# Patient Record
Sex: FEMALE | Race: BLACK OR AFRICAN AMERICAN | Employment: FULL TIME | URBAN - METROPOLITAN AREA
[De-identification: names, ages, dates, MRNs, and addresses within clinical notes are randomized per-mention and may not be internally consistent; named-entity substitution may affect disease eponyms.]

---

## 2017-01-09 ENCOUNTER — TELEPHONE (OUTPATIENT)
Dept: INTERNAL MEDICINE CLINIC | Facility: CLINIC | Age: 47
End: 2017-01-09

## 2017-01-09 DIAGNOSIS — E11.9 CONTROLLED TYPE 2 DIABETES MELLITUS WITHOUT COMPLICATION, WITHOUT LONG-TERM CURRENT USE OF INSULIN (HCC): Primary | ICD-10-CM

## 2017-01-09 NOTE — TELEPHONE ENCOUNTER
Call patient. I've gone ahead and ordered Diabetes-related labs to start out this calendar year on the right note. Get labs done ASAP. Loida Kaur. Go Murphy MD  Diplomate, American Board of Internal Medicine  [facility]  [address] [place] [address],[address], [address]

## 2017-01-18 RX ORDER — OMEPRAZOLE 20 MG/1
CAPSULE, DELAYED RELEASE ORAL
Qty: 90 CAPSULE | Refills: 0 | Status: SHIPPED | OUTPATIENT
Start: 2017-01-18 | End: 2017-02-20

## 2017-02-08 ENCOUNTER — TELEPHONE (OUTPATIENT)
Dept: INTERNAL MEDICINE CLINIC | Facility: CLINIC | Age: 47
End: 2017-02-08

## 2017-02-13 ENCOUNTER — APPOINTMENT (OUTPATIENT)
Dept: LAB | Age: 47
End: 2017-02-13
Attending: INTERNAL MEDICINE
Payer: COMMERCIAL

## 2017-02-13 DIAGNOSIS — E11.9 CONTROLLED TYPE 2 DIABETES MELLITUS WITHOUT COMPLICATION, WITHOUT LONG-TERM CURRENT USE OF INSULIN (HCC): ICD-10-CM

## 2017-02-13 LAB
ALBUMIN SERPL-MCNC: 3.3 G/DL (ref 3.5–4.8)
ALP LIVER SERPL-CCNC: 100 U/L (ref 39–100)
ALT SERPL-CCNC: 17 U/L (ref 14–54)
AST SERPL-CCNC: 12 U/L (ref 15–41)
BILIRUB SERPL-MCNC: 0.3 MG/DL (ref 0.1–2)
BUN BLD-MCNC: 14 MG/DL (ref 8–20)
CALCIUM BLD-MCNC: 8.7 MG/DL (ref 8.3–10.3)
CHLORIDE: 101 MMOL/L (ref 101–111)
CHOLEST SMN-MCNC: 157 MG/DL (ref ?–200)
CO2: 29 MMOL/L (ref 22–32)
CREAT BLD-MCNC: 1.1 MG/DL (ref 0.55–1.02)
CREAT UR-SCNC: 328 MG/DL
EST. AVERAGE GLUCOSE BLD GHB EST-MCNC: 123 MG/DL (ref 68–126)
GLUCOSE BLD-MCNC: 103 MG/DL (ref 70–99)
HBA1C MFR BLD HPLC: 5.9 % (ref ?–5.7)
HDLC SERPL-MCNC: 74 MG/DL (ref 45–?)
HDLC SERPL: 2.12 {RATIO} (ref ?–4.44)
LDLC SERPL CALC-MCNC: 67 MG/DL (ref ?–130)
M PROTEIN MFR SERPL ELPH: 7.1 G/DL (ref 6.1–8.3)
MICROALBUMIN UR-MCNC: 1.55 MG/DL
MICROALBUMIN/CREAT 24H UR-RTO: 4.7 UG/MG (ref ?–30)
NONHDLC SERPL-MCNC: 83 MG/DL (ref ?–130)
POTASSIUM SERPL-SCNC: 3.3 MMOL/L (ref 3.6–5.1)
SODIUM SERPL-SCNC: 137 MMOL/L (ref 136–144)
TRIGLYCERIDES: 79 MG/DL (ref ?–150)
VLDL: 16 MG/DL (ref 5–40)

## 2017-02-20 ENCOUNTER — OFFICE VISIT (OUTPATIENT)
Dept: INTERNAL MEDICINE CLINIC | Facility: CLINIC | Age: 47
End: 2017-02-20

## 2017-02-20 VITALS
SYSTOLIC BLOOD PRESSURE: 118 MMHG | BODY MASS INDEX: 46 KG/M2 | DIASTOLIC BLOOD PRESSURE: 74 MMHG | TEMPERATURE: 99 F | OXYGEN SATURATION: 98 % | WEIGHT: 255.5 LBS | RESPIRATION RATE: 12 BRPM | HEART RATE: 91 BPM

## 2017-02-20 DIAGNOSIS — C81.04 NODULAR LYMPHOCYTE PREDOMINANT HODGKIN LYMPHOMA OF LYMPH NODES OF AXILLA (HCC): ICD-10-CM

## 2017-02-20 DIAGNOSIS — I10 ESSENTIAL HYPERTENSION, BENIGN: ICD-10-CM

## 2017-02-20 DIAGNOSIS — E66.01 MORBID OBESITY WITH BMI OF 45.0-49.9, ADULT (HCC): ICD-10-CM

## 2017-02-20 DIAGNOSIS — G47.33 OSA ON CPAP: ICD-10-CM

## 2017-02-20 DIAGNOSIS — Z99.89 OSA ON CPAP: ICD-10-CM

## 2017-02-20 DIAGNOSIS — E11.9 CONTROLLED TYPE 2 DIABETES MELLITUS WITHOUT COMPLICATION, WITHOUT LONG-TERM CURRENT USE OF INSULIN (HCC): Primary | ICD-10-CM

## 2017-02-20 DIAGNOSIS — K21.00 REFLUX ESOPHAGITIS: ICD-10-CM

## 2017-02-20 DIAGNOSIS — Z12.31 ENCOUNTER FOR SCREENING MAMMOGRAM FOR BREAST CANCER: ICD-10-CM

## 2017-02-20 DIAGNOSIS — N92.6 ABNORMAL MENSES: ICD-10-CM

## 2017-02-20 DIAGNOSIS — J30.9 CHRONIC ALLERGIC RHINITIS: ICD-10-CM

## 2017-02-20 PROCEDURE — 99214 OFFICE O/P EST MOD 30 MIN: CPT | Performed by: INTERNAL MEDICINE

## 2017-02-20 RX ORDER — HYDROCHLOROTHIAZIDE 25 MG/1
25 TABLET ORAL
Qty: 90 TABLET | Refills: 1 | Status: SHIPPED | OUTPATIENT
Start: 2017-02-20 | End: 2017-06-13

## 2017-02-20 RX ORDER — OMEPRAZOLE 20 MG/1
CAPSULE, DELAYED RELEASE ORAL
Qty: 90 CAPSULE | Refills: 1 | Status: SHIPPED | OUTPATIENT
Start: 2017-02-20 | End: 2017-07-26

## 2017-02-20 RX ORDER — RANITIDINE 300 MG/1
300 TABLET ORAL NIGHTLY
Qty: 90 TABLET | Refills: 1 | Status: SHIPPED | OUTPATIENT
Start: 2017-02-20 | End: 2017-08-20

## 2017-02-20 NOTE — PROGRESS NOTES
Patient presents with:   Other: 6-month f/u chronic medical conditions and disease burden status      HPI: The pt presents today for 6-month f/u chronic medical conditions and disease burden status eval.  Specifically, her conditions include controlled DM2, Capsule Delayed Release, TAKE 1 CAPSULE BY MOUTH EVERY MORNING, Disp: 90 capsule, Rfl: 1  •  MetFORMIN HCl 500 MG Oral Tab, TAKE 1 TABLET(500 MG) BY MOUTH TWICE DAILY WITH MEALS, Disp: 180 tablet, Rfl: 1  •  RaNITidine HCl (ZANTAC) 300 MG Oral Tab, Take 1 pulses B       Labs:  Component      Latest Ref Rng 2/13/2017   Glucose      70-99 mg/dL 103 (H)   BUN      8-20 mg/dL 14   CREATININE      0.55-1.02 mg/dL 1.10 (H)   GFR      >=60 68   CALCIUM      8.3-10.3 mg/dL 8.7   ALKALINE PHOSPHATASE       U/L an active and healthy lifestyle, one that encompasses strong foundations of a mineral-dense and macronutritent-balanced diet, regular and dynamic exercise, maintenance of ideal body weight, and reduction in daily stressors while making themselves more resi

## 2017-03-02 ENCOUNTER — HOSPITAL ENCOUNTER (OUTPATIENT)
Dept: MAMMOGRAPHY | Age: 47
Discharge: HOME | End: 2017-03-02
Attending: INTERNAL MEDICINE
Payer: COMMERCIAL

## 2017-03-02 DIAGNOSIS — Z12.31 ENCOUNTER FOR SCREENING MAMMOGRAM FOR BREAST CANCER: ICD-10-CM

## 2017-03-02 PROCEDURE — 77063 BREAST TOMOSYNTHESIS BI: CPT

## 2017-03-02 PROCEDURE — 77067 SCR MAMMO BI INCL CAD: CPT

## 2017-03-11 ENCOUNTER — HOSPITAL ENCOUNTER (OUTPATIENT)
Age: 47
Discharge: HOME | End: 2017-03-11
Attending: FAMILY MEDICINE
Payer: COMMERCIAL

## 2017-03-11 VITALS
RESPIRATION RATE: 16 BRPM | SYSTOLIC BLOOD PRESSURE: 153 MMHG | DIASTOLIC BLOOD PRESSURE: 77 MMHG | BODY MASS INDEX: 45 KG/M2 | WEIGHT: 249 LBS | OXYGEN SATURATION: 99 % | HEART RATE: 70 BPM | TEMPERATURE: 99 F

## 2017-03-11 DIAGNOSIS — J30.9 ALLERGIC RHINITIS, UNSPECIFIED ALLERGIC RHINITIS TRIGGER, UNSPECIFIED RHINITIS SEASONALITY: Primary | ICD-10-CM

## 2017-03-11 DIAGNOSIS — J01.00 ACUTE MAXILLARY SINUSITIS, RECURRENCE NOT SPECIFIED: ICD-10-CM

## 2017-03-11 DIAGNOSIS — H10.33 ACUTE CONJUNCTIVITIS OF BOTH EYES, UNSPECIFIED ACUTE CONJUNCTIVITIS TYPE: ICD-10-CM

## 2017-03-11 DIAGNOSIS — H65.93 MIDDLE EAR EFFUSION, BILATERAL: ICD-10-CM

## 2017-03-11 PROCEDURE — 99204 OFFICE O/P NEW MOD 45 MIN: CPT

## 2017-03-11 PROCEDURE — 99213 OFFICE O/P EST LOW 20 MIN: CPT

## 2017-03-11 RX ORDER — AMOXICILLIN AND CLAVULANATE POTASSIUM 875; 125 MG/1; MG/1
1 TABLET, FILM COATED ORAL 2 TIMES DAILY
Qty: 20 TABLET | Refills: 0 | Status: SHIPPED | OUTPATIENT
Start: 2017-03-11 | End: 2017-03-21

## 2017-03-11 RX ORDER — CIPROFLOXACIN HYDROCHLORIDE 3.5 MG/ML
2 SOLUTION/ DROPS TOPICAL EVERY 8 HOURS
Qty: 1 BOTTLE | Refills: 0 | Status: SHIPPED | OUTPATIENT
Start: 2017-03-11 | End: 2017-03-18

## 2017-03-11 NOTE — ED PROVIDER NOTES
Patient Seen in: [facility]    History   Patient presents with:  Cough/URI    Stated Complaint: cold symptoms x2 weeks     HPI  78-year-old female coming in with complaints of cold and congestion, has been sick for the last 2 we nightly. hydrochlorothiazide 25 MG Oral Tab,  Take 1 tablet (25 mg total) by mouth once daily. Multiple Vitamins-Iron (ONCE DAILY/IRON) Oral Tab,  Take 1 tablet by mouth daily.    Azelastine HCl 0.05 % Ophthalmic Solution,  Place 1 drop into both eyes d Wt 112.946 kg  SpO2 99%  LMP 02/02/2017 (Approximate)    Physical Exam  GEN: Not in any acute distress, making good conversation, answering appropriately   SKIN: No pallor, no erythema, no cyanosis, warm and dry  Eyes: wnl, Eron conjunctiva, pupils equall phenylephrine if you have heart problems or blood pressure issues like hypertension. It will raise your blood pressure. · Pneumonia is a risk with a cold.  If your symptoms worsen, you become short-of-breath, develop fever, worse cough etc, you must follo

## 2017-03-16 ENCOUNTER — OFFICE VISIT (OUTPATIENT)
Dept: PHYSICAL THERAPY | Age: 47
End: 2017-03-16
Attending: ORTHOPAEDIC SURGERY
Payer: COMMERCIAL

## 2017-03-16 DIAGNOSIS — M25.561 RIGHT KNEE PAIN, UNSPECIFIED CHRONICITY: Primary | ICD-10-CM

## 2017-03-16 DIAGNOSIS — M17.0 PRIMARY OSTEOARTHRITIS OF BOTH KNEES: ICD-10-CM

## 2017-03-16 PROCEDURE — 97140 MANUAL THERAPY 1/> REGIONS: CPT

## 2017-03-16 PROCEDURE — 97110 THERAPEUTIC EXERCISES: CPT

## 2017-03-16 PROCEDURE — 97162 PT EVAL MOD COMPLEX 30 MIN: CPT

## 2017-03-16 NOTE — PROGRESS NOTES
KNEE EVALUATION:   Referring Physician: Dr. Jemal Villatoro  Diagnosis: R knee pain, B OA     Date of Service: 3/16/2017     PATIENT SUMMARY   Andrew Phelps is a 50year old female who presents to therapy today with complaints of B knee pain which insidiously began a medial joint line in R. Stiffness in distal ITB and hamstring.            A/PROM Strength    R L R L   Hip WNL WNL       Flexion   4 4     Extension   3+ 3+     Abduction   4- 4-   Knee         Flexion 105*/110* 118/122 4+ 4+     Extension 0 0 4 4   Foot / questions, please contact me at Dept: [phone]    Sincerely,  Electronically signed by therapist: Cameron Montano PT

## 2017-03-23 ENCOUNTER — OFFICE VISIT (OUTPATIENT)
Dept: PHYSICAL THERAPY | Age: 47
End: 2017-03-23
Attending: ORTHOPAEDIC SURGERY
Payer: COMMERCIAL

## 2017-03-23 PROCEDURE — 97140 MANUAL THERAPY 1/> REGIONS: CPT

## 2017-03-23 PROCEDURE — 97110 THERAPEUTIC EXERCISES: CPT

## 2017-03-23 NOTE — PROGRESS NOTES
Dx: R knee pain, B knee OA         Authorized # of Visits:  10         Next MD visit: none scheduled  Fall Risk: standard         Precautions: n/a             Subjective: Pt reports she wasn't too sore after the eval.  Objective: (see flow sheet below)

## 2017-03-28 ENCOUNTER — APPOINTMENT (OUTPATIENT)
Dept: PHYSICAL THERAPY | Age: 47
End: 2017-03-28
Attending: ORTHOPAEDIC SURGERY
Payer: COMMERCIAL

## 2017-04-04 ENCOUNTER — OFFICE VISIT (OUTPATIENT)
Dept: PHYSICAL THERAPY | Age: 47
End: 2017-04-04
Attending: ORTHOPAEDIC SURGERY
Payer: COMMERCIAL

## 2017-04-04 PROCEDURE — 97140 MANUAL THERAPY 1/> REGIONS: CPT

## 2017-04-04 PROCEDURE — 97110 THERAPEUTIC EXERCISES: CPT

## 2017-04-04 NOTE — PROGRESS NOTES
Dx: R knee pain, B knee OA         Authorized # of Visits:  10         Next MD visit: none scheduled  Fall Risk: standard         Precautions: n/a             Subjective: Pt reports she is having more good days, but she still notes R knee pain after increa min, Gr II-III 4 min, Gr II-III 4 min, Gr II-III

## 2017-04-06 ENCOUNTER — OFFICE VISIT (OUTPATIENT)
Dept: PHYSICAL THERAPY | Age: 47
End: 2017-04-06
Attending: ORTHOPAEDIC SURGERY
Payer: COMMERCIAL

## 2017-04-06 PROCEDURE — 97140 MANUAL THERAPY 1/> REGIONS: CPT

## 2017-04-06 PROCEDURE — 97110 THERAPEUTIC EXERCISES: CPT

## 2017-04-06 NOTE — PROGRESS NOTES
Dx: R knee pain, B knee OA         Authorized # of Visits:  10         Next MD visit: none scheduled  Fall Risk: standard         Precautions: n/a             Subjective:  Pt reports her knees are feeling okay. They are a little sore today.   Objective: (s inch  R: x20, 4 inch   Backward walkout, low incline    X10, 30#   Step over, R/L    X10, 4 inch   Note: exercises with (*) are part of the patient's HEP and with (-) are not completed that visit    Manual:   Date:   Tx#:  1 3/23/2017  Tx#: 2  4/4/2017  Tx#

## 2017-04-11 ENCOUNTER — OFFICE VISIT (OUTPATIENT)
Dept: PHYSICAL THERAPY | Age: 47
End: 2017-04-11
Attending: ORTHOPAEDIC SURGERY
Payer: COMMERCIAL

## 2017-04-11 PROCEDURE — 97110 THERAPEUTIC EXERCISES: CPT

## 2017-04-11 PROCEDURE — 97140 MANUAL THERAPY 1/> REGIONS: CPT

## 2017-04-11 NOTE — PROGRESS NOTES
Dx: R knee pain, B knee OA         Authorized # of Visits:  10         Next MD visit: none scheduled  Fall Risk: standard         Precautions: n/a             Subjective: Pt reports she has been doing more stairs lately and feels stiffness in the knees.   O x20, 4 inch -   Backward walkout, low incline    X10, 30# X10, 35#   Step over, R/L    X10, 4 inch X20, 4 inch   Stair tap, SLS balance, R/L     X20, 4 inch   Note: exercises with (*) are part of the patient's HEP and with (-) are not completed that visit

## 2017-04-13 ENCOUNTER — APPOINTMENT (OUTPATIENT)
Dept: PHYSICAL THERAPY | Age: 47
End: 2017-04-13
Attending: ORTHOPAEDIC SURGERY
Payer: COMMERCIAL

## 2017-04-18 ENCOUNTER — OFFICE VISIT (OUTPATIENT)
Dept: PHYSICAL THERAPY | Age: 47
End: 2017-04-18
Attending: ORTHOPAEDIC SURGERY
Payer: COMMERCIAL

## 2017-04-18 PROCEDURE — 97140 MANUAL THERAPY 1/> REGIONS: CPT

## 2017-04-18 PROCEDURE — 97110 THERAPEUTIC EXERCISES: CPT

## 2017-04-18 NOTE — PROGRESS NOTES
Dx: R knee pain, B knee OA         Authorized # of Visits:  10         Next MD visit: none scheduled  Fall Risk: standard         Precautions: n/a             Subjective: Pt reports her knees have been feeling okay.   Just on/off symptoms, but it is not con X20, red *X20, red -   Side stepping   *X3, 10 ft, red X3, 10 ft, red X3, 10 ft, red -   Lat step up, R/L   L: X20, 4 inch  R: x10, 4 inch  L: X20, 4 inch  R: x20, 4 inch - Dip: 2x10, 4 inch   Backward walkout, low incline    X10, 30# X10, 35# X10, 35#   S

## 2017-04-25 ENCOUNTER — OFFICE VISIT (OUTPATIENT)
Dept: PHYSICAL THERAPY | Age: 47
End: 2017-04-25
Attending: ORTHOPAEDIC SURGERY
Payer: COMMERCIAL

## 2017-04-25 PROCEDURE — 97110 THERAPEUTIC EXERCISES: CPT

## 2017-04-25 PROCEDURE — 97140 MANUAL THERAPY 1/> REGIONS: CPT

## 2017-04-25 NOTE — PROGRESS NOTES
Discharge Summary  Initial Functional Outcome Score 44/100  Final Functional Outcome Score 64/100  Number of Visits Attended 7 in Physical Therapy  Pt reports pain is 2/10 at most.  She feels she has returned to her PLOF 100%.   Assessment: Pt presents with PT    Charges: 1 Man, 2 TherEx       Total Timed Treatment: 35 min  Total Treatment Time: 35 min (decreased treatment time as pt was 15 min late)  Exercises:   Date: 3/16/2017  Tx#:   1 3/23/2017  Tx#: 2  4/4/2017  Tx#: 3 4/6/2017  Tx#: 4 4/11/2017  Tx#: 5 tendon 10 min, patellar tendon 8 min   Knee traction R/L 3 min 3 min - - - - -   Patellar mob- R/L med glide with lat lift 2 min, Gr II-III 4 min, Gr II-III 4 min, Gr II-III 2 min, Gr II-III 5 min, Gr II-III 5 min, Gr II-III 2 min, Gr III

## 2017-04-26 ENCOUNTER — OFFICE VISIT (OUTPATIENT)
Dept: INTERNAL MEDICINE CLINIC | Facility: CLINIC | Age: 47
End: 2017-04-26

## 2017-04-26 VITALS
HEIGHT: 62 IN | HEART RATE: 81 BPM | RESPIRATION RATE: 20 BRPM | OXYGEN SATURATION: 99 % | BODY MASS INDEX: 47.11 KG/M2 | SYSTOLIC BLOOD PRESSURE: 132 MMHG | WEIGHT: 256 LBS | DIASTOLIC BLOOD PRESSURE: 84 MMHG | TEMPERATURE: 98 F

## 2017-04-26 DIAGNOSIS — J32.9 RECURRENT SINUS INFECTIONS: Primary | ICD-10-CM

## 2017-04-26 PROCEDURE — 99213 OFFICE O/P EST LOW 20 MIN: CPT | Performed by: INTERNAL MEDICINE

## 2017-04-26 RX ORDER — AMOXICILLIN AND CLAVULANATE POTASSIUM 875; 125 MG/1; MG/1
1 TABLET, FILM COATED ORAL 2 TIMES DAILY
Qty: 20 TABLET | Refills: 0 | Status: SHIPPED | OUTPATIENT
Start: 2017-04-26 | End: 2017-05-06

## 2017-04-26 NOTE — PROGRESS NOTES
Katrinka Bernheim is a 50year old female. HPI:   Patient presents with:  Sinus Problem: gums/teeth pain, headaches, coughing, poor hearing both ears, coughing, 2 1/2 weeks   Patient presents with acute respiratory symptoms.   Pain in her teeth, headaches, Rfl: 1  •  hydrochlorothiazide 25 MG Oral Tab, Take 1 tablet (25 mg total) by mouth once daily. , Disp: 90 tablet, Rfl: 1  •  Multiple Vitamins-Iron (ONCE DAILY/IRON) Oral Tab, Take 1 tablet by mouth daily. , Disp: , Rfl:   •  Azelastine HCl 0.05 % Ophthalmi kg/m2  SpO2 99%  LMP 04/24/2017  Breastfeeding? No  GENERAL: Alert and oriented, well developed, well nourished,in no apparent distress  HEENT: atraumatic, PERRLA, EOMI, normal lid and conjunctiva.   Bilateral sinus tenderness  LUNGS: clear to auscultation

## 2017-04-26 NOTE — PATIENT INSTRUCTIONS
- Take antibiotic (Augmentin) twice daily for the next 10 days  - Use Flonase twice daily (AM and PM)  - Consider switching to a different over the counter daily allergy medications. Options include Claritin (loratadine) or Xyzal (levocetirizine).     - If

## 2017-05-05 ENCOUNTER — LAB ENCOUNTER (OUTPATIENT)
Dept: LAB | Age: 47
End: 2017-05-05
Attending: OBSTETRICS & GYNECOLOGY
Payer: COMMERCIAL

## 2017-05-05 ENCOUNTER — OFFICE VISIT (OUTPATIENT)
Dept: OBGYN CLINIC | Facility: CLINIC | Age: 47
End: 2017-05-05

## 2017-05-05 VITALS
DIASTOLIC BLOOD PRESSURE: 70 MMHG | WEIGHT: 252 LBS | HEIGHT: 62 IN | BODY MASS INDEX: 46.38 KG/M2 | HEART RATE: 79 BPM | SYSTOLIC BLOOD PRESSURE: 136 MMHG

## 2017-05-05 DIAGNOSIS — N92.6 IRREGULAR MENSES: Primary | ICD-10-CM

## 2017-05-05 DIAGNOSIS — N92.6 IRREGULAR MENSES: ICD-10-CM

## 2017-05-05 DIAGNOSIS — R10.2 PELVIC PAIN: ICD-10-CM

## 2017-05-05 PROCEDURE — 36415 COLL VENOUS BLD VENIPUNCTURE: CPT | Performed by: OBSTETRICS & GYNECOLOGY

## 2017-05-05 PROCEDURE — 84443 ASSAY THYROID STIM HORMONE: CPT | Performed by: OBSTETRICS & GYNECOLOGY

## 2017-05-05 PROCEDURE — 85025 COMPLETE CBC W/AUTO DIFF WBC: CPT | Performed by: OBSTETRICS & GYNECOLOGY

## 2017-05-05 PROCEDURE — 99203 OFFICE O/P NEW LOW 30 MIN: CPT | Performed by: OBSTETRICS & GYNECOLOGY

## 2017-05-05 NOTE — PROGRESS NOTES
GYN H&P     2017  5:29 PM    CC: Patient presents with: Other: irregular cycles 14 to 90 days / abdominal pain / pain in groin      HPI: patient is a 50year old  here for Patient presents with:   Other: irregular cycles 14 to 90 days / Itzel Kat left                         TONSILLECTOMY  2000    Comment w/ adenoidectomy    OTHER SURGICAL HISTORY  2009    Comment uterine myomectomy for uterine fibroids    ROGELIO BIOPSY STEREOTACTIC NODULE 2 SITE BILAT  2009    Comment NON HODGKINS LYMPHOMA/RADIATION visit.   Family History   Problem Relation Age of Onset   • Anemia Mother    • Diabetes Mother    • Mari Aschoff Mother    • Hypertension Father    • Cancer Father      lung   • Heart Attack Father      transient Ischemic Attack   • Cancer Maternal well supported, urethra wnl, no lesions or fissures                     Vagina: normal pink mucosa, no lesions, normal clear discharge.                       Uterus:  mobile, non tender, Difficult to assess size due to body habitus                     Sally [place]

## 2017-05-16 ENCOUNTER — OFFICE VISIT (OUTPATIENT)
Dept: INTERNAL MEDICINE CLINIC | Facility: CLINIC | Age: 47
End: 2017-05-16

## 2017-05-16 VITALS
BODY MASS INDEX: 46.74 KG/M2 | WEIGHT: 254 LBS | HEIGHT: 62 IN | RESPIRATION RATE: 16 BRPM | HEART RATE: 86 BPM | DIASTOLIC BLOOD PRESSURE: 72 MMHG | SYSTOLIC BLOOD PRESSURE: 122 MMHG

## 2017-05-16 DIAGNOSIS — E11.9 CONTROLLED TYPE 2 DIABETES MELLITUS WITHOUT COMPLICATION, WITHOUT LONG-TERM CURRENT USE OF INSULIN (HCC): ICD-10-CM

## 2017-05-16 DIAGNOSIS — E66.01 OBESITY, CLASS III, BMI 40-49.9 (MORBID OBESITY) (HCC): ICD-10-CM

## 2017-05-16 DIAGNOSIS — R94.31 ABNORMAL EKG: ICD-10-CM

## 2017-05-16 DIAGNOSIS — Z99.89 OSA ON CPAP: ICD-10-CM

## 2017-05-16 DIAGNOSIS — G47.33 OSA ON CPAP: ICD-10-CM

## 2017-05-16 DIAGNOSIS — Z51.81 ENCOUNTER FOR THERAPEUTIC DRUG MONITORING: Primary | ICD-10-CM

## 2017-05-16 PROBLEM — E66.813 OBESITY, CLASS III, BMI 40-49.9 (MORBID OBESITY): Status: ACTIVE | Noted: 2017-05-16

## 2017-05-16 PROCEDURE — 93000 ELECTROCARDIOGRAM COMPLETE: CPT | Performed by: INTERNAL MEDICINE

## 2017-05-16 PROCEDURE — 99214 OFFICE O/P EST MOD 30 MIN: CPT | Performed by: INTERNAL MEDICINE

## 2017-05-16 NOTE — PROGRESS NOTES
CC: Patient presents with:  Weight Problem       HPI:   1. Obesity, wt gain over the past 15 years, severity moderate in nature, with associated back pain, have tried wt watchers with some help. 2. ANAHY, doing well on cpap machine.    3. DM, doing well Reflux esophagitis     [facility]'s [facility] ([facility]     Eczema     Essential hypertension, benign     Diabetes mellitus type 2, controlled, without complications (HCC)     Chronic allergic rhinitis     Allergic rhinitis due to pollen     Allergic rhinitis due t mellitus without complication, without long-term current use of insulin (HCC)  Abnormal EKG     PLAN:  1. Obesity, Class III, BMI 40-49.9 (morbid obesity), consulted on nutrition and working out. Will send to dietician.  Increased stress, consulted on medit

## 2017-05-23 ENCOUNTER — TELEPHONE (OUTPATIENT)
Dept: OBGYN CLINIC | Facility: CLINIC | Age: 47
End: 2017-05-23

## 2017-05-23 DIAGNOSIS — R10.2 PELVIC PAIN: Primary | ICD-10-CM

## 2017-05-23 DIAGNOSIS — N92.6 IRREGULAR MENSES: ICD-10-CM

## 2017-05-25 ENCOUNTER — HOSPITAL ENCOUNTER (OUTPATIENT)
Dept: ULTRASOUND IMAGING | Facility: HOSPITAL | Age: 47
Discharge: HOME | End: 2017-05-25
Attending: OBSTETRICS & GYNECOLOGY
Payer: COMMERCIAL

## 2017-05-25 ENCOUNTER — HOSPITAL ENCOUNTER (OUTPATIENT)
Dept: CV DIAGNOSTICS | Facility: HOSPITAL | Age: 47
Discharge: HOME | End: 2017-05-25
Attending: INTERNAL MEDICINE
Payer: COMMERCIAL

## 2017-05-25 ENCOUNTER — APPOINTMENT (OUTPATIENT)
Dept: LAB | Facility: HOSPITAL | Age: 47
End: 2017-05-25
Attending: INTERNAL MEDICINE
Payer: COMMERCIAL

## 2017-05-25 DIAGNOSIS — G47.33 OSA ON CPAP: ICD-10-CM

## 2017-05-25 DIAGNOSIS — E66.01 OBESITY, CLASS III, BMI 40-49.9 (MORBID OBESITY) (HCC): ICD-10-CM

## 2017-05-25 DIAGNOSIS — R10.2 PELVIC PAIN: ICD-10-CM

## 2017-05-25 DIAGNOSIS — N92.6 IRREGULAR MENSES: ICD-10-CM

## 2017-05-25 DIAGNOSIS — E11.9 CONTROLLED TYPE 2 DIABETES MELLITUS WITHOUT COMPLICATION, WITHOUT LONG-TERM CURRENT USE OF INSULIN (HCC): ICD-10-CM

## 2017-05-25 DIAGNOSIS — Z99.89 OSA ON CPAP: ICD-10-CM

## 2017-05-25 DIAGNOSIS — Z51.81 ENCOUNTER FOR THERAPEUTIC DRUG MONITORING: ICD-10-CM

## 2017-05-25 DIAGNOSIS — R94.31 ABNORMAL EKG: ICD-10-CM

## 2017-05-25 PROCEDURE — 93017 CV STRESS TEST TRACING ONLY: CPT | Performed by: INTERNAL MEDICINE

## 2017-05-25 PROCEDURE — 76856 US EXAM PELVIC COMPLETE: CPT | Performed by: OBSTETRICS & GYNECOLOGY

## 2017-05-25 PROCEDURE — 93018 CV STRESS TEST I&R ONLY: CPT | Performed by: INTERNAL MEDICINE

## 2017-05-25 PROCEDURE — 82397 CHEMILUMINESCENT ASSAY: CPT

## 2017-05-25 PROCEDURE — 76830 TRANSVAGINAL US NON-OB: CPT | Performed by: OBSTETRICS & GYNECOLOGY

## 2017-05-25 PROCEDURE — 82607 VITAMIN B-12: CPT

## 2017-05-25 PROCEDURE — 36415 COLL VENOUS BLD VENIPUNCTURE: CPT

## 2017-05-25 PROCEDURE — 82306 VITAMIN D 25 HYDROXY: CPT

## 2017-05-25 PROCEDURE — 93350 STRESS TTE ONLY: CPT | Performed by: INTERNAL MEDICINE

## 2017-05-25 PROCEDURE — 80048 BASIC METABOLIC PNL TOTAL CA: CPT

## 2017-05-26 ENCOUNTER — TELEPHONE (OUTPATIENT)
Dept: OBGYN CLINIC | Facility: CLINIC | Age: 47
End: 2017-05-26

## 2017-05-26 NOTE — PROGRESS NOTES
Quick Note:    Patient notified. Patient verbalized understanding. Pt has no symptoms at this time.     ______

## 2017-06-07 ENCOUNTER — OFFICE VISIT (OUTPATIENT)
Dept: INTERNAL MEDICINE CLINIC | Facility: CLINIC | Age: 47
End: 2017-06-07

## 2017-06-07 VITALS
WEIGHT: 252 LBS | OXYGEN SATURATION: 98 % | RESPIRATION RATE: 12 BRPM | SYSTOLIC BLOOD PRESSURE: 128 MMHG | HEIGHT: 62 IN | HEART RATE: 84 BPM | TEMPERATURE: 98 F | DIASTOLIC BLOOD PRESSURE: 78 MMHG | BODY MASS INDEX: 46.38 KG/M2

## 2017-06-07 DIAGNOSIS — E66.01 OBESITY, CLASS III, BMI 40-49.9 (MORBID OBESITY) (HCC): ICD-10-CM

## 2017-06-07 DIAGNOSIS — R42 VERTIGO: Primary | ICD-10-CM

## 2017-06-07 DIAGNOSIS — I10 ESSENTIAL HYPERTENSION, BENIGN: ICD-10-CM

## 2017-06-07 DIAGNOSIS — J30.9 CHRONIC ALLERGIC RHINITIS: ICD-10-CM

## 2017-06-07 PROCEDURE — 99214 OFFICE O/P EST MOD 30 MIN: CPT | Performed by: INTERNAL MEDICINE

## 2017-06-07 RX ORDER — MECLIZINE HYDROCHLORIDE 25 MG/1
25 TABLET ORAL 3 TIMES DAILY PRN
Qty: 15 TABLET | Refills: 0 | Status: SHIPPED | OUTPATIENT
Start: 2017-06-07 | End: 2018-07-25 | Stop reason: ALTCHOICE

## 2017-06-07 NOTE — PROGRESS NOTES
[facility] is a 50year old female. HPI:   Patient presents with:  Vertigo  Patient presents with vertiginous symptoms. Symptoms started this morning. Room spinning around her, mild nausea. Has happened before.   Symptoms triggered by bending ove daily., Disp: 90 tablet, Rfl: 1  •  Multiple Vitamins-Iron (ONCE DAILY/IRON) Oral Tab, Take 1 tablet by mouth daily. , Disp: , Rfl:   •  Azelastine HCl 0.05 % Ophthalmic Solution, Place 1 drop into both eyes daily. , Disp: 1 Bottle, Rfl: 0  •  JOLIVETTE 0.35 Resp 12  Ht 62\"  Wt 252 lb  BMI 46.08 kg/m2  SpO2 98%  LMP 06/02/2017 (Exact Date)  Breastfeeding?  No  GENERAL: Alert and oriented, well developed, well nourished,in no apparent distress  HEENT: atraumatic, PERRLA, EOMI, normal lid and conjunctiva  LUNGS the plan. The patient is asked to return to clinic in 3-6 months with Dr. Nely Veliz MD for follow up on chronic issues, or earlier if acute issues arise.     Jerome Guzman MD

## 2017-06-07 NOTE — PATIENT INSTRUCTIONS
- Take meclizine as needed (1 tab every 8 hours)  - Follow up with [facility]  - Consider ENT follow up for allergy symptoms. It was a pleasure seeing you in the clinic today.   Thank you for choosing the [facility]

## 2017-06-08 ENCOUNTER — OFFICE VISIT (OUTPATIENT)
Dept: INTERNAL MEDICINE CLINIC | Facility: CLINIC | Age: 47
End: 2017-06-08

## 2017-06-08 ENCOUNTER — OFFICE VISIT (OUTPATIENT)
Dept: PHYSICAL THERAPY | Age: 47
End: 2017-06-08
Attending: INTERNAL MEDICINE
Payer: COMMERCIAL

## 2017-06-08 VITALS — BODY MASS INDEX: 47 KG/M2 | WEIGHT: 255 LBS

## 2017-06-08 DIAGNOSIS — E66.01 OBESITY, CLASS III, BMI 40-49.9 (MORBID OBESITY) (HCC): ICD-10-CM

## 2017-06-08 PROCEDURE — 97802 MEDICAL NUTRITION INDIV IN: CPT | Performed by: DIETITIAN, REGISTERED

## 2017-06-08 PROCEDURE — 97161 PT EVAL LOW COMPLEX 20 MIN: CPT

## 2017-06-08 NOTE — PROGRESS NOTES
VESTIBULAR EVALUATION:   Referring Physician: Dr. Hardeep Bliss  Diagnosis: Vertigo     Date of Service: 6/8/2017     PATIENT Shaquille Tavarez is a 50year old y/o female who presents to therapy today with complaints of dizziness.  She has had this befor symptoms do not return pt has no further need for skilled intervention and will be discharged to [facility], however, if symptoms return in next 1-2 weeks will continue reassessment and treatment as necessary and appropriate.    Jose Roberto Garg would benefit from skilled Ph over a 90 day period. Treatment will include: Neuromuscular Re-education;  Therapeutic Exercises; Gait Training; Manual Therapy; modalities as indicated    Education or treatment limitation: None  Rehab Potential: good    Patient/Family/Caregiver was advise

## 2017-06-08 NOTE — PROGRESS NOTES
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  Dis Current Diet: General, has multiple food dislikes. Concerned about trying unfamiliar foods. Odors and appearance of foods related to food likes/dislkes. Food/Beverage Intake: Written journal  Breakfast: Skips most days.   Sleeps in during the summe decreased. Currently is trying to walk more and do yard work. Discussed importance of following a structured eating pattern. Encouraged a preliminary goal of eating breakfast daily. Sample meals were provided.     Goals:   · Eat breakfast daily  · Incre

## 2017-06-12 NOTE — PROGRESS NOTES
Quick Note:    Elevated leptin level  Low potassium, increase potassium rich foods repeat BMP in 2 weeks  Low Vit D, begin 50,000 U twice weekly for 6 months, and then OTC 1,000 U daily  F/U as directed  ______

## 2017-06-13 ENCOUNTER — OFFICE VISIT (OUTPATIENT)
Dept: PHYSICAL THERAPY | Age: 47
End: 2017-06-13
Attending: INTERNAL MEDICINE
Payer: COMMERCIAL

## 2017-06-13 ENCOUNTER — OFFICE VISIT (OUTPATIENT)
Dept: INTERNAL MEDICINE CLINIC | Facility: CLINIC | Age: 47
End: 2017-06-13

## 2017-06-13 VITALS
BODY MASS INDEX: 46.56 KG/M2 | WEIGHT: 253 LBS | HEART RATE: 80 BPM | RESPIRATION RATE: 16 BRPM | HEIGHT: 62 IN | SYSTOLIC BLOOD PRESSURE: 122 MMHG | DIASTOLIC BLOOD PRESSURE: 80 MMHG

## 2017-06-13 DIAGNOSIS — E11.9 CONTROLLED TYPE 2 DIABETES MELLITUS WITHOUT COMPLICATION, WITHOUT LONG-TERM CURRENT USE OF INSULIN (HCC): ICD-10-CM

## 2017-06-13 DIAGNOSIS — R42 VERTIGO: Primary | ICD-10-CM

## 2017-06-13 DIAGNOSIS — E87.6 HYPOKALEMIA: ICD-10-CM

## 2017-06-13 DIAGNOSIS — Z51.81 ENCOUNTER FOR THERAPEUTIC DRUG MONITORING: Primary | ICD-10-CM

## 2017-06-13 DIAGNOSIS — R79.89 LOW VITAMIN D LEVEL: ICD-10-CM

## 2017-06-13 DIAGNOSIS — E66.01 OBESITY, CLASS III, BMI 40-49.9 (MORBID OBESITY) (HCC): ICD-10-CM

## 2017-06-13 DIAGNOSIS — I10 ESSENTIAL HYPERTENSION, BENIGN: ICD-10-CM

## 2017-06-13 PROCEDURE — 99214 OFFICE O/P EST MOD 30 MIN: CPT | Performed by: INTERNAL MEDICINE

## 2017-06-13 PROCEDURE — 97112 NEUROMUSCULAR REEDUCATION: CPT

## 2017-06-13 RX ORDER — ERGOCALCIFEROL 1.25 MG/1
50000 CAPSULE ORAL
Qty: 8 CAPSULE | Refills: 5 | Status: SHIPPED | OUTPATIENT
Start: 2017-06-15 | End: 2017-12-04

## 2017-06-13 NOTE — PROGRESS NOTES
Dx: Marquis Wilde         Authorized # of Visits:  ANNALEEO- latisha         Next MD visit: TBA  Fall Risk: standard       Precautions: none             Subjective: Pt reports dizziness 1x last week and then 1x last night.  She notes rolling over and that helps as

## 2017-06-13 NOTE — PROGRESS NOTES
CC: Patient presents with:  Weight Check: down 1 lb       HPI:   1. Obesity, Class III, BMI 40-49.9 (morbid obesity), watching diet. Saw dietician.    2. Controlled type 2 diabetes mellitus without complication, without long-term current use of insuli Cancer ([facility] 2010     Predominant Hodgkin's Lymphoma (in remission)   • Diabetes ([facility]    • Sleep apnea    • High blood pressure    • Fibroids    • Abnormal uterine bleeding    • Amenorrhea    • Anemia    • Anxiety    • Depression    • Dysmenorrhea    • Se ([address]  Hypokalemia  Essential hypertension, benign     PLAN:  1. Obesity, Class III, BMI 40-49.9 (morbid obesity), pt with 1 Ib with 1 month of watching diet. Saw dietician. Stress echo is ok. Will consider phentermine next visit. Labs show high leptin.

## 2017-06-15 ENCOUNTER — APPOINTMENT (OUTPATIENT)
Dept: PHYSICAL THERAPY | Age: 47
End: 2017-06-15
Attending: INTERNAL MEDICINE
Payer: COMMERCIAL

## 2017-06-19 ENCOUNTER — APPOINTMENT (OUTPATIENT)
Dept: PHYSICAL THERAPY | Age: 47
End: 2017-06-19
Attending: INTERNAL MEDICINE
Payer: COMMERCIAL

## 2017-06-21 ENCOUNTER — APPOINTMENT (OUTPATIENT)
Dept: PHYSICAL THERAPY | Age: 47
End: 2017-06-21
Attending: INTERNAL MEDICINE
Payer: COMMERCIAL

## 2017-06-26 ENCOUNTER — APPOINTMENT (OUTPATIENT)
Dept: PHYSICAL THERAPY | Age: 47
End: 2017-06-26
Attending: INTERNAL MEDICINE
Payer: COMMERCIAL

## 2017-06-28 ENCOUNTER — APPOINTMENT (OUTPATIENT)
Dept: PHYSICAL THERAPY | Age: 47
End: 2017-06-28
Attending: INTERNAL MEDICINE
Payer: COMMERCIAL

## 2017-07-07 ENCOUNTER — OFFICE VISIT (OUTPATIENT)
Dept: INTERNAL MEDICINE CLINIC | Facility: CLINIC | Age: 47
End: 2017-07-07

## 2017-07-07 VITALS — WEIGHT: 253 LBS | BODY MASS INDEX: 46 KG/M2

## 2017-07-07 DIAGNOSIS — E66.01 OBESITY, CLASS III, BMI 40-49.9 (MORBID OBESITY) (HCC): ICD-10-CM

## 2017-07-07 PROBLEM — J30.89 ALLERGIC RHINITIS DUE TO HOUSE DUST MITE: Status: ACTIVE | Noted: 2017-07-07

## 2017-07-07 PROBLEM — J30.81 ALLERGIC RHINITIS DUE TO ANIMAL (CAT) (DOG) HAIR AND DANDER: Status: ACTIVE | Noted: 2017-07-07

## 2017-07-07 PROBLEM — J30.89 ALLERGIC RHINITIS DUE TO MOLD: Status: ACTIVE | Noted: 2017-07-07

## 2017-07-07 PROCEDURE — 97803 MED NUTRITION INDIV SUBSEQ: CPT | Performed by: DIETITIAN, REGISTERED

## 2017-07-07 NOTE — PROGRESS NOTES
FOLLOW UP NUTRITION CONSULTATION    Nutrition Assessment    Number of consultations with dietitian: 2    Height:  Ht Readings from Last 1 Encounters:  06/13/17 : 62\"      Weight:   Wt Readings from Last 2 Encounters:  07/07/17 : 253 lb  06/13/17 : 253

## 2017-07-10 ENCOUNTER — APPOINTMENT (OUTPATIENT)
Dept: LAB | Age: 47
End: 2017-07-10
Attending: INTERNAL MEDICINE
Payer: COMMERCIAL

## 2017-07-10 DIAGNOSIS — Z51.81 ENCOUNTER FOR THERAPEUTIC DRUG MONITORING: ICD-10-CM

## 2017-07-10 DIAGNOSIS — E66.01 OBESITY, CLASS III, BMI 40-49.9 (MORBID OBESITY) (HCC): ICD-10-CM

## 2017-07-10 DIAGNOSIS — I10 ESSENTIAL HYPERTENSION, BENIGN: ICD-10-CM

## 2017-07-10 DIAGNOSIS — R79.89 LOW VITAMIN D LEVEL: ICD-10-CM

## 2017-07-10 DIAGNOSIS — E87.6 HYPOKALEMIA: ICD-10-CM

## 2017-07-10 DIAGNOSIS — E11.9 CONTROLLED TYPE 2 DIABETES MELLITUS WITHOUT COMPLICATION, WITHOUT LONG-TERM CURRENT USE OF INSULIN (HCC): ICD-10-CM

## 2017-07-10 DIAGNOSIS — D50.0 IRON DEFICIENCY ANEMIA DUE TO CHRONIC BLOOD LOSS: ICD-10-CM

## 2017-07-10 LAB
BUN BLD-MCNC: 12 MG/DL (ref 8–20)
CALCIUM BLD-MCNC: 9.1 MG/DL (ref 8.3–10.3)
CHLORIDE: 106 MMOL/L (ref 101–111)
CO2: 25 MMOL/L (ref 22–32)
CREAT BLD-MCNC: 1.39 MG/DL (ref 0.55–1.02)
GLUCOSE BLD-MCNC: 120 MG/DL (ref 70–99)
POTASSIUM SERPL-SCNC: 3.9 MMOL/L (ref 3.6–5.1)
SODIUM SERPL-SCNC: 140 MMOL/L (ref 136–144)

## 2017-07-10 PROCEDURE — 80048 BASIC METABOLIC PNL TOTAL CA: CPT | Performed by: INTERNAL MEDICINE

## 2017-07-10 PROCEDURE — 36415 COLL VENOUS BLD VENIPUNCTURE: CPT | Performed by: INTERNAL MEDICINE

## 2017-07-14 ENCOUNTER — OFFICE VISIT (OUTPATIENT)
Dept: INTERNAL MEDICINE CLINIC | Facility: CLINIC | Age: 47
End: 2017-07-14

## 2017-07-14 VITALS
HEIGHT: 62 IN | WEIGHT: 253 LBS | SYSTOLIC BLOOD PRESSURE: 130 MMHG | HEART RATE: 78 BPM | DIASTOLIC BLOOD PRESSURE: 80 MMHG | BODY MASS INDEX: 46.56 KG/M2 | RESPIRATION RATE: 16 BRPM

## 2017-07-14 DIAGNOSIS — R79.89 LOW VITAMIN D LEVEL: ICD-10-CM

## 2017-07-14 DIAGNOSIS — E87.6 HYPOKALEMIA: ICD-10-CM

## 2017-07-14 DIAGNOSIS — E66.01 OBESITY, CLASS III, BMI 40-49.9 (MORBID OBESITY) (HCC): ICD-10-CM

## 2017-07-14 DIAGNOSIS — I10 ESSENTIAL HYPERTENSION, BENIGN: ICD-10-CM

## 2017-07-14 DIAGNOSIS — Z51.81 ENCOUNTER FOR THERAPEUTIC DRUG MONITORING: Primary | ICD-10-CM

## 2017-07-14 DIAGNOSIS — E11.9 CONTROLLED TYPE 2 DIABETES MELLITUS WITHOUT COMPLICATION, WITHOUT LONG-TERM CURRENT USE OF INSULIN (HCC): ICD-10-CM

## 2017-07-14 PROCEDURE — 99214 OFFICE O/P EST MOD 30 MIN: CPT | Performed by: INTERNAL MEDICINE

## 2017-07-14 RX ORDER — PHENTERMINE HYDROCHLORIDE 37.5 MG/1
37.5 TABLET ORAL
Qty: 30 TABLET | Refills: 0 | Status: SHIPPED | OUTPATIENT
Start: 2017-07-14 | End: 2017-08-15

## 2017-07-14 NOTE — PROGRESS NOTES
CC: Patient presents with:  Weight Check: same       HPI:   1. Obesity, Class III, BMI 40-49.9 (morbid obesity), pt watching diet, trying to work out more.     2. Controlled type 2 diabetes mellitus without complication, without long-term current use sprays in each nostril daily as needed Disp: 1 Bottle Rfl: 0   Cetirizine HCl (ZYRTEC ALLERGY) 10 MG Oral Tab Take 1 tablet by mouth daily.  Disp:  Rfl:       Past Medical History:   Diagnosis Date   • Abnormal uterine bleeding    • Allergic rhinitis    • A therapeutic drug monitoring  (primary encounter diagnosis)  Obesity, Class III, BMI 40-49.9 (morbid obesity) (HCC)  Low vitamin D level  Essential hypertension, benign  Hypokalemia  Controlled type 2 diabetes mellitus without complication, without long-ter

## 2017-07-26 DIAGNOSIS — K21.00 REFLUX ESOPHAGITIS: ICD-10-CM

## 2017-07-26 RX ORDER — OMEPRAZOLE 20 MG/1
CAPSULE, DELAYED RELEASE ORAL
Qty: 90 CAPSULE | Refills: 0 | Status: SHIPPED | OUTPATIENT
Start: 2017-07-26 | End: 2017-10-19

## 2017-07-29 DIAGNOSIS — I10 ESSENTIAL HYPERTENSION, BENIGN: ICD-10-CM

## 2017-08-04 RX ORDER — HYDROCHLOROTHIAZIDE 25 MG/1
TABLET ORAL
Qty: 90 TABLET | Refills: 0 | Status: SHIPPED | OUTPATIENT
Start: 2017-08-04 | End: 2017-08-15

## 2017-08-14 ENCOUNTER — TELEPHONE (OUTPATIENT)
Dept: INTERNAL MEDICINE CLINIC | Facility: CLINIC | Age: 47
End: 2017-08-14

## 2017-08-15 ENCOUNTER — OFFICE VISIT (OUTPATIENT)
Dept: INTERNAL MEDICINE CLINIC | Facility: CLINIC | Age: 47
End: 2017-08-15

## 2017-08-15 ENCOUNTER — LAB ENCOUNTER (OUTPATIENT)
Dept: LAB | Age: 47
End: 2017-08-15
Attending: INTERNAL MEDICINE
Payer: COMMERCIAL

## 2017-08-15 VITALS
TEMPERATURE: 98 F | HEART RATE: 84 BPM | OXYGEN SATURATION: 97 % | HEIGHT: 62 IN | RESPIRATION RATE: 20 BRPM | WEIGHT: 245 LBS | DIASTOLIC BLOOD PRESSURE: 70 MMHG | SYSTOLIC BLOOD PRESSURE: 124 MMHG | BODY MASS INDEX: 45.08 KG/M2

## 2017-08-15 VITALS
HEIGHT: 63 IN | SYSTOLIC BLOOD PRESSURE: 122 MMHG | RESPIRATION RATE: 16 BRPM | BODY MASS INDEX: 43.41 KG/M2 | HEART RATE: 80 BPM | DIASTOLIC BLOOD PRESSURE: 78 MMHG | WEIGHT: 245 LBS

## 2017-08-15 DIAGNOSIS — E87.6 HYPOKALEMIA: ICD-10-CM

## 2017-08-15 DIAGNOSIS — E66.01 OBESITY, CLASS III, BMI 40-49.9 (MORBID OBESITY) (HCC): ICD-10-CM

## 2017-08-15 DIAGNOSIS — E11.9 CONTROLLED TYPE 2 DIABETES MELLITUS WITHOUT COMPLICATION, WITHOUT LONG-TERM CURRENT USE OF INSULIN (HCC): ICD-10-CM

## 2017-08-15 DIAGNOSIS — R79.89 LOW VITAMIN D LEVEL: ICD-10-CM

## 2017-08-15 DIAGNOSIS — I10 ESSENTIAL HYPERTENSION, BENIGN: ICD-10-CM

## 2017-08-15 DIAGNOSIS — C81.04 NODULAR LYMPHOCYTE PREDOMINANT HODGKIN LYMPHOMA OF LYMPH NODES OF AXILLA (HCC): ICD-10-CM

## 2017-08-15 DIAGNOSIS — T50.905A MEDICATION SIDE EFFECT, INITIAL ENCOUNTER: ICD-10-CM

## 2017-08-15 DIAGNOSIS — R19.7 DIARRHEA, UNSPECIFIED TYPE: ICD-10-CM

## 2017-08-15 DIAGNOSIS — Z51.81 ENCOUNTER FOR THERAPEUTIC DRUG MONITORING: Primary | ICD-10-CM

## 2017-08-15 DIAGNOSIS — Z00.00 ROUTINE GENERAL MEDICAL EXAMINATION AT A HEALTH CARE FACILITY: Primary | ICD-10-CM

## 2017-08-15 LAB
BUN BLD-MCNC: 10 MG/DL (ref 8–20)
CALCIUM BLD-MCNC: 9 MG/DL (ref 8.3–10.3)
CHLORIDE: 109 MMOL/L (ref 101–111)
CO2: 25 MMOL/L (ref 22–32)
CREAT BLD-MCNC: 0.98 MG/DL (ref 0.55–1.02)
GLUCOSE BLD-MCNC: 79 MG/DL (ref 70–99)
POTASSIUM SERPL-SCNC: 4.1 MMOL/L (ref 3.6–5.1)
SODIUM SERPL-SCNC: 141 MMOL/L (ref 136–144)

## 2017-08-15 PROCEDURE — 99214 OFFICE O/P EST MOD 30 MIN: CPT | Performed by: INTERNAL MEDICINE

## 2017-08-15 PROCEDURE — 99396 PREV VISIT EST AGE 40-64: CPT | Performed by: INTERNAL MEDICINE

## 2017-08-15 PROCEDURE — 80048 BASIC METABOLIC PNL TOTAL CA: CPT | Performed by: INTERNAL MEDICINE

## 2017-08-15 PROCEDURE — 36415 COLL VENOUS BLD VENIPUNCTURE: CPT | Performed by: INTERNAL MEDICINE

## 2017-08-15 RX ORDER — PHENTERMINE HYDROCHLORIDE 37.5 MG/1
37.5 TABLET ORAL
Qty: 30 TABLET | Refills: 0 | Status: SHIPPED | OUTPATIENT
Start: 2017-08-15 | End: 2017-09-12

## 2017-08-15 NOTE — PROGRESS NOTES
CC: Patient presents with:  Weight Check: down 8 lb       HPI:   1. Obesity, Class III, BMI 40-49.9 (morbid obesity), pt doing well on phentermine, no chest pain.    2. Controlled type 2 diabetes mellitus without complication, without long-term current needed Disp: 1 Bottle Rfl: 0   Cetirizine HCl (ZYRTEC ALLERGY) 10 MG Oral Tab Take 1 tablet by mouth daily.  Disp:  Rfl:       Past Medical History:   Diagnosis Date   • Abnormal uterine bleeding    • Allergic rhinitis    • Amenorrhea    • Anemia    • Anxie diagnosis)  Obesity, Class III, BMI 40-49.9 (morbid obesity) ([address]  Controlled type 2 diabetes mellitus without complication, without long-term current use of insulin (HCC)  Essential hypertension, benign  Hypokalemia     PLAN:  1.  Obesity, Class III, BMI

## 2017-08-15 NOTE — PROGRESS NOTES
Patient presents with:  Physical: not fasting       HPI: The patient presents today for WWE minus the pap and minus the CBE. She's due for updated wellness labs. Feels well.   She just [facility] and she's due to and bilateral  2000: TONSILLECTOMY      Comment: w/ adenoidectomy  2010: UPPER GI ENDOSCOPY,EXAM    Mucinex                 Li    Comment:TBCR  Sudmary Jefferson    Comment:Tabs;        Current Outpatient Prescriptions:   •  Phentermine Smoker                                                              Smokeless tobacco: Never Used                      Alcohol use:  No              Family History   Problem Relation Age of Onset   • Anemia Mother    • Diabetes Mother    • Other Minor Grady encounter  Diarrhea, unspecified type  Essential hypertension, benign  Obesity, class iii, bmi 40-49.9 (morbid obesity) (hcc)  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 Female CPX - Stable health.   Check wellness

## 2017-08-15 NOTE — PATIENT INSTRUCTIONS
Lizette,  1. Get labs done in the fasting state. 2. Stop the Metformin as this may be causing the loose stools. 3. Continue all other medications as scheduled. 4. Keep losing weight.   5. If all goes well with your labs, then you and I will re-connect in 6

## 2017-08-17 ENCOUNTER — OFFICE VISIT (OUTPATIENT)
Dept: INTERNAL MEDICINE CLINIC | Facility: CLINIC | Age: 47
End: 2017-08-17

## 2017-08-17 ENCOUNTER — OFFICE VISIT (OUTPATIENT)
Dept: HEMATOLOGY/ONCOLOGY | Facility: HOSPITAL | Age: 47
End: 2017-08-17
Attending: SPECIALIST
Payer: COMMERCIAL

## 2017-08-17 VITALS
WEIGHT: 245 LBS | TEMPERATURE: 98 F | BODY MASS INDEX: 45.08 KG/M2 | HEIGHT: 62 IN | DIASTOLIC BLOOD PRESSURE: 69 MMHG | SYSTOLIC BLOOD PRESSURE: 105 MMHG | RESPIRATION RATE: 16 BRPM | HEART RATE: 101 BPM | OXYGEN SATURATION: 98 %

## 2017-08-17 DIAGNOSIS — E66.01 OBESITY, CLASS III, BMI 40-49.9 (MORBID OBESITY) (HCC): ICD-10-CM

## 2017-08-17 DIAGNOSIS — C81.04 NODULAR LYMPHOCYTE PREDOMINANT HODGKIN LYMPHOMA OF LYMPH NODES OF AXILLA (HCC): ICD-10-CM

## 2017-08-17 DIAGNOSIS — Z00.00 ROUTINE GENERAL MEDICAL EXAMINATION AT A HEALTH CARE FACILITY: ICD-10-CM

## 2017-08-17 LAB
25-HYDROXYVITAMIN D (TOTAL): 98 NG/ML (ref 30–100)
ALBUMIN SERPL-MCNC: 3.5 G/DL (ref 3.5–4.8)
ALP LIVER SERPL-CCNC: 82 U/L (ref 39–100)
ALT SERPL-CCNC: 17 U/L (ref 14–54)
AST SERPL-CCNC: 12 U/L (ref 15–41)
BILIRUB DIRECT SERPL-MCNC: 0.2 MG/DL (ref 0.1–0.5)
BILIRUB SERPL-MCNC: 0.5 MG/DL (ref 0.1–2)
BILIRUB UR QL STRIP.AUTO: NEGATIVE
CHOLEST SMN-MCNC: 145 MG/DL (ref ?–200)
CLARITY UR REFRACT.AUTO: CLEAR
COLOR UR AUTO: YELLOW
EST. AVERAGE GLUCOSE BLD GHB EST-MCNC: 120 MG/DL (ref 68–126)
GLUCOSE UR STRIP.AUTO-MCNC: >=500 MG/DL
HBA1C MFR BLD HPLC: 5.8 % (ref ?–5.7)
HDLC SERPL-MCNC: 63 MG/DL (ref 45–?)
HDLC SERPL: 2.3 {RATIO} (ref ?–4.44)
KETONES UR STRIP.AUTO-MCNC: NEGATIVE MG/DL
LDLC SERPL CALC-MCNC: 68 MG/DL (ref ?–130)
LDLC SERPL-MCNC: 14 MG/DL (ref 5–40)
LEUKOCYTE ESTERASE UR QL STRIP.AUTO: NEGATIVE
M PROTEIN MFR SERPL ELPH: 7 G/DL (ref 6.1–8.3)
NITRITE UR QL STRIP.AUTO: NEGATIVE
NONHDLC SERPL-MCNC: 82 MG/DL (ref ?–130)
PH UR STRIP.AUTO: 5 [PH] (ref 4.5–8)
PROT UR STRIP.AUTO-MCNC: NEGATIVE MG/DL
SP GR UR STRIP.AUTO: 1.02 (ref 1–1.03)
TRIGLYCERIDES: 68 MG/DL (ref ?–150)
UROBILINOGEN UR STRIP.AUTO-MCNC: <2 MG/DL

## 2017-08-17 PROCEDURE — 99213 OFFICE O/P EST LOW 20 MIN: CPT | Performed by: SPECIALIST

## 2017-08-17 PROCEDURE — 97803 MED NUTRITION INDIV SUBSEQ: CPT | Performed by: DIETITIAN, REGISTERED

## 2017-08-17 NOTE — PROGRESS NOTES
[facility] Progress Note      Patient Name: Lanie Schwarz   YOB: 1970  Medical Record Number: [id number]  Attending Physician: Brant Hammans Aisha Shad, M.D.      Date of Visit: 8/17/2017      Chief Complaint  Hodgkin's lymphoma, nodular lym Phentermine HCl 37.5 MG Oral Tab Take 1 tablet (37.5 mg total) by mouth every morning before breakfast. Disp: 30 tablet Rfl: 0   OMEPRAZOLE 20 MG Oral Capsule Delayed Release TAKE 1 CAPSULE EVERY MORNING Disp: 90 capsule Rfl: 0   Empagliflozin ([facility] Location: Left arm, Patient Position: Sitting, Cuff Size: large)   Pulse 101   Temp 98 °F (36.7 °C)   Resp 16   Ht 1.575 m (5' 2\")   Wt 111.1 kg (245 lb)   LMP 08/01/2017   SpO2 98%   BMI 44.81 kg/m²     Physical Examination   Constitutional Well develope

## 2017-08-17 NOTE — PROGRESS NOTES
Patient is here today for follow up with Luis Felipe Merida for hodgkins lymphoma. Patient denies pain. Medication list and medical history were reviewed and updated.     Education Record    Learner:  Patient    Disease / Diagnosis: Hodgkins Lymphoma    Barriers /

## 2017-08-20 VITALS — BODY MASS INDEX: 45 KG/M2 | WEIGHT: 245 LBS

## 2017-08-20 DIAGNOSIS — K21.00 REFLUX ESOPHAGITIS: ICD-10-CM

## 2017-08-20 NOTE — PROGRESS NOTES
FOLLOW UP NUTRITION CONSULTATION    Nutrition Assessment    Number of consultations with dietitian: 3    Height:  Ht Readings from Last 1 Encounters:  08/17/17 : 62\"      Weight:   Wt Readings from Last 2 Encounters:  08/17/17 : 245 lb  08/17/17 : 245

## 2017-08-21 PROBLEM — J30.81 ALLERGIC RHINITIS DUE TO ANIMAL HAIR AND DANDER: Status: ACTIVE | Noted: 2017-07-07

## 2017-08-23 RX ORDER — RANITIDINE 300 MG/1
TABLET ORAL
Qty: 90 TABLET | Refills: 0 | Status: SHIPPED | OUTPATIENT
Start: 2017-08-23 | End: 2017-11-21

## 2017-09-05 ENCOUNTER — OFFICE VISIT (OUTPATIENT)
Dept: FAMILY MEDICINE CLINIC | Facility: CLINIC | Age: 47
End: 2017-09-05

## 2017-09-05 VITALS
DIASTOLIC BLOOD PRESSURE: 84 MMHG | SYSTOLIC BLOOD PRESSURE: 138 MMHG | BODY MASS INDEX: 43.24 KG/M2 | OXYGEN SATURATION: 98 % | TEMPERATURE: 98 F | HEIGHT: 62 IN | HEART RATE: 104 BPM | RESPIRATION RATE: 16 BRPM | WEIGHT: 235 LBS

## 2017-09-05 DIAGNOSIS — R05.9 COUGH: ICD-10-CM

## 2017-09-05 DIAGNOSIS — J01.10 ACUTE FRONTAL SINUSITIS, RECURRENCE NOT SPECIFIED: Primary | ICD-10-CM

## 2017-09-05 PROCEDURE — 99213 OFFICE O/P EST LOW 20 MIN: CPT | Performed by: NURSE PRACTITIONER

## 2017-09-05 RX ORDER — AMOXICILLIN AND CLAVULANATE POTASSIUM 875; 125 MG/1; MG/1
1 TABLET, FILM COATED ORAL 2 TIMES DAILY
Qty: 20 TABLET | Refills: 0 | Status: SHIPPED | OUTPATIENT
Start: 2017-09-05 | End: 2017-09-15

## 2017-09-05 NOTE — PROGRESS NOTES
CHIEF COMPLAINT:   Patient presents with:  Nasal Congestion      HPI:   Acosta Mccloud is a 50year old female who presents for sinus congestion for 1 week. Symptoms have been worsening since onset.  Sinus congestion/pain is described as a pressure and is l Fluticasone Propionate (FLONASE) 50 MCG/ACT Nasal Suspension 2 sprays in each nostril daily as needed Disp: 1 Bottle Rfl: 0   Cetirizine HCl (ZYRTEC ALLERGY) 10 MG Oral Tab Take 1 tablet by mouth daily.  Disp:  Rfl:    Meclizine HCl 25 MG Oral Tab Take 1 ta • Breast Cancer Maternal Aunt 48     IN HER 50'S  NOT SURE OF EXACT AGE   • Cancer Self 35     Predominant lymphocyte hodgkins lymphoma      Smoking status: Never Smoker                                                              Smokeless tobacco: Never PLAN: Meds and instructions as below. Comfort care instructions as listed in Patient Instructions. To f/u if no improvement in 3-5 days or sooner if sx worsen.     Meds & Refills for this Visit:    Signed Prescriptions Disp Refills    Amoxicillin-Pot Clav · Over-the-counter decongestants may be used unless a similar medicine was prescribed. Nasal sprays work the fastest. Use one that contains phenylephrine or oxymetazoline. First blow the nose gently. Then use the spray.  Do not use these medicines more ofte © 9225-9127 The [address] [place] [address], [address]. All rights reserved. This information is not intended as a substitute for professional medical care. Always follow your healthcare professional's instructions.             The

## 2017-09-12 ENCOUNTER — OFFICE VISIT (OUTPATIENT)
Dept: INTERNAL MEDICINE CLINIC | Facility: CLINIC | Age: 47
End: 2017-09-12

## 2017-09-12 VITALS
DIASTOLIC BLOOD PRESSURE: 68 MMHG | HEART RATE: 78 BPM | WEIGHT: 237 LBS | SYSTOLIC BLOOD PRESSURE: 122 MMHG | RESPIRATION RATE: 16 BRPM | BODY MASS INDEX: 41.99 KG/M2 | HEIGHT: 63 IN

## 2017-09-12 DIAGNOSIS — Z51.81 ENCOUNTER FOR THERAPEUTIC DRUG MONITORING: Primary | ICD-10-CM

## 2017-09-12 DIAGNOSIS — E66.01 OBESITY, CLASS III, BMI 40-49.9 (MORBID OBESITY) (HCC): ICD-10-CM

## 2017-09-12 DIAGNOSIS — I10 ESSENTIAL HYPERTENSION, BENIGN: ICD-10-CM

## 2017-09-12 DIAGNOSIS — E11.9 CONTROLLED TYPE 2 DIABETES MELLITUS WITHOUT COMPLICATION, WITHOUT LONG-TERM CURRENT USE OF INSULIN (HCC): ICD-10-CM

## 2017-09-12 PROCEDURE — 99214 OFFICE O/P EST MOD 30 MIN: CPT | Performed by: INTERNAL MEDICINE

## 2017-09-12 RX ORDER — PHENTERMINE HYDROCHLORIDE 37.5 MG/1
37.5 TABLET ORAL
Qty: 30 TABLET | Refills: 0 | Status: SHIPPED | OUTPATIENT
Start: 2017-09-12 | End: 2017-11-02

## 2017-09-12 RX ORDER — METFORMIN HYDROCHLORIDE 750 MG/1
750 TABLET, EXTENDED RELEASE ORAL DAILY
Qty: 90 TABLET | Refills: 3 | Status: SHIPPED | OUTPATIENT
Start: 2017-09-12 | End: 2018-08-27

## 2017-09-12 NOTE — PROGRESS NOTES
CC: Patient presents with:  Weight Check: down 8 lb       HPI:   1. Obesity, Class III, BMI 40-49.9 (morbid obesity), pt doing well on phentermine, no chest pain.    2. Controlled type 2 diabetes mellitus without complication, without long-term current Disp: 1 Box Rfl: 5   Fluticasone Propionate (FLONASE) 50 MCG/ACT Nasal Suspension 2 sprays in each nostril daily as needed Disp: 1 Bottle Rfl: 0   Cetirizine HCl (ZYRTEC ALLERGY) 10 MG Oral Tab Take 1 tablet by mouth daily.  Disp:  Rfl:       Past Medical H Take 1 tablet (750 mg total) by mouth daily.           None     ASSESSMENT:   Encounter for therapeutic drug monitoring  (primary encounter diagnosis)  Obesity, Class III, BMI 40-49.9 (morbid obesity) ([address]  Controlled type 2 diabetes mellitus without compl

## 2017-09-19 ENCOUNTER — NURSE ONLY (OUTPATIENT)
Dept: INTERNAL MEDICINE CLINIC | Facility: CLINIC | Age: 47
End: 2017-09-19

## 2017-09-19 DIAGNOSIS — Z23 NEED FOR VACCINATION: ICD-10-CM

## 2017-09-19 PROCEDURE — 90686 IIV4 VACC NO PRSV 0.5 ML IM: CPT | Performed by: INTERNAL MEDICINE

## 2017-09-19 PROCEDURE — 90471 IMMUNIZATION ADMIN: CPT | Performed by: INTERNAL MEDICINE

## 2017-10-03 ENCOUNTER — HOSPITAL ENCOUNTER (OUTPATIENT)
Dept: CT IMAGING | Age: 47
Discharge: HOME | End: 2017-10-03
Attending: OTOLARYNGOLOGY
Payer: COMMERCIAL

## 2017-10-03 DIAGNOSIS — J32.0 CHRONIC MAXILLARY SINUSITIS: ICD-10-CM

## 2017-10-03 PROCEDURE — 70486 CT MAXILLOFACIAL W/O DYE: CPT | Performed by: OTOLARYNGOLOGY

## 2017-10-04 NOTE — PROGRESS NOTES
Shen Bauer. Your CT sinus looks overall clear with no evidence of infection or significant inflammation.  Your septum that divides the nose into 2 sides is crooked and is likely contributing to trouble breathing through the nose, but not to the sneezing or ea

## 2017-10-11 NOTE — PROGRESS NOTES
Patient made a follow up appointment for 10/20/17, results and recommendations were viewed on [facility].

## 2017-10-17 ENCOUNTER — TELEPHONE (OUTPATIENT)
Dept: INTERNAL MEDICINE CLINIC | Facility: CLINIC | Age: 47
End: 2017-10-17

## 2017-10-17 NOTE — TELEPHONE ENCOUNTER
Patient unable to come in for appt before 4pm, no availability for patient in nov or oct. Would like to speak to supervisor.

## 2017-10-19 DIAGNOSIS — K21.00 REFLUX ESOPHAGITIS: ICD-10-CM

## 2017-10-20 PROBLEM — H69.93 DYSFUNCTION OF BOTH EUSTACHIAN TUBES: Status: ACTIVE | Noted: 2017-10-20

## 2017-10-20 PROBLEM — H69.83 DYSFUNCTION OF BOTH EUSTACHIAN TUBES: Status: ACTIVE | Noted: 2017-10-20

## 2017-10-20 RX ORDER — OMEPRAZOLE 20 MG/1
CAPSULE, DELAYED RELEASE ORAL
Qty: 90 CAPSULE | Refills: 0 | Status: SHIPPED | OUTPATIENT
Start: 2017-10-20 | End: 2018-01-18

## 2017-11-02 ENCOUNTER — PATIENT MESSAGE (OUTPATIENT)
Dept: INTERNAL MEDICINE CLINIC | Facility: CLINIC | Age: 47
End: 2017-11-02

## 2017-11-02 ENCOUNTER — OFFICE VISIT (OUTPATIENT)
Dept: INTERNAL MEDICINE CLINIC | Facility: CLINIC | Age: 47
End: 2017-11-02

## 2017-11-02 VITALS
RESPIRATION RATE: 16 BRPM | DIASTOLIC BLOOD PRESSURE: 80 MMHG | HEIGHT: 63 IN | WEIGHT: 236 LBS | HEART RATE: 80 BPM | BODY MASS INDEX: 41.82 KG/M2 | SYSTOLIC BLOOD PRESSURE: 128 MMHG

## 2017-11-02 DIAGNOSIS — E66.01 OBESITY, CLASS III, BMI 40-49.9 (MORBID OBESITY) (HCC): ICD-10-CM

## 2017-11-02 DIAGNOSIS — Z51.81 ENCOUNTER FOR THERAPEUTIC DRUG MONITORING: ICD-10-CM

## 2017-11-02 DIAGNOSIS — E11.9 CONTROLLED TYPE 2 DIABETES MELLITUS WITHOUT COMPLICATION, WITHOUT LONG-TERM CURRENT USE OF INSULIN (HCC): Primary | ICD-10-CM

## 2017-11-02 PROCEDURE — 99213 OFFICE O/P EST LOW 20 MIN: CPT | Performed by: NURSE PRACTITIONER

## 2017-11-02 RX ORDER — PHENTERMINE HYDROCHLORIDE 37.5 MG/1
37.5 TABLET ORAL
Qty: 30 TABLET | Refills: 0 | Status: SHIPPED | OUTPATIENT
Start: 2017-11-02 | End: 2017-12-07

## 2017-11-02 RX ORDER — TOPIRAMATE 25 MG/1
25 TABLET ORAL 2 TIMES DAILY
Qty: 60 TABLET | Refills: 1 | Status: SHIPPED | OUTPATIENT
Start: 2017-11-02 | End: 2017-12-07 | Stop reason: ALTCHOICE

## 2017-11-02 RX ORDER — PHENTERMINE HYDROCHLORIDE 37.5 MG/1
37.5 TABLET ORAL
Qty: 30 TABLET | Refills: 0 | Status: CANCELLED | OUTPATIENT
Start: 2017-11-02

## 2017-11-02 NOTE — PATIENT INSTRUCTIONS
Weight Management: Overcoming Your Barriers    You may have many reasons why you’re not ready to lose weight. You may not feel you have the time or the skills. You may be afraid of losing weight and gaining it back again. Well, you can lose weight.  And y Do you have a health problem? If so, don’t use it as an excuse for not losing weight. Ask your healthcare provider or dietitian about methods to lose weight that are safe for you.  For example, even if you have severe arthritis, it may be easier for you to

## 2017-11-02 NOTE — PROGRESS NOTES
CC: Patient presents with:  Weight Check: lost 1 pound       HPI:   Patient doing well on phentermine, jardiance, topiramate, and metformin.        Current Outpatient Prescriptions:  Phentermine HCl 37.5 MG Oral Tab Take 1 tablet (37.5 mg total) by mo ALLERGY) 10 MG Oral Tab Take 1 tablet by mouth daily.  Disp:  Rfl:       Past Medical History:   Diagnosis Date   • Abnormal uterine bleeding    • Allergic rhinitis    • Amenorrhea    • Anemia    • Anxiety    • [facility]    Predominant Hodgkin's Lym topiramate 25 MG Oral Tab 60 tablet 1      Sig: Take 1 tablet (25 mg total) by mouth 2 (two) times daily.           None     ASSESSMENT:   Controlled type 2 diabetes mellitus without complication, without long-term current use of insulin (HCC)  (primar

## 2017-11-03 NOTE — TELEPHONE ENCOUNTER
From: Iris Madrigal  To: VIKAS Smith  Sent: 11/2/2017 5:59 PM CDT  Subject: Visit Follow-up Question    Blanca Jackson,   I forgot to ask about the probiotic Dr. hSayy Gudino wanted me to take. I am out of it, do I need to get more?      [employer]

## 2017-11-08 NOTE — TELEPHONE ENCOUNTER
Requesting: VSL 3  LOV: 11/2/17 LUCI Burdick  Will cont phentermine and will add topamax and discussed side effects and MOA. . Labs show high leptin. Encouraged to increase exercise frequency.   RTC: 4 weeks  Last Relevant Labs:   Filled: I do not see that we ev

## 2017-11-12 NOTE — TELEPHONE ENCOUNTER
Only for 1 month for now, pt needs to have her pap smear results sent to us or needs to return for a pap smear.

## 2017-11-13 RX ORDER — NORETHINDRONE 0.35 MG/1
TABLET ORAL
Qty: 28 TABLET | Refills: 0 | Status: SHIPPED | OUTPATIENT
Start: 2017-11-13 | End: 2017-12-04

## 2017-11-13 RX ORDER — LACTOBACIL 2/BIFIDO 1/S.THERMO 450B CELL
PACKET (EA) ORAL
Qty: 30 CAPSULE | Refills: 3 | Status: SHIPPED | OUTPATIENT
Start: 2017-11-13

## 2017-11-21 DIAGNOSIS — K21.00 REFLUX ESOPHAGITIS: ICD-10-CM

## 2017-11-22 RX ORDER — RANITIDINE 300 MG/1
TABLET ORAL
Qty: 90 TABLET | Refills: 0 | Status: SHIPPED | OUTPATIENT
Start: 2017-11-22 | End: 2018-02-18

## 2017-12-04 ENCOUNTER — OFFICE VISIT (OUTPATIENT)
Dept: OBGYN CLINIC | Facility: CLINIC | Age: 47
End: 2017-12-04

## 2017-12-04 VITALS
HEART RATE: 99 BPM | DIASTOLIC BLOOD PRESSURE: 76 MMHG | SYSTOLIC BLOOD PRESSURE: 134 MMHG | WEIGHT: 230 LBS | HEIGHT: 62.25 IN | BODY MASS INDEX: 41.79 KG/M2

## 2017-12-04 DIAGNOSIS — Z12.39 BREAST CANCER SCREENING: ICD-10-CM

## 2017-12-04 DIAGNOSIS — Z12.4 CERVICAL CANCER SCREENING: ICD-10-CM

## 2017-12-04 DIAGNOSIS — Z01.419 WELL WOMAN EXAM WITH ROUTINE GYNECOLOGICAL EXAM: Primary | ICD-10-CM

## 2017-12-04 PROCEDURE — 88175 CYTOPATH C/V AUTO FLUID REDO: CPT | Performed by: NURSE PRACTITIONER

## 2017-12-04 PROCEDURE — 99396 PREV VISIT EST AGE 40-64: CPT | Performed by: NURSE PRACTITIONER

## 2017-12-04 PROCEDURE — 87624 HPV HI-RISK TYP POOLED RSLT: CPT | Performed by: NURSE PRACTITIONER

## 2017-12-04 RX ORDER — MONTELUKAST SODIUM 10 MG/1
TABLET ORAL
COMMUNITY
Start: 2017-11-22 | End: 2018-01-31

## 2017-12-04 RX ORDER — ACETAMINOPHEN AND CODEINE PHOSPHATE 120; 12 MG/5ML; MG/5ML
0.35 SOLUTION ORAL
Qty: 84 TABLET | Refills: 4 | Status: SHIPPED | OUTPATIENT
Start: 2017-12-04 | End: 2018-12-10

## 2017-12-04 RX ORDER — VALACYCLOVIR HYDROCHLORIDE 500 MG/1
500 TABLET, FILM COATED ORAL AS NEEDED
COMMUNITY
Start: 2017-09-17 | End: 2019-07-08

## 2017-12-05 NOTE — PROGRESS NOTES
Here for Routine Annual Exam  No concerns or questions. Denies any pain or concern with fibroids  Menses irregular, not monthly but no concern. Contraception- Jonathan.   Does note a change in discharge since medications were changed, no itching or burnin

## 2017-12-07 ENCOUNTER — OFFICE VISIT (OUTPATIENT)
Dept: INTERNAL MEDICINE CLINIC | Facility: CLINIC | Age: 47
End: 2017-12-07

## 2017-12-07 VITALS
HEIGHT: 63 IN | HEART RATE: 80 BPM | BODY MASS INDEX: 41.11 KG/M2 | DIASTOLIC BLOOD PRESSURE: 70 MMHG | WEIGHT: 232 LBS | RESPIRATION RATE: 16 BRPM | SYSTOLIC BLOOD PRESSURE: 136 MMHG

## 2017-12-07 DIAGNOSIS — E55.9 VITAMIN D DEFICIENCY: Primary | ICD-10-CM

## 2017-12-07 DIAGNOSIS — E66.01 OBESITY, CLASS III, BMI 40-49.9 (MORBID OBESITY) (HCC): ICD-10-CM

## 2017-12-07 DIAGNOSIS — Z51.81 ENCOUNTER FOR THERAPEUTIC DRUG MONITORING: ICD-10-CM

## 2017-12-07 PROCEDURE — 99213 OFFICE O/P EST LOW 20 MIN: CPT | Performed by: NURSE PRACTITIONER

## 2017-12-07 RX ORDER — PHENTERMINE HYDROCHLORIDE 37.5 MG/1
37.5 TABLET ORAL
Qty: 30 TABLET | Refills: 0 | Status: SHIPPED | OUTPATIENT
Start: 2017-12-07 | End: 2018-01-04

## 2017-12-07 RX ORDER — ZONISAMIDE 100 MG/1
100 CAPSULE ORAL DAILY
Qty: 30 CAPSULE | Refills: 1 | Status: SHIPPED | OUTPATIENT
Start: 2017-12-07 | End: 2018-01-31

## 2017-12-07 RX ORDER — TOPIRAMATE 25 MG/1
25 TABLET ORAL 2 TIMES DAILY
Qty: 60 TABLET | Refills: 1 | Status: CANCELLED | OUTPATIENT
Start: 2017-12-07 | End: 2018-02-05

## 2017-12-07 RX ORDER — MULTIVIT-MIN/IRON/FOLIC ACID/K 18-600-40
2000 CAPSULE ORAL DAILY
COMMUNITY

## 2017-12-07 NOTE — PATIENT INSTRUCTIONS
1. Put in lab to check Vitamin d  2. Start taking Vitamin D3 2000 units with a meal daily  3. Take half of a phentermine if you are feeling jittery again.    Diabetes: Learning About Serving and Portion Sizes     A good rule of thumb: Devote half your plate When you’re planning for a snack or a meal, keep servings in mind. If you don’t have measuring cups or a scale handy, there are ways to [facility] serving sizes, such as comparing your food to the size of your hand (see pictures above).   Managing portion si

## 2017-12-07 NOTE — PROGRESS NOTES
CC: Patient presents with:  Weight Check: lost 4 pounds       HPI:   Patient doing well on phentermine, jardiance, topiramate, and metformin. She has been having problems sleeping and feels jittery when trying to sleep.  Only thing different is additi Box Rfl: 5   Fluticasone Propionate (FLONASE) 50 MCG/ACT Nasal Suspension 2 sprays in each nostril daily as needed Disp: 1 Bottle Rfl: 0   Cetirizine HCl (ZYRTEC ALLERGY) 10 MG Oral Tab Take 1 tablet by mouth daily.  Disp:  Rfl:       Past Medical History: breakfast.      zonisamide 100 MG Oral Cap 30 capsule 1      Sig: Take 1 capsule (100 mg total) by mouth daily.           None     ASSESSMENT:   Vitamin D deficiency  (primary encounter diagnosis)  Encounter for therapeutic drug monitoring  Obesity, Class I

## 2017-12-13 ENCOUNTER — TELEPHONE (OUTPATIENT)
Dept: INTERNAL MEDICINE CLINIC | Facility: CLINIC | Age: 47
End: 2017-12-13

## 2017-12-15 ENCOUNTER — HOSPITAL ENCOUNTER (OUTPATIENT)
Age: 47
Discharge: HOME | End: 2017-12-15
Attending: EMERGENCY MEDICINE
Payer: COMMERCIAL

## 2017-12-15 VITALS
TEMPERATURE: 99 F | SYSTOLIC BLOOD PRESSURE: 154 MMHG | HEART RATE: 77 BPM | OXYGEN SATURATION: 100 % | DIASTOLIC BLOOD PRESSURE: 65 MMHG | RESPIRATION RATE: 18 BRPM

## 2017-12-15 DIAGNOSIS — J01.00 ACUTE NON-RECURRENT MAXILLARY SINUSITIS: Primary | ICD-10-CM

## 2017-12-15 PROCEDURE — 99214 OFFICE O/P EST MOD 30 MIN: CPT

## 2017-12-15 PROCEDURE — 99213 OFFICE O/P EST LOW 20 MIN: CPT

## 2017-12-15 RX ORDER — AMOXICILLIN AND CLAVULANATE POTASSIUM 875; 125 MG/1; MG/1
1 TABLET, FILM COATED ORAL 2 TIMES DAILY
Qty: 20 TABLET | Refills: 0 | Status: SHIPPED | OUTPATIENT
Start: 2017-12-15 | End: 2018-02-19

## 2017-12-15 NOTE — ED INITIAL ASSESSMENT (HPI)
Head and nose congestion for the last two weeks. States wake up with headaches. Blowing dark yellow to greenish nasal discharge.

## 2017-12-15 NOTE — ED PROVIDER NOTES
Patient presents with:  Nasal Congestion    HPI:     Albino Springer is a 50year old female who presents with chief complaint of nasal congestion, facial pressure. Hx of allergies and chronic sinusitis with possible sinus surgery this upcoming summer.   St needed for re-evaluation, sooner if symptoms worsen      All results reviewed and discussed with patient. See AVS for detailed discharge instructions.

## 2017-12-22 ENCOUNTER — OFFICE VISIT (OUTPATIENT)
Dept: INTERNAL MEDICINE CLINIC | Facility: CLINIC | Age: 47
End: 2017-12-22

## 2017-12-22 VITALS — BODY MASS INDEX: 41 KG/M2 | WEIGHT: 232 LBS

## 2017-12-22 DIAGNOSIS — E66.01 OBESITY, CLASS III, BMI 40-49.9 (MORBID OBESITY) (HCC): ICD-10-CM

## 2017-12-22 PROCEDURE — 97803 MED NUTRITION INDIV SUBSEQ: CPT | Performed by: DIETITIAN, REGISTERED

## 2017-12-22 NOTE — PROGRESS NOTES
FOLLOW UP NUTRITION CONSULTATION    Nutrition Assessment    Number of consultations with dietitian: 4    Height:  Ht Readings from Last 1 Encounters:  12/07/17 : 63\"      Weight:   Wt Readings from Last 2 Encounters:  12/22/17 : 232 lb  12/07/17 : 232

## 2018-01-03 ENCOUNTER — LAB ENCOUNTER (OUTPATIENT)
Dept: LAB | Age: 48
End: 2018-01-03
Attending: NURSE PRACTITIONER
Payer: COMMERCIAL

## 2018-01-03 DIAGNOSIS — E55.9 VITAMIN D DEFICIENCY: ICD-10-CM

## 2018-01-03 LAB — 25-HYDROXYVITAMIN D (TOTAL): 51.7 NG/ML (ref 30–100)

## 2018-01-03 PROCEDURE — 82306 VITAMIN D 25 HYDROXY: CPT | Performed by: NURSE PRACTITIONER

## 2018-01-03 PROCEDURE — 36415 COLL VENOUS BLD VENIPUNCTURE: CPT | Performed by: NURSE PRACTITIONER

## 2018-01-04 ENCOUNTER — OFFICE VISIT (OUTPATIENT)
Dept: INTERNAL MEDICINE CLINIC | Facility: CLINIC | Age: 48
End: 2018-01-04

## 2018-01-04 VITALS
HEART RATE: 80 BPM | SYSTOLIC BLOOD PRESSURE: 130 MMHG | BODY MASS INDEX: 51.91 KG/M2 | RESPIRATION RATE: 16 BRPM | DIASTOLIC BLOOD PRESSURE: 82 MMHG | HEIGHT: 63 IN | WEIGHT: 293 LBS

## 2018-01-04 DIAGNOSIS — Z51.81 ENCOUNTER FOR THERAPEUTIC DRUG MONITORING: ICD-10-CM

## 2018-01-04 DIAGNOSIS — E66.01 OBESITY, CLASS III, BMI 40-49.9 (MORBID OBESITY) (HCC): ICD-10-CM

## 2018-01-04 PROCEDURE — 99213 OFFICE O/P EST LOW 20 MIN: CPT | Performed by: NURSE PRACTITIONER

## 2018-01-04 RX ORDER — PHENTERMINE HYDROCHLORIDE 37.5 MG/1
37.5 TABLET ORAL
Qty: 30 TABLET | Refills: 0 | Status: SHIPPED | OUTPATIENT
Start: 2018-01-04 | End: 2018-01-31 | Stop reason: DRUGHIGH

## 2018-01-04 NOTE — PROGRESS NOTES
CC: Patient presents with:  Weight Check: lost 5 pounds       HPI:   Patient doing well on phentermine, jardiance,zonisamide and metformin. Has been sleeping better with switch to zonisamide from topamax. She has been exercising some.        Current nostril daily as needed Disp: 1 Bottle Rfl: 0   Cetirizine HCl (ZYRTEC ALLERGY) 10 MG Oral Tab Take 1 tablet by mouth daily.  Disp:  Rfl:       Past Medical History:   Diagnosis Date   • Abnormal uterine bleeding    • Allergic rhinitis    • Amenorrhea    • therapeutic drug monitoring  Obesity, Class III, BMI 40-49.9 (morbid obesity) [facility]     PLAN:  1. Obesity, Class III, BMI 40-49.9 (morbid obesity), pt with 9 Ibs wt loss on 3 month of phentermine and zonisamide (5 lbs this last month).  Pt with total wt los

## 2018-01-04 NOTE — PATIENT INSTRUCTIONS
Weight Management: Fact and Fiction    Knowing the truth about losing weight can help you separate what works from what doesn’t. Don’t be taken in by expensive weight-loss fads like pills, herbs, and special foods that promise unbelievable results.  There Fact: All foods, even fat-free ones, have calories. Eat too many calories and you’ll gain weight. It’s OK to treat yourself to a fat-free cookie or two. Just don’t eat the whole box!  A dietitian will help you figure this out, and will likely recommend that

## 2018-01-18 DIAGNOSIS — K21.00 REFLUX ESOPHAGITIS: ICD-10-CM

## 2018-01-18 RX ORDER — OMEPRAZOLE 20 MG/1
CAPSULE, DELAYED RELEASE ORAL
Qty: 90 CAPSULE | Refills: 3 | Status: SHIPPED | OUTPATIENT
Start: 2018-01-18 | End: 2018-12-31

## 2018-01-18 NOTE — TELEPHONE ENCOUNTER
Omeprazole 20 mg 1 cap daily filled 10-20-17 90 with 0 refills     LOV 8-15-17      RTC 6 months.     Next apt 2-19-18     Labs 8-7-17

## 2018-01-31 ENCOUNTER — OFFICE VISIT (OUTPATIENT)
Dept: INTERNAL MEDICINE CLINIC | Facility: CLINIC | Age: 48
End: 2018-01-31

## 2018-01-31 VITALS
RESPIRATION RATE: 16 BRPM | HEART RATE: 62 BPM | DIASTOLIC BLOOD PRESSURE: 74 MMHG | HEIGHT: 63 IN | WEIGHT: 225 LBS | SYSTOLIC BLOOD PRESSURE: 120 MMHG | BODY MASS INDEX: 39.87 KG/M2

## 2018-01-31 DIAGNOSIS — E66.01 OBESITY, CLASS III, BMI 40-49.9 (MORBID OBESITY) (HCC): ICD-10-CM

## 2018-01-31 DIAGNOSIS — Z51.81 ENCOUNTER FOR THERAPEUTIC DRUG MONITORING: Primary | ICD-10-CM

## 2018-01-31 PROCEDURE — 99213 OFFICE O/P EST LOW 20 MIN: CPT | Performed by: NURSE PRACTITIONER

## 2018-01-31 RX ORDER — ZONISAMIDE 100 MG/1
100 CAPSULE ORAL DAILY
Qty: 90 CAPSULE | Refills: 1 | Status: SHIPPED | OUTPATIENT
Start: 2018-01-31 | End: 2018-07-23

## 2018-01-31 RX ORDER — PHENTERMINE HYDROCHLORIDE 15 MG/1
15 CAPSULE ORAL EVERY MORNING
Qty: 30 CAPSULE | Refills: 2 | Status: SHIPPED | OUTPATIENT
Start: 2018-01-31 | End: 2018-04-30

## 2018-01-31 NOTE — PROGRESS NOTES
CC: Patient presents with:  Weight Check: Down 2lb       HPI:   Patient doing well on phentermine, jardiance,zonisamide and metformin. Has been sleeping better with switch to zonisamide from topamax. She has been exercising some.        Current Outpa ALLERGY) 10 MG Oral Tab Take 1 tablet by mouth daily.  Disp:  Rfl:       Past Medical History:   Diagnosis Date   • Abnormal uterine bleeding    • Allergic rhinitis    • Amenorrhea    • Anemia    • Anxiety    • [facility] ([facility]    Predominant Hodgkin's Lym for therapeutic drug monitoring  (primary encounter diagnosis)  Obesity, Class III, BMI 40-49.9 (morbid obesity) ([facility]     PLAN:  1. Obesity, Class III, BMI 40-49.9 (morbid obesity), pt with 11 Ibs wt loss on 4 month of phentermine and zonisamide.  Total wt

## 2018-01-31 NOTE — PATIENT INSTRUCTIONS
Weigh weekly and if increase of 5 lbs total make earlier appointment. Weight Management: Take It Off and Keep It Off    It’s easy to be motivated when you first start.  The key is to stay motivated all along the way and to have realistic and achievable · Make a list of the things that others like about you and that you like about yourself. Add something new from time to time. Keep this list to look at when you need a lift. Resources  · [employer]www. fitness. go

## 2018-02-18 DIAGNOSIS — K21.00 REFLUX ESOPHAGITIS: ICD-10-CM

## 2018-02-19 ENCOUNTER — APPOINTMENT (OUTPATIENT)
Dept: LAB | Age: 48
End: 2018-02-19
Attending: INTERNAL MEDICINE
Payer: COMMERCIAL

## 2018-02-19 ENCOUNTER — OFFICE VISIT (OUTPATIENT)
Dept: INTERNAL MEDICINE CLINIC | Facility: CLINIC | Age: 48
End: 2018-02-19

## 2018-02-19 VITALS
WEIGHT: 228.5 LBS | TEMPERATURE: 98 F | HEART RATE: 96 BPM | SYSTOLIC BLOOD PRESSURE: 122 MMHG | HEIGHT: 63 IN | BODY MASS INDEX: 40.49 KG/M2 | OXYGEN SATURATION: 98 % | DIASTOLIC BLOOD PRESSURE: 80 MMHG | RESPIRATION RATE: 18 BRPM

## 2018-02-19 DIAGNOSIS — G47.33 OSA ON CPAP: ICD-10-CM

## 2018-02-19 DIAGNOSIS — J01.90 ACUTE SINUSITIS, RECURRENCE NOT SPECIFIED, UNSPECIFIED LOCATION: ICD-10-CM

## 2018-02-19 DIAGNOSIS — J30.9 CHRONIC ALLERGIC RHINITIS: ICD-10-CM

## 2018-02-19 DIAGNOSIS — C81.04 NODULAR LYMPHOCYTE PREDOMINANT HODGKIN LYMPHOMA OF LYMPH NODES OF AXILLA (HCC): ICD-10-CM

## 2018-02-19 DIAGNOSIS — E66.01 OBESITY, CLASS III, BMI 40-49.9 (MORBID OBESITY) (HCC): ICD-10-CM

## 2018-02-19 DIAGNOSIS — E11.9 CONTROLLED TYPE 2 DIABETES MELLITUS WITHOUT COMPLICATION, WITHOUT LONG-TERM CURRENT USE OF INSULIN (HCC): ICD-10-CM

## 2018-02-19 DIAGNOSIS — I10 ESSENTIAL HYPERTENSION, BENIGN: ICD-10-CM

## 2018-02-19 DIAGNOSIS — L60.0 INGROWN TOENAIL: ICD-10-CM

## 2018-02-19 DIAGNOSIS — E11.9 CONTROLLED TYPE 2 DIABETES MELLITUS WITHOUT COMPLICATION, WITHOUT LONG-TERM CURRENT USE OF INSULIN (HCC): Primary | ICD-10-CM

## 2018-02-19 DIAGNOSIS — Z99.89 OSA ON CPAP: ICD-10-CM

## 2018-02-19 DIAGNOSIS — K21.00 REFLUX ESOPHAGITIS: ICD-10-CM

## 2018-02-19 PROBLEM — H69.93 DYSFUNCTION OF BOTH EUSTACHIAN TUBES: Status: RESOLVED | Noted: 2017-10-20 | Resolved: 2018-02-19

## 2018-02-19 PROBLEM — Z51.81 ENCOUNTER FOR THERAPEUTIC DRUG MONITORING: Status: RESOLVED | Noted: 2017-05-16 | Resolved: 2018-02-19

## 2018-02-19 PROBLEM — J30.89 ALLERGIC RHINITIS DUE TO MOLD: Status: RESOLVED | Noted: 2017-07-07 | Resolved: 2018-02-19

## 2018-02-19 PROBLEM — H69.83 DYSFUNCTION OF BOTH EUSTACHIAN TUBES: Status: RESOLVED | Noted: 2017-10-20 | Resolved: 2018-02-19

## 2018-02-19 PROBLEM — R79.89 LOW VITAMIN D LEVEL: Status: RESOLVED | Noted: 2017-06-13 | Resolved: 2018-02-19

## 2018-02-19 PROBLEM — J30.81 ALLERGIC RHINITIS DUE TO ANIMAL HAIR AND DANDER: Status: RESOLVED | Noted: 2017-07-07 | Resolved: 2018-02-19

## 2018-02-19 PROBLEM — J30.89 ALLERGIC RHINITIS DUE TO HOUSE DUST MITE: Status: RESOLVED | Noted: 2017-07-07 | Resolved: 2018-02-19

## 2018-02-19 LAB
ALBUMIN SERPL-MCNC: 3.3 G/DL (ref 3.5–4.8)
ALP LIVER SERPL-CCNC: 98 U/L (ref 39–100)
ALT SERPL-CCNC: 13 U/L (ref 14–54)
AST SERPL-CCNC: 11 U/L (ref 15–41)
BILIRUB SERPL-MCNC: 0.4 MG/DL (ref 0.1–2)
BUN BLD-MCNC: 11 MG/DL (ref 8–20)
CALCIUM BLD-MCNC: 8.9 MG/DL (ref 8.3–10.3)
CHLORIDE: 110 MMOL/L (ref 101–111)
CHOLEST SMN-MCNC: 146 MG/DL (ref ?–200)
CO2: 23 MMOL/L (ref 22–32)
CREAT BLD-MCNC: 1.04 MG/DL (ref 0.55–1.02)
CREAT UR-SCNC: 162 MG/DL
EST. AVERAGE GLUCOSE BLD GHB EST-MCNC: 128 MG/DL (ref 68–126)
GLUCOSE BLD-MCNC: 81 MG/DL (ref 70–99)
HBA1C MFR BLD HPLC: 6.1 % (ref ?–5.7)
HDLC SERPL-MCNC: 68 MG/DL (ref 45–?)
HDLC SERPL: 2.15 {RATIO} (ref ?–4.44)
LDLC SERPL CALC-MCNC: 65 MG/DL (ref ?–130)
M PROTEIN MFR SERPL ELPH: 7.1 G/DL (ref 6.1–8.3)
MICROALBUMIN UR-MCNC: <0.5 MG/DL
NONHDLC SERPL-MCNC: 78 MG/DL (ref ?–130)
POTASSIUM SERPL-SCNC: 4.3 MMOL/L (ref 3.6–5.1)
SODIUM SERPL-SCNC: 140 MMOL/L (ref 136–144)
TRIGL SERPL-MCNC: 64 MG/DL (ref ?–150)
VLDLC SERPL CALC-MCNC: 13 MG/DL (ref 5–40)

## 2018-02-19 PROCEDURE — 80061 LIPID PANEL: CPT | Performed by: INTERNAL MEDICINE

## 2018-02-19 PROCEDURE — 80053 COMPREHEN METABOLIC PANEL: CPT | Performed by: INTERNAL MEDICINE

## 2018-02-19 PROCEDURE — 36415 COLL VENOUS BLD VENIPUNCTURE: CPT | Performed by: INTERNAL MEDICINE

## 2018-02-19 PROCEDURE — 99214 OFFICE O/P EST MOD 30 MIN: CPT | Performed by: INTERNAL MEDICINE

## 2018-02-19 PROCEDURE — 82043 UR ALBUMIN QUANTITATIVE: CPT | Performed by: INTERNAL MEDICINE

## 2018-02-19 PROCEDURE — 82570 ASSAY OF URINE CREATININE: CPT | Performed by: INTERNAL MEDICINE

## 2018-02-19 PROCEDURE — 83036 HEMOGLOBIN GLYCOSYLATED A1C: CPT | Performed by: INTERNAL MEDICINE

## 2018-02-19 RX ORDER — AMOXICILLIN AND CLAVULANATE POTASSIUM 875; 125 MG/1; MG/1
1 TABLET, FILM COATED ORAL 2 TIMES DAILY
Qty: 20 TABLET | Refills: 0 | Status: SHIPPED | OUTPATIENT
Start: 2018-02-19 | End: 2018-03-01

## 2018-02-19 RX ORDER — RANITIDINE 300 MG/1
TABLET ORAL
Qty: 90 TABLET | Refills: 0 | Status: SHIPPED | OUTPATIENT
Start: 2018-02-19 | End: 2018-05-20

## 2018-02-19 NOTE — TELEPHONE ENCOUNTER
Ranitidine 300 mg 1 tab nightly filled 11-22-17 90 with 0 refills     LOV2-19-18  GERD - Stable on prescription medication. No new issues    RTC 6 months for f/u.

## 2018-02-19 NOTE — PROGRESS NOTES
Patient presents with: Follow - Up: 6-month f/u chronic medical conditions      HPI: The pt presents today for 6-month f/u chronic medical conditions as follows:    1. Controlled DM2 w/o insulin and w/o complication - Doing well.   Peak A1c was 6.6% on 7/2 capsule, Rfl: 2  •  zonisamide 100 MG Oral Cap, Take 1 capsule (100 mg total) by mouth daily. , Disp: 90 capsule, Rfl: 1  •  OMEPRAZOLE 20 MG Oral Capsule Delayed Release, TAKE 1 CAPSULE EVERY MORNING, Disp: 90 capsule, Rfl: 3  •  Cholecalciferol (VITAMIN D (Oral)   Resp 18   Ht 63\"   Wt 228 lb 8 oz   SpO2 98%   BMI 40.48 kg/m²   Wt Readings from Last 6 Encounters:  02/19/18 : 228 lb 8 oz  01/31/18 : 225 lb  01/04/18 : (!) 327 lb  12/22/17 : 232 lb  12/07/17 : 232 lb  12/04/17 : 230 lb  Gen - A&Ox3, NAD, mor above. Patient was also afforded the time and opportunity to ask questions, which were then answered to the best of my ability. Liyah Pickard. Russell Adrian MD  Diplomate, American Board of Internal Medicine  [address] [address] [address]  [address], [address], [place] [address]

## 2018-03-05 ENCOUNTER — HOSPITAL ENCOUNTER (OUTPATIENT)
Dept: MAMMOGRAPHY | Age: 48
Discharge: HOME | End: 2018-03-05
Attending: NURSE PRACTITIONER
Payer: COMMERCIAL

## 2018-03-05 DIAGNOSIS — Z12.39 BREAST CANCER SCREENING: ICD-10-CM

## 2018-03-05 PROCEDURE — 77067 SCR MAMMO BI INCL CAD: CPT | Performed by: NURSE PRACTITIONER

## 2018-03-06 DIAGNOSIS — R92.1 BREAST CALCIFICATIONS: Primary | ICD-10-CM

## 2018-03-06 DIAGNOSIS — N64.4 BREAST PAIN: ICD-10-CM

## 2018-03-07 NOTE — PROGRESS NOTES
Patient returned call. She is aware that she needs additional views. Let her know that orders have been placed. She has central scheduling's number.

## 2018-03-16 ENCOUNTER — MED REC SCAN ONLY (OUTPATIENT)
Dept: INTERNAL MEDICINE CLINIC | Facility: CLINIC | Age: 48
End: 2018-03-16

## 2018-03-19 ENCOUNTER — HOSPITAL ENCOUNTER (OUTPATIENT)
Dept: MAMMOGRAPHY | Facility: HOSPITAL | Age: 48
Discharge: HOME | End: 2018-03-19
Attending: NURSE PRACTITIONER
Payer: COMMERCIAL

## 2018-03-19 DIAGNOSIS — N64.4 BREAST PAIN: ICD-10-CM

## 2018-03-19 DIAGNOSIS — R92.1 BREAST CALCIFICATIONS: ICD-10-CM

## 2018-03-19 PROCEDURE — 77062 BREAST TOMOSYNTHESIS BI: CPT | Performed by: NURSE PRACTITIONER

## 2018-03-19 PROCEDURE — 76642 ULTRASOUND BREAST LIMITED: CPT | Performed by: NURSE PRACTITIONER

## 2018-03-19 PROCEDURE — 77066 DX MAMMO INCL CAD BI: CPT | Performed by: NURSE PRACTITIONER

## 2018-03-19 NOTE — IMAGING NOTE
Asssisted Dr. Urban Zapata with recommendation for a right stereotactic 3 site breast biopsy for calcifications. Emotional and educational support provided. Written information provided to Albino Springer.  Our breast center schedulers will call pt within [phone] [id number]

## 2018-04-10 ENCOUNTER — OFFICE VISIT (OUTPATIENT)
Dept: SURGERY | Facility: CLINIC | Age: 48
End: 2018-04-10

## 2018-04-10 VITALS
DIASTOLIC BLOOD PRESSURE: 84 MMHG | HEART RATE: 91 BPM | RESPIRATION RATE: 20 BRPM | BODY MASS INDEX: 41 KG/M2 | WEIGHT: 229.38 LBS | SYSTOLIC BLOOD PRESSURE: 134 MMHG | TEMPERATURE: 99 F

## 2018-04-10 DIAGNOSIS — F41.9 ANXIETY: ICD-10-CM

## 2018-04-10 DIAGNOSIS — R92.1 BREAST CALCIFICATION, RIGHT: Primary | ICD-10-CM

## 2018-04-10 PROCEDURE — 99244 OFF/OP CNSLTJ NEW/EST MOD 40: CPT | Performed by: SURGERY

## 2018-04-10 RX ORDER — DIAZEPAM 5 MG/1
5 TABLET ORAL 3 TIMES DAILY PRN
Qty: 10 TABLET | Refills: 0 | Status: SHIPPED | OUTPATIENT
Start: 2018-04-10 | End: 2018-04-30 | Stop reason: ALTCHOICE

## 2018-04-10 NOTE — PROGRESS NOTES
Previous biopsy on the left, for lymphoma. Recommended biopsies 3 sites on the right. Scheduled for Thursday.

## 2018-04-11 NOTE — PROGRESS NOTES
Breast Surgery New Patient Consultation    This is the first visit for this 46year old woman, referred by Gene Dee, who presents for evaluation of abnormal imaging.     History of Present Illness:   Ms. Irma Avila is a 46year old woman who prese SURGERY UNLISTED      Comment: hand incision tendon sheath of a finger  2009: ROGELIO BIOPSY STEREOTACTIC NODULE 2 SITE BILAT      Comment: NON HODGKINS LYMPHOMA/RADIATION THERAPY 8/10  2008: OTHER SURGICAL HISTORY      Comment: neuroplasty w/ transposition of Bottle Rfl: 6   Multiple Vitamins-Iron (ONCE DAILY/IRON) Oral Tab Take 1 tablet by mouth daily.  Disp:  Rfl:    Fluticasone Propionate (FLONASE) 50 MCG/ACT Nasal Suspension 2 sprays in each nostril daily as needed Disp: 1 Bottle Rfl: 0   Cetirizine HCl (ZYR is no history of chest pain, chest pressure/discomfort, palpitations, irregular heartbeat, fainting or near-fainting, difficulty breathing when lying flat, SOB/Coughing at night, swelling of the legs or chest pain while walking.     Breasts:  See history of 6.4 oz)   BMI 40.64 kg/m²     Physical Exam:  The patient is an alert, oriented, well-nourished and  well-developed woman who appears her stated age. Her speech patterns and movements are normal. Her affect is appropriate.     HEENT: The head is normocephal a discussion with the Patient regarding her breast exam. On exam today I found no clinical evidence of malignancy bilaterally. I personally reviewed her recent imaging we discussed this at length.   The significance of calcifications found on screening cande

## 2018-04-12 ENCOUNTER — HOSPITAL ENCOUNTER (OUTPATIENT)
Dept: MAMMOGRAPHY | Facility: HOSPITAL | Age: 48
Discharge: HOME | End: 2018-04-12
Attending: NURSE PRACTITIONER
Payer: COMMERCIAL

## 2018-04-12 DIAGNOSIS — R92.1 BREAST CALCIFICATIONS: ICD-10-CM

## 2018-04-12 PROCEDURE — 19081 BX BREAST 1ST LESION STRTCTC: CPT | Performed by: NURSE PRACTITIONER

## 2018-04-12 PROCEDURE — 88305 TISSUE EXAM BY PATHOLOGIST: CPT | Performed by: NURSE PRACTITIONER

## 2018-04-12 PROCEDURE — 19082 BX BREAST ADD LESION STRTCTC: CPT | Performed by: NURSE PRACTITIONER

## 2018-04-17 ENCOUNTER — TELEPHONE (OUTPATIENT)
Dept: MAMMOGRAPHY | Facility: HOSPITAL | Age: 48
End: 2018-04-17

## 2018-04-17 NOTE — TELEPHONE ENCOUNTER
Telephoned Maddie Hsu and name,  verified with pt. Notified Maddie Hsu of benign right breast 3 site stereotactic biopsy result of radial scar, fibroadenoma. Pt has seen Dr. Sue Cortez prior to bx, will f/u with her.   Maddie Hsu reports biopsy

## 2018-04-25 ENCOUNTER — TELEPHONE (OUTPATIENT)
Dept: MAMMOGRAPHY | Facility: HOSPITAL | Age: 48
End: 2018-04-25

## 2018-04-30 ENCOUNTER — OFFICE VISIT (OUTPATIENT)
Dept: INTERNAL MEDICINE CLINIC | Facility: CLINIC | Age: 48
End: 2018-04-30

## 2018-04-30 VITALS
WEIGHT: 229.25 LBS | HEART RATE: 78 BPM | SYSTOLIC BLOOD PRESSURE: 120 MMHG | HEIGHT: 63 IN | BODY MASS INDEX: 40.62 KG/M2 | DIASTOLIC BLOOD PRESSURE: 70 MMHG | OXYGEN SATURATION: 99 % | RESPIRATION RATE: 16 BRPM

## 2018-04-30 DIAGNOSIS — E66.01 OBESITY, CLASS III, BMI 40-49.9 (MORBID OBESITY) (HCC): ICD-10-CM

## 2018-04-30 DIAGNOSIS — Z51.81 ENCOUNTER FOR THERAPEUTIC DRUG MONITORING: ICD-10-CM

## 2018-04-30 DIAGNOSIS — E11.9 CONTROLLED TYPE 2 DIABETES MELLITUS WITHOUT COMPLICATION, WITHOUT LONG-TERM CURRENT USE OF INSULIN (HCC): ICD-10-CM

## 2018-04-30 PROCEDURE — 99214 OFFICE O/P EST MOD 30 MIN: CPT | Performed by: NURSE PRACTITIONER

## 2018-04-30 RX ORDER — PHENTERMINE HYDROCHLORIDE 15 MG/1
15 CAPSULE ORAL EVERY MORNING
Qty: 30 CAPSULE | Refills: 1 | Status: SHIPPED | OUTPATIENT
Start: 2018-04-30 | End: 2018-06-27

## 2018-04-30 NOTE — PROGRESS NOTES
HISTORY OF PRESENT ILLNESS  Patient presents with:  Weight Check: 3 month follow up started menses today, has been snacking more, Phentermine  Up 4lbs    Loni Cortes is a 46year old female here for follow up in medical weight loss program.  Previous Ki hours and integrity: 7-8 Hours per night    MEDICAL HISTORY  PMH reviewed:   Cardiac disorders: htn  ANAHY- on CPAP   Diabetes: DM type II  +Hodgkin's disease   Thyroid disease: negative  Renal disease: negative   Kidney stones: negative   Liver disease: neg HGB 12.9 05/05/2017   HCT 39.4 05/05/2017   MCV 87.9 05/05/2017   MCH 28.8 05/05/2017   MCHC 32.7 05/05/2017   RDW 13.9 05/05/2017   .0 05/05/2017   MPV 11.0 02/01/2013       Lab Results  Component Value Date   GLU 81 02/19/2018   BUN 11 02/19/201 times daily as needed for Rhinitis. Disp: 1 Bottle Rfl: 6   Meclizine HCl 25 MG Oral Tab Take 1 tablet (25 mg total) by mouth 3 (three) times daily as needed for Dizziness.  Disp: 15 tablet Rfl: 0   EPINEPHrine (EPIPEN 2-CHLOE) 0.3 MG/0.3ML Injection Solution decreasing fast foods and sweet tea and starting to exercising.     Medication:  · Medication use and side effects reviewed with patient.    · Will continue low dose phentermine 15mg, zonisamide, metformin 750mg, jardiance   · Discussed possibly starting in crackers    Premier protein shakes   Siggi yogurt    [place] balanced breaks (cheese and nuts)- without chocolate   5.  Reduce carbohydrates which includes sweets as well as rice, pasta, potatoes, bread, corn and instead choose whole grain options or more

## 2018-04-30 NOTE — PATIENT INSTRUCTIONS
Plan:  Continue with medications: phentermine, metformin, jardance and zonasamide   Consider starting on victoza (injectable medication to help w/ weight loss and prediabetes)   Follow up with me in 1-2 month  Schedule follow up appointments: Leelee Calvo (

## 2018-05-01 ENCOUNTER — HOSPITAL ENCOUNTER (OUTPATIENT)
Dept: GENERAL RADIOLOGY | Age: 48
Discharge: HOME | End: 2018-05-01
Attending: PHYSICIAN ASSISTANT
Payer: COMMERCIAL

## 2018-05-01 ENCOUNTER — OFFICE VISIT (OUTPATIENT)
Dept: INTERNAL MEDICINE CLINIC | Facility: CLINIC | Age: 48
End: 2018-05-01

## 2018-05-01 ENCOUNTER — OFFICE VISIT (OUTPATIENT)
Dept: SURGERY | Facility: CLINIC | Age: 48
End: 2018-05-01

## 2018-05-01 VITALS
HEART RATE: 98 BPM | BODY MASS INDEX: 40.57 KG/M2 | OXYGEN SATURATION: 100 % | HEIGHT: 62.99 IN | DIASTOLIC BLOOD PRESSURE: 68 MMHG | SYSTOLIC BLOOD PRESSURE: 122 MMHG | WEIGHT: 229 LBS | TEMPERATURE: 99 F | RESPIRATION RATE: 20 BRPM

## 2018-05-01 VITALS
DIASTOLIC BLOOD PRESSURE: 68 MMHG | TEMPERATURE: 99 F | BODY MASS INDEX: 40.57 KG/M2 | SYSTOLIC BLOOD PRESSURE: 122 MMHG | HEART RATE: 98 BPM | WEIGHT: 229 LBS | HEIGHT: 63 IN

## 2018-05-01 DIAGNOSIS — M79.642 LEFT HAND PAIN: Primary | ICD-10-CM

## 2018-05-01 DIAGNOSIS — M79.645 FINGER PAIN, LEFT: ICD-10-CM

## 2018-05-01 DIAGNOSIS — R92.8 ABNORMAL MAMMOGRAM OF BOTH BREASTS: ICD-10-CM

## 2018-05-01 DIAGNOSIS — N64.89 RADIAL SCAR OF BREAST: Primary | ICD-10-CM

## 2018-05-01 DIAGNOSIS — M79.642 LEFT HAND PAIN: ICD-10-CM

## 2018-05-01 PROCEDURE — 73140 X-RAY EXAM OF FINGER(S): CPT | Performed by: PHYSICIAN ASSISTANT

## 2018-05-01 PROCEDURE — 73130 X-RAY EXAM OF HAND: CPT | Performed by: PHYSICIAN ASSISTANT

## 2018-05-01 PROCEDURE — 99214 OFFICE O/P EST MOD 30 MIN: CPT | Performed by: SURGERY

## 2018-05-01 PROCEDURE — 99213 OFFICE O/P EST LOW 20 MIN: CPT | Performed by: PHYSICIAN ASSISTANT

## 2018-05-01 NOTE — PATIENT INSTRUCTIONS
Hand & Finger Pain:  - x-ray today  - ice (10-15 minutes at a time)  - may take tylenol (acetaminophen) 1,000 mg every 8 hours as needed (do not exceed 3,000 mg daily)  - may take ibuprofen 600 mg every 8 hours WITH food as needed  - start occupational the

## 2018-05-01 NOTE — PROGRESS NOTES
Jt Wesley is a 46year old female. HPI:   Patient presents for L 3rd finger pain x 3 months. Notes that in mid February she was pulling a plastic construction cone from under her car when she felt a pop and had sudden onset of pain.   Had some ini Smokeless tobacco: Never Used                      Alcohol use:  No                  REVIEW OF SYSTEMS:   GENERAL HEALTH: denies fever, chills  SKIN: denies any unusual skin lesions or rashes  NECK: denies neck pain  NEURO: denies numbness

## 2018-05-03 DIAGNOSIS — Z51.81 ENCOUNTER FOR THERAPEUTIC DRUG MONITORING: ICD-10-CM

## 2018-05-03 DIAGNOSIS — E66.01 OBESITY, CLASS III, BMI 40-49.9 (MORBID OBESITY) (HCC): ICD-10-CM

## 2018-05-03 RX ORDER — PHENTERMINE HYDROCHLORIDE 15 MG/1
CAPSULE ORAL
Qty: 30 CAPSULE | Refills: 0 | OUTPATIENT
Start: 2018-05-03

## 2018-05-03 NOTE — TELEPHONE ENCOUNTER
Requesting Phentermine HCl 15 MG Oral Cap  LOV: 04/30/2018  RTC: 3 months  Filled: 04/30/2018 #30 with 1 refills    Future Appointments  Date Time Provider Karina Veronique   5/22/2018 4:00 PM Christy Carmichael MD [facility]   6/27/2018 8:20 AM VIKAS Sam [facility] [address]   8/20/2018 8:00 AM Jena Damon MD [facility]   12/10/2018 6:00 PM VIKAS Rushing [facility] [place]

## 2018-05-04 NOTE — PROGRESS NOTES
Breast Surgery Surveillance    History of Present Illness:   Ms. Alondra Mixon is a 46year old woman who presents with a personal history of a left breast needle biopsy many years ago which was benign with new calcifications in the right breast seen on r 8/10  2008: OTHER SURGICAL HISTORY      Comment: neuroplasty w/ transposition of ulnar nerve at               elbow left                       2009: OTHER SURGICAL HISTORY      Comment: uterine myomectomy for uterine fibroids  2018: OTHER SURGICAL HISTORY Solution Auto-injector Take as directed for anaphylaxis, then call 911. Disp: 1 each Rfl: 1   Multiple Vitamins-Iron (ONCE DAILY/IRON) Oral Tab Take 1 tablet by mouth daily.  Disp:  Rfl:    ACCU-CHEK FASTCLIX LANCETS Does not apply Misc TEST X1 DAILY Disp: wheezing, difficulty in breathing with exertion, emphysema, chronic bronchitis, shortness of breath or abnormal sound when breathing. Cardiovascular:   There is no history of chest pain, chest pressure/discomfort, palpitations, irregular heartbeat, morgan or anaphylaxis.     /68 (BP Location: Left arm, Patient Position: Sitting, Cuff Size: adult)   Pulse 98   Temp 98.7 °F (37.1 °C) (Oral)   Resp 20   Ht 1.6 m (5' 2.99\")   Wt 103.9 kg (229 lb)   LMP 04/30/2018 (Exact Date)   SpO2 100%   BMI 40.58 kg/m² Valdo Fuller is a 46year old woman presents with a personal history of Hodgkin's disease status post left axillary surgery and radiation with new right breast imaging detected calcifications ×3 clusters s/p benign biopsy with incidental radial scar.

## 2018-05-20 DIAGNOSIS — K21.00 REFLUX ESOPHAGITIS: ICD-10-CM

## 2018-05-21 RX ORDER — RANITIDINE 300 MG/1
TABLET ORAL
Qty: 90 TABLET | Refills: 0 | Status: SHIPPED | OUTPATIENT
Start: 2018-05-21 | End: 2018-08-18

## 2018-06-04 ENCOUNTER — HOSPITAL ENCOUNTER (OUTPATIENT)
Dept: OCCUPATIONAL MEDICINE | Facility: HOSPITAL | Age: 48
Setting detail: THERAPIES SERIES
Discharge: HOME | End: 2018-06-04
Attending: PHYSICIAN ASSISTANT
Payer: COMMERCIAL

## 2018-06-04 DIAGNOSIS — M79.645 FINGER PAIN, LEFT: ICD-10-CM

## 2018-06-04 DIAGNOSIS — M79.642 LEFT HAND PAIN: ICD-10-CM

## 2018-06-04 PROCEDURE — 97140 MANUAL THERAPY 1/> REGIONS: CPT

## 2018-06-04 PROCEDURE — 97165 OT EVAL LOW COMPLEX 30 MIN: CPT

## 2018-06-07 ENCOUNTER — HOSPITAL ENCOUNTER (OUTPATIENT)
Dept: OCCUPATIONAL MEDICINE | Facility: HOSPITAL | Age: 48
Setting detail: THERAPIES SERIES
Discharge: HOME | End: 2018-06-07
Attending: PHYSICIAN ASSISTANT
Payer: COMMERCIAL

## 2018-06-07 PROCEDURE — 97110 THERAPEUTIC EXERCISES: CPT

## 2018-06-07 PROCEDURE — 97140 MANUAL THERAPY 1/> REGIONS: CPT

## 2018-06-07 NOTE — PROGRESS NOTES
Dx: L Hand pain   (M79.642)  L Finger Pain ( M79.645  )          Authorized # of Visits:  2/12       Next MD visit: none scheduled unless needs to  Fall Risk: standard         Precautions: n/a             Subjective:   Pt reports:   Woke up in terrible pain

## 2018-06-11 ENCOUNTER — HOSPITAL ENCOUNTER (OUTPATIENT)
Dept: OCCUPATIONAL MEDICINE | Facility: HOSPITAL | Age: 48
Setting detail: THERAPIES SERIES
Discharge: HOME | End: 2018-06-11
Attending: PHYSICIAN ASSISTANT
Payer: COMMERCIAL

## 2018-06-11 PROCEDURE — 97110 THERAPEUTIC EXERCISES: CPT

## 2018-06-11 PROCEDURE — 97140 MANUAL THERAPY 1/> REGIONS: CPT

## 2018-06-11 PROCEDURE — 97035 APP MDLTY 1+ULTRASOUND EA 15: CPT

## 2018-06-11 PROCEDURE — 97760 ORTHOTIC MGMT&TRAING 1ST ENC: CPT

## 2018-06-11 NOTE — PROGRESS NOTES
Dx: L Hand pain   (M79.642)  L Finger Pain ( M79.645  )          Authorized # of Visits:  3/12       Next MD visit: none scheduled unless needs to  Fall Risk: standard         Precautions: n/a             Subjective:   Pt reports:   Bad pain from last week Services:manual therapy, U/S    Charges: 1x MT, 1x TE , 1x orthotic management 1x u/s     Total Timed Treatment:55 min  Total Treatment Time: 55 min

## 2018-06-14 ENCOUNTER — HOSPITAL ENCOUNTER (OUTPATIENT)
Dept: OCCUPATIONAL MEDICINE | Facility: HOSPITAL | Age: 48
Setting detail: THERAPIES SERIES
Discharge: HOME | End: 2018-06-14
Attending: PHYSICIAN ASSISTANT
Payer: COMMERCIAL

## 2018-06-14 PROCEDURE — 97110 THERAPEUTIC EXERCISES: CPT

## 2018-06-14 PROCEDURE — 97140 MANUAL THERAPY 1/> REGIONS: CPT

## 2018-06-14 NOTE — PROGRESS NOTES
Dx: L Hand pain   (M79.642)  L Finger Pain ( M79.645  )          Authorized # of Visits:  4/12       Next MD visit: none scheduled unless needs to  Fall Risk: standard         Precautions: n/a             Subjective:   Pt reports:  * she cut her grass prio walking       Finger lifts L Finger lifts L Finger lifting       yel putty  and pinch. Table lift offs L fingers Table lift off L fingers for MCP ext. L MCP table liftoff for vola Fabricated L LF ring orthosis to ease stress on triggering finger.

## 2018-06-18 ENCOUNTER — HOSPITAL ENCOUNTER (OUTPATIENT)
Dept: OCCUPATIONAL MEDICINE | Facility: HOSPITAL | Age: 48
Setting detail: THERAPIES SERIES
Discharge: HOME | End: 2018-06-18
Attending: PHYSICIAN ASSISTANT
Payer: COMMERCIAL

## 2018-06-18 PROCEDURE — 97140 MANUAL THERAPY 1/> REGIONS: CPT

## 2018-06-18 PROCEDURE — 97110 THERAPEUTIC EXERCISES: CPT

## 2018-06-18 NOTE — PROGRESS NOTES
Dx: L Hand pain   (M79.642)  L Finger Pain ( M79.645  )          Authorized # of Visits:  4/12       Next MD visit: none scheduled unless needs to  Fall Risk: standard         Precautions: n/a             Subjective:   Pt reports:  * she she drove several all fingers L  AROM L fingers flx/ext AB-D AROM L fingers flx/ext AB-D      Finger walking  L Finger walking L Finger walking Finger walking      Finger lifts L Finger lifts L Finger lifting Finger lifting      yel putty  and pinch.  Table lift offs L f

## 2018-06-21 ENCOUNTER — HOSPITAL ENCOUNTER (OUTPATIENT)
Dept: OCCUPATIONAL MEDICINE | Facility: HOSPITAL | Age: 48
Setting detail: THERAPIES SERIES
Discharge: HOME | End: 2018-06-21
Attending: PHYSICIAN ASSISTANT
Payer: COMMERCIAL

## 2018-06-21 PROCEDURE — 97140 MANUAL THERAPY 1/> REGIONS: CPT

## 2018-06-21 PROCEDURE — 97110 THERAPEUTIC EXERCISES: CPT

## 2018-06-21 NOTE — PROGRESS NOTES
Dx: L Hand pain   (M79.642)  L Finger Pain ( M79.645  )          Authorized # of Visits:  6/12       Next MD visit: none scheduled unless needs to  Fall Risk: standard         Precautions: n/a             Subjective:   Pt reports:  * she is noticing pain i areas as above Manual tissue massage same areas as above Manual tissue massage same areas as above     Gentle jnt mobs L fingers MCPs AROM all fingers L  AROM L fingers flx/ext AB-D AROM L fingers flx/ext AB-D AROM L fingers flx/ext AB-D     Finger walking

## 2018-06-25 ENCOUNTER — HOSPITAL ENCOUNTER (OUTPATIENT)
Dept: OCCUPATIONAL MEDICINE | Facility: HOSPITAL | Age: 48
Setting detail: THERAPIES SERIES
Discharge: HOME | End: 2018-06-25
Attending: PHYSICIAN ASSISTANT
Payer: COMMERCIAL

## 2018-06-25 PROCEDURE — 97140 MANUAL THERAPY 1/> REGIONS: CPT

## 2018-06-25 PROCEDURE — 97110 THERAPEUTIC EXERCISES: CPT

## 2018-06-25 NOTE — PROGRESS NOTES
Dx: L Hand pain   (M79.642)  L Finger Pain ( M79.645  )          Authorized # of Visits:  7/12       Next MD visit: none scheduled unless needs to  Fall Risk: standard         Precautions: n/a             Subjective:   Pt reports:  * she is noticing  [employer] #  A-1pulley , above and below A-1 pulley area IASTM  To volar L LF  HG #  A-1pulley , above and below A-1 pulley area    AROM all directions fingers IASTM to volar L LF MCP A-1pulley   Manual tissue massage same areas as above Manual tissue massage same a

## 2018-06-26 NOTE — PROGRESS NOTES
HISTORY OF PRESENT ILLNESS  Patient presents with:  Weight Check: 1 pound weight gain    Dayna Mason is a 46year old female. here for follow up with medical weight loss program for the treatment of overweight, obesity, or morbid obesity.  Patient has gai none  Exercise/Activity: walking about 7,000 steps per day   Stress level: 7/10  Sleep hours and integrity: 7-8 Hours per night    MEDICAL HISTORY  PMH reviewed:   Cardiac disorders: htn  ANAHY- on CPAP   Diabetes: DM type II  +Hodgkin's disease   Thyroid di 05/05/2017   HCT 39.4 05/05/2017   MCV 87.9 05/05/2017   MCH 28.8 05/05/2017   MCHC 32.7 05/05/2017   RDW 13.9 05/05/2017   .0 05/05/2017   MPV 11.0 02/01/2013       Lab Results  Component Value Date   GLU 81 02/19/2018   BUN 11 02/19/2018   JIM MetFORMIN HCl  MG Oral Tablet 24 Hr Take 1 tablet (750 mg total) by mouth daily. Disp: 90 tablet Rfl: 3   Azelastine HCl 0.1 % Nasal Solution 2 sprays by Nasal route 2 (two) times daily as needed for Rhinitis.  Disp: 1 Bottle Rfl: 6   Meclizine HCl Prediabetes (last a1c was 6.1% on 2/2018)    PLAN   Discussed a lot about diet, decreasing fast foods and sweet tea and starting to exercising. Cut out cereal  No skipping meals     Medication:  · Medication use and side effects reviewed with patient.    · meal) ie. greek yogurt, cottage cheese, string cheese, hard boiled eggs  4.  Healthy snacks: peanut butter and apples, hummus and carrots, yogurt and berries, nuts (1/4 cup), tuna and crackers    Protein Shakes: Premier protein or Core Power   Protein Bars:

## 2018-06-27 ENCOUNTER — OFFICE VISIT (OUTPATIENT)
Dept: INTERNAL MEDICINE CLINIC | Facility: CLINIC | Age: 48
End: 2018-06-27

## 2018-06-27 VITALS
RESPIRATION RATE: 16 BRPM | HEIGHT: 63 IN | HEART RATE: 76 BPM | DIASTOLIC BLOOD PRESSURE: 82 MMHG | BODY MASS INDEX: 40.75 KG/M2 | WEIGHT: 230 LBS | SYSTOLIC BLOOD PRESSURE: 136 MMHG

## 2018-06-27 DIAGNOSIS — E11.9 CONTROLLED TYPE 2 DIABETES MELLITUS WITHOUT COMPLICATION, WITHOUT LONG-TERM CURRENT USE OF INSULIN (HCC): ICD-10-CM

## 2018-06-27 DIAGNOSIS — I10 ESSENTIAL HYPERTENSION, BENIGN: Primary | ICD-10-CM

## 2018-06-27 DIAGNOSIS — Z99.89 OSA ON CPAP: ICD-10-CM

## 2018-06-27 DIAGNOSIS — Z51.81 ENCOUNTER FOR THERAPEUTIC DRUG MONITORING: ICD-10-CM

## 2018-06-27 DIAGNOSIS — C81.04 NODULAR LYMPHOCYTE PREDOMINANT HODGKIN LYMPHOMA OF LYMPH NODES OF AXILLA (HCC): ICD-10-CM

## 2018-06-27 DIAGNOSIS — E66.01 OBESITY, CLASS III, BMI 40-49.9 (MORBID OBESITY) (HCC): ICD-10-CM

## 2018-06-27 DIAGNOSIS — G47.33 OSA ON CPAP: ICD-10-CM

## 2018-06-27 PROCEDURE — 99213 OFFICE O/P EST LOW 20 MIN: CPT | Performed by: NURSE PRACTITIONER

## 2018-06-27 RX ORDER — PHENTERMINE HYDROCHLORIDE 15 MG/1
15 CAPSULE ORAL EVERY MORNING
Qty: 30 CAPSULE | Refills: 0 | Status: SHIPPED | OUTPATIENT
Start: 2018-06-27 | End: 2018-07-23

## 2018-06-27 NOTE — PATIENT INSTRUCTIONS
Plan:  Continue with medications: phentermine 15mg, jardance, metformin (think about adding injectable victoza, saxanda or ozempic (weekly injection)   Also, talk with therapist about possibly adding in floxetine (to help w/ mood)   Add in exercise  Follow

## 2018-06-28 ENCOUNTER — HOSPITAL ENCOUNTER (OUTPATIENT)
Dept: OCCUPATIONAL MEDICINE | Facility: HOSPITAL | Age: 48
Setting detail: THERAPIES SERIES
Discharge: HOME | End: 2018-06-28
Attending: PHYSICIAN ASSISTANT
Payer: COMMERCIAL

## 2018-06-28 PROCEDURE — 97140 MANUAL THERAPY 1/> REGIONS: CPT

## 2018-06-28 PROCEDURE — 97110 THERAPEUTIC EXERCISES: CPT

## 2018-06-28 NOTE — PROGRESS NOTES
Dx: L Hand pain   (M79.642)  L Finger Pain ( M79.645  )          Authorized # of Visits:  8/12       Next MD visit: none scheduled unless needs to  Fall Risk: standard         Precautions: n/a            Discharge Summary    Pt has attended 8, cancelled 0, zip lock baggies  8 visits MET. 5-  fabricate L LF Ring Orthosis for L LF to decrease \"triggering\" symptoms    3 visits.  MET  Pt will be independent and compliant with comprehensive HEP to maintain progress achieved in OT (12visits) MET     FOTO SCORE: fingers flx/ext AB-D   Finger lifts L Finger lifts L Finger lifting Finger lifting Finger lifting Table lift off L fingers for MCP ext Table lift off L fingers for MCP ext   yel putty  and pinch.  Table lift offs L fingers Table lift off L fingers for M

## 2018-07-03 DIAGNOSIS — E66.01 OBESITY, CLASS III, BMI 40-49.9 (MORBID OBESITY) (HCC): ICD-10-CM

## 2018-07-03 DIAGNOSIS — Z51.81 ENCOUNTER FOR THERAPEUTIC DRUG MONITORING: ICD-10-CM

## 2018-07-05 ENCOUNTER — APPOINTMENT (OUTPATIENT)
Dept: OCCUPATIONAL MEDICINE | Facility: HOSPITAL | Age: 48
End: 2018-07-05
Attending: PHYSICIAN ASSISTANT

## 2018-07-05 RX ORDER — PHENTERMINE HYDROCHLORIDE 15 MG/1
CAPSULE ORAL
Qty: 30 CAPSULE | Refills: 0 | OUTPATIENT
Start: 2018-07-05

## 2018-07-23 ENCOUNTER — OFFICE VISIT (OUTPATIENT)
Dept: INTERNAL MEDICINE CLINIC | Facility: CLINIC | Age: 48
End: 2018-07-23
Payer: COMMERCIAL

## 2018-07-23 VITALS
BODY MASS INDEX: 40.75 KG/M2 | WEIGHT: 230 LBS | HEART RATE: 80 BPM | HEIGHT: 63 IN | SYSTOLIC BLOOD PRESSURE: 130 MMHG | DIASTOLIC BLOOD PRESSURE: 76 MMHG | RESPIRATION RATE: 16 BRPM

## 2018-07-23 DIAGNOSIS — G47.33 OSA ON CPAP: ICD-10-CM

## 2018-07-23 DIAGNOSIS — E66.01 OBESITY, CLASS III, BMI 40-49.9 (MORBID OBESITY) (HCC): Primary | ICD-10-CM

## 2018-07-23 DIAGNOSIS — Z99.89 OSA ON CPAP: ICD-10-CM

## 2018-07-23 DIAGNOSIS — Z51.81 ENCOUNTER FOR THERAPEUTIC DRUG MONITORING: ICD-10-CM

## 2018-07-23 DIAGNOSIS — E11.9 CONTROLLED TYPE 2 DIABETES MELLITUS WITHOUT COMPLICATION, WITHOUT LONG-TERM CURRENT USE OF INSULIN (HCC): ICD-10-CM

## 2018-07-23 DIAGNOSIS — I10 ESSENTIAL HYPERTENSION, BENIGN: ICD-10-CM

## 2018-07-23 PROCEDURE — 99213 OFFICE O/P EST LOW 20 MIN: CPT | Performed by: NURSE PRACTITIONER

## 2018-07-23 RX ORDER — ZONISAMIDE 100 MG/1
100 CAPSULE ORAL DAILY
Qty: 90 CAPSULE | Refills: 0 | Status: SHIPPED | OUTPATIENT
Start: 2018-07-23 | End: 2018-08-27

## 2018-07-23 RX ORDER — PHENTERMINE HYDROCHLORIDE 15 MG/1
15 CAPSULE ORAL EVERY MORNING
Qty: 30 CAPSULE | Refills: 0 | Status: SHIPPED | OUTPATIENT
Start: 2018-07-23 | End: 2018-08-27

## 2018-07-23 NOTE — PROGRESS NOTES
HISTORY OF PRESENT ILLNESS  Patient presents with:  Weight Check: no weight change    ARIELLEkenneth Hsu is a 46year old female here for follow up with medical weight loss program for the treatment of overweight, obesity, or morbid obesity.  Patient has lost - use: didn't discuss   Supplements: none  Exercise/Activity: walking about 7,000 steps per day   Stress level: 7/10  Sleep hours and integrity: 7-8 Hours per night    MEDICAL HISTORY  PMH reviewed:   Cardiac disorders: htn  ANAHY- on CPAP   Diabetes: DM type RBC 4.48 05/05/2017   HGB 12.9 05/05/2017   HCT 39.4 05/05/2017   MCV 87.9 05/05/2017   MCH 28.8 05/05/2017   MCHC 32.7 05/05/2017   RDW 13.9 05/05/2017   .0 05/05/2017   MPV 11.0 02/01/2013       Lab Results  Component Value Date   GLU 81 02/19/2 by mouth once daily. Disp: 84 tablet Rfl: 4   Probiotic Product (VSL#3) Oral Cap Take 1 capsule by mouth daily. Disp: 30 capsule Rfl: 3   MetFORMIN HCl  MG Oral Tablet 24 Hr Take 1 tablet (750 mg total) by mouth daily.  Disp: 90 tablet Rfl: 3   Azelas attention to nutrition, exercise and behavior/stress management for success.  Discussed first goal of weight loss 5% in 3 months and 10% in 6 months  Abnormal labs: elevated leptin,  Prediabetes (last a1c was 6.1% on 2/2018)    PLAN   Discussed a lot about

## 2018-07-23 NOTE — PATIENT INSTRUCTIONS
Plan:  Continue with medications: phentermine 15mg, jardance, metformin  Looking into injectable--> victoza, saxenda (daily) medication or ozempic (once weekly)   Vitamin b12 1,000 to 2,000 iu daily (over the counter after high dose  Follow up with me in 1

## 2018-07-24 ENCOUNTER — OFFICE VISIT (OUTPATIENT)
Dept: INTERNAL MEDICINE CLINIC | Facility: CLINIC | Age: 48
End: 2018-07-24
Payer: COMMERCIAL

## 2018-07-24 DIAGNOSIS — E66.01 OBESITY, CLASS III, BMI 40-49.9 (MORBID OBESITY) (HCC): Primary | ICD-10-CM

## 2018-07-24 DIAGNOSIS — E11.9 TYPE 2 DIABETES MELLITUS WITHOUT COMPLICATION, WITHOUT LONG-TERM CURRENT USE OF INSULIN (HCC): ICD-10-CM

## 2018-07-24 PROCEDURE — 97803 MED NUTRITION INDIV SUBSEQ: CPT | Performed by: DIETITIAN, REGISTERED

## 2018-07-25 ENCOUNTER — HOSPITAL ENCOUNTER (OUTPATIENT)
Dept: GENERAL RADIOLOGY | Age: 48
Discharge: HOME | End: 2018-07-25
Attending: INTERNAL MEDICINE
Payer: COMMERCIAL

## 2018-07-25 ENCOUNTER — OFFICE VISIT (OUTPATIENT)
Dept: INTERNAL MEDICINE CLINIC | Facility: CLINIC | Age: 48
End: 2018-07-25
Payer: COMMERCIAL

## 2018-07-25 VITALS
WEIGHT: 230 LBS | HEART RATE: 91 BPM | RESPIRATION RATE: 18 BRPM | OXYGEN SATURATION: 98 % | SYSTOLIC BLOOD PRESSURE: 124 MMHG | BODY MASS INDEX: 40.75 KG/M2 | TEMPERATURE: 99 F | HEIGHT: 63 IN | DIASTOLIC BLOOD PRESSURE: 70 MMHG

## 2018-07-25 DIAGNOSIS — M79.642 LEFT HAND PAIN: Primary | ICD-10-CM

## 2018-07-25 DIAGNOSIS — M79.642 LEFT HAND PAIN: ICD-10-CM

## 2018-07-25 PROCEDURE — 73130 X-RAY EXAM OF HAND: CPT | Performed by: INTERNAL MEDICINE

## 2018-07-25 PROCEDURE — 99214 OFFICE O/P EST MOD 30 MIN: CPT | Performed by: INTERNAL MEDICINE

## 2018-07-25 NOTE — PROGRESS NOTES
Rubio Yuan is a 46year old female. HPI:   Patient presents with:  Hand Pain: Left hand pain. Smashed hand while doing yardwork. Icing and taking ibuprofen. Pt feels swelling has not gone down.     Patient presents with an acute musculoskeletal compla as needed. , Disp: , Rfl:   •  Norethindrone (JOLIVETTE) 0.35 MG Oral Tab, Take 1 tablet (0.35 mg total) by mouth once daily. , Disp: 84 tablet, Rfl: 4  •  Probiotic Product (VSL#3) Oral Cap, Take 1 capsule by mouth daily. , Disp: 30 capsule, Rfl: 3  •  Met grandfather, and paternal aunt; Cancer (age of onset: 37) in her self; Diabetes in her mother; Heart Attack in her father; Hypertension in her father; Other in her mother. Social:  reports that she has never smoked.  She has never used smokeless tobacco. S as Consulting Physician ([facility])  Norma Feldman RD (Dietitian)  VIKAS Lemon (Nurse Practitioner Women's Health)  VIKAS Felix (Nurse Practitioner Family)  The patient indicates understanding of these issues and agrees

## 2018-07-25 NOTE — PROGRESS NOTES
FOLLOW UP NUTRITION CONSULTATION    Nutrition Assessment    Number of consultations with dietitian: 5    Height:  Ht Readings from Last 1 Encounters:  07/25/18 : 63\"      Weight:   Wt Readings from Last 2 Encounters:  07/25/18 : 230 lb  07/23/18 : 230

## 2018-07-25 NOTE — PATIENT INSTRUCTIONS
- Get x-ray done today. - Start diclofenac. Take every 8 hours as needed with food. - Ok to take Tylenol in between doses. - Based on results and your recovery process, we may have you see Dr. Oliva Ards. [phone].     It was a pleasure seeing you in

## 2018-07-27 ENCOUNTER — PATIENT MESSAGE (OUTPATIENT)
Dept: INTERNAL MEDICINE CLINIC | Facility: CLINIC | Age: 48
End: 2018-07-27

## 2018-07-30 NOTE — TELEPHONE ENCOUNTER
Please see patient message. Per LOV 7/23/18:  Medication:  · Medication use and side effects reviewed with patient.    · Will continue low dose phentermine 15mg, zonisamide, metformin 750mg, jardiance   · Discussed again starting injectable medication (v

## 2018-07-30 NOTE — TELEPHONE ENCOUNTER
From: Maddie Hsu  To:  VIKAS Glaser  Sent: 7/27/2018 4:13 PM CDT  Subject: Prescription Question    Bettie May,    I was told by [facility] that I needed to try Trulicity before they would approve the Victoza, but the Ozempic is ap

## 2018-07-30 NOTE — TELEPHONE ENCOUNTER
I would say trulicity or ozempic injectables would be ok to use. .. Do you want to discuss with Dr. Jaja Vidal first, or would you like to me order?

## 2018-08-16 RX ORDER — EMPAGLIFLOZIN 10 MG/1
TABLET, FILM COATED ORAL
Qty: 90 TABLET | Refills: 3 | OUTPATIENT
Start: 2018-08-16

## 2018-08-18 DIAGNOSIS — K21.00 REFLUX ESOPHAGITIS: ICD-10-CM

## 2018-08-20 ENCOUNTER — APPOINTMENT (OUTPATIENT)
Dept: HEMATOLOGY/ONCOLOGY | Facility: HOSPITAL | Age: 48
End: 2018-08-20
Attending: SPECIALIST
Payer: COMMERCIAL

## 2018-08-20 RX ORDER — RANITIDINE 300 MG/1
TABLET ORAL
Qty: 90 TABLET | Refills: 0 | Status: SHIPPED | OUTPATIENT
Start: 2018-08-20 | End: 2018-11-17

## 2018-08-20 NOTE — TELEPHONE ENCOUNTER
Ranitidine 300 mg 1 tab nightly filled 5-21-18 90 with 0 refills     LOV 7-25-18      return to clinic in 1-2 months     Next apt 9-7-18     Labs 2-19-18

## 2018-08-21 DIAGNOSIS — E11.9 CONTROLLED TYPE 2 DIABETES MELLITUS WITHOUT COMPLICATION, WITHOUT LONG-TERM CURRENT USE OF INSULIN (HCC): ICD-10-CM

## 2018-08-21 DIAGNOSIS — Z51.81 ENCOUNTER FOR THERAPEUTIC DRUG MONITORING: Primary | ICD-10-CM

## 2018-08-21 DIAGNOSIS — E66.01 OBESITY, CLASS III, BMI 40-49.9 (MORBID OBESITY) (HCC): ICD-10-CM

## 2018-08-21 NOTE — TELEPHONE ENCOUNTER
Pharmacy called to request a refill         Name of Medication:Empagliflozin (JARDIANCE) 10 MG Oral Tab       Dose:     How is medication prescribed:    Specific name of pharmacy and location: [address] [place] [address]

## 2018-08-22 NOTE — TELEPHONE ENCOUNTER
Requesting:     Pending Prescriptions Disp Refills    Empagliflozin (JARDIANCE) 10 MG Oral Tab 90 tablet 1     Sig: Take 10 mg by mouth daily.        LOV: 7/23/18  RTC: 4 weeks  Filled: 4/30/18  with 2 refills at [place] [address], pt is requesting mailorder    Fut

## 2018-08-26 NOTE — PROGRESS NOTES
HISTORY OF PRESENT ILLNESS  Patient presents with:  Weight Check: lost 3 pounds    Albino Springer is a 46year old female here for follow up with medical weight loss program for the treatment of overweight, obesity, or morbid obesity.  Patient has lost -# week  Juice: none  Coffee/Tea: sweat tea (thermus) daily      Sweet/ Salty tooth: salty  Portion: medium/large  Eats 3 meals per day: no (skips a lot either breakfast or lunch)   Number of restaurant or fast food meals/week: 4-5  meals/week     Social hx a adenopathy, no thyromegaly  LUNGS: CTA in all fields, breathing non labored  CARDIO: RRR without murmur, normal S1 and S2 without clicks or gallops, no pedal edema.    GI: rotund, no masses, HSM or tenderness  MUSCULOSKELETAL: grossly intact  NEURO: [place] daily. Disp: 45 tablet Rfl: 1   OMEPRAZOLE 20 MG Oral Capsule Delayed Release TAKE 1 CAPSULE EVERY MORNING Disp: 90 capsule Rfl: 3   Cholecalciferol (VITAMIN D) 2000 units Oral Cap Take 2,000 Units by mouth daily.    Disp:  Rfl:    ValACYclovir HCl 500 MG O long-term current use of insulin (HCC)  Plan: MetFORMIN HCl  MG Oral Tablet 24 Hr,         OZEMPIC 0.25 or 0.5 MG/DOSE Subcutaneous         Solution Pen-injector, Insulin Pen Needle (PEN         NEEDLES) 32G X 4 MM Does not apply Misc    (C81.04) Nod drink your calories (no soda, juice, high calorie coffee drinks, limit alcohol)--> drinking sweet tea everyday  - Do not eat late at night  · FITTE: ACSM recommendations (150-300 minutes/ week in active weight loss)   · Discussed the role of sleep and stre weeks (around 9/24/2018) for weight management. Patient verbalizes understanding.     VIKAS Batista

## 2018-08-27 ENCOUNTER — OFFICE VISIT (OUTPATIENT)
Dept: INTERNAL MEDICINE CLINIC | Facility: CLINIC | Age: 48
End: 2018-08-27

## 2018-08-27 VITALS
SYSTOLIC BLOOD PRESSURE: 110 MMHG | HEART RATE: 80 BPM | WEIGHT: 227 LBS | BODY MASS INDEX: 40.22 KG/M2 | HEIGHT: 63 IN | RESPIRATION RATE: 16 BRPM | DIASTOLIC BLOOD PRESSURE: 70 MMHG

## 2018-08-27 DIAGNOSIS — I10 ESSENTIAL HYPERTENSION, BENIGN: ICD-10-CM

## 2018-08-27 DIAGNOSIS — G47.33 OSA ON CPAP: ICD-10-CM

## 2018-08-27 DIAGNOSIS — Z51.81 ENCOUNTER FOR THERAPEUTIC DRUG MONITORING: Primary | ICD-10-CM

## 2018-08-27 DIAGNOSIS — E11.9 CONTROLLED TYPE 2 DIABETES MELLITUS WITHOUT COMPLICATION, WITHOUT LONG-TERM CURRENT USE OF INSULIN (HCC): ICD-10-CM

## 2018-08-27 DIAGNOSIS — C81.04 NODULAR LYMPHOCYTE PREDOMINANT HODGKIN LYMPHOMA OF LYMPH NODES OF AXILLA (HCC): ICD-10-CM

## 2018-08-27 DIAGNOSIS — E66.01 OBESITY, CLASS III, BMI 40-49.9 (MORBID OBESITY) (HCC): ICD-10-CM

## 2018-08-27 DIAGNOSIS — Z99.89 OSA ON CPAP: ICD-10-CM

## 2018-08-27 PROCEDURE — 99214 OFFICE O/P EST MOD 30 MIN: CPT | Performed by: NURSE PRACTITIONER

## 2018-08-27 RX ORDER — PHENTERMINE HYDROCHLORIDE 15 MG/1
15 CAPSULE ORAL EVERY MORNING
Qty: 30 CAPSULE | Refills: 0 | Status: CANCELLED | OUTPATIENT
Start: 2018-08-27 | End: 2018-11-25

## 2018-08-27 RX ORDER — SEMAGLUTIDE 1.34 MG/ML
0.25 INJECTION, SOLUTION SUBCUTANEOUS WEEKLY
Qty: 4 PEN | Refills: 1 | Status: SHIPPED | OUTPATIENT
Start: 2018-08-27 | End: 2018-10-22

## 2018-08-27 RX ORDER — PEN NEEDLE, DIABETIC 30 GX3/16"
1 NEEDLE, DISPOSABLE MISCELLANEOUS DAILY
Qty: 100 EACH | Refills: 1 | Status: SHIPPED | OUTPATIENT
Start: 2018-08-27 | End: 2020-12-21

## 2018-08-27 RX ORDER — METFORMIN HYDROCHLORIDE 750 MG/1
750 TABLET, EXTENDED RELEASE ORAL DAILY
Qty: 90 TABLET | Refills: 3 | Status: SHIPPED | OUTPATIENT
Start: 2018-08-27 | End: 2019-01-23

## 2018-08-27 NOTE — PROGRESS NOTES
Teaching done for ozempic. Explained that this is a weekly multidose pen. Instructed on how to dial to appropriate dose and titrate. How to use and dispose of needles. How to store medication.  Patient seemed confident, informed, and verbalized understandin

## 2018-09-07 ENCOUNTER — OFFICE VISIT (OUTPATIENT)
Dept: INTERNAL MEDICINE CLINIC | Facility: CLINIC | Age: 48
End: 2018-09-07
Payer: COMMERCIAL

## 2018-09-07 ENCOUNTER — OFFICE VISIT (OUTPATIENT)
Dept: HEMATOLOGY/ONCOLOGY | Facility: HOSPITAL | Age: 48
End: 2018-09-07
Attending: SPECIALIST
Payer: COMMERCIAL

## 2018-09-07 VITALS
RESPIRATION RATE: 18 BRPM | OXYGEN SATURATION: 100 % | DIASTOLIC BLOOD PRESSURE: 72 MMHG | WEIGHT: 227.5 LBS | TEMPERATURE: 98 F | BODY MASS INDEX: 40 KG/M2 | HEART RATE: 71 BPM | SYSTOLIC BLOOD PRESSURE: 133 MMHG

## 2018-09-07 VITALS
DIASTOLIC BLOOD PRESSURE: 70 MMHG | HEIGHT: 63 IN | TEMPERATURE: 99 F | HEART RATE: 76 BPM | RESPIRATION RATE: 16 BRPM | WEIGHT: 228.25 LBS | SYSTOLIC BLOOD PRESSURE: 120 MMHG | BODY MASS INDEX: 40.44 KG/M2

## 2018-09-07 DIAGNOSIS — C81.04 NODULAR LYMPHOCYTE PREDOMINANT HODGKIN LYMPHOMA OF LYMPH NODES OF AXILLA (HCC): Primary | ICD-10-CM

## 2018-09-07 DIAGNOSIS — E11.9 CONTROLLED TYPE 2 DIABETES MELLITUS WITHOUT COMPLICATION, WITHOUT LONG-TERM CURRENT USE OF INSULIN (HCC): Primary | ICD-10-CM

## 2018-09-07 DIAGNOSIS — G47.33 OSA ON CPAP: ICD-10-CM

## 2018-09-07 DIAGNOSIS — C81.04 NODULAR LYMPHOCYTE PREDOMINANT HODGKIN LYMPHOMA OF LYMPH NODES OF AXILLA (HCC): ICD-10-CM

## 2018-09-07 DIAGNOSIS — K21.00 REFLUX ESOPHAGITIS: ICD-10-CM

## 2018-09-07 DIAGNOSIS — E66.01 OBESITY, CLASS III, BMI 40-49.9 (MORBID OBESITY) (HCC): ICD-10-CM

## 2018-09-07 DIAGNOSIS — I10 ESSENTIAL HYPERTENSION, BENIGN: ICD-10-CM

## 2018-09-07 DIAGNOSIS — Z88.9 MULTIPLE ALLERGIES: ICD-10-CM

## 2018-09-07 DIAGNOSIS — Z99.89 OSA ON CPAP: ICD-10-CM

## 2018-09-07 PROCEDURE — 99214 OFFICE O/P EST MOD 30 MIN: CPT | Performed by: INTERNAL MEDICINE

## 2018-09-07 PROCEDURE — 99213 OFFICE O/P EST LOW 20 MIN: CPT | Performed by: SPECIALIST

## 2018-09-07 RX ORDER — LANCETS
EACH MISCELLANEOUS
Qty: 1 BOX | Refills: 1 | Status: SHIPPED | OUTPATIENT
Start: 2018-09-07

## 2018-09-07 RX ORDER — BLOOD SUGAR DIAGNOSTIC
STRIP MISCELLANEOUS
Qty: 100 STRIP | Refills: 1 | Status: SHIPPED | OUTPATIENT
Start: 2018-09-07 | End: 2019-12-13

## 2018-09-07 NOTE — PROGRESS NOTES
Patient is here today for follow up with Jossy Alejandra for Hodgkins . Patient denies pain. Stated feeling good. Medication list and medical history were reviewed and updated.      Education Record    Learner:  Patient    Disease / Diagnosis: Hodgkins follow u

## 2018-09-07 NOTE — PROGRESS NOTES
Patient presents with: Follow - Up: 6 month follow up for  chronic medical issues      HPI: Josselin Tavares presents today for 6-month f/u chronic medical conditions as follows:    1. Controlled DM2 w/o complication - Stable on prescription medication.   Due for lab MG/DOSE Subcutaneous Solution Pen-injector, Inject 0.25 mg into the skin once a week., Disp: 4 pen, Rfl: 1  •  Insulin Pen Needle (PEN NEEDLES) 32G X 4 MM Does not apply Misc, Inject 1 each into the skin daily. , Disp: 100 each, Rfl: 1  •  Empagliflozin (Betty Plover Encounters:  09/07/18 : 228 lb 4 oz  09/07/18 : 227 lb 8 oz  08/27/18 : 227 lb  07/25/18 : 230 lb  07/23/18 : 230 lb  06/27/18 : 230 lb  Gen - A&Ox3, NAD, morbidly obese  HEENT - PERRL, EOMI, OP is clear  Lungs - CTAB  CV - RRR, nl s1, s2  Abd - soft, NABS Vitro Strip 100 strip 1      Sig: Once a day E11.9           Imaging & Consults:  None

## 2018-09-07 NOTE — PATIENT INSTRUCTIONS
1. Menopausal options include EVENING PRIMROSE OIL AND BLACK COHOSH. These are supplements available OTC, BUT no consensus on dose in milligrams or frequency of capsules per day.     Kind regards,  Dr. Guerrero He

## 2018-09-08 NOTE — PROGRESS NOTES
[facility] Progress Note      Patient Name: Abdi Carty   YOB: 1970  Medical Record Number: [id number]  Attending Physician: Theo Huang M.D.      Date of Visit: 9/7/2018      Chief Complaint  Hodgkin's lymphoma, nodular lymp MetFORMIN HCl  MG Oral Tablet 24 Hr Take 1 tablet (750 mg total) by mouth daily. Disp: 90 tablet Rfl: 3   OZEMPIC 0.25 or 0.5 MG/DOSE Subcutaneous Solution Pen-injector Inject 0.25 mg into the skin once a week.  Disp: 4 pen Rfl: 1   Insulin Pen Ne Constitutional No fevers, chills, night sweats, or weight loss. Hematologic/Lymphatic No tender or enlarged lymph nodes; no easy bruising or bleeding. Breasts No new breast masses, nipple discharge, breast skin changes.   Respiratory No dyspnea, cough, Patient is more than 5 years out from definitive therapy. Continue active surveillance. Recommend CBC be obtained - patient would like to have the lab drawn with whatever labs her primary care physician orders today.      Planned Follow Up  Patient will ret

## 2018-09-15 ENCOUNTER — LAB ENCOUNTER (OUTPATIENT)
Dept: LAB | Facility: HOSPITAL | Age: 48
End: 2018-09-15
Attending: INTERNAL MEDICINE
Payer: COMMERCIAL

## 2018-09-15 DIAGNOSIS — C81.04 NODULAR LYMPHOCYTE PREDOMINANT HODGKIN LYMPHOMA OF LYMPH NODES OF AXILLA (HCC): ICD-10-CM

## 2018-09-15 DIAGNOSIS — E11.9 CONTROLLED TYPE 2 DIABETES MELLITUS WITHOUT COMPLICATION, WITHOUT LONG-TERM CURRENT USE OF INSULIN (HCC): ICD-10-CM

## 2018-09-15 LAB
ALBUMIN SERPL-MCNC: 3.5 G/DL (ref 3.5–4.8)
ALBUMIN/GLOB SERPL: 0.9 {RATIO} (ref 1–2)
ALP LIVER SERPL-CCNC: 111 U/L (ref 39–100)
ALT SERPL-CCNC: 18 U/L (ref 14–54)
ANION GAP SERPL CALC-SCNC: 8 MMOL/L (ref 0–18)
AST SERPL-CCNC: 14 U/L (ref 15–41)
BASOPHILS # BLD AUTO: 0.02 X10(3) UL (ref 0–0.1)
BASOPHILS NFR BLD AUTO: 0.3 %
BILIRUB SERPL-MCNC: 0.4 MG/DL (ref 0.1–2)
BUN BLD-MCNC: 14 MG/DL (ref 8–20)
BUN/CREAT SERPL: 15.4 (ref 10–20)
CALCIUM BLD-MCNC: 9 MG/DL (ref 8.3–10.3)
CHLORIDE SERPL-SCNC: 108 MMOL/L (ref 101–111)
CHOLEST SMN-MCNC: 145 MG/DL (ref ?–200)
CO2 SERPL-SCNC: 26 MMOL/L (ref 22–32)
CREAT BLD-MCNC: 0.91 MG/DL (ref 0.55–1.02)
EOSINOPHIL # BLD AUTO: 0.16 X10(3) UL (ref 0–0.3)
EOSINOPHIL NFR BLD AUTO: 2.3 %
ERYTHROCYTE [DISTWIDTH] IN BLOOD BY AUTOMATED COUNT: 17.8 % (ref 11.5–16)
EST. AVERAGE GLUCOSE BLD GHB EST-MCNC: 120 MG/DL (ref 68–126)
GLOBULIN PLAS-MCNC: 4 G/DL (ref 2.5–4)
GLUCOSE BLD-MCNC: 81 MG/DL (ref 70–99)
HBA1C MFR BLD HPLC: 5.8 % (ref ?–5.7)
HCT VFR BLD AUTO: 41.7 % (ref 34–50)
HDLC SERPL-MCNC: 72 MG/DL (ref 40–59)
HGB BLD-MCNC: 13 G/DL (ref 12–16)
IMMATURE GRANULOCYTE COUNT: 0.02 X10(3) UL (ref 0–1)
IMMATURE GRANULOCYTE RATIO %: 0.3 %
LDLC SERPL CALC-MCNC: 60 MG/DL (ref ?–100)
LYMPHOCYTES # BLD AUTO: 1.81 X10(3) UL (ref 0.9–4)
LYMPHOCYTES NFR BLD AUTO: 26.6 %
M PROTEIN MFR SERPL ELPH: 7.5 G/DL (ref 6.1–8.3)
MCH RBC QN AUTO: 26.5 PG (ref 27–33.2)
MCHC RBC AUTO-ENTMCNC: 31.2 G/DL (ref 31–37)
MCV RBC AUTO: 84.9 FL (ref 81–100)
MONOCYTES # BLD AUTO: 0.57 X10(3) UL (ref 0.1–1)
MONOCYTES NFR BLD AUTO: 8.4 %
NEUTROPHIL ABS PRELIM: 4.23 X10 (3) UL (ref 1.3–6.7)
NEUTROPHILS # BLD AUTO: 4.23 X10(3) UL (ref 1.3–6.7)
NEUTROPHILS NFR BLD AUTO: 62.1 %
NONHDLC SERPL-MCNC: 73 MG/DL (ref ?–130)
OSMOLALITY SERPL CALC.SUM OF ELEC: 294 MOSM/KG (ref 275–295)
PLATELET # BLD AUTO: 196 10(3)UL (ref 150–450)
POTASSIUM SERPL-SCNC: 4.2 MMOL/L (ref 3.6–5.1)
RBC # BLD AUTO: 4.91 X10(6)UL (ref 3.8–5.1)
RED CELL DISTRIBUTION WIDTH-SD: 54.3 FL (ref 35.1–46.3)
SODIUM SERPL-SCNC: 142 MMOL/L (ref 136–144)
TRIGL SERPL-MCNC: 65 MG/DL (ref 30–149)
VLDLC SERPL CALC-MCNC: 13 MG/DL (ref 0–30)
WBC # BLD AUTO: 6.8 X10(3) UL (ref 4–13)

## 2018-09-15 PROCEDURE — 36415 COLL VENOUS BLD VENIPUNCTURE: CPT

## 2018-09-15 PROCEDURE — 80053 COMPREHEN METABOLIC PANEL: CPT

## 2018-09-15 PROCEDURE — 80061 LIPID PANEL: CPT

## 2018-09-15 PROCEDURE — 83036 HEMOGLOBIN GLYCOSYLATED A1C: CPT

## 2018-09-15 PROCEDURE — 85025 COMPLETE CBC W/AUTO DIFF WBC: CPT

## 2018-09-20 ENCOUNTER — OFFICE VISIT (OUTPATIENT)
Dept: INTERNAL MEDICINE CLINIC | Facility: CLINIC | Age: 48
End: 2018-09-20
Payer: COMMERCIAL

## 2018-09-20 VITALS
HEIGHT: 63 IN | BODY MASS INDEX: 40.4 KG/M2 | WEIGHT: 228 LBS | RESPIRATION RATE: 16 BRPM | HEART RATE: 86 BPM | DIASTOLIC BLOOD PRESSURE: 72 MMHG | SYSTOLIC BLOOD PRESSURE: 118 MMHG

## 2018-09-20 DIAGNOSIS — G47.33 OSA ON CPAP: ICD-10-CM

## 2018-09-20 DIAGNOSIS — Z51.81 ENCOUNTER FOR THERAPEUTIC DRUG MONITORING: Primary | ICD-10-CM

## 2018-09-20 DIAGNOSIS — E11.9 CONTROLLED TYPE 2 DIABETES MELLITUS WITHOUT COMPLICATION, WITHOUT LONG-TERM CURRENT USE OF INSULIN (HCC): ICD-10-CM

## 2018-09-20 DIAGNOSIS — I10 ESSENTIAL HYPERTENSION, BENIGN: ICD-10-CM

## 2018-09-20 DIAGNOSIS — Z99.89 OSA ON CPAP: ICD-10-CM

## 2018-09-20 PROBLEM — M26.609 TMJ DYSFUNCTION: Status: ACTIVE | Noted: 2018-09-20

## 2018-09-20 PROCEDURE — 99214 OFFICE O/P EST MOD 30 MIN: CPT | Performed by: NURSE PRACTITIONER

## 2018-09-20 RX ORDER — SEMAGLUTIDE 1.34 MG/ML
0.5 INJECTION, SOLUTION SUBCUTANEOUS WEEKLY
Qty: 4 PEN | Refills: 1 | Status: SHIPPED | OUTPATIENT
Start: 2018-09-20 | End: 2018-10-22

## 2018-09-20 NOTE — PATIENT INSTRUCTIONS
PLAN:  Continue with medications: ozempic 0.5mg weekly X 4 weeks, jardance, and metformin  Go to the lab for blood work   Follow up with me in 1 month  Schedule follow up appointments:  Dietitian/nutritionist     Please try to work on the following dietary

## 2018-09-20 NOTE — PROGRESS NOTES
HISTORY OF PRESENT ILLNESS  Patient presents with:  Weight Check: up 1 lb    Valdo Fuller is a 46year old female here for follow up with medical weight loss program for the treatment of overweight, obesity, or morbid obesity.  Patient has gained -# 1  lb (skips a lot either breakfast or lunch)   Number of restaurant or fast food meals/week: 4-5  meals/week     Social hx and lifestyle reviewed:    Work:  (off during the summer)   Marital status:     Support: yes  Tobacco use: none  ETOH without clicks or gallops, no pedal edema.    GI: rotund, no masses, HSM or tenderness  MUSCULOSKELETAL: grossly intact  NEURO: Oriented times three, full ROM of bilateral UE/LE  PSYCH: pleasant, cooperative, normal mood and affect, no si/hi    Lab Results total) by mouth daily. Disp: 90 tablet Rfl: 3   OZEMPIC 0.25 or 0.5 MG/DOSE Subcutaneous Solution Pen-injector Inject 0.25 mg into the skin once a week.  Disp: 4 pen Rfl: 1   Insulin Pen Needle (PEN NEEDLES) 32G X 4 MM Does not apply Misc Inject 1 each into diabetes mellitus without complication, without long-term current use of insulin (HCC)    (I10) Essential hypertension, benign    (G47.33,  Z99.89) ANAHY on CPAP    Initial Weight Data and Goal Weight Loss:  Weight Calculations  Initial Weight: 254 lbs  Init week in active weight loss)   · Discussed the role of sleep and stress in weight management    Labs ordered today: none    Patient Instructions   PLAN:  Continue with medications: ozempic 0.5mg weekly X 4 weeks, jardance, and metformin  Go to the lab for b \"Headspace\" which helps with mindfulness, meditation, clarity, sleep, and lexus to your daily life. Return in about 4 weeks (around 10/18/2018) for weight management. Patient verbalizes understanding.     VIKAS Collado

## 2018-09-21 ENCOUNTER — HOSPITAL ENCOUNTER (OUTPATIENT)
Dept: MAMMOGRAPHY | Facility: HOSPITAL | Age: 48
Discharge: HOME | End: 2018-09-21
Attending: SURGERY
Payer: COMMERCIAL

## 2018-09-21 DIAGNOSIS — R92.8 ABNORMAL MAMMOGRAM: ICD-10-CM

## 2018-09-21 DIAGNOSIS — R92.8 ABNORMAL MAMMOGRAM: Primary | ICD-10-CM

## 2018-09-21 DIAGNOSIS — N64.89 RADIAL SCAR OF BREAST: ICD-10-CM

## 2018-09-21 DIAGNOSIS — R92.8 ABNORMAL MAMMOGRAM OF BOTH BREASTS: ICD-10-CM

## 2018-09-21 PROCEDURE — 76642 ULTRASOUND BREAST LIMITED: CPT | Performed by: SURGERY

## 2018-09-21 PROCEDURE — 77066 DX MAMMO INCL CAD BI: CPT | Performed by: SURGERY

## 2018-09-21 PROCEDURE — 77062 BREAST TOMOSYNTHESIS BI: CPT | Performed by: SURGERY

## 2018-09-21 NOTE — PROGRESS NOTES
HPI:    Patient ID: Philomena Parikh is a 46year old female. HPI    Review of Systems         Current Outpatient Medications:  OZEMPIC 0.25 or 0.5 MG/DOSE Subcutaneous Solution Pen-injector Inject 0.5 mg into the skin once a week.  Disp: 4 pen Rfl: 1   me Auto-injector Take as directed for anaphylaxis, then call 911. Disp: 1 each Rfl: 1   Multiple Vitamins-Iron (ONCE DAILY/IRON) Oral Tab Take 1 tablet by mouth daily.  Disp:  Rfl:    Fluticasone Propionate (FLONASE) 50 MCG/ACT Nasal Suspension 2 sprays in eac

## 2018-10-11 ENCOUNTER — HOSPITAL ENCOUNTER (OUTPATIENT)
Dept: MRI IMAGING | Facility: HOSPITAL | Age: 48
Discharge: HOME | End: 2018-10-11
Attending: ORTHOPAEDIC SURGERY
Payer: COMMERCIAL

## 2018-10-11 ENCOUNTER — OFFICE VISIT (OUTPATIENT)
Dept: FAMILY MEDICINE CLINIC | Facility: CLINIC | Age: 48
End: 2018-10-11
Payer: COMMERCIAL

## 2018-10-11 ENCOUNTER — TELEPHONE (OUTPATIENT)
Dept: INTERNAL MEDICINE CLINIC | Facility: CLINIC | Age: 48
End: 2018-10-11

## 2018-10-11 VITALS
SYSTOLIC BLOOD PRESSURE: 130 MMHG | HEART RATE: 77 BPM | WEIGHT: 225 LBS | HEIGHT: 62.5 IN | RESPIRATION RATE: 16 BRPM | DIASTOLIC BLOOD PRESSURE: 76 MMHG | TEMPERATURE: 98 F | OXYGEN SATURATION: 98 % | BODY MASS INDEX: 40.37 KG/M2

## 2018-10-11 DIAGNOSIS — S67.22XA CRUSHING INJURY OF LEFT HAND, INITIAL ENCOUNTER: ICD-10-CM

## 2018-10-11 DIAGNOSIS — J30.2 SEASONAL ALLERGIES: ICD-10-CM

## 2018-10-11 DIAGNOSIS — R59.0 PREAURICULAR LYMPHADENOPATHY: Primary | ICD-10-CM

## 2018-10-11 PROCEDURE — 99213 OFFICE O/P EST LOW 20 MIN: CPT | Performed by: NURSE PRACTITIONER

## 2018-10-11 PROCEDURE — 73218 MRI UPPER EXTREMITY W/O DYE: CPT | Performed by: ORTHOPAEDIC SURGERY

## 2018-10-11 RX ORDER — AZELASTINE 1 MG/ML
SPRAY, METERED NASAL
Qty: 30 ML | Refills: 5 | Status: SHIPPED | OUTPATIENT
Start: 2018-10-11 | End: 2018-10-22

## 2018-10-11 NOTE — TELEPHONE ENCOUNTER
Pt called to advise she was seen at dentist due to inflammation of lymph nodes in rt jaw. Dentist wants to send pt to dentist for TMJ . Pt wants to know if she should go to dentist or come here. Please call to advise.

## 2018-10-11 NOTE — PROGRESS NOTES
Nella Vidal is a 46year old female. HPI:   Patient presents with: Other: swollen tender lymph nodes right side of neck x 1 day  Patient presents with one day hx of right sided jaw/neck pain.  She has hx of teeth grinding and believed it might be relat OMEPRAZOLE 20 MG Oral Capsule Delayed Release TAKE 1 CAPSULE EVERY MORNING Disp: 90 capsule Rfl: 3   ValACYclovir HCl 500 MG Oral Tab Take 500 mg by mouth as needed.    Disp:  Rfl:    Norethindrone (JOLIVETTE) 0.35 MG Oral Tab Take 1 tablet (0.35 mg total) /76 (BP Location: Left arm, Patient Position: Sitting)   Pulse 77   Temp 98.1 °F (36.7 °C) (Tympanic)   Resp 16   Ht 62.5\"   Wt 225 lb   SpO2 98%   BMI 40.50 kg/m²   GENERAL: well developed, well nourished, in no apparent distress  SKIN: no rashes o Dead cells and fluid build up in the lymph nodes as they help fight infection or disease. This causes them to swell in size. Enlarged lymph nodes are often near the source of infection. This can help to find the cause of an infection.  For example, swollen · Lymph node biopsy. If lymph nodes are swollen for 3 to 4 weeks, they may be checked with a biopsy. Small samples of lymph node tissue are taken and checked in a lab for signs of cancer.  You may be referred to a specialist in blood disorders and cancer (h

## 2018-10-11 NOTE — PATIENT INSTRUCTIONS
Lymphadenopathy  Lymphadenopathy is swelling of the lymph nodes. Lymph nodes are small, bean-shaped glands around the body. What are lymph nodes? Lymph nodes are part of your immune system.  These glands are found in your neck, over your clavicle, armpi You may also have symptoms from an infection causing the swollen glands. These symptoms may include fever, sore throat, body aches, or cough. Diagnosing lymphadenopathy  Your healthcare provider will ask about your health history and symptoms.  He or she w © 0989-0893 [employer] [address]. [address] [facility], [facility] [place]. All rights reserved. This information is not intended as a substitute for professional medical care. Always follow your healthcare professional's instructions.

## 2018-10-11 NOTE — TELEPHONE ENCOUNTER
Called pt back to recommend an initial assessment w a provider here at the office. Pt stated will call right back as an appointment was offered for tomorrow w Dr Miranda Madison.

## 2018-10-12 NOTE — PROGRESS NOTES
MRI results reviewed, mild tendinitis at the extensor tendon, we can discuss further treatment options at the next appointment.

## 2018-10-22 ENCOUNTER — OFFICE VISIT (OUTPATIENT)
Dept: INTERNAL MEDICINE CLINIC | Facility: CLINIC | Age: 48
End: 2018-10-22
Payer: COMMERCIAL

## 2018-10-22 VITALS
HEIGHT: 63 IN | WEIGHT: 233 LBS | HEART RATE: 82 BPM | BODY MASS INDEX: 41.29 KG/M2 | DIASTOLIC BLOOD PRESSURE: 80 MMHG | SYSTOLIC BLOOD PRESSURE: 134 MMHG | RESPIRATION RATE: 16 BRPM

## 2018-10-22 DIAGNOSIS — I10 ESSENTIAL HYPERTENSION, BENIGN: ICD-10-CM

## 2018-10-22 DIAGNOSIS — G47.33 OSA ON CPAP: ICD-10-CM

## 2018-10-22 DIAGNOSIS — E11.9 CONTROLLED TYPE 2 DIABETES MELLITUS WITHOUT COMPLICATION, WITHOUT LONG-TERM CURRENT USE OF INSULIN (HCC): ICD-10-CM

## 2018-10-22 DIAGNOSIS — Z99.89 OSA ON CPAP: ICD-10-CM

## 2018-10-22 DIAGNOSIS — Z51.81 ENCOUNTER FOR THERAPEUTIC DRUG MONITORING: Primary | ICD-10-CM

## 2018-10-22 PROCEDURE — 99214 OFFICE O/P EST MOD 30 MIN: CPT | Performed by: NURSE PRACTITIONER

## 2018-10-22 RX ORDER — SEMAGLUTIDE 1.34 MG/ML
1 INJECTION, SOLUTION SUBCUTANEOUS WEEKLY
Qty: 2 PEN | Refills: 1 | Status: SHIPPED | OUTPATIENT
Start: 2018-10-22 | End: 2019-02-07

## 2018-10-22 NOTE — PATIENT INSTRUCTIONS
PLAN:  Continue with medications: ozempic 1mg, jardance and metformin (by PCP)  Follow up with me in 1 month  Schedule follow up appointments:  Dietitian/nutritionist      Please try to work on the following dietary changes:  1.  Drink lots of water and cut daily life.

## 2018-10-22 NOTE — PROGRESS NOTES
HISTORY OF PRESENT ILLNESS  Patient presents with:  Weight Check: 5 pound weight gain    Loni Cortes is a 46year old female here for follow up with medical weight loss program for the treatment of overweight, obesity, or morbid obesity.  Patient has gai fast food meals/week: 4-5  meals/week     Social hx and lifestyle reviewed:    Work:  (off during the summer)   Marital status:     Support: yes  Tobacco use: none  ETOH use: didn't discuss   Supplements: none  Exercise/Activity: walki masses, HSM or tenderness  MUSCULOSKELETAL: grossly intact  NEURO: Oriented times three, full ROM of bilateral UE/LE  PSYCH: pleasant, cooperative, normal mood and affect, no si/hi    Lab Results   Component Value Date    WBC 6.8 09/15/2018    RBC 4.91 09/ HCl  MG Oral Tablet 24 Hr Take 1 tablet (750 mg total) by mouth daily. Disp: 90 tablet Rfl: 3   Insulin Pen Needle (PEN NEEDLES) 32G X 4 MM Does not apply Misc Inject 1 each into the skin daily.  Disp: 100 each Rfl: 1   Empagliflozin (JARDIANCE) 10 MG complication, without long-term current use of insulin (HCC)  Plan: OZEMPIC 1 MG/DOSE Subcutaneous Solution         Pen-injector    Initial Weight Data and Goal Weight Loss:  Weight Calculations  Initial Weight: 254 lbs  Initial Weight Date: 05/16/17  Sang Holt active weight loss)   · Discussed the role of sleep and stress in weight management    Labs ordered today: none    Patient Instructions   PLAN:  Continue with medications: ozempic 1mg, jardance and metformin (by PCP)  Follow up with me in 1 month  Schedule your day and that you can do at home! 3. \"Calm\" or \"Headspace\" which helps with mindfulness, meditation, clarity, sleep, and lexus to your daily life. Return in about 4 weeks (around 11/19/2018) for weight management.     Patient verbalizes unders

## 2018-11-05 RX ORDER — METFORMIN HYDROCHLORIDE 750 MG/1
TABLET, EXTENDED RELEASE ORAL
Qty: 90 TABLET | Refills: 3 | OUTPATIENT
Start: 2018-11-05

## 2018-11-12 ENCOUNTER — TELEPHONE (OUTPATIENT)
Dept: INTERNAL MEDICINE CLINIC | Facility: CLINIC | Age: 48
End: 2018-11-12

## 2018-11-12 NOTE — TELEPHONE ENCOUNTER
Patient called stating that she needs a letter to be written to \"[facility]\" explaining that she broke her CPAP machine and is in need of a new one  Ph# [phone]  # [phone]

## 2018-11-17 DIAGNOSIS — K21.00 REFLUX ESOPHAGITIS: ICD-10-CM

## 2018-11-19 ENCOUNTER — OFFICE VISIT (OUTPATIENT)
Dept: INTERNAL MEDICINE CLINIC | Facility: CLINIC | Age: 48
End: 2018-11-19
Payer: COMMERCIAL

## 2018-11-19 VITALS
SYSTOLIC BLOOD PRESSURE: 120 MMHG | WEIGHT: 223 LBS | DIASTOLIC BLOOD PRESSURE: 70 MMHG | HEIGHT: 63 IN | BODY MASS INDEX: 39.51 KG/M2 | RESPIRATION RATE: 16 BRPM | HEART RATE: 76 BPM

## 2018-11-19 DIAGNOSIS — R79.89 LOW VITAMIN D LEVEL: ICD-10-CM

## 2018-11-19 DIAGNOSIS — E11.9 CONTROLLED TYPE 2 DIABETES MELLITUS WITHOUT COMPLICATION, WITHOUT LONG-TERM CURRENT USE OF INSULIN (HCC): ICD-10-CM

## 2018-11-19 DIAGNOSIS — G47.33 OSA ON CPAP: ICD-10-CM

## 2018-11-19 DIAGNOSIS — Z99.89 OSA ON CPAP: ICD-10-CM

## 2018-11-19 DIAGNOSIS — Z51.81 ENCOUNTER FOR THERAPEUTIC DRUG MONITORING: Primary | ICD-10-CM

## 2018-11-19 DIAGNOSIS — I10 ESSENTIAL HYPERTENSION, BENIGN: ICD-10-CM

## 2018-11-19 PROCEDURE — 99213 OFFICE O/P EST LOW 20 MIN: CPT | Performed by: NURSE PRACTITIONER

## 2018-11-19 RX ORDER — RANITIDINE 300 MG/1
TABLET ORAL
Qty: 90 TABLET | Refills: 0 | Status: SHIPPED | OUTPATIENT
Start: 2018-11-19 | End: 2019-01-21

## 2018-11-19 NOTE — PATIENT INSTRUCTIONS
PLAN:  Continue with medications: ozempic 1mg  Follow up with me in 1 month  Schedule follow up appointments:  Dietitian/nutritionist      Please try to work on the following dietary changes:  1.  Drink lots of water and cut down on soda/juice consumption i

## 2018-11-19 NOTE — PROGRESS NOTES
HISTORY OF PRESENT ILLNESS  Patient presents with:  Weight Check: lost 10 pounds    Roe Toro is a 46year old female here for follow up with medical weight loss program for the treatment of overweight, obesity, or morbid obesity.  Patient has lost -#1 (skips a lot either breakfast or lunch)   Number of restaurant or fast food meals/week: 4-5  meals/week     Social hx and lifestyle reviewed:    Work:  (off during the summer)   Marital status:     Support: yes  Tobacco use: none  ETOH UE/LE  PSYCH: pleasant, cooperative, normal mood and affect, no si/hi    Lab Results   Component Value Date    WBC 6.8 09/15/2018    RBC 4.91 09/15/2018    HGB 13.0 09/15/2018    HCT 41.7 09/15/2018    MCV 84.9 09/15/2018    MCH 26.5 (L) 09/15/2018    MCHC ([place]rist[place]) In Vitro Strip Once a day E11.9 Disp: 100 strip Rfl: 1   MetFORMIN HCl  MG Oral Tablet 24 Hr Take 1 tablet (750 mg total) by mouth daily.  Disp: 90 tablet Rfl: 3   Insulin Pen Needle (PEN NEEDLES) 32G X 4 MM Does not apply Misc I Weight Loss:  Weight Calculations  Initial Weight: 254 lbs  Initial Weight Date: 05/16/17  Today's Weight: 223 lbs  5% Goal: 12.7  10% Goal: 25.4  Total Weight Loss: 31 lbs     Initial consult: 254 lbs on 5/27/2017, lost 10 Lb: 31 lbs total     Reviewed management    Labs ordered today: none    Patient Instructions   PLAN:  Continue with medications: ozempic 1mg  Follow up with me in 1 month  Schedule follow up appointments:  Dietitian/nutritionist      Please try to work on the following dietary changes: clarity, sleep, and lexus to your daily life. No Follow-up on file. Patient verbalizes understanding.     VIKAS Burns

## 2018-12-01 ENCOUNTER — PATIENT MESSAGE (OUTPATIENT)
Dept: INTERNAL MEDICINE CLINIC | Facility: CLINIC | Age: 48
End: 2018-12-01

## 2018-12-03 NOTE — TELEPHONE ENCOUNTER
Spoke with pt who stated she is in need of a replacement Cpap machine through [address] [place] [address]. Pt states her last Cpap machine and supplies were ordered by a previous physician. Last sleep study 8 years ago. Pt will try to obtain a copy.   Will Dr Madeline Rodgers

## 2018-12-03 NOTE — TELEPHONE ENCOUNTER
From: Qi Marinelli  To: Emelia Sparks MD  Sent: 12/1/2018 10:47 AM CST  Subject: Non-Urgent Medical Question    Rosanne Wheatmann have a question about my CPAP machine.  [facility] needs a letter that has my specifications to set up my CPA

## 2018-12-03 NOTE — PROGRESS NOTES
Sure, please write on a prescription pad. I think we'll just need her specific settings and then I can sign it. We'd then send it back to [facility] via fax. Yuli Molina.  Yasmeen Tripp MD  Diplomate, American Board of Internal Medicine  [address] [address]

## 2018-12-04 ENCOUNTER — TELEPHONE (OUTPATIENT)
Dept: INTERNAL MEDICINE CLINIC | Facility: CLINIC | Age: 48
End: 2018-12-04

## 2018-12-04 NOTE — TELEPHONE ENCOUNTER
Roselia Martin from [facility] would like a call back needs clarification on order for CPAP

## 2018-12-07 NOTE — PROGRESS NOTES
CPAP order from [address] [place] [address] completed by Dr Laura Apodaca and faxed back to [phone]. Copy in [place] 1.

## 2018-12-10 ENCOUNTER — OFFICE VISIT (OUTPATIENT)
Dept: OBGYN CLINIC | Facility: CLINIC | Age: 48
End: 2018-12-10
Payer: COMMERCIAL

## 2018-12-10 VITALS
SYSTOLIC BLOOD PRESSURE: 122 MMHG | DIASTOLIC BLOOD PRESSURE: 72 MMHG | BODY MASS INDEX: 39.79 KG/M2 | HEIGHT: 62.25 IN | HEART RATE: 92 BPM | WEIGHT: 219 LBS

## 2018-12-10 DIAGNOSIS — N92.6 IRREGULAR MENSES: ICD-10-CM

## 2018-12-10 DIAGNOSIS — Z01.419 WELL WOMAN EXAM WITH ROUTINE GYNECOLOGICAL EXAM: Primary | ICD-10-CM

## 2018-12-10 DIAGNOSIS — Z12.39 BREAST CANCER SCREENING: ICD-10-CM

## 2018-12-10 PROCEDURE — 99396 PREV VISIT EST AGE 40-64: CPT | Performed by: NURSE PRACTITIONER

## 2018-12-10 RX ORDER — ACETAMINOPHEN AND CODEINE PHOSPHATE 120; 12 MG/5ML; MG/5ML
0.35 SOLUTION ORAL
Qty: 84 TABLET | Refills: 4 | Status: SHIPPED | OUTPATIENT
Start: 2018-12-10 | End: 2019-01-30 | Stop reason: SDUPTHER

## 2018-12-10 RX ORDER — VALACYCLOVIR HYDROCHLORIDE 500 MG/1
500 TABLET, FILM COATED ORAL 2 TIMES DAILY
Qty: 30 TABLET | Refills: 0 | Status: SHIPPED | OUTPATIENT
Start: 2018-12-10 | End: 2019-07-06

## 2018-12-10 NOTE — PROGRESS NOTES
Here for Routine Annual Exam  No concerns or questions. Menses are irregular on OCP. She has skipped a few months in between. She experiences hot flashes the longer she goes between menses. Once she has a menses her symptoms improve.   Contraception- n

## 2018-12-14 NOTE — TELEPHONE ENCOUNTER
Ludwig Brownlee called from [facility] to request face to face notes for CPAP she needs more documentation please fax [phone]

## 2018-12-17 ENCOUNTER — OFFICE VISIT (OUTPATIENT)
Dept: INTERNAL MEDICINE CLINIC | Facility: CLINIC | Age: 48
End: 2018-12-17
Payer: COMMERCIAL

## 2018-12-17 VITALS
RESPIRATION RATE: 16 BRPM | HEIGHT: 63 IN | DIASTOLIC BLOOD PRESSURE: 74 MMHG | SYSTOLIC BLOOD PRESSURE: 126 MMHG | HEART RATE: 80 BPM | BODY MASS INDEX: 38.98 KG/M2 | WEIGHT: 220 LBS

## 2018-12-17 DIAGNOSIS — Z51.81 ENCOUNTER FOR THERAPEUTIC DRUG MONITORING: Primary | ICD-10-CM

## 2018-12-17 DIAGNOSIS — R79.89 LOW VITAMIN D LEVEL: ICD-10-CM

## 2018-12-17 DIAGNOSIS — E66.01 OBESITY, CLASS III, BMI 40-49.9 (MORBID OBESITY) (HCC): ICD-10-CM

## 2018-12-17 DIAGNOSIS — E11.9 CONTROLLED TYPE 2 DIABETES MELLITUS WITHOUT COMPLICATION, WITHOUT LONG-TERM CURRENT USE OF INSULIN (HCC): ICD-10-CM

## 2018-12-17 DIAGNOSIS — I10 ESSENTIAL HYPERTENSION, BENIGN: ICD-10-CM

## 2018-12-17 PROCEDURE — 99213 OFFICE O/P EST LOW 20 MIN: CPT | Performed by: NURSE PRACTITIONER

## 2018-12-17 RX ORDER — SEMAGLUTIDE 1.34 MG/ML
1 INJECTION, SOLUTION SUBCUTANEOUS WEEKLY
Qty: 2 PEN | Refills: 1 | Status: CANCELLED | OUTPATIENT
Start: 2018-12-17

## 2018-12-17 NOTE — PROGRESS NOTES
HISTORY OF PRESENT ILLNESS  Patient presents with:  Weight Check: down 3 lbs in 1 month(11/19/18)    Romero Payne is a 46year old female here for follow up with medical weight loss program for the treatment of overweight, obesity, or morbid obesity.  [employer] sweat tea (thermus) daily      Sweet/ Salty tooth: salty  Portion: medium/large  Eats 3 meals per day: no (skips a lot either breakfast or lunch)   Number of restaurant or fast food meals/week: 4-5  meals/week     Social hx and lifestyle reviewed:    Work: mood and affect, no si/hi    Lab Results   Component Value Date    WBC 6.8 09/15/2018    RBC 4.91 09/15/2018    HGB 13.0 09/15/2018    HCT 41.7 09/15/2018    MCV 84.9 09/15/2018    MCH 26.5 (L) 09/15/2018    MCHC 31.2 09/15/2018    RDW 17.8 (H) 09/15/2018 Does not apply Misc TEST X1 DAILY Disp: 1 Box Rfl: 1   Glucose Blood (ACCU-CHEK SMARTVIEW) In Vitro Strip Once a day E11.9 Disp: 100 strip Rfl: 1   MetFORMIN HCl  MG Oral Tablet 24 Hr Take 1 tablet (750 mg total) by mouth daily.  Disp: 90 tablet Rfl: Loss:  Weight Calculations  Initial Weight: 254 lbs  Initial Weight Date: 05/16/17  Today's Weight: 220 lbs  5% Goal: 12.7  10% Goal: 25.4  Total Weight Loss: 34 lbs     Initial consult: 254 lbs on 5/27/2017, lost 3 Lb: 34 lbs total     Reviewed    [employer] night  · FITTE: ACSM recommendations (150-300 minutes/ week in active weight loss)   · Discussed the role of sleep and stress in weight management    Labs ordered today: none    Patient Instructions   Keep up the great work!!  #34 lbs of weight loss! !    minute workout\" to help with exercise/activity which takes 7 minutes of your day and that you can do at home! 3. \"Calm\" or \"Headspace\" which helps with mindfulness, meditation, clarity, sleep, and lexus to your daily life.        Return in about 4 week

## 2018-12-17 NOTE — PATIENT INSTRUCTIONS
Keep up the great work!!  #34 lbs of weight loss! !     PLAN:  Continue with medications: Follow up with me in 1 month  Schedule follow up appointments:  Dietitian/nutritionist      Please try to work on the following dietary changes:  1.  Drink lots of wa to your daily life.

## 2018-12-31 DIAGNOSIS — K21.00 REFLUX ESOPHAGITIS: ICD-10-CM

## 2019-01-01 RX ORDER — OMEPRAZOLE 20 MG/1
CAPSULE, DELAYED RELEASE ORAL
Qty: 90 CAPSULE | Refills: 3 | Status: SHIPPED | OUTPATIENT
Start: 2019-01-01 | End: 2019-12-13

## 2019-01-01 NOTE — TELEPHONE ENCOUNTER
Requesting OMEPRAZOLE 20 MG Oral Capsule Delayed Release  LOV: 9/7/18  RTC: 6 months  Filled: 1/18/18 #90 with 3 refills    Future Appointments   Date Time Provider Karina Piper   1/11/2019  9:00 PM SCHEDULE BY DATE [address]   1/28/2019

## 2019-01-03 ENCOUNTER — PATIENT MESSAGE (OUTPATIENT)
Dept: INTERNAL MEDICINE CLINIC | Facility: CLINIC | Age: 49
End: 2019-01-03

## 2019-01-03 DIAGNOSIS — K21.00 REFLUX ESOPHAGITIS: ICD-10-CM

## 2019-01-03 RX ORDER — OMEPRAZOLE 20 MG/1
CAPSULE, DELAYED RELEASE ORAL
Qty: 90 CAPSULE | Refills: 3 | OUTPATIENT
Start: 2019-01-03

## 2019-01-03 RX ORDER — OMEPRAZOLE 20 MG/1
CAPSULE, DELAYED RELEASE ORAL
Qty: 90 CAPSULE | Refills: 0 | OUTPATIENT
Start: 2019-01-03

## 2019-01-03 NOTE — TELEPHONE ENCOUNTER
From: Roe Toro  To: Milad Caba MD  Sent: 1/3/2019 3:41 PM CST  Subject: Prescription Question    Dr. Bee Palmoo,      My insurance has changed to a different mail order service. I am currently out of omeprazole.  May I please have a refill sent to the [address] [place] [address] Dr MORTON

## 2019-01-11 ENCOUNTER — OFFICE VISIT (OUTPATIENT)
Dept: SLEEP CENTER | Facility: HOSPITAL | Age: 49
End: 2019-01-11
Attending: INTERNAL MEDICINE
Payer: COMMERCIAL

## 2019-01-11 DIAGNOSIS — G47.33 OSA (OBSTRUCTIVE SLEEP APNEA): ICD-10-CM

## 2019-01-11 DIAGNOSIS — R06.83 SNORING: ICD-10-CM

## 2019-01-11 DIAGNOSIS — G47.10 HYPERSOMNIA: ICD-10-CM

## 2019-01-11 PROCEDURE — 95810 POLYSOM 6/> YRS 4/> PARAM: CPT

## 2019-01-15 NOTE — PROCEDURES
[address] [address]       Accredited by the [facility] (AASM)    PATIENT'S NAME:        Niko Geronimo  ATTENDING PHYSICIAN:   Suzie Davenport M.D. REFERRING PHYSICIAN:   Suzie Davenport M.D.   PATIENT follows:  Stage 1, 2%; stage 2, 88%; stage 3, less than 1%; stage REM 10%. RESPIRATORY MEASURES:  The patient had a total of 43 obstructive apneic or hypopneic episodes yielding an apnea-hypopnea index of 7.6. Supine AHI was 23, lateral AHI was 6.   O

## 2019-01-21 ENCOUNTER — TELEPHONE (OUTPATIENT)
Dept: INTERNAL MEDICINE CLINIC | Facility: CLINIC | Age: 49
End: 2019-01-21

## 2019-01-21 DIAGNOSIS — K21.00 REFLUX ESOPHAGITIS: ICD-10-CM

## 2019-01-21 NOTE — TELEPHONE ENCOUNTER
Requesting Jardiance  LOV: 12/17/18  RTC: 4 weeks  Last Relevant Labs: 9/15/18  Filled: 8/22/18 #90 with 1 refills    Future Appointments   Date Time Provider Karina Piper   1/28/2019  7:40 AM Amber Patel APRN [facility]     Patient h

## 2019-01-22 DIAGNOSIS — E66.01 OBESITY, CLASS III, BMI 40-49.9 (MORBID OBESITY) (HCC): ICD-10-CM

## 2019-01-22 DIAGNOSIS — Z51.81 ENCOUNTER FOR THERAPEUTIC DRUG MONITORING: ICD-10-CM

## 2019-01-22 DIAGNOSIS — E11.9 CONTROLLED TYPE 2 DIABETES MELLITUS WITHOUT COMPLICATION, WITHOUT LONG-TERM CURRENT USE OF INSULIN (HCC): ICD-10-CM

## 2019-01-22 RX ORDER — RANITIDINE 300 MG/1
TABLET ORAL
Qty: 90 TABLET | Refills: 0 | Status: SHIPPED | OUTPATIENT
Start: 2019-01-22 | End: 2019-09-13

## 2019-01-22 NOTE — TELEPHONE ENCOUNTER
Ranitidine 300 mg 1 tab nightly  11-19-18 90 with 0 refill    LOV 9-7-18     RTC 6 months.     Next apt 2-7-19     Labs 9-15-18

## 2019-01-22 NOTE — TELEPHONE ENCOUNTER
Pt has been out of MetFORMIN HCl  for 3 weeks. [place] ([phone]) told pt when have not responded to request they sent on 1/15/19. Please send refill asap.

## 2019-01-23 RX ORDER — METFORMIN HYDROCHLORIDE 750 MG/1
750 TABLET, EXTENDED RELEASE ORAL DAILY
Qty: 90 TABLET | Refills: 3 | Status: SHIPPED | OUTPATIENT
Start: 2019-01-23 | End: 2019-11-11

## 2019-01-23 NOTE — TELEPHONE ENCOUNTER
Metformin er 750 mg 1 tab daily filled 8-27-18 90 with 3 refills     Follow up 9-7-18     . RTC 6 months.       Next apt 2-7-19     Labs 9-15-18     HgbA1C <5.7 % 5.8 Abnormally high

## 2019-01-30 RX ORDER — NORETHINDRONE 0.35 MG/1
TABLET ORAL
Qty: 84 TABLET | Refills: 4 | Status: SHIPPED | OUTPATIENT
Start: 2019-01-30 | End: 2019-02-07 | Stop reason: ALTCHOICE

## 2019-01-30 NOTE — TELEPHONE ENCOUNTER
Refill Sent to [facility] for 1 year on 12/10/18. [facility]   [facility] and canceled at [facility]

## 2019-02-01 ENCOUNTER — OFFICE VISIT (OUTPATIENT)
Dept: SLEEP CENTER | Facility: HOSPITAL | Age: 49
End: 2019-02-01
Attending: INTERNAL MEDICINE
Payer: COMMERCIAL

## 2019-02-01 DIAGNOSIS — G47.33 OSA (OBSTRUCTIVE SLEEP APNEA): ICD-10-CM

## 2019-02-01 PROCEDURE — 95811 POLYSOM 6/>YRS CPAP 4/> PARM: CPT

## 2019-02-05 NOTE — PROCEDURES
[address] [address]       Accredited by the [facility] of [facility] Medicine (AASM)    PATIENT'S NAME:        Favian Rausch  ATTENDING PHYSICIAN:   Og Easton M.D. REFERRING PHYSICIAN:   Og Easton M.D.   PATIENT STAGING:  Stage 1, 2%; stage 2, 85%; stage 3, 0%; stage REM, 14%. RESPIRATORY MEASURES:  The patient was initiated on CPAP at 7 cm of water pressure and titrated up to a final pressure of 10 cm of water.   On this setting, the patient was able to achieve

## 2019-02-07 ENCOUNTER — OFFICE VISIT (OUTPATIENT)
Dept: INTERNAL MEDICINE CLINIC | Facility: CLINIC | Age: 49
End: 2019-02-07
Payer: COMMERCIAL

## 2019-02-07 ENCOUNTER — APPOINTMENT (OUTPATIENT)
Dept: LAB | Age: 49
End: 2019-02-07
Attending: INTERNAL MEDICINE
Payer: COMMERCIAL

## 2019-02-07 VITALS
RESPIRATION RATE: 16 BRPM | HEART RATE: 78 BPM | HEIGHT: 63 IN | DIASTOLIC BLOOD PRESSURE: 66 MMHG | BODY MASS INDEX: 39.69 KG/M2 | SYSTOLIC BLOOD PRESSURE: 112 MMHG | WEIGHT: 224 LBS

## 2019-02-07 VITALS
HEIGHT: 62.25 IN | WEIGHT: 222.5 LBS | DIASTOLIC BLOOD PRESSURE: 76 MMHG | TEMPERATURE: 99 F | SYSTOLIC BLOOD PRESSURE: 112 MMHG | BODY MASS INDEX: 40.43 KG/M2 | HEART RATE: 72 BPM | RESPIRATION RATE: 16 BRPM

## 2019-02-07 DIAGNOSIS — E11.9 CONTROLLED TYPE 2 DIABETES MELLITUS WITHOUT COMPLICATION, WITHOUT LONG-TERM CURRENT USE OF INSULIN (HCC): ICD-10-CM

## 2019-02-07 DIAGNOSIS — Z51.81 ENCOUNTER FOR THERAPEUTIC DRUG MONITORING: Primary | ICD-10-CM

## 2019-02-07 DIAGNOSIS — E11.9 CONTROLLED TYPE 2 DIABETES MELLITUS WITHOUT COMPLICATION, WITHOUT LONG-TERM CURRENT USE OF INSULIN (HCC): Primary | ICD-10-CM

## 2019-02-07 DIAGNOSIS — C81.04 NODULAR LYMPHOCYTE PREDOMINANT HODGKIN LYMPHOMA OF LYMPH NODES OF AXILLA (HCC): ICD-10-CM

## 2019-02-07 DIAGNOSIS — E66.01 OBESITY, CLASS III, BMI 40-49.9 (MORBID OBESITY) (HCC): ICD-10-CM

## 2019-02-07 DIAGNOSIS — I10 ESSENTIAL HYPERTENSION, BENIGN: ICD-10-CM

## 2019-02-07 DIAGNOSIS — R79.89 LOW VITAMIN D LEVEL: ICD-10-CM

## 2019-02-07 DIAGNOSIS — K21.00 REFLUX ESOPHAGITIS: ICD-10-CM

## 2019-02-07 DIAGNOSIS — E66.01 CLASS 3 SEVERE OBESITY DUE TO EXCESS CALORIES WITH SERIOUS COMORBIDITY AND BODY MASS INDEX (BMI) OF 40.0 TO 44.9 IN ADULT (HCC): ICD-10-CM

## 2019-02-07 DIAGNOSIS — Z23 FLU VACCINE NEED: ICD-10-CM

## 2019-02-07 PROBLEM — E66.813 CLASS 3 SEVERE OBESITY DUE TO EXCESS CALORIES WITH SERIOUS COMORBIDITY AND BODY MASS INDEX (BMI) OF 40.0 TO 44.9 IN ADULT: Status: ACTIVE | Noted: 2017-05-16

## 2019-02-07 PROBLEM — E66.813 CLASS 3 SEVERE OBESITY DUE TO EXCESS CALORIES WITH SERIOUS COMORBIDITY AND BODY MASS INDEX (BMI) OF 40.0 TO 44.9 IN ADULT (HCC): Status: ACTIVE | Noted: 2017-05-16

## 2019-02-07 LAB
ALBUMIN SERPL-MCNC: 3.6 G/DL (ref 3.1–4.5)
ALBUMIN/GLOB SERPL: 1 {RATIO} (ref 1–2)
ALP LIVER SERPL-CCNC: 94 U/L (ref 41–108)
ALT SERPL-CCNC: 17 U/L (ref 14–54)
ANION GAP SERPL CALC-SCNC: 6 MMOL/L (ref 0–18)
AST SERPL-CCNC: 13 U/L (ref 15–41)
BILIRUB SERPL-MCNC: 0.6 MG/DL (ref 0.1–2)
BUN BLD-MCNC: 11 MG/DL (ref 8–20)
BUN/CREAT SERPL: 11.7 (ref 10–20)
CALCIUM BLD-MCNC: 8.9 MG/DL (ref 8.3–10.3)
CHLORIDE SERPL-SCNC: 107 MMOL/L (ref 101–111)
CHOLEST SMN-MCNC: 160 MG/DL (ref ?–200)
CO2 SERPL-SCNC: 28 MMOL/L (ref 22–32)
CREAT BLD-MCNC: 0.94 MG/DL (ref 0.55–1.02)
CREAT UR-SCNC: 161 MG/DL
EST. AVERAGE GLUCOSE BLD GHB EST-MCNC: 114 MG/DL (ref 68–126)
GLOBULIN PLAS-MCNC: 3.7 G/DL (ref 2.8–4.4)
GLUCOSE BLD-MCNC: 72 MG/DL (ref 70–99)
HBA1C MFR BLD HPLC: 5.6 % (ref ?–5.7)
HDLC SERPL-MCNC: 75 MG/DL (ref 40–59)
LDLC SERPL CALC-MCNC: 73 MG/DL (ref ?–100)
M PROTEIN MFR SERPL ELPH: 7.3 G/DL (ref 6.4–8.2)
MICROALBUMIN UR-MCNC: 0.68 MG/DL
MICROALBUMIN/CREAT 24H UR-RTO: 4.2 UG/MG (ref ?–30)
NONHDLC SERPL-MCNC: 85 MG/DL (ref ?–130)
OSMOLALITY SERPL CALC.SUM OF ELEC: 290 MOSM/KG (ref 275–295)
POTASSIUM SERPL-SCNC: 4 MMOL/L (ref 3.6–5.1)
SODIUM SERPL-SCNC: 141 MMOL/L (ref 136–144)
TRIGL SERPL-MCNC: 58 MG/DL (ref 30–149)
VLDLC SERPL CALC-MCNC: 12 MG/DL (ref 0–30)

## 2019-02-07 PROCEDURE — 90686 IIV4 VACC NO PRSV 0.5 ML IM: CPT | Performed by: INTERNAL MEDICINE

## 2019-02-07 PROCEDURE — 80061 LIPID PANEL: CPT | Performed by: INTERNAL MEDICINE

## 2019-02-07 PROCEDURE — 82570 ASSAY OF URINE CREATININE: CPT | Performed by: INTERNAL MEDICINE

## 2019-02-07 PROCEDURE — 90471 IMMUNIZATION ADMIN: CPT | Performed by: INTERNAL MEDICINE

## 2019-02-07 PROCEDURE — 99214 OFFICE O/P EST MOD 30 MIN: CPT | Performed by: NURSE PRACTITIONER

## 2019-02-07 PROCEDURE — 80053 COMPREHEN METABOLIC PANEL: CPT | Performed by: INTERNAL MEDICINE

## 2019-02-07 PROCEDURE — 99214 OFFICE O/P EST MOD 30 MIN: CPT | Performed by: INTERNAL MEDICINE

## 2019-02-07 PROCEDURE — 82043 UR ALBUMIN QUANTITATIVE: CPT | Performed by: INTERNAL MEDICINE

## 2019-02-07 PROCEDURE — 83036 HEMOGLOBIN GLYCOSYLATED A1C: CPT | Performed by: INTERNAL MEDICINE

## 2019-02-07 PROCEDURE — 36415 COLL VENOUS BLD VENIPUNCTURE: CPT | Performed by: INTERNAL MEDICINE

## 2019-02-07 RX ORDER — SEMAGLUTIDE 1.34 MG/ML
1 INJECTION, SOLUTION SUBCUTANEOUS WEEKLY
Qty: 2 PEN | Refills: 1 | Status: SHIPPED | OUTPATIENT
Start: 2019-02-07 | End: 2019-04-15

## 2019-02-07 NOTE — PATIENT INSTRUCTIONS
PLAN:  Continue with medications: ozempic 1mg, jardance  Follow up with me in 1 month  Schedule follow up appointments:  Dietitian/nutritionist      Please try to work on the following dietary changes:  1.  Drink lots of water and cut down on soda/juice con

## 2019-02-07 NOTE — PROGRESS NOTES
Patient presents with: Follow - Up: 6-month f/u chronic medical conditions      HPI: Rosalba [place] presents today for 6-month f/u chronic medical conditions as follows:    1. Controlled DM2 w/o complication - Stable on prescription medication. No new issues.    2 Disp: 1 Box, Rfl: 1  •  Glucose Blood (ACCU-CHEK SMARTVIEW) In Vitro Strip, Once a day E11.9, Disp: 100 strip, Rfl: 1  •  Insulin Pen Needle (PEN NEEDLES) 32G X 4 MM Does not apply Misc, Inject 1 each into the skin daily. , Disp: 100 each, Rfl: 1  •  Empagl intact, no focal deficits; 2+ DTRs  Psych - nl mood/affect      A/P:  Controlled type 2 diabetes mellitus without complication, without long-term current use of insulin (hcc)  (primary encounter diagnosis)  Essential hypertension, benign  Class 3 severe ob

## 2019-02-07 NOTE — PROGRESS NOTES
HISTORY OF PRESENT ILLNESS  Patient presents with:  Weight Check: up 4 lbs    Nicole Orozco is a 44year old female here for follow up with medical weight loss program for the treatment of overweight, obesity, or morbid obesity.  Patient has gained -#4 lbs Work:  (off during the summer)   Marital status:  ?  Lives with sister (no kids)     Support: yes  Tobacco use: none  ETOH use: didn't discuss   Supplements: none  Exercise/Activity: walking about 7,000 steps per day   Stress level: 7 ANIONGAP 8 09/15/2018    GFR 78 08/15/2017    GFRNAA 74 09/15/2018    GFRAA 85 09/15/2018    CA 9.0 09/15/2018    OSMOCALC 294 09/15/2018    ALKPHO 111 (H) 09/15/2018    AST 14 (L) 09/15/2018    ALT 18 09/15/2018    BILT 0.4 09/15/2018    TP 7.5 09/15/2018 Take 1 capsule by mouth daily. Disp: 30 capsule Rfl: 3   Azelastine HCl 0.1 % Nasal Solution 2 sprays by Nasal route 2 (two) times daily as needed for Rhinitis.  Disp: 1 Bottle Rfl: 6   EPINEPHrine (EPIPEN 2-CHLOE) 0.3 MG/0.3ML Injection Solution Auto-injecto with meal prepping  No skipping meals  Long discussion done on meal prepping, offered possibly getting some meal delivery programs (don't want to do yet)--> crockpot meals suggested   Sleep hygiene discussed- can try OTC melontin  Mindfulness and eating    tuna and crackers                 Protein Shakes: Premier protein or Core Power                Protein Bars: Rx Bars, Oatmega, Power Crunch                 Sargento balanced breaks (cheese and nuts)- without chocolate     5. Reduce carbohydrates which incl

## 2019-02-09 ENCOUNTER — HOSPITAL ENCOUNTER (OUTPATIENT)
Age: 49
Discharge: HOME | End: 2019-02-09
Attending: FAMILY MEDICINE
Payer: OTHER MISCELLANEOUS

## 2019-02-09 ENCOUNTER — APPOINTMENT (OUTPATIENT)
Dept: GENERAL RADIOLOGY | Age: 49
End: 2019-02-09
Attending: FAMILY MEDICINE
Payer: OTHER MISCELLANEOUS

## 2019-02-09 VITALS
RESPIRATION RATE: 16 BRPM | SYSTOLIC BLOOD PRESSURE: 138 MMHG | DIASTOLIC BLOOD PRESSURE: 72 MMHG | HEART RATE: 80 BPM | HEIGHT: 62.5 IN | TEMPERATURE: 99 F | BODY MASS INDEX: 39.29 KG/M2 | OXYGEN SATURATION: 100 % | WEIGHT: 219 LBS

## 2019-02-09 DIAGNOSIS — S40.022A CONTUSION OF MULTIPLE SITES OF LEFT SHOULDER AND UPPER ARM, INITIAL ENCOUNTER: ICD-10-CM

## 2019-02-09 DIAGNOSIS — S50.02XA CONTUSION OF LEFT ELBOW, INITIAL ENCOUNTER: Primary | ICD-10-CM

## 2019-02-09 DIAGNOSIS — S40.012A CONTUSION OF MULTIPLE SITES OF LEFT SHOULDER AND UPPER ARM, INITIAL ENCOUNTER: ICD-10-CM

## 2019-02-09 PROCEDURE — 99213 OFFICE O/P EST LOW 20 MIN: CPT

## 2019-02-09 PROCEDURE — 73030 X-RAY EXAM OF SHOULDER: CPT | Performed by: FAMILY MEDICINE

## 2019-02-09 PROCEDURE — 73080 X-RAY EXAM OF ELBOW: CPT | Performed by: FAMILY MEDICINE

## 2019-02-09 RX ORDER — IBUPROFEN 600 MG/1
600 TABLET ORAL EVERY 8 HOURS PRN
Qty: 21 TABLET | Refills: 0 | Status: SHIPPED | OUTPATIENT
Start: 2019-02-09 | End: 2019-02-16

## 2019-02-09 NOTE — ED INITIAL ASSESSMENT (HPI)
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 Patient states that she fell onto her left side. Denies hitting her head or any LOC.  Patient complains of left upper back pain, le

## 2019-02-09 NOTE — ED PROVIDER NOTES
Patient Seen in: [facility]    History   Patient presents with:  Fall (musculoskeletal, neurologic)  Upper Extremity Injury (musculoskeletal)  Back Pain (musculoskeletal)    Stated Complaint: WC/ Left arm/shoulder/collar injury/ 04/2018    3 SITE, RADIAL SCAR   • ROGELIO BIOPSY STEREOTACTIC NODULE 2 SITE BILAT  2009    NON HODGKINS LYMPHOMA/RADIATION THERAPY 8/10   • OTHER SURGICAL HISTORY  2008    neuroplasty w/ transposition of ulnar nerve at elbow left                        • OTHE Cardiovascular: Normal rate, regular rhythm, normal heart sounds and intact distal pulses. Pulmonary/Chest: Effort normal and breath sounds normal. No respiratory distress. Abdominal: Soft. Bowel sounds are normal. There is no tenderness.    Luba Oliver joint effusion. Joint spaces are well maintained. Coronoid osteophyte is noted. IMPRESSION: Mild osteoarthritic changes the left elbow.     Dictated by: Dwight Campbell MD on 2/09/2019 at 13:54     Approved by: MD Amy Hunter Medication List    START taking these medications    ibuprofen 600 MG Oral Tab  Take 1 tablet (600 mg total) by mouth every 8 (eight) hours as needed for Pain or Fever.   Qty: 21 tablet Refills: 0

## 2019-02-12 ENCOUNTER — OFFICE VISIT (OUTPATIENT)
Dept: OCCUPATIONAL MEDICINE | Age: 49
End: 2019-02-12
Attending: FAMILY MEDICINE
Payer: OTHER MISCELLANEOUS

## 2019-02-19 ENCOUNTER — OFFICE VISIT (OUTPATIENT)
Dept: OCCUPATIONAL MEDICINE | Age: 49
End: 2019-02-19
Attending: FAMILY MEDICINE
Payer: OTHER MISCELLANEOUS

## 2019-03-11 ENCOUNTER — OFFICE VISIT (OUTPATIENT)
Dept: INTERNAL MEDICINE CLINIC | Facility: CLINIC | Age: 49
End: 2019-03-11
Payer: COMMERCIAL

## 2019-03-11 VITALS
DIASTOLIC BLOOD PRESSURE: 74 MMHG | HEART RATE: 84 BPM | SYSTOLIC BLOOD PRESSURE: 120 MMHG | RESPIRATION RATE: 16 BRPM | HEIGHT: 63 IN | WEIGHT: 225 LBS | BODY MASS INDEX: 39.87 KG/M2

## 2019-03-11 DIAGNOSIS — E66.01 OBESITY, CLASS III, BMI 40-49.9 (MORBID OBESITY) (HCC): ICD-10-CM

## 2019-03-11 DIAGNOSIS — R79.89 LOW VITAMIN D LEVEL: ICD-10-CM

## 2019-03-11 DIAGNOSIS — Z51.81 ENCOUNTER FOR THERAPEUTIC DRUG MONITORING: Primary | ICD-10-CM

## 2019-03-11 DIAGNOSIS — I10 ESSENTIAL HYPERTENSION, BENIGN: ICD-10-CM

## 2019-03-11 DIAGNOSIS — E11.9 CONTROLLED TYPE 2 DIABETES MELLITUS WITHOUT COMPLICATION, WITHOUT LONG-TERM CURRENT USE OF INSULIN (HCC): ICD-10-CM

## 2019-03-11 PROCEDURE — 99213 OFFICE O/P EST LOW 20 MIN: CPT | Performed by: NURSE PRACTITIONER

## 2019-03-11 NOTE — PATIENT INSTRUCTIONS
PLAN:  Continue with medications: ozempic 1mg, could think about add back stimulant medication (but would need ekg first)   Check out interm fasting- Dr. Lomas Do  Follow up with me in 1 month  Schedule follow up appointments:  [address] \"Headspace\" which helps with mindfulness, meditation, clarity, sleep, and lexus to your daily life.

## 2019-03-11 NOTE — PROGRESS NOTES
HISTORY OF PRESENT ILLNESS  Patient presents with:  Weight Check: 1 pound weight gain    Nicole Orozco is a 44year old female here for follow up with medical weight loss program for the treatment of overweight, obesity, or morbid obesity.  Patient has gai meals/week: 4-5  meals/week     Social hx and lifestyle reviewed:    Work:  (off during the summer)   Marital status:  ?  Lives with sister (no kids)     Support: yes  Tobacco use: none  ETOH use: didn't discuss   Supplements: none  Exe 11.7 02/07/2019    CREATSERUM 0.94 02/07/2019    ANIONGAP 6 02/07/2019    GFR 78 08/15/2017    GFRNAA 70 02/07/2019    GFRAA 81 02/07/2019    CA 8.9 02/07/2019    OSMOCALC 290 02/07/2019    ALKPHO 94 02/07/2019    AST 13 (L) 02/07/2019    ALT 17 02/07/2019 by mouth daily. Disp:  Rfl:    ValACYclovir HCl 500 MG Oral Tab Take 500 mg by mouth as needed. Disp:  Rfl:    Probiotic Product (VSL#3) Oral Cap Take 1 capsule by mouth daily.  Disp: 30 capsule Rfl: 3   Azelastine HCl 0.1 % Nasal Solution 2 sprays by N breakfast, and frozen meal for lunch to help with meal prepping  No skipping meals  Long discussion done on meal prepping, offered possibly getting some meal delivery programs (don't want to do yet)--> crockpot meals suggested   Sleep hygiene discussed- ca changes:  1. Drink lots of water and cut down on soda/juice consumption if soda/juice drinker  2. Eat breakfast daily (within 1 hour)  3. Focus on protein: (15-30 grams with each meal) ie. greek yogurt, cottage cheese, string cheese, hard boiled eggs  4.  H

## 2019-03-13 ENCOUNTER — TELEPHONE (OUTPATIENT)
Dept: INTERNAL MEDICINE CLINIC | Facility: CLINIC | Age: 49
End: 2019-03-13

## 2019-03-14 NOTE — TELEPHONE ENCOUNTER
Can you please call patient and tell her that I spoke with Dr. Hiral Jones and she is good to stop jardance, just needs to watch bp.

## 2019-04-15 ENCOUNTER — OFFICE VISIT (OUTPATIENT)
Dept: INTERNAL MEDICINE CLINIC | Facility: CLINIC | Age: 49
End: 2019-04-15
Payer: COMMERCIAL

## 2019-04-15 VITALS
WEIGHT: 224 LBS | HEART RATE: 84 BPM | SYSTOLIC BLOOD PRESSURE: 110 MMHG | RESPIRATION RATE: 16 BRPM | DIASTOLIC BLOOD PRESSURE: 66 MMHG | BODY MASS INDEX: 39.69 KG/M2 | HEIGHT: 63 IN

## 2019-04-15 DIAGNOSIS — Z71.3 DIETARY COUNSELING: ICD-10-CM

## 2019-04-15 DIAGNOSIS — I10 ESSENTIAL HYPERTENSION, BENIGN: ICD-10-CM

## 2019-04-15 DIAGNOSIS — E66.01 OBESITY, CLASS III, BMI 40-49.9 (MORBID OBESITY) (HCC): ICD-10-CM

## 2019-04-15 DIAGNOSIS — R79.89 LOW VITAMIN D LEVEL: ICD-10-CM

## 2019-04-15 DIAGNOSIS — E11.9 CONTROLLED TYPE 2 DIABETES MELLITUS WITHOUT COMPLICATION, WITHOUT LONG-TERM CURRENT USE OF INSULIN (HCC): ICD-10-CM

## 2019-04-15 DIAGNOSIS — Z51.81 ENCOUNTER FOR THERAPEUTIC DRUG MONITORING: Primary | ICD-10-CM

## 2019-04-15 PROCEDURE — 99401 PREV MED CNSL INDIV APPRX 15: CPT | Performed by: NURSE PRACTITIONER

## 2019-04-15 PROCEDURE — 99213 OFFICE O/P EST LOW 20 MIN: CPT | Performed by: NURSE PRACTITIONER

## 2019-04-15 RX ORDER — SEMAGLUTIDE 1.34 MG/ML
1 INJECTION, SOLUTION SUBCUTANEOUS WEEKLY
Qty: 6 PEN | Refills: 0 | Status: SHIPPED | OUTPATIENT
Start: 2019-04-15 | End: 2019-07-17

## 2019-04-15 NOTE — PROGRESS NOTES
HISTORY OF PRESENT ILLNESS  Patient presents with:  Weight Check: lost 1 pound    Jt Wesley is a 44year old female here for follow up with medical weight loss program for the treatment of overweight, obesity, or morbid obesity.  Patient has lost -#1 l conditions:negative  Migraines/seizures: negative  Glaucoma: negative   Depression/anxiety: negative  Constipation: negative  DVT: negative  Family or personal history of Pancreatic issues / Medullary Thyroid Cancer: negative  History of bariatric surgery: CHOLEST 160 02/07/2019    TRIG 58 02/07/2019    HDL 75 (H) 02/07/2019    LDL 73 02/07/2019    VLDL 12 02/07/2019    TCHDLRATIO 2.15 02/19/2018    NONHDLC 85 02/07/2019    CHOLHDLRATIO 2.6 01/30/2016     Lab Results   Component Value Date    TSH 0.723 05/05 ALLERGY) 10 MG Oral Tab Take 1 tablet by mouth daily. Disp:  Rfl:      No current facility-administered medications on file prior to visit.      ASSESSMENT/PLAN  (Z51.81) Encounter for therapeutic drug monitoring  (primary encounter diagnosis)  Plan: Lolita Linares some meds)   · Could consider re-trying stimulant medication in the future    Contraindications: topamax (insomnia), stop phentermine 15mg and zonisamide (wasn't working anymore    Discussed:  · Counseled on comprehensive weight loss plan including attenti sleep  7. Try to decrease stress in life     Please download apps:  1.  \"My Fitness Pal\" (other option is Lose it)) to help you to monitor daily dietary intake and you will be able to see if you are eating the right amount of calories, protein, carbs

## 2019-04-15 NOTE — PROGRESS NOTES
Preventative Counseling for Diet and Exercise     /66   Pulse 84   Resp 16   Ht 63\"   Wt 224 lb   BMI 39.68 kg/m²   Body mass index is 39.68 kg/m².     Wt Readings from Last 6 Encounters:  04/15/19 : 224 lb  04/15/19 : 220 lb  03/11/19 : 225 lb  02/0

## 2019-05-24 ENCOUNTER — OFFICE VISIT (OUTPATIENT)
Dept: OBGYN CLINIC | Facility: CLINIC | Age: 49
End: 2019-05-24
Payer: COMMERCIAL

## 2019-05-24 ENCOUNTER — TELEPHONE (OUTPATIENT)
Dept: OBGYN CLINIC | Facility: CLINIC | Age: 49
End: 2019-05-24

## 2019-05-24 VITALS
HEIGHT: 63 IN | SYSTOLIC BLOOD PRESSURE: 140 MMHG | DIASTOLIC BLOOD PRESSURE: 78 MMHG | BODY MASS INDEX: 40.93 KG/M2 | WEIGHT: 231 LBS | HEART RATE: 80 BPM

## 2019-05-24 DIAGNOSIS — N76.0 VAGINITIS AND VULVOVAGINITIS: Primary | ICD-10-CM

## 2019-05-24 PROCEDURE — 87480 CANDIDA DNA DIR PROBE: CPT | Performed by: NURSE PRACTITIONER

## 2019-05-24 PROCEDURE — 99213 OFFICE O/P EST LOW 20 MIN: CPT | Performed by: NURSE PRACTITIONER

## 2019-05-24 PROCEDURE — 87510 GARDNER VAG DNA DIR PROBE: CPT | Performed by: NURSE PRACTITIONER

## 2019-05-24 PROCEDURE — 87660 TRICHOMONAS VAGIN DIR PROBE: CPT | Performed by: NURSE PRACTITIONER

## 2019-05-24 RX ORDER — FLUCONAZOLE 150 MG/1
TABLET ORAL
Qty: 3 TABLET | Refills: 0 | Status: SHIPPED | OUTPATIENT
Start: 2019-05-24 | End: 2019-06-11 | Stop reason: ALTCHOICE

## 2019-05-24 NOTE — TELEPHONE ENCOUNTER
Pt calling and has a possible yeast infection    Has been treating for a couple of days and no relief    Please call

## 2019-05-24 NOTE — TELEPHONE ENCOUNTER
Pt last seen 12/10/18 for annual physical.  Pt reports HSV outbreak earlier this week; treated with Valtrex BID x 2 days; symptoms resolved. Pt reports vaginal itching and irritation x 3 days; treated with OTC Monistat with minimal relief of symptoms.   Pt

## 2019-05-24 NOTE — PROGRESS NOTES
S:  1 week ago patient started having increased yellow discharge. The following day she had an HSV outbreak. She used her valtrex BID for 2 days and her outbreak resolved. She then started to have increased vaginal itching.  She has used Monistat for 3 days

## 2019-05-28 RX ORDER — METRONIDAZOLE 500 MG/1
500 TABLET ORAL 2 TIMES DAILY WITH MEALS
Qty: 14 TABLET | Refills: 0 | Status: SHIPPED | OUTPATIENT
Start: 2019-05-28 | End: 2019-06-04

## 2019-05-29 ENCOUNTER — TELEPHONE (OUTPATIENT)
Dept: INTERNAL MEDICINE CLINIC | Facility: CLINIC | Age: 49
End: 2019-05-29

## 2019-05-29 DIAGNOSIS — Z23 NEED FOR TETANUS, DIPHTHERIA, AND ACELLULAR PERTUSSIS (TDAP) VACCINE: Primary | ICD-10-CM

## 2019-05-29 NOTE — TELEPHONE ENCOUNTER
Pt cut her leg when something flew out of the weed tara. Pt reports last tetanus vaccine 2006. Requesting order for booster. No c/o redness, swelling, drainage or any signs of infection.

## 2019-05-30 NOTE — TELEPHONE ENCOUNTER
Order signed. Adela Robb. Severo Obey, MD  Diplomate, American Board of Internal Medicine  [facility]  [address], [address], [address]  T: [id number]; F: [place] [address]

## 2019-06-03 NOTE — TELEPHONE ENCOUNTER
appt sched    Future Appointments   Date Time Provider Karina Piper   6/7/2019  9:00 AM [facility] 08 NURSE EM[facility] 8 [facility] [place]

## 2019-06-07 ENCOUNTER — NURSE ONLY (OUTPATIENT)
Dept: INTERNAL MEDICINE CLINIC | Facility: CLINIC | Age: 49
End: 2019-06-07
Payer: COMMERCIAL

## 2019-06-07 PROCEDURE — 90715 TDAP VACCINE 7 YRS/> IM: CPT | Performed by: INTERNAL MEDICINE

## 2019-06-07 PROCEDURE — 90471 IMMUNIZATION ADMIN: CPT | Performed by: INTERNAL MEDICINE

## 2019-06-11 ENCOUNTER — OFFICE VISIT (OUTPATIENT)
Dept: INTERNAL MEDICINE CLINIC | Facility: CLINIC | Age: 49
End: 2019-06-11
Payer: COMMERCIAL

## 2019-06-11 VITALS
HEART RATE: 74 BPM | SYSTOLIC BLOOD PRESSURE: 132 MMHG | RESPIRATION RATE: 16 BRPM | DIASTOLIC BLOOD PRESSURE: 66 MMHG | TEMPERATURE: 98 F | OXYGEN SATURATION: 99 % | HEIGHT: 63 IN | BODY MASS INDEX: 41.11 KG/M2 | WEIGHT: 232 LBS

## 2019-06-11 DIAGNOSIS — Z51.81 ENCOUNTER FOR THERAPEUTIC DRUG MONITORING: Primary | ICD-10-CM

## 2019-06-11 DIAGNOSIS — E66.01 OBESITY, CLASS III, BMI 40-49.9 (MORBID OBESITY) (HCC): ICD-10-CM

## 2019-06-11 DIAGNOSIS — R79.89 LOW VITAMIN D LEVEL: ICD-10-CM

## 2019-06-11 DIAGNOSIS — E11.9 CONTROLLED TYPE 2 DIABETES MELLITUS WITHOUT COMPLICATION, WITHOUT LONG-TERM CURRENT USE OF INSULIN (HCC): ICD-10-CM

## 2019-06-11 DIAGNOSIS — I10 ESSENTIAL HYPERTENSION, BENIGN: ICD-10-CM

## 2019-06-11 PROCEDURE — 99214 OFFICE O/P EST MOD 30 MIN: CPT | Performed by: NURSE PRACTITIONER

## 2019-06-11 NOTE — PROGRESS NOTES
HISTORY OF PRESENT ILLNESS  Patient presents with:  Weight Check: gained Taty Christie is a 44year old female here for follow up with medical weight loss program for the treatment of overweight, obesity, or morbid obesity.  Patient has gained -#8 negative  Joint-related conditions:negative  Migraines/seizures: negative  Glaucoma: negative   Depression/anxiety: negative  Constipation: negative  DVT: negative  Family or personal history of Pancreatic issues / Medullary Thyroid Cancer: negative  Histo 02/07/2019    CO2 28.0 02/07/2019     Lab Results   Component Value Date     02/07/2019    A1C 5.6 02/07/2019     Lab Results   Component Value Date    CHOLEST 160 02/07/2019    TRIG 58 02/07/2019    HDL 75 (H) 02/07/2019    LDL 73 02/07/2019    VLD for Rhinitis. Disp: 1 Bottle Rfl: 11   ValACYclovir HCl 500 MG Oral Tab Take 500 mg by mouth as needed.    Disp:  Rfl:    Fluticasone Propionate (FLONASE) 50 MCG/ACT Nasal Suspension 2 sprays in each nostril daily as needed Disp: 1 Bottle Rfl: 0     No curr consider re-trying stimulant medication in the future    Contraindications: topamax (insomnia), stop phentermine 15mg and zonisamide (wasn't working anymore), stop jardance (wanted to get off)    Discussed:  · Counseled on comprehensive weight loss plan in

## 2019-07-08 RX ORDER — VALACYCLOVIR HYDROCHLORIDE 500 MG/1
TABLET, FILM COATED ORAL
Qty: 30 TABLET | Refills: 0 | Status: SHIPPED | OUTPATIENT
Start: 2019-07-08

## 2019-07-08 NOTE — TELEPHONE ENCOUNTER
Last OV: 5/24/19 with [facility] for gyn problem; last annual 12/2018  Last refill date: none  Follow-up: 12/2019 for annual  Next appt.: 12/16/19    Refill sent.

## 2019-07-17 DIAGNOSIS — E66.01 OBESITY, CLASS III, BMI 40-49.9 (MORBID OBESITY) (HCC): ICD-10-CM

## 2019-07-17 DIAGNOSIS — E11.9 CONTROLLED TYPE 2 DIABETES MELLITUS WITHOUT COMPLICATION, WITHOUT LONG-TERM CURRENT USE OF INSULIN (HCC): ICD-10-CM

## 2019-07-17 DIAGNOSIS — Z51.81 ENCOUNTER FOR THERAPEUTIC DRUG MONITORING: ICD-10-CM

## 2019-07-18 RX ORDER — SEMAGLUTIDE 1.34 MG/ML
1 INJECTION, SOLUTION SUBCUTANEOUS WEEKLY
Qty: 2 PEN | Refills: 0 | Status: SHIPPED | OUTPATIENT
Start: 2019-07-18 | End: 2019-08-17

## 2019-07-18 NOTE — TELEPHONE ENCOUNTER
Requesting  Requested Prescriptions     Pending Prescriptions Disp Refills   • OZEMPIC 1 MG/DOSE Subcutaneous Solution Pen-injector [Pharmacy Med Name: OZEMPIC 1MG PER DOSE-2MG/1.5ML] 2 pen 0     Sig: INJECT 1 MG INTO THE SKIN ONCE A WEEK     LOV: 6/11/19

## 2019-07-30 ENCOUNTER — OFFICE VISIT (OUTPATIENT)
Dept: INTERNAL MEDICINE CLINIC | Facility: CLINIC | Age: 49
End: 2019-07-30
Payer: COMMERCIAL

## 2019-07-30 VITALS
SYSTOLIC BLOOD PRESSURE: 120 MMHG | DIASTOLIC BLOOD PRESSURE: 80 MMHG | HEIGHT: 63 IN | RESPIRATION RATE: 16 BRPM | WEIGHT: 235 LBS | HEART RATE: 76 BPM | BODY MASS INDEX: 41.64 KG/M2

## 2019-07-30 DIAGNOSIS — Z51.81 ENCOUNTER FOR THERAPEUTIC DRUG MONITORING: Primary | ICD-10-CM

## 2019-07-30 DIAGNOSIS — E66.01 OBESITY, CLASS III, BMI 40-49.9 (MORBID OBESITY) (HCC): ICD-10-CM

## 2019-07-30 DIAGNOSIS — I10 ESSENTIAL HYPERTENSION, BENIGN: ICD-10-CM

## 2019-07-30 DIAGNOSIS — R79.89 LOW VITAMIN D LEVEL: ICD-10-CM

## 2019-07-30 DIAGNOSIS — E11.9 CONTROLLED TYPE 2 DIABETES MELLITUS WITHOUT COMPLICATION, WITHOUT LONG-TERM CURRENT USE OF INSULIN (HCC): ICD-10-CM

## 2019-07-30 PROCEDURE — 99214 OFFICE O/P EST MOD 30 MIN: CPT | Performed by: NURSE PRACTITIONER

## 2019-07-30 RX ORDER — SEMAGLUTIDE 1.34 MG/ML
1 INJECTION, SOLUTION SUBCUTANEOUS WEEKLY
Qty: 2 PEN | Refills: 0 | Status: CANCELLED | OUTPATIENT
Start: 2019-07-30

## 2019-07-30 NOTE — PROGRESS NOTES
HISTORY OF PRESENT ILLNESS  Patient presents with:  Weight Check: 3 pound weight gain    Harjit Calhoun is a 44year old female here for follow up with medical weight loss program for the treatment of overweight, obesity, or morbid obesity.  Patient has gai Kidney stones: negative   Liver disease: negative  Joint-related conditions:negative  Migraines/seizures: negative  Glaucoma: negative   Depression/anxiety: negative  Constipation: negative  DVT: negative  Family or personal history of Pancreatic issues 02/07/2019    A1C 5.6 02/07/2019     Lab Results   Component Value Date    CHOLEST 160 02/07/2019    TRIG 58 02/07/2019    HDL 75 (H) 02/07/2019    LDL 73 02/07/2019    VLDL 12 02/07/2019    TCHDLRATIO 2.15 02/19/2018    NONHDLC 85 02/07/2019    CHOLHDLRAT (FLONASE) 50 MCG/ACT Nasal Suspension 2 sprays in each nostril daily as needed Disp: 1 Bottle Rfl: 0   Cetirizine HCl (ZYRTEC ALLERGY) 10 MG Oral Tab Take 1 tablet by mouth daily.  Disp:  Rfl:      No current facility-administered medications on file prior Pt wants to start decreasing the amount of meds that she takes  · Will continue with ozempic 1mg   · Could consider re-trying stimulant medication in the future    Contraindications: topamax (insomnia), stop phentermine 15mg and zonisamide (wasn't working

## 2019-07-31 PROBLEM — M77.11 RIGHT LATERAL EPICONDYLITIS: Status: ACTIVE | Noted: 2019-07-31

## 2019-08-17 DIAGNOSIS — E11.9 CONTROLLED TYPE 2 DIABETES MELLITUS WITHOUT COMPLICATION, WITHOUT LONG-TERM CURRENT USE OF INSULIN (HCC): ICD-10-CM

## 2019-08-17 DIAGNOSIS — Z51.81 ENCOUNTER FOR THERAPEUTIC DRUG MONITORING: ICD-10-CM

## 2019-08-17 DIAGNOSIS — E66.01 OBESITY, CLASS III, BMI 40-49.9 (MORBID OBESITY) (HCC): ICD-10-CM

## 2019-08-19 RX ORDER — SEMAGLUTIDE 1.34 MG/ML
INJECTION, SOLUTION SUBCUTANEOUS
Qty: 2 PEN | Refills: 1 | Status: SHIPPED | OUTPATIENT
Start: 2019-08-19 | End: 2019-10-10

## 2019-08-19 NOTE — TELEPHONE ENCOUNTER
Requesting ozempic  LOV: 7/30/19  RTC: 8 weeks  Last Relevant Labs: 2/7/19  Filled: 7/18/19 #2 pens with 0 refills    10/7/2019  3:40 PM DOREEN Gonzalez [facility] [address]   12/16/2019  4:30 PM VIKAS Craig [facility] N [facility] [place]

## 2019-09-10 ENCOUNTER — OFFICE VISIT (OUTPATIENT)
Dept: FAMILY MEDICINE CLINIC | Facility: CLINIC | Age: 49
End: 2019-09-10
Payer: COMMERCIAL

## 2019-09-10 VITALS
OXYGEN SATURATION: 100 % | BODY MASS INDEX: 42 KG/M2 | TEMPERATURE: 98 F | WEIGHT: 235 LBS | DIASTOLIC BLOOD PRESSURE: 76 MMHG | SYSTOLIC BLOOD PRESSURE: 140 MMHG | HEART RATE: 82 BPM

## 2019-09-10 DIAGNOSIS — R19.7 DIARRHEA, UNSPECIFIED TYPE: Primary | ICD-10-CM

## 2019-09-10 DIAGNOSIS — J01.40 ACUTE NON-RECURRENT PANSINUSITIS: ICD-10-CM

## 2019-09-10 DIAGNOSIS — H65.92 OTITIS MEDIA WITH EFFUSION, LEFT: ICD-10-CM

## 2019-09-10 PROCEDURE — 99213 OFFICE O/P EST LOW 20 MIN: CPT | Performed by: NURSE PRACTITIONER

## 2019-09-10 RX ORDER — AZELASTINE HYDROCHLORIDE 0.5 MG/ML
1 SOLUTION/ DROPS OPHTHALMIC DAILY
Qty: 1 BOTTLE | Refills: 0 | Status: SHIPPED | OUTPATIENT
Start: 2019-09-10 | End: 2019-10-10

## 2019-09-10 RX ORDER — AMOXICILLIN AND CLAVULANATE POTASSIUM 875; 125 MG/1; MG/1
1 TABLET, FILM COATED ORAL 2 TIMES DAILY
Qty: 20 TABLET | Refills: 0 | Status: SHIPPED | OUTPATIENT
Start: 2019-09-10 | End: 2019-09-20

## 2019-09-10 NOTE — PROGRESS NOTES
CHIEF COMPLAINT:   Patient presents with:  Sinus Problem: for greater than 2 weeks  Diarrhea: on and off x1 week      HPI:   Neelima Mane is a 44year old female who presents for sinus congestion for over 2  weeks.  Symptoms have been worsening since on Insulin Pen Needle (PEN NEEDLES) 32G X 4 MM Does not apply Misc Inject 1 each into the skin daily. Disp: 100 each Rfl: 1   Cholecalciferol (VITAMIN D) 2000 units Oral Cap Take 2,000 Units by mouth daily.    Disp:  Rfl:    Multiple Vitamins-Iron (ONCE DAILY/ • OTHER SURGICAL HISTORY  2009    uterine myomectomy for uterine fibroids   • OTHER SURGICAL HISTORY  2018    R breast bx (x3)   • REVISE MEDIAN N/CARPAL TUNNEL SURG      laterality and bilateral   • TONSILLECTOMY  2000    w/ adenoidectomy   • UPPER GI END THROAT: oral mucosa pink, moist. No visible dental caries. Posterior pharynx is not erythematous. no exudates. NECK: supple, non-tender  LUNGS: clear to auscultation bilaterally, no wheezes or rhonchi. Breathing is non labored.   CARDIO: RRR without murmur Drinking extra fluids helps thin your mucus. This lets it drain from your sinuses more easily. Have a glass of water every hour or two. A humidifier helps in much the same way. Fluids can also offset the drying effects of certain medicines.  If you use a hu

## 2019-09-10 NOTE — PATIENT INSTRUCTIONS
-Take antibiotics with food and plenty of water. Eat yogurt or take probiotic daily.   Joseph Pal is a good otc probiotic.  -follow up if new or worsening symptoms, or if no improvement in 2-3 days  -stay well hydrated and rest  -bland foods such as bananas, r 10/1/2016  © 2577-8345 [employer] [address] [address]. [address] [facility] [place]. All rights reserved. This information is not intended as a substitute for professional medical care. Always follow your healthcare professional's instructions.

## 2019-09-13 ENCOUNTER — OFFICE VISIT (OUTPATIENT)
Dept: INTERNAL MEDICINE CLINIC | Facility: CLINIC | Age: 49
End: 2019-09-13
Payer: COMMERCIAL

## 2019-09-13 ENCOUNTER — OFFICE VISIT (OUTPATIENT)
Dept: HEMATOLOGY/ONCOLOGY | Facility: HOSPITAL | Age: 49
End: 2019-09-13
Attending: SPECIALIST
Payer: COMMERCIAL

## 2019-09-13 VITALS
HEART RATE: 64 BPM | SYSTOLIC BLOOD PRESSURE: 130 MMHG | DIASTOLIC BLOOD PRESSURE: 78 MMHG | TEMPERATURE: 99 F | HEIGHT: 63 IN | WEIGHT: 241 LBS | BODY MASS INDEX: 42.7 KG/M2 | RESPIRATION RATE: 16 BRPM

## 2019-09-13 VITALS
WEIGHT: 240.5 LBS | DIASTOLIC BLOOD PRESSURE: 80 MMHG | TEMPERATURE: 99 F | SYSTOLIC BLOOD PRESSURE: 141 MMHG | OXYGEN SATURATION: 98 % | RESPIRATION RATE: 18 BRPM | BODY MASS INDEX: 43 KG/M2

## 2019-09-13 DIAGNOSIS — J30.9 CHRONIC ALLERGIC RHINITIS: ICD-10-CM

## 2019-09-13 DIAGNOSIS — K21.00 REFLUX ESOPHAGITIS: ICD-10-CM

## 2019-09-13 DIAGNOSIS — C81.04 NODULAR LYMPHOCYTE PREDOMINANT HODGKIN LYMPHOMA OF LYMPH NODES OF AXILLA (HCC): ICD-10-CM

## 2019-09-13 DIAGNOSIS — E66.01 CLASS 3 SEVERE OBESITY DUE TO EXCESS CALORIES WITH SERIOUS COMORBIDITY AND BODY MASS INDEX (BMI) OF 40.0 TO 44.9 IN ADULT (HCC): ICD-10-CM

## 2019-09-13 DIAGNOSIS — E11.9 CONTROLLED TYPE 2 DIABETES MELLITUS WITHOUT COMPLICATION, WITHOUT LONG-TERM CURRENT USE OF INSULIN (HCC): Primary | ICD-10-CM

## 2019-09-13 DIAGNOSIS — Z99.89 OSA ON CPAP: ICD-10-CM

## 2019-09-13 DIAGNOSIS — I10 ESSENTIAL HYPERTENSION, BENIGN: ICD-10-CM

## 2019-09-13 DIAGNOSIS — C81.04 NODULAR LYMPHOCYTE PREDOMINANT HODGKIN LYMPHOMA OF LYMPH NODES OF AXILLA (HCC): Primary | ICD-10-CM

## 2019-09-13 DIAGNOSIS — G47.33 OSA ON CPAP: ICD-10-CM

## 2019-09-13 PROCEDURE — 99213 OFFICE O/P EST LOW 20 MIN: CPT | Performed by: SPECIALIST

## 2019-09-13 PROCEDURE — 99214 OFFICE O/P EST MOD 30 MIN: CPT | Performed by: INTERNAL MEDICINE

## 2019-09-13 RX ORDER — GLYCOPYRROLATE 1.5 MG/1
1 TABLET ORAL 2 TIMES DAILY PRN
Qty: 60 TABLET | Refills: 2 | Status: SHIPPED | OUTPATIENT
Start: 2019-09-13 | End: 2020-02-17

## 2019-09-13 RX ORDER — RANITIDINE 300 MG/1
TABLET ORAL
Qty: 90 TABLET | Refills: 3 | Status: SHIPPED | OUTPATIENT
Start: 2019-09-13 | End: 2020-04-16 | Stop reason: RX

## 2019-09-13 NOTE — PROGRESS NOTES
[facility] Progress Note      Patient Name: Sugar Hansen   YOB: 1970  Medical Record Number: [id number]  Attending Physician: Vinny Strickland M.D.      Date of Visit: 9/13/2019      Chief Complaint  Hodgkin's lymphoma, nodular lym alcohol, and illicit drug use. Current Medications     [] Amoxicillin-Pot Clavulanate 875-125 MG Oral Tab Take 1 tablet by mouth 2 (two) times daily for 10 days.  Disp: 20 tablet Rfl: 0   Azelastine HCl 0.05 % Ophthalmic Solution Place 1 drop in mucinex and sudafed. Review of Systems   Constitutional No fevers, chills, night sweats, or weight loss. Hematologic/Lymphatic No tender or enlarged lymph nodes; no easy bruising or bleeding.   Breasts No new breast masses, nipple discharge, breast s evidence on exam of recurrent disease. Recommend continue annual follow up until 10 years post treatment (2021).  Recommend CBC and ESR be obtained - patient reports that she is seeing her primary care physician today who will also be drawing labs and she w

## 2019-09-13 NOTE — PROGRESS NOTES
Patient is here today for follow up with Gm Chow for Hodgkins Disease. Patient denies pain. Stated has sinus infection. Is currently seeing PCP and is on antibiotic. Feeling good. Medication list and medical history were reviewed and updated.      Vee Crum

## 2019-09-13 NOTE — PROGRESS NOTES
Patient presents with: Follow - Up: 6 month follwo up       HPI: Amado Costa presents today for 6-month f/u chronic medical conditions as follows:    1. Controlled DM2 - Stable on prescription medication. No new issues.    2. HTN - Stable on prescription medica EPINEPHrine (EPIPEN 2-CHLOE) 0.3 MG/0.3ML Injection Solution Auto-injector, Take as directed for anaphylaxis, then call 911., Disp: 2 each, Rfl: 1  •  Azelastine HCl 0.1 % Nasal Solution, 2 sprays by Nasal route 2 (two) times daily as needed for Rhinitis. , D lb  Gen - A&Ox3, NAD, morbidly obese  HEENT - PERRL, EOMI, OP is clear; TMs clear B  Neck - supple, no JVD, no thyromegaly  Lymph - no palp LAD  Lungs - CTAB  CV - RRR, nl s1, s2  Abd - soft, NABS, NT, ND  Ext - no c/c/e; Bilateral barefoot skin diabetic e Urine      Meds & Refills for this Visit:  Requested Prescriptions     Signed Prescriptions Disp Refills   • raNITIdine HCl 300 MG Oral Tab 90 tablet 3     Sig: TAKE 1 TABLET NIGHTLY   • GLYCATE 1.5 MG Oral Tab 60 tablet 2     Sig: Take 1 tablet by mouth 2

## 2019-09-23 ENCOUNTER — HOSPITAL ENCOUNTER (OUTPATIENT)
Dept: MAMMOGRAPHY | Age: 49
Discharge: HOME | End: 2019-09-23
Attending: NURSE PRACTITIONER
Payer: COMMERCIAL

## 2019-09-23 DIAGNOSIS — Z12.39 BREAST CANCER SCREENING: ICD-10-CM

## 2019-09-23 PROCEDURE — 77063 BREAST TOMOSYNTHESIS BI: CPT | Performed by: NURSE PRACTITIONER

## 2019-09-23 PROCEDURE — 77067 SCR MAMMO BI INCL CAD: CPT | Performed by: NURSE PRACTITIONER

## 2019-09-29 LAB
ALBUMIN/GLOBULIN RATIO: 1.6 (CALC) (ref 1–2.5)
ALBUMIN: 3.9 G/DL (ref 3.6–5.1)
ALKALINE PHOSPHATASE: 75 U/L (ref 33–130)
ALT: 17 U/L (ref 6–29)
AST: 17 U/L (ref 10–35)
BILIRUBIN, TOTAL: 0.4 MG/DL (ref 0.2–1.2)
BUN: 12 MG/DL (ref 7–25)
CALCIUM: 9.3 MG/DL (ref 8.6–10.4)
CARBON DIOXIDE: 28 MMOL/L (ref 20–32)
CHLORIDE: 105 MMOL/L (ref 98–110)
CHOL/HDLC RATIO: 2.3 (CALC)
CHOLESTEROL, TOTAL: 166 MG/DL
CREATININE, RANDOM URINE: 170 MG/DL (ref 20–275)
CREATININE: 1.04 MG/DL (ref 0.5–1.05)
EGFR IF AFRICN AM: 72 ML/MIN/1.73M2
EGFR IF NONAFRICN AM: 62 ML/MIN/1.73M2
GLOBULIN: 2.4 G/DL (CALC) (ref 1.9–3.7)
GLUCOSE: 84 MG/DL (ref 65–99)
HDL CHOLESTEROL: 73 MG/DL
HEMOGLOBIN A1C: 5.2 % OF TOTAL HGB
LDL-CHOLESTEROL: 77 MG/DL (CALC)
MICROALBUMIN/CREATININE RATIO, RANDOM URINE: 1 MCG/MG CREAT
MICROALBUMIN: 0.2 MG/DL
NON-HDL CHOLESTEROL: 93 MG/DL (CALC)
POTASSIUM: 4.4 MMOL/L (ref 3.5–5.3)
PROTEIN, TOTAL: 6.3 G/DL (ref 6.1–8.1)
SED RATE BY MODIFIED$WESTERGREN: 6 MM/H
SODIUM: 140 MMOL/L (ref 135–146)
TRIGLYCERIDES: 76 MG/DL

## 2019-10-10 ENCOUNTER — OFFICE VISIT (OUTPATIENT)
Dept: INTERNAL MEDICINE CLINIC | Facility: CLINIC | Age: 49
End: 2019-10-10
Payer: COMMERCIAL

## 2019-10-10 VITALS
DIASTOLIC BLOOD PRESSURE: 70 MMHG | BODY MASS INDEX: 42.52 KG/M2 | HEIGHT: 63 IN | HEART RATE: 70 BPM | RESPIRATION RATE: 16 BRPM | WEIGHT: 240 LBS | SYSTOLIC BLOOD PRESSURE: 128 MMHG

## 2019-10-10 DIAGNOSIS — E55.9 VITAMIN D DEFICIENCY: ICD-10-CM

## 2019-10-10 DIAGNOSIS — E11.9 CONTROLLED TYPE 2 DIABETES MELLITUS WITHOUT COMPLICATION, WITHOUT LONG-TERM CURRENT USE OF INSULIN (HCC): ICD-10-CM

## 2019-10-10 DIAGNOSIS — I10 ESSENTIAL HYPERTENSION, BENIGN: ICD-10-CM

## 2019-10-10 DIAGNOSIS — G47.33 OSA ON CPAP: ICD-10-CM

## 2019-10-10 DIAGNOSIS — Z99.89 OSA ON CPAP: ICD-10-CM

## 2019-10-10 DIAGNOSIS — Z51.81 ENCOUNTER FOR THERAPEUTIC DRUG MONITORING: Primary | ICD-10-CM

## 2019-10-10 DIAGNOSIS — E66.01 CLASS 3 SEVERE OBESITY DUE TO EXCESS CALORIES WITH SERIOUS COMORBIDITY AND BODY MASS INDEX (BMI) OF 40.0 TO 44.9 IN ADULT (HCC): ICD-10-CM

## 2019-10-10 PROCEDURE — 93000 ELECTROCARDIOGRAM COMPLETE: CPT | Performed by: NURSE PRACTITIONER

## 2019-10-10 PROCEDURE — 99214 OFFICE O/P EST MOD 30 MIN: CPT | Performed by: NURSE PRACTITIONER

## 2019-10-10 RX ORDER — SEMAGLUTIDE 1.34 MG/ML
1 INJECTION, SOLUTION SUBCUTANEOUS WEEKLY
Qty: 6 PEN | Refills: 0 | Status: SHIPPED | OUTPATIENT
Start: 2019-10-10 | End: 2020-01-17

## 2019-10-10 NOTE — PROGRESS NOTES
HISTORY OF PRESENT ILLNESS  Patient presents with:  Weight Check: up 5 lbs    Liliane Vasquez is a 44year old female here for follow up with medical weight loss program for the treatment of overweight, obesity, or morbid obesity.  Patient has gained -#5 lbs negative   Liver disease: negative  Joint-related conditions:negative  Migraines/seizures: negative  Glaucoma: negative   Depression/anxiety: negative  Constipation: negative  DVT: negative  Family or personal history of Pancreatic issues / Medullary Thyro 02/07/2019    A1C 5.2 09/28/2019     Lab Results   Component Value Date    CHOLEST 166 09/28/2019    TRIG 76 09/28/2019    HDL 73 09/28/2019    LDL 77 09/28/2019    VLDL 12 02/07/2019    TCHDLRATIO 2.15 02/19/2018    NONHDLC 93 09/28/2019    CHOLHDLRATIO 2 Disp:  Rfl:    Probiotic Product (VSL#3) Oral Cap Take 1 capsule by mouth daily. Disp: 30 capsule Rfl: 3   Multiple Vitamins-Iron (ONCE DAILY/IRON) Oral Tab Take 1 tablet by mouth daily.  Disp:  Rfl:    Fluticasone Propionate (FLONASE) 50 MCG/ACT Nasal Susp with, decreasing fast foods and sweet tea and starting to exercising (with bike riding--> with more intensity)   Cut out cereal, egg for breakfast, and frozen meal for lunch to help with meal prepping  No skipping meals, cut down on snacking  Long discussi verbalizes understanding.     VIKAS Ward

## 2019-10-12 DIAGNOSIS — E11.9 CONTROLLED TYPE 2 DIABETES MELLITUS WITHOUT COMPLICATION, WITHOUT LONG-TERM CURRENT USE OF INSULIN (HCC): ICD-10-CM

## 2019-10-12 DIAGNOSIS — Z51.81 ENCOUNTER FOR THERAPEUTIC DRUG MONITORING: ICD-10-CM

## 2019-10-12 DIAGNOSIS — E66.01 OBESITY, CLASS III, BMI 40-49.9 (MORBID OBESITY) (HCC): ICD-10-CM

## 2019-10-14 RX ORDER — SEMAGLUTIDE 1.34 MG/ML
INJECTION, SOLUTION SUBCUTANEOUS
Refills: 0 | OUTPATIENT
Start: 2019-10-14

## 2019-10-17 ENCOUNTER — TELEPHONE (OUTPATIENT)
Dept: INTERNAL MEDICINE CLINIC | Facility: CLINIC | Age: 49
End: 2019-10-17

## 2019-10-17 NOTE — TELEPHONE ENCOUNTER
Patient returned call and I was unable to  since I was rooming patients. I tried calling her back and left message I would try again to reach her later.

## 2019-10-17 NOTE — PROGRESS NOTES
Breast Surgery Surveillance Visit      History of Present Illness:   Ms. Roe Toro is a 44year old woman was referred by VIKAS Hernandez and presented in April 2018 with a personal history of a benign left breast needle biopsy many years ago.  She transmitted disease    • Sleep apnea        Past Surgical History:   Procedure Laterality Date   • BENIGN BIOPSY LEFT     • COLONOSCOPY  2010   • HAND DUPYTRENS RELEASE Left 12/4/2014    Performed by Niles Crigler, MD at [facility] MAIN OR   • HAND/FINGER SURGERY 1  Azelastine HCl 0.1 % Nasal Solution, 2 sprays by Nasal route 2 (two) times daily as needed for Rhinitis., Disp: 1 Bottle, Rfl: 11  MetFORMIN HCl  MG Oral Tablet 24 Hr, Take 1 tablet (750 mg total) by mouth daily. , Disp: 90 tablet, Rfl: 3  OMEPRAZO ancestry. Social History:    Alcohol use No         Smoking status: Never Smoker   Smokeless tobacco: Never Used   The patient is single. She has no children. She is employed full-time.     Review of Systems:  General:   The patient denies, fever, chil nodes.     Musculoskeletal:  The patient denies muscle aches/pain, +joint pain, +stiff joints, neck pain, +back pain or bone pain.     Neuropsychiatric:  There is no history of migraines or severe headaches, seizure/epilepsy, speech problems, coordination p no skin dimpling with movement of the pectoralis. There is no nipple retraction. No nipple discharge can be elicited. The parenchyma is mildly nodular. There are no dominant masses in the breast. The axillary tail is normal.    Abdomen:   The abdomen is sof prior to her next appointment. This encounter lasted a total of 25 minutes, more than 50% of which was dedicated to the discussion of management options.

## 2019-10-17 NOTE — TELEPHONE ENCOUNTER
This provider called patient to discuss stimulant medication, I discussed with my collaborating MD and we would be ok with starting phentermine (if the patient is ok with it). The past echo (was a false positive and ekg was similar to the one from 2017).

## 2019-10-19 PROBLEM — N64.89 RADIAL SCAR OF BREAST: Status: ACTIVE | Noted: 2019-10-19

## 2019-10-21 ENCOUNTER — OFFICE VISIT (OUTPATIENT)
Dept: SURGERY | Facility: CLINIC | Age: 49
End: 2019-10-21
Payer: COMMERCIAL

## 2019-10-21 VITALS
RESPIRATION RATE: 20 BRPM | DIASTOLIC BLOOD PRESSURE: 79 MMHG | SYSTOLIC BLOOD PRESSURE: 126 MMHG | HEART RATE: 78 BPM | TEMPERATURE: 98 F | WEIGHT: 239.38 LBS | BODY MASS INDEX: 42 KG/M2

## 2019-10-21 DIAGNOSIS — Z12.39 BREAST CANCER SCREENING, HIGH RISK PATIENT: ICD-10-CM

## 2019-10-21 DIAGNOSIS — Z92.3 PERSONAL HISTORY OF RADIATION EXPOSURE: ICD-10-CM

## 2019-10-21 DIAGNOSIS — Z91.89 AT HIGH RISK FOR BREAST CANCER: ICD-10-CM

## 2019-10-21 DIAGNOSIS — Z80.3 FAMILY HISTORY OF BREAST CANCER: Primary | ICD-10-CM

## 2019-10-21 DIAGNOSIS — Z85.71 HISTORY OF HODGKIN'S LYMPHOMA: ICD-10-CM

## 2019-10-21 PROCEDURE — 99214 OFFICE O/P EST MOD 30 MIN: CPT | Performed by: SURGERY

## 2019-11-11 ENCOUNTER — OFFICE VISIT (OUTPATIENT)
Dept: INTERNAL MEDICINE CLINIC | Facility: CLINIC | Age: 49
End: 2019-11-11
Payer: COMMERCIAL

## 2019-11-11 ENCOUNTER — HOSPITAL ENCOUNTER (OUTPATIENT)
Dept: MRI IMAGING | Facility: HOSPITAL | Age: 49
Discharge: HOME | End: 2019-11-11
Attending: SURGERY
Payer: COMMERCIAL

## 2019-11-11 VITALS
SYSTOLIC BLOOD PRESSURE: 132 MMHG | DIASTOLIC BLOOD PRESSURE: 70 MMHG | BODY MASS INDEX: 41.43 KG/M2 | WEIGHT: 233.81 LBS | HEART RATE: 68 BPM | HEIGHT: 63 IN

## 2019-11-11 DIAGNOSIS — E11.9 CONTROLLED TYPE 2 DIABETES MELLITUS WITHOUT COMPLICATION, WITHOUT LONG-TERM CURRENT USE OF INSULIN (HCC): ICD-10-CM

## 2019-11-11 DIAGNOSIS — E55.9 VITAMIN D DEFICIENCY: ICD-10-CM

## 2019-11-11 DIAGNOSIS — Z91.89 AT HIGH RISK FOR BREAST CANCER: ICD-10-CM

## 2019-11-11 DIAGNOSIS — Z92.3 PERSONAL HISTORY OF RADIATION EXPOSURE: ICD-10-CM

## 2019-11-11 DIAGNOSIS — Z99.89 OSA ON CPAP: ICD-10-CM

## 2019-11-11 DIAGNOSIS — Z12.39 BREAST CANCER SCREENING, HIGH RISK PATIENT: ICD-10-CM

## 2019-11-11 DIAGNOSIS — I10 ESSENTIAL HYPERTENSION, BENIGN: ICD-10-CM

## 2019-11-11 DIAGNOSIS — G47.33 OSA ON CPAP: ICD-10-CM

## 2019-11-11 DIAGNOSIS — Z80.3 FAMILY HISTORY OF BREAST CANCER: ICD-10-CM

## 2019-11-11 DIAGNOSIS — E66.01 OBESITY, CLASS III, BMI 40-49.9 (MORBID OBESITY) (HCC): ICD-10-CM

## 2019-11-11 DIAGNOSIS — Z51.81 ENCOUNTER FOR THERAPEUTIC DRUG MONITORING: Primary | ICD-10-CM

## 2019-11-11 DIAGNOSIS — Z85.71 HISTORY OF HODGKIN'S LYMPHOMA: ICD-10-CM

## 2019-11-11 PROCEDURE — A9575 INJ GADOTERATE MEGLUMI 0.1ML: HCPCS | Performed by: SURGERY

## 2019-11-11 PROCEDURE — 77049 MRI BREAST C-+ W/CAD BI: CPT | Performed by: SURGERY

## 2019-11-11 PROCEDURE — 99214 OFFICE O/P EST MOD 30 MIN: CPT | Performed by: NURSE PRACTITIONER

## 2019-11-11 NOTE — PATIENT INSTRUCTIONS
Keep up the great work! !     PLAN:  Continue with medications: ozempic 1mg  Follow up with me in 1 month  Schedule follow up appointments:  Dietitian/nutritionist      Please try to work on the following dietary changes:  1.  Drink lots of water and cut carolyn life.

## 2019-11-11 NOTE — PROGRESS NOTES
HISTORY OF PRESENT ILLNESS  Patient presents with:  Weight Check: down 81 Claudette Arroyo is a 44year old female here for follow up with medical weight loss program for the treatment of overweight, obesity, or morbid obesity.  Patient has lost -#7 lbs si negative  Joint-related conditions:negative  Migraines/seizures: negative  Glaucoma: negative   Depression/anxiety: negative  Constipation: negative  DVT: negative  Family or personal history of Pancreatic issues / Medullary Thyroid Cancer: negative  Histo 09/28/2019     Lab Results   Component Value Date    CHOLEST 166 09/28/2019    TRIG 76 09/28/2019    HDL 73 09/28/2019    LDL 77 09/28/2019    VLDL 12 02/07/2019    TCHDLRATIO 2.15 02/19/2018    NONHDLC 93 09/28/2019    CHOLHDLRATIO 2.3 09/28/2019     Lab Suspension, 2 sprays in each nostril daily as needed, Disp: 1 Bottle, Rfl: 0  Cetirizine HCl (ZYRTEC ALLERGY) 10 MG Oral Tab, Take 1 tablet by mouth daily. , Disp: , Rfl:     No current facility-administered medications on file prior to visit.        ASSESSM effects reviewed with patient.   · Will continue with ozempic 1mg   · Stopped metformin (by self)   · Wants to hold on restarting stimulant   · Could consider re-trying stimulant medication in the future    Contraindications: topamax (insomnia), stop phente includes sweets as well as rice, pasta, potatoes, bread, corn and instead choose whole grain options or more protein or vegetables (4-6 servings of vegetables per day)  6. Get a good night of sleep  7.  Try to decrease stress in life     Please download vinh

## 2019-11-12 ENCOUNTER — TELEPHONE (OUTPATIENT)
Dept: MAMMOGRAPHY | Age: 49
End: 2019-11-12

## 2019-11-12 ENCOUNTER — TELEPHONE (OUTPATIENT)
Dept: SURGERY | Facility: CLINIC | Age: 49
End: 2019-11-12

## 2019-11-12 DIAGNOSIS — R92.8 ABNORMAL MRI, BREAST: Primary | ICD-10-CM

## 2019-11-12 NOTE — TELEPHONE ENCOUNTER
LVM for patient to review the [employer] message from Dr Tosha Todd regarding her MRI, and recommendation for biopsy. I let her know someone would be contacting her to schedule, and to call me back with any questions.

## 2019-11-12 NOTE — TELEPHONE ENCOUNTER
Spoke with pt re:Right breast MRI biopsy x 1 site at the recommendation of Dr Pam Smith. Procedure reviewed and written instructions providedall questions answered. Emotional and educational support given.   On the day of the biopsy, pt instructed

## 2019-11-15 ENCOUNTER — TELEPHONE (OUTPATIENT)
Dept: SURGERY | Facility: CLINIC | Age: 49
End: 2019-11-15

## 2019-11-18 ENCOUNTER — IMMUNIZATION (OUTPATIENT)
Dept: INTERNAL MEDICINE CLINIC | Facility: CLINIC | Age: 49
End: 2019-11-18
Payer: COMMERCIAL

## 2019-11-18 DIAGNOSIS — Z23 NEED FOR VACCINATION: ICD-10-CM

## 2019-11-18 PROCEDURE — 90686 IIV4 VACC NO PRSV 0.5 ML IM: CPT | Performed by: INTERNAL MEDICINE

## 2019-11-18 PROCEDURE — 90471 IMMUNIZATION ADMIN: CPT | Performed by: INTERNAL MEDICINE

## 2019-11-22 ENCOUNTER — HOSPITAL ENCOUNTER (OUTPATIENT)
Dept: MAMMOGRAPHY | Facility: HOSPITAL | Age: 49
Discharge: HOME | End: 2019-11-22
Attending: SURGERY
Payer: COMMERCIAL

## 2019-11-22 ENCOUNTER — HOSPITAL ENCOUNTER (OUTPATIENT)
Dept: MRI IMAGING | Facility: HOSPITAL | Age: 49
Discharge: HOME | End: 2019-11-22
Attending: SURGERY
Payer: COMMERCIAL

## 2019-11-22 DIAGNOSIS — R92.8 ABNORMAL MRI, BREAST: ICD-10-CM

## 2019-11-22 PROCEDURE — A9575 INJ GADOTERATE MEGLUMI 0.1ML: HCPCS | Performed by: SURGERY

## 2019-11-22 PROCEDURE — 19085 BX BREAST 1ST LESION MR IMAG: CPT | Performed by: SURGERY

## 2019-11-22 PROCEDURE — 88344 IMHCHEM/IMCYTCHM EA MLT ANTB: CPT | Performed by: SURGERY

## 2019-11-22 PROCEDURE — 88305 TISSUE EXAM BY PATHOLOGIST: CPT | Performed by: SURGERY

## 2019-11-22 PROCEDURE — 77065 DX MAMMO INCL CAD UNI: CPT | Performed by: SURGERY

## 2019-11-22 NOTE — IMAGING NOTE
Pt arrived with sisiter. Procedure explained throughout and all questions answered. Consent and discharge paperwork signed by Rochelle Enriquez. IV placed to left antecubital by technologist without difficulty.  Pt placed prone in comfortable position on MRI t

## 2019-11-26 ENCOUNTER — TELEPHONE (OUTPATIENT)
Dept: SURGERY | Facility: CLINIC | Age: 49
End: 2019-11-26

## 2019-11-26 NOTE — TELEPHONE ENCOUNTER
Contacted the patient in regard to her recent biopsy, and the need to set up an appt to see dr Cindy Draper. appt made.

## 2019-12-13 DIAGNOSIS — K21.00 REFLUX ESOPHAGITIS: ICD-10-CM

## 2019-12-13 RX ORDER — OMEPRAZOLE 20 MG/1
CAPSULE, DELAYED RELEASE ORAL
Qty: 90 CAPSULE | Refills: 0 | Status: SHIPPED | OUTPATIENT
Start: 2019-12-13 | End: 2020-03-23

## 2019-12-13 NOTE — TELEPHONE ENCOUNTER
Failed protocol     Last refill:  1/1/2019 Omeprazole 20 mg #90 3R    9/13/2019 Dr Kim Roberts RTC 6 months  4. GERD - Sub-optimally controlled on both PPI and H-2 blocker. No new issues.    No FOV scheduled

## 2019-12-16 ENCOUNTER — OFFICE VISIT (OUTPATIENT)
Dept: OBGYN CLINIC | Facility: CLINIC | Age: 49
End: 2019-12-16
Payer: COMMERCIAL

## 2019-12-16 VITALS
BODY MASS INDEX: 41.45 KG/M2 | HEART RATE: 77 BPM | HEIGHT: 62.5 IN | DIASTOLIC BLOOD PRESSURE: 70 MMHG | SYSTOLIC BLOOD PRESSURE: 120 MMHG | WEIGHT: 231 LBS

## 2019-12-16 DIAGNOSIS — Z12.4 CERVICAL CANCER SCREENING: ICD-10-CM

## 2019-12-16 DIAGNOSIS — Z01.419 WELL WOMAN EXAM WITH ROUTINE GYNECOLOGICAL EXAM: Primary | ICD-10-CM

## 2019-12-16 PROCEDURE — 99396 PREV VISIT EST AGE 40-64: CPT | Performed by: NURSE PRACTITIONER

## 2019-12-16 PROCEDURE — 87624 HPV HI-RISK TYP POOLED RSLT: CPT | Performed by: NURSE PRACTITIONER

## 2019-12-16 NOTE — PROGRESS NOTES
Here for Routine Annual Exam  No concerns or questions. Menses are irregular, 7 in the last year. She is seeing breast specialist Dr. Shay Hull for follow up to a breast MRI. No C/O    ROS: No Cardiac, Respiratory, GI,  or Neurological symptoms.     P

## 2020-01-07 ENCOUNTER — OFFICE VISIT (OUTPATIENT)
Dept: SURGERY | Facility: CLINIC | Age: 50
End: 2020-01-07
Payer: COMMERCIAL

## 2020-01-07 VITALS
SYSTOLIC BLOOD PRESSURE: 125 MMHG | BODY MASS INDEX: 42 KG/M2 | DIASTOLIC BLOOD PRESSURE: 73 MMHG | HEART RATE: 70 BPM | OXYGEN SATURATION: 100 % | WEIGHT: 233 LBS | RESPIRATION RATE: 16 BRPM

## 2020-01-07 DIAGNOSIS — Z85.71 HISTORY OF HODGKIN'S LYMPHOMA: ICD-10-CM

## 2020-01-07 DIAGNOSIS — Z01.818 PRE-OP TESTING: ICD-10-CM

## 2020-01-07 DIAGNOSIS — D24.1 PAPILLOMA OF RIGHT BREAST: Primary | ICD-10-CM

## 2020-01-07 DIAGNOSIS — Z80.3 FAMILY HISTORY OF BREAST CANCER: ICD-10-CM

## 2020-01-07 DIAGNOSIS — Z92.3 PERSONAL HISTORY OF RADIATION EXPOSURE: ICD-10-CM

## 2020-01-07 PROCEDURE — 99215 OFFICE O/P EST HI 40 MIN: CPT | Performed by: SURGERY

## 2020-01-07 NOTE — PROGRESS NOTES
Reviewed surgical instructions with patient. She does need pre op testing for anesthesia. Pt open to seed loc. Questions addressed.

## 2020-01-07 NOTE — PROGRESS NOTES
Breast Surgery Surveillance Visit      History of Present Illness:   Ms. Ailyn Schwarz is a 44year old woman was referred by Albin Schaumann, APN and presented in April 2018 with a personal history of a benign left breast needle biopsy many years ago.  She Anxiety    • Cancer ([facility] 2010    Predominant Hodgkin's Lymphoma (in remission)   • Depression    • Diabetes ([facility]    • Diabetes mellitus ([facility]    • Dysmenorrhea    • Esophageal reflux    • Essential hypertension    • Fibroids    • Genital herpes simplex tablet, Rfl: 3  GLYCATE 1.5 MG Oral Tab, Take 1 tablet by mouth 2 (two) times daily as needed (for further acid suppression). , Disp: 60 tablet, Rfl: 2  VALACYCLOVIR  MG Oral Tab, TAKE 1 TABLET(500 MG) BY MOUTH TWICE DAILY FOR 3 DAYS, Disp: 30 tablet lymphoma   • Cancer Maternal Grandfather         brain       She is not of Ashkenazi Jehovah's witness ancestry. Social History:    Alcohol use No         Smoking status: Never Smoker   Smokeless tobacco: Never Used   The patient is single. She has no children. Hematologic/Lymphatic:  The patient denies easily bruising or bleeding or persistent swollen glands or lymph nodes. Musculoskeletal:  The patient denies muscle aches/pain, +joint pain, +stiff joints, neck pain, +back pain or bone pain.     Neuropsyc mildly nodular. There are no dominant masses in the breast. The axillary tail is normal.  Left breast:   The skin, nipple, and areola appear normal. There is no skin dimpling with movement of the pectoralis. There is no nipple retraction.  No nipple dischar MRI has been found to increase cancer detection rates in this population therefore I recommended this be alternated with her mammogram at six-month intervals for high risk surveillance.       The risks and possible complications of the surgery were discusse

## 2020-01-07 NOTE — PATIENT INSTRUCTIONS
Surgeon: Dr Bethanie Kramer  [phone]  Fax [phone]  Procedure/Surgery: right breast localized excisional biopsy    [address] [place], [address] [phone]  1.  Someone must accompany you the day of the procedure to dri

## 2020-01-08 ENCOUNTER — TELEPHONE (OUTPATIENT)
Dept: SURGERY | Facility: CLINIC | Age: 50
End: 2020-01-08

## 2020-01-08 DIAGNOSIS — E66.01 CLASS 3 SEVERE OBESITY DUE TO EXCESS CALORIES WITH SERIOUS COMORBIDITY AND BODY MASS INDEX (BMI) OF 40.0 TO 44.9 IN ADULT (HCC): ICD-10-CM

## 2020-01-08 DIAGNOSIS — E11.9 CONTROLLED TYPE 2 DIABETES MELLITUS WITHOUT COMPLICATION, WITHOUT LONG-TERM CURRENT USE OF INSULIN (HCC): ICD-10-CM

## 2020-01-08 DIAGNOSIS — Z51.81 ENCOUNTER FOR THERAPEUTIC DRUG MONITORING: ICD-10-CM

## 2020-01-09 DIAGNOSIS — D24.1 PAPILLOMA OF RIGHT BREAST: Primary | ICD-10-CM

## 2020-01-09 NOTE — TELEPHONE ENCOUNTER
Requesting ozempic  LOV: 10/10/19  RTC: 4 weeks  Last Relevant Labs: 9/28/19  Filled: 10/10/19 #6 pens with 0 refills    Future Appointments   Date Time Provider Karina Piper   2/18/2020  2:15 PM Walker Conklin MD [facility] Surg/Onc   12/21/2

## 2020-01-10 ENCOUNTER — APPOINTMENT (OUTPATIENT)
Dept: LAB | Age: 50
End: 2020-01-10
Attending: SURGERY
Payer: COMMERCIAL

## 2020-01-10 DIAGNOSIS — Z01.818 PRE-OP TESTING: ICD-10-CM

## 2020-01-10 LAB
ANION GAP SERPL CALC-SCNC: 3 MMOL/L (ref 0–18)
ATRIAL RATE: 64 BPM
BUN BLD-MCNC: 10 MG/DL (ref 7–18)
BUN/CREAT SERPL: 10.9 (ref 10–20)
CALCIUM BLD-MCNC: 8.8 MG/DL (ref 8.5–10.1)
CHLORIDE SERPL-SCNC: 107 MMOL/L (ref 98–112)
CO2 SERPL-SCNC: 30 MMOL/L (ref 21–32)
CREAT BLD-MCNC: 0.92 MG/DL (ref 0.55–1.02)
GLUCOSE BLD-MCNC: 75 MG/DL (ref 70–99)
OSMOLALITY SERPL CALC.SUM OF ELEC: 288 MOSM/KG (ref 275–295)
P AXIS: 65 DEGREES
P-R INTERVAL: 172 MS
PATIENT FASTING Y/N/NP: NO
POTASSIUM SERPL-SCNC: 3.9 MMOL/L (ref 3.5–5.1)
Q-T INTERVAL: 414 MS
QRS DURATION: 90 MS
QTC CALCULATION (BEZET): 427 MS
R AXIS: 37 DEGREES
SODIUM SERPL-SCNC: 140 MMOL/L (ref 136–145)
T AXIS: 19 DEGREES
VENTRICULAR RATE: 64 BPM

## 2020-01-10 PROCEDURE — 93010 ELECTROCARDIOGRAM REPORT: CPT | Performed by: INTERNAL MEDICINE

## 2020-01-10 PROCEDURE — 36415 COLL VENOUS BLD VENIPUNCTURE: CPT | Performed by: SURGERY

## 2020-01-10 PROCEDURE — 93005 ELECTROCARDIOGRAM TRACING: CPT

## 2020-01-10 PROCEDURE — 80048 BASIC METABOLIC PNL TOTAL CA: CPT | Performed by: SURGERY

## 2020-01-13 ENCOUNTER — PATIENT MESSAGE (OUTPATIENT)
Dept: INTERNAL MEDICINE CLINIC | Facility: CLINIC | Age: 50
End: 2020-01-13

## 2020-01-13 NOTE — TELEPHONE ENCOUNTER
From: Evert Hand  To: Anabel Cortes MD  Sent: 1/13/2020 9:07 AM CST  Subject: Non-Urgent Cherelle Arceo,     I have just reviewed my latest pre-op test concerning my heart.  Should I be concerned about the results and what do they cheri

## 2020-01-16 ENCOUNTER — TELEPHONE (OUTPATIENT)
Dept: INTERNAL MEDICINE CLINIC | Facility: CLINIC | Age: 50
End: 2020-01-16

## 2020-01-16 NOTE — TELEPHONE ENCOUNTER
Pt called checking status on rx refill pt states this is day 2 without med and has not received a call back

## 2020-01-16 NOTE — TELEPHONE ENCOUNTER
Does patient have a pre-op clearance visit scheduled with us?   I took a look at the EKG report that was ordered (not by me) and it's being finalized as abnormal from the cardiologist.  She'll need to be seen for pre-op clearance and also probably updated s

## 2020-01-17 RX ORDER — SEMAGLUTIDE 1.34 MG/ML
1 INJECTION, SOLUTION SUBCUTANEOUS WEEKLY
Qty: 9 ML | Refills: 0 | Status: SHIPPED | OUTPATIENT
Start: 2020-01-17 | End: 2020-09-11

## 2020-01-17 NOTE — TELEPHONE ENCOUNTER
Future Appointments   Date Time Provider Karina Piper   1/20/2020 12:40 PM DOREEN Charles [address]   1/20/2020  1:20 PM [facility] [address] [facility]   2/6/2020  8:00 AM [place] [id number] [facility]   2/17/2020  4:00 PM Renetta Corea

## 2020-01-20 ENCOUNTER — OFFICE VISIT (OUTPATIENT)
Dept: INTERNAL MEDICINE CLINIC | Facility: CLINIC | Age: 50
End: 2020-01-20
Payer: COMMERCIAL

## 2020-01-20 ENCOUNTER — HOSPITAL ENCOUNTER (OUTPATIENT)
Dept: MAMMOGRAPHY | Facility: HOSPITAL | Age: 50
Discharge: HOME | End: 2020-01-20
Attending: SURGERY
Payer: COMMERCIAL

## 2020-01-20 ENCOUNTER — TELEPHONE (OUTPATIENT)
Dept: MAMMOGRAPHY | Age: 50
End: 2020-01-20

## 2020-01-20 VITALS
SYSTOLIC BLOOD PRESSURE: 120 MMHG | WEIGHT: 234 LBS | DIASTOLIC BLOOD PRESSURE: 80 MMHG | HEART RATE: 68 BPM | RESPIRATION RATE: 18 BRPM | BODY MASS INDEX: 41.98 KG/M2 | HEIGHT: 62.5 IN

## 2020-01-20 DIAGNOSIS — E55.9 VITAMIN D DEFICIENCY: ICD-10-CM

## 2020-01-20 DIAGNOSIS — I10 ESSENTIAL HYPERTENSION, BENIGN: ICD-10-CM

## 2020-01-20 DIAGNOSIS — D24.1 PAPILLOMA OF RIGHT BREAST: ICD-10-CM

## 2020-01-20 DIAGNOSIS — Z99.89 OSA ON CPAP: ICD-10-CM

## 2020-01-20 DIAGNOSIS — Z51.81 ENCOUNTER FOR THERAPEUTIC DRUG MONITORING: Primary | ICD-10-CM

## 2020-01-20 DIAGNOSIS — G47.33 OSA ON CPAP: ICD-10-CM

## 2020-01-20 DIAGNOSIS — E66.01 OBESITY, CLASS III, BMI 40-49.9 (MORBID OBESITY) (HCC): ICD-10-CM

## 2020-01-20 DIAGNOSIS — E11.9 CONTROLLED TYPE 2 DIABETES MELLITUS WITHOUT COMPLICATION, WITHOUT LONG-TERM CURRENT USE OF INSULIN (HCC): ICD-10-CM

## 2020-01-20 PROCEDURE — 99213 OFFICE O/P EST LOW 20 MIN: CPT | Performed by: NURSE PRACTITIONER

## 2020-01-20 PROCEDURE — 19281 PERQ DEVICE BREAST 1ST IMAG: CPT | Performed by: SURGERY

## 2020-01-20 NOTE — TELEPHONE ENCOUNTER
Spoke with pt and explained how the marjan  procedure is preformed by the Radiologist with local. I explained that she can have this placed up to 30 days prior to surgery. All of the pt's questions were answered and emotional support provided.

## 2020-01-20 NOTE — PATIENT INSTRUCTIONS
PLAN:  Continue with medications: ozempic 1mg  Follow up with me in 2-3 month  Schedule follow up appointments:  Dietitian/nutritionist      Please try to work on the following dietary changes:  1.  Drink lots of water and cut down on soda/juice consumption

## 2020-01-20 NOTE — TELEPHONE ENCOUNTER
OK to fit her into a 15 min slot or a hospital f/u slot (preferrably). Rosa Sommers. Pablo Queen MD  Diplomate, American Board of Internal Medicine  Member, [employer] of [address] [employer]  [address], [address], [facility] [place], [place]

## 2020-01-20 NOTE — TELEPHONE ENCOUNTER
2nd attempt to reach pt-LMTCB attempting to provide pre-procedure instructions for marjan seed placement being done later today.

## 2020-01-20 NOTE — TELEPHONE ENCOUNTER
Pt having surgery on 2/6 with Dr Sameer Rivera and requesting pre-op clearance and to discuss ekg with only Dr Laura Apodaca. Only 15 min slots available other than some available HFU appointment's. Can you squeeze her in to your schedule?

## 2020-01-27 ENCOUNTER — ANESTHESIA EVENT (OUTPATIENT)
Dept: SURGERY | Facility: HOSPITAL | Age: 50
End: 2020-01-27
Payer: COMMERCIAL

## 2020-01-27 ENCOUNTER — TELEPHONE (OUTPATIENT)
Dept: INTERNAL MEDICINE CLINIC | Facility: CLINIC | Age: 50
End: 2020-01-27

## 2020-01-30 ENCOUNTER — OFFICE VISIT (OUTPATIENT)
Dept: INTERNAL MEDICINE CLINIC | Facility: CLINIC | Age: 50
End: 2020-01-30
Payer: COMMERCIAL

## 2020-01-30 VITALS
HEART RATE: 79 BPM | RESPIRATION RATE: 16 BRPM | TEMPERATURE: 98 F | HEIGHT: 62 IN | BODY MASS INDEX: 43.15 KG/M2 | SYSTOLIC BLOOD PRESSURE: 138 MMHG | OXYGEN SATURATION: 99 % | WEIGHT: 234.5 LBS | DIASTOLIC BLOOD PRESSURE: 70 MMHG

## 2020-01-30 DIAGNOSIS — R94.31 ABNORMAL EKG: Primary | ICD-10-CM

## 2020-01-30 PROCEDURE — 99214 OFFICE O/P EST MOD 30 MIN: CPT | Performed by: INTERNAL MEDICINE

## 2020-01-30 NOTE — PROGRESS NOTES
Stat stress test scheduled for 1/31/2020 @ 9am at [facility]. Pt agreed to time.

## 2020-01-30 NOTE — PROGRESS NOTES
Patient presents with:  Abnormal EKG      HPI: Renaldo Bob presents today for abnormal EKG. Testing was done on 1/7/20 as part of pre-procedural w/u. She's set to undergo surgical intervention w/ Dr. Cris Danielle on 2/6/20.   Specifically, she'll have R breast seed l tablet, Rfl: 0  •  EPINEPHrine (EPIPEN 2-CHLOE) 0.3 MG/0.3ML Injection Solution Auto-injector, Take as directed for anaphylaxis, then call 911., Disp: 2 each, Rfl: 1  •  Azelastine HCl 0.1 % Nasal Solution, 2 sprays by Nasal route 2 (two) times daily as need in Radiology ([address]) Dx:  Pre-op testing   Component  Ref Range & Units 2wk ago   Ventricular rate  BPM 64    Atrial rate  BPM 64    P-R Interval  ms 172    QRS Duration  ms 90    Q-T Interval  ms 414    QTC Calculation (Bezet)  ms 427    P Axis STRESS(CPT=93350/40359 [facility] [address])

## 2020-01-31 ENCOUNTER — HOSPITAL ENCOUNTER (OUTPATIENT)
Dept: CV DIAGNOSTICS | Facility: HOSPITAL | Age: 50
Discharge: HOME | End: 2020-01-31
Attending: INTERNAL MEDICINE
Payer: COMMERCIAL

## 2020-01-31 DIAGNOSIS — R94.31 ABNORMAL EKG: ICD-10-CM

## 2020-01-31 PROCEDURE — 93017 CV STRESS TEST TRACING ONLY: CPT | Performed by: INTERNAL MEDICINE

## 2020-01-31 PROCEDURE — 93350 STRESS TTE ONLY: CPT | Performed by: INTERNAL MEDICINE

## 2020-01-31 PROCEDURE — 93018 CV STRESS TEST I&R ONLY: CPT | Performed by: INTERNAL MEDICINE

## 2020-02-03 NOTE — PROGRESS NOTES
Stress testing result is negative. Hayden Frazier. Rui Levin MD  Diplomate, American Board of Internal Medicine  Member, American College of [address] [facility] [address] [facility] [address] [employer]  [address] [facility], [address], [facility], [address] [place] [address]  T: [phone]; F: [phone].

## 2020-02-04 ENCOUNTER — OFFICE VISIT (OUTPATIENT)
Dept: INTERNAL MEDICINE CLINIC | Facility: CLINIC | Age: 50
End: 2020-02-04
Payer: COMMERCIAL

## 2020-02-04 VITALS
TEMPERATURE: 98 F | HEIGHT: 62 IN | BODY MASS INDEX: 43.06 KG/M2 | DIASTOLIC BLOOD PRESSURE: 78 MMHG | OXYGEN SATURATION: 95 % | HEART RATE: 93 BPM | WEIGHT: 234 LBS | SYSTOLIC BLOOD PRESSURE: 132 MMHG | RESPIRATION RATE: 16 BRPM

## 2020-02-04 DIAGNOSIS — J01.90 ACUTE NON-RECURRENT SINUSITIS, UNSPECIFIED LOCATION: Primary | ICD-10-CM

## 2020-02-04 PROCEDURE — 99213 OFFICE O/P EST LOW 20 MIN: CPT | Performed by: NURSE PRACTITIONER

## 2020-02-04 RX ORDER — AMOXICILLIN AND CLAVULANATE POTASSIUM 875; 125 MG/1; MG/1
1 TABLET, FILM COATED ORAL 2 TIMES DAILY
Qty: 14 TABLET | Refills: 0 | Status: SHIPPED | OUTPATIENT
Start: 2020-02-04 | End: 2020-02-11

## 2020-02-04 NOTE — PATIENT INSTRUCTIONS
Sinusitis (Antibiotic Treatment)    The sinuses are air-filled spaces within the bones of the face. They connect to the inside of the nose. Sinusitis is an inflammation of the tissue that lines the sinuses. Sinusitis can occur during a cold.  It can also · You can use an over-the-counter decongestant, unless a similar medicine was prescribed to you. Nasal sprays work the fastest. Use one that contains phenylephrine or oxymetazoline. First blow your nose gently. Then use the spray.  Do not use these medicine · Don’t have close contact with people who have sore throats, colds, or other upper respiratory infections. · Don’t smoke, and stay away from secondhand smoke. · Stay up to date with of your vaccines.   Date Last Reviewed: 11/1/2017  © 5802-3915 [employer]

## 2020-02-04 NOTE — PROGRESS NOTES
CHIEF COMPLAINT:   Patient presents with:  Sinus Problem  Cough: lasting a few weeks, pt has surgery on on 2/6/2020      HPI:   Kamila Batres is a 44year old female who presents for upper respiratory symptoms for  2 weeks.  Patient reports sore throat, co capsule by mouth daily. 30 capsule 3   • Multiple Vitamins-Iron (ONCE DAILY/IRON) Oral Tab Take 1 tablet by mouth daily.      • Fluticasone Propionate (FLONASE) 50 MCG/ACT Nasal Suspension 2 sprays in each nostril daily as needed 1 Bottle 0   • Cetirizine H Social History    Tobacco Use      Smoking status: Never Smoker      Smokeless tobacco: Never Used    Alcohol use:  Yes      Alcohol/week: 0.0 standard drinks      Frequency: Monthly or less      Comment: occ    Drug use: No        REVIEW OF SYSTEMS:   MYRNA drink tea with honey for sore throat; I also instructed them to use a cool-mist vaporizer and prop themselves up at night to help with the coughing. The patient indicates understanding of these issues and agrees to the plan.  The patient is asked to return

## 2020-02-05 ENCOUNTER — TELEPHONE (OUTPATIENT)
Dept: SURGERY | Facility: CLINIC | Age: 50
End: 2020-02-05

## 2020-02-05 NOTE — TELEPHONE ENCOUNTER
I contacted the patient to assess her symptoms. Pt states that she is afebrile, and has never had a fever. She is taking the abx, Amoxicillin and tylenol for a headache.    I let her know it is ok to take cold medicine as long as there is no ibuprofen o

## 2020-02-06 ENCOUNTER — ANESTHESIA (OUTPATIENT)
Dept: SURGERY | Facility: HOSPITAL | Age: 50
End: 2020-02-06
Payer: COMMERCIAL

## 2020-02-06 ENCOUNTER — HOSPITAL ENCOUNTER (OUTPATIENT)
Facility: HOSPITAL | Age: 50
Setting detail: HOSPITAL OUTPATIENT SURGERY
Discharge: HOME | End: 2020-02-06
Attending: SURGERY | Admitting: SURGERY
Payer: COMMERCIAL

## 2020-02-06 ENCOUNTER — HOSPITAL ENCOUNTER (OUTPATIENT)
Dept: MAMMOGRAPHY | Facility: HOSPITAL | Age: 50
Setting detail: HOSPITAL OUTPATIENT SURGERY
Discharge: HOME | End: 2020-02-06
Attending: SURGERY | Admitting: SURGERY
Payer: COMMERCIAL

## 2020-02-06 VITALS
HEIGHT: 62 IN | OXYGEN SATURATION: 99 % | DIASTOLIC BLOOD PRESSURE: 70 MMHG | WEIGHT: 231.06 LBS | HEART RATE: 72 BPM | SYSTOLIC BLOOD PRESSURE: 123 MMHG | RESPIRATION RATE: 18 BRPM | BODY MASS INDEX: 42.52 KG/M2 | TEMPERATURE: 97 F

## 2020-02-06 DIAGNOSIS — D24.1 PAPILLOMA OF RIGHT BREAST: ICD-10-CM

## 2020-02-06 LAB
GLUCOSE BLD-MCNC: 88 MG/DL (ref 70–99)
GLUCOSE BLD-MCNC: 88 MG/DL (ref 70–99)
POCT URINE PREGNANCY: NEGATIVE

## 2020-02-06 PROCEDURE — 76098 X-RAY EXAM SURGICAL SPECIMEN: CPT | Performed by: SURGERY

## 2020-02-06 PROCEDURE — 81025 URINE PREGNANCY TEST: CPT | Performed by: SURGERY

## 2020-02-06 PROCEDURE — 82962 GLUCOSE BLOOD TEST: CPT

## 2020-02-06 PROCEDURE — 88307 TISSUE EXAM BY PATHOLOGIST: CPT | Performed by: SURGERY

## 2020-02-06 PROCEDURE — 0HBT0ZX EXCISION OF RIGHT BREAST, OPEN APPROACH, DIAGNOSTIC: ICD-10-PCS | Performed by: SURGERY

## 2020-02-06 RX ORDER — LIDOCAINE HYDROCHLORIDE AND EPINEPHRINE 10; 10 MG/ML; UG/ML
INJECTION, SOLUTION INFILTRATION; PERINEURAL AS NEEDED
Status: DISCONTINUED | OUTPATIENT
Start: 2020-02-06 | End: 2020-02-06 | Stop reason: HOSPADM

## 2020-02-06 RX ORDER — MIDAZOLAM HYDROCHLORIDE 1 MG/ML
INJECTION INTRAMUSCULAR; INTRAVENOUS AS NEEDED
Status: DISCONTINUED | OUTPATIENT
Start: 2020-02-06 | End: 2020-02-06 | Stop reason: SURG

## 2020-02-06 RX ORDER — BUPIVACAINE HYDROCHLORIDE 5 MG/ML
INJECTION, SOLUTION EPIDURAL; INTRACAUDAL AS NEEDED
Status: DISCONTINUED | OUTPATIENT
Start: 2020-02-06 | End: 2020-02-06 | Stop reason: HOSPADM

## 2020-02-06 RX ORDER — DEXTROSE MONOHYDRATE 25 G/50ML
50 INJECTION, SOLUTION INTRAVENOUS
Status: DISCONTINUED | OUTPATIENT
Start: 2020-02-06 | End: 2020-02-06 | Stop reason: HOSPADM

## 2020-02-06 RX ORDER — HYDROMORPHONE HYDROCHLORIDE 1 MG/ML
0.4 INJECTION, SOLUTION INTRAMUSCULAR; INTRAVENOUS; SUBCUTANEOUS EVERY 5 MIN PRN
Status: DISCONTINUED | OUTPATIENT
Start: 2020-02-06 | End: 2020-02-06

## 2020-02-06 RX ORDER — NALOXONE HYDROCHLORIDE 0.4 MG/ML
80 INJECTION, SOLUTION INTRAMUSCULAR; INTRAVENOUS; SUBCUTANEOUS AS NEEDED
Status: DISCONTINUED | OUTPATIENT
Start: 2020-02-06 | End: 2020-02-06

## 2020-02-06 RX ORDER — SODIUM CHLORIDE, SODIUM LACTATE, POTASSIUM CHLORIDE, CALCIUM CHLORIDE 600; 310; 30; 20 MG/100ML; MG/100ML; MG/100ML; MG/100ML
INJECTION, SOLUTION INTRAVENOUS CONTINUOUS
Status: DISCONTINUED | OUTPATIENT
Start: 2020-02-06 | End: 2020-02-06

## 2020-02-06 RX ORDER — ACETAMINOPHEN 500 MG
1000 TABLET ORAL ONCE
COMMUNITY
End: 2020-12-21

## 2020-02-06 RX ORDER — CEFAZOLIN SODIUM/WATER 2 G/20 ML
2 SYRINGE (ML) INTRAVENOUS ONCE
Status: COMPLETED | OUTPATIENT
Start: 2020-02-06 | End: 2020-02-06

## 2020-02-06 RX ORDER — ONDANSETRON 2 MG/ML
4 INJECTION INTRAMUSCULAR; INTRAVENOUS AS NEEDED
Status: DISCONTINUED | OUTPATIENT
Start: 2020-02-06 | End: 2020-02-06

## 2020-02-06 RX ORDER — DEXTROSE MONOHYDRATE 25 G/50ML
50 INJECTION, SOLUTION INTRAVENOUS
Status: DISCONTINUED | OUTPATIENT
Start: 2020-02-06 | End: 2020-02-06

## 2020-02-06 RX ORDER — HYDROCODONE BITARTRATE AND ACETAMINOPHEN 5; 325 MG/1; MG/1
1-2 TABLET ORAL EVERY 6 HOURS PRN
Qty: 20 TABLET | Refills: 0 | Status: SHIPPED | OUTPATIENT
Start: 2020-02-06 | End: 2020-07-21

## 2020-02-06 RX ORDER — ACETAMINOPHEN 500 MG
1000 TABLET ORAL ONCE
Status: DISCONTINUED | OUTPATIENT
Start: 2020-02-06 | End: 2020-02-06 | Stop reason: HOSPADM

## 2020-02-06 RX ORDER — HYDROCODONE BITARTRATE AND ACETAMINOPHEN 5; 325 MG/1; MG/1
1 TABLET ORAL AS NEEDED
Status: DISCONTINUED | OUTPATIENT
Start: 2020-02-06 | End: 2020-02-06

## 2020-02-06 RX ORDER — HYDROCODONE BITARTRATE AND ACETAMINOPHEN 5; 325 MG/1; MG/1
2 TABLET ORAL AS NEEDED
Status: DISCONTINUED | OUTPATIENT
Start: 2020-02-06 | End: 2020-02-06

## 2020-02-06 RX ADMIN — CEFAZOLIN SODIUM/WATER 2 G: 2 G/20 ML SYRINGE (ML) INTRAVENOUS at 07:42:00

## 2020-02-06 RX ADMIN — MIDAZOLAM HYDROCHLORIDE 2 MG: 1 INJECTION INTRAMUSCULAR; INTRAVENOUS at 07:36:00

## 2020-02-06 RX ADMIN — SODIUM CHLORIDE, SODIUM LACTATE, POTASSIUM CHLORIDE, CALCIUM CHLORIDE: 600; 310; 30; 20 INJECTION, SOLUTION INTRAVENOUS at 08:19:00

## 2020-02-06 NOTE — H&P
History of Present Illness:   Ms. Irma Avila is a 44year old woman was referred by VIKAS [place] and presented in April 2018 with a personal history of a benign left breast needle biopsy many years ago.  She also had new calcifications in the rig 2010     Predominant Hodgkin's Lymphoma (in remission)   • Depression     • Diabetes ([facility]     • Diabetes mellitus ([facility]     • Dysmenorrhea     • Esophageal reflux     • Essential hypertension     • Fibroids     • Genital herpes simplex     • High blood pr NIGHTLY, Disp: 90 tablet, Rfl: 3  GLYCATE 1.5 MG Oral Tab, Take 1 tablet by mouth 2 (two) times daily as needed (for further acid suppression). , Disp: 60 tablet, Rfl: 2  VALACYCLOVIR  MG Oral Tab, TAKE 1 TABLET(500 MG) BY MOUTH TWICE DAILY FOR 3 DAY 37         Predominant lymphocyte hodgkins lymphoma   • Cancer Maternal Grandfather           brain         She is not of Ashkenazi Zoroastrianism ancestry.     Social History:     Alcohol use No                 Smoking status: Never Smoker   Smokeless tobacco: Ne change in skin color or change in moles.      Hematologic/Lymphatic:  The patient denies easily bruising or bleeding or persistent swollen glands or lymph nodes.      Musculoskeletal:  The patient denies muscle aches/pain, +joint pain, +stiff joints, neck nipple discharge can be elicited. The parenchyma is mildly nodular.  There are no dominant masses in the breast. The axillary tail is normal.  Left breast:   The skin, nipple, and areola appear normal. There is no skin dimpling with movement of the pectoral breast cancer in the future. In light of this, we discussed that MRI has been found to increase cancer detection rates in this population therefore I recommended this be alternated with her mammogram at six-month intervals for high risk surveillance.

## 2020-02-06 NOTE — OPERATIVE REPORT
[address]    PATIENT'S NAME: Josephine SNOW [facility] PHYSICIAN: Nehemias Russo. Judith Zaman M.D. OPERATING PHYSICIAN: Nehemias Russo. Judith Zaman M.D.    PATIENT ACCOUNT#:   [de-identified]    LOCATION:  [address] [place] [address] [address]  MEDICAL RECORD #:   [id number] to infiltrate the skin and subcutaneous tissues at our incision site. A curvilinear incision was made along the medial border of the areola with a 15 blade knife in the skin. Using the [facility], the seed was identified and brought into the field.   Mary Ellen Crowell

## 2020-02-06 NOTE — BRIEF OP NOTE
Pre-Operative Diagnosis: Papilloma of right breast [D24.1]     Post-Operative Diagnosis: Papilloma of right breast [D24.1]      Procedure Performed:   Procedure(s):  right breast seed localized excisional biopsy    Surgeon(s) and Role:     * Efraín Sharp

## 2020-02-06 NOTE — ANESTHESIA POSTPROCEDURE EVALUATION
[address] [facility] Patient Status:  Hospital Outpatient Surgery   Age/Gender 44year old female MRN [id number]   [facility] SURGERY Attending Rashida Brewer, [facility] Se Day # 0 PCP Mercy Chand MD       Anesthesia Post-op Note

## 2020-02-06 NOTE — ANESTHESIA PREPROCEDURE EVALUATION
PRE-OP EVALUATION    Patient Name: Loni Cortes    Pre-op Diagnosis: Papilloma of right breast [D24.1]    Procedure(s):  right breast seed localized excisional biopsy    Surgeon(s) and Role:     Eleazar Dallas MD - Primary    Pre-op vitals reviewed Rhinitis., Disp: 1 Bottle, Rfl: 11  Cholecalciferol (VITAMIN D) 2000 units Oral Cap, Take 2,000 Units by mouth daily. , Disp: , Rfl:   Probiotic Product (VSL#3) Oral Cap, Take 1 capsule by mouth daily. , Disp: 30 capsule, Rfl: 3  Multiple Vitamins-Iron (ON bx (x3)   • REVISE MEDIAN N/CARPAL TUNNEL SURG      laterality and bilateral   • TONSILLECTOMY  2000    w/ adenoidectomy   • UPPER GI ENDOSCOPY,EXAM  2010     Social History    Tobacco Use      Smoking status: Never Smoker      Smokeless tobacco: Never Use

## 2020-02-17 ENCOUNTER — OFFICE VISIT (OUTPATIENT)
Dept: INTERNAL MEDICINE CLINIC | Facility: CLINIC | Age: 50
End: 2020-02-17
Payer: COMMERCIAL

## 2020-02-17 VITALS
SYSTOLIC BLOOD PRESSURE: 108 MMHG | WEIGHT: 235.5 LBS | BODY MASS INDEX: 43.34 KG/M2 | DIASTOLIC BLOOD PRESSURE: 60 MMHG | HEART RATE: 72 BPM | HEIGHT: 62 IN | TEMPERATURE: 98 F

## 2020-02-17 DIAGNOSIS — K21.00 REFLUX ESOPHAGITIS: ICD-10-CM

## 2020-02-17 DIAGNOSIS — M79.9 MUSCULOSKELETAL DISORDER: ICD-10-CM

## 2020-02-17 DIAGNOSIS — E11.9 CONTROLLED TYPE 2 DIABETES MELLITUS WITHOUT COMPLICATION, WITHOUT LONG-TERM CURRENT USE OF INSULIN (HCC): Primary | ICD-10-CM

## 2020-02-17 DIAGNOSIS — I10 ESSENTIAL HYPERTENSION, BENIGN: ICD-10-CM

## 2020-02-17 DIAGNOSIS — E66.01 CLASS 3 SEVERE OBESITY DUE TO EXCESS CALORIES WITH SERIOUS COMORBIDITY AND BODY MASS INDEX (BMI) OF 40.0 TO 44.9 IN ADULT (HCC): ICD-10-CM

## 2020-02-17 PROBLEM — M77.11 RIGHT LATERAL EPICONDYLITIS: Status: RESOLVED | Noted: 2019-07-31 | Resolved: 2020-02-17

## 2020-02-17 PROCEDURE — 99214 OFFICE O/P EST MOD 30 MIN: CPT | Performed by: INTERNAL MEDICINE

## 2020-02-17 RX ORDER — GLYCOPYRROLATE 1.5 MG/1
1 TABLET ORAL 2 TIMES DAILY PRN
Qty: 180 TABLET | Refills: 1 | Status: SHIPPED | OUTPATIENT
Start: 2020-02-17 | End: 2020-08-22

## 2020-02-17 NOTE — PROGRESS NOTES
Patient presents with:  Medication Follow-Up: 6 month follow up       HPI: Tone Whatley presents today for 6-month f/u select chronic medical conditions as follows:    1. Controlled DM2 - Stable on prescription medication. No new issues.    2. HTN - Stable on pre oz (104.8 kg)  02/04/20 : 234 lb (106.1 kg)  01/30/20 : 234 lb 8 oz (106.4 kg)  01/20/20 : 234 lb (106.1 kg)  01/07/20 : 233 lb (105.7 kg)  Gen - A&Ox3, NAD, morbidly obese  HEENT - PERRL, EOMI, OP is clear  Lungs - CTAB  CV - RRR, nl s1, s2  Abd - soft, N

## 2020-02-18 ENCOUNTER — OFFICE VISIT (OUTPATIENT)
Dept: SURGERY | Facility: CLINIC | Age: 50
End: 2020-02-18
Payer: COMMERCIAL

## 2020-02-18 VITALS
HEART RATE: 79 BPM | DIASTOLIC BLOOD PRESSURE: 75 MMHG | TEMPERATURE: 98 F | SYSTOLIC BLOOD PRESSURE: 133 MMHG | RESPIRATION RATE: 20 BRPM

## 2020-02-18 DIAGNOSIS — D24.1 PAPILLOMA OF RIGHT BREAST: Primary | ICD-10-CM

## 2020-02-18 PROCEDURE — 99024 POSTOP FOLLOW-UP VISIT: CPT | Performed by: SURGERY

## 2020-02-19 NOTE — PROGRESS NOTES
Breast Surgery Post-Operative Visit    Diagnosis: Right breast papilloma status post surgical excision on February 6, 2020.     Stage: Cancer Staging  Hodgkin's disease [facility])  Staging form: Lymphoid Neoplasms, AJCC V6  - Clinical stage from 8/8/2016: Stage HYSTEROSCOPY DILATION AND CURETTAGE N/A 2/26/2016    Performed by Nkechi Lopez MD at [facility] MAIN OR   • ROGELIO BIOPSY STEREO NODULE 1 SITE LEFT (CPT=19081)  10yrs  back    ciara   • ROGELIO BIOPSY STEREO NODULE 1 SITE RIGHT (CPT=19081)  04/2018    3 SITE, RADIAL Injection Solution Auto-injector, Take as directed for anaphylaxis, then call 911. (Patient not taking: Reported on 2/17/2020 ), Disp: 2 each, Rfl: 1  Azelastine HCl 0.1 % Nasal Solution, 2 sprays by Nasal route 2 (two) times daily as needed for Rhinitis. , The patient is single. She has no children. She is employed full-time. Review of Systems:  General:   The patient denies, fever, chills, night sweats, fatigue, generalized weakness, change in appetite or weight loss.     HEENT:     The patient denies bone pain.     Neuropsychiatric:  There is no history of migraines or severe headaches, seizure/epilepsy, speech problems, coordination problems, trembling/tremors, fainting/black outs, dizziness, memory problems, loss of sensation/numbness, problems walkin return to see me in September 2020 with a mammogram due at that time. She was encouraged to contact the office with any questions or concerns prior to her next scheduled appointment.

## 2020-03-05 ENCOUNTER — PATIENT MESSAGE (OUTPATIENT)
Dept: INTERNAL MEDICINE CLINIC | Facility: CLINIC | Age: 50
End: 2020-03-05

## 2020-03-06 NOTE — TELEPHONE ENCOUNTER
From: Rubio Yuan  To: Sebastián Hassan MD  Sent: 3/5/2020 5:48 PM CST  Subject: Prescription Question    Good evening Dr. Oleksandr Shafer,     I have been in contact with the insurance company. They switched my diabetic testing equipment over to bare contour.  If you

## 2020-03-08 LAB
ALBUMIN/GLOBULIN RATIO: 1.5 (CALC) (ref 1–2.5)
ALBUMIN: 4 G/DL (ref 3.6–5.1)
ALKALINE PHOSPHATASE: 85 U/L (ref 37–153)
ALT: 11 U/L (ref 6–29)
AST: 16 U/L (ref 10–35)
BILIRUBIN, TOTAL: 0.6 MG/DL (ref 0.2–1.2)
BUN: 12 MG/DL (ref 7–25)
CALCIUM: 9.5 MG/DL (ref 8.6–10.4)
CARBON DIOXIDE: 30 MMOL/L (ref 20–32)
CHLORIDE: 105 MMOL/L (ref 98–110)
CHOL/HDLC RATIO: 2.4 (CALC)
CHOLESTEROL, TOTAL: 168 MG/DL
CREATININE, RANDOM URINE: 134 MG/DL (ref 20–275)
CREATININE: 1.05 MG/DL (ref 0.5–1.05)
EGFR IF AFRICN AM: 71 ML/MIN/1.73M2
EGFR IF NONAFRICN AM: 61 ML/MIN/1.73M2
GLOBULIN: 2.6 G/DL (CALC) (ref 1.9–3.7)
GLUCOSE: 79 MG/DL (ref 65–99)
HDL CHOLESTEROL: 71 MG/DL
HEMOGLOBIN A1C: 5.3 % OF TOTAL HGB
LDL-CHOLESTEROL: 83 MG/DL (CALC)
MICROALBUMIN/CREATININE RATIO, RANDOM URINE: 16 MCG/MG CREAT
MICROALBUMIN: 2.1 MG/DL
NON-HDL CHOLESTEROL: 97 MG/DL (CALC)
POTASSIUM: 4.2 MMOL/L (ref 3.5–5.3)
PROTEIN, TOTAL: 6.6 G/DL (ref 6.1–8.1)
SODIUM: 141 MMOL/L (ref 135–146)
TRIGLYCERIDES: 59 MG/DL

## 2020-03-09 NOTE — TELEPHONE ENCOUNTER
Contour next test strips testing 1-2 x a day 9-7-18 100 with 1 refill     LOV 2-17-20    RTC 6 months.     No upcoming apt on file  Labs 3-7-20   HEMOGLOBIN A1c  <5.7 % of total Hgb 5.3

## 2020-03-19 ENCOUNTER — TELEPHONE (OUTPATIENT)
Dept: INTERNAL MEDICINE CLINIC | Facility: CLINIC | Age: 50
End: 2020-03-19

## 2020-03-22 DIAGNOSIS — K21.00 REFLUX ESOPHAGITIS: ICD-10-CM

## 2020-03-23 RX ORDER — OMEPRAZOLE 20 MG/1
CAPSULE, DELAYED RELEASE ORAL
Qty: 90 CAPSULE | Refills: 0 | Status: SHIPPED
Start: 2020-03-23 | End: 2020-06-02

## 2020-03-23 NOTE — TELEPHONE ENCOUNTER
Omeprazole 20 mg 1 cap daily filled 12-13-19 90 with 0 refills     LOV 2-17-20   . RTC 6 months.   No upcoming apt on file   Labs 3-7-20

## 2020-03-24 ENCOUNTER — TELEPHONE (OUTPATIENT)
Dept: INTERNAL MEDICINE CLINIC | Facility: CLINIC | Age: 50
End: 2020-03-24

## 2020-03-24 DIAGNOSIS — Z51.81 ENCOUNTER FOR THERAPEUTIC DRUG MONITORING: Primary | ICD-10-CM

## 2020-03-24 DIAGNOSIS — G47.33 OSA ON CPAP: ICD-10-CM

## 2020-03-24 DIAGNOSIS — E11.9 CONTROLLED TYPE 2 DIABETES MELLITUS WITHOUT COMPLICATION, WITHOUT LONG-TERM CURRENT USE OF INSULIN (HCC): ICD-10-CM

## 2020-03-24 DIAGNOSIS — I10 ESSENTIAL HYPERTENSION, BENIGN: ICD-10-CM

## 2020-03-24 DIAGNOSIS — E55.9 VITAMIN D DEFICIENCY: ICD-10-CM

## 2020-03-24 DIAGNOSIS — Z99.89 OSA ON CPAP: ICD-10-CM

## 2020-03-24 DIAGNOSIS — F43.9 STRESS: ICD-10-CM

## 2020-03-25 PROCEDURE — 99442 PHONE E/M BY PHYS 11-20 MIN: CPT | Performed by: NURSE PRACTITIONER

## 2020-03-25 RX ORDER — SEMAGLUTIDE 1.34 MG/ML
1 INJECTION, SOLUTION SUBCUTANEOUS WEEKLY
Qty: 6 PEN | Refills: 0 | Status: SHIPPED | OUTPATIENT
Start: 2020-03-25 | End: 2020-09-21

## 2020-03-25 NOTE — TELEPHONE ENCOUNTER
Virtual/Telephone Check-In    Alondra Mixon verbally consents to a Virtual/Telephone Check-In service on 03/25/20. Patient understands and accepts financial responsibility for any deductible, co-insurance and/or co-pays associated with this service.     D

## 2020-04-16 ENCOUNTER — PATIENT MESSAGE (OUTPATIENT)
Dept: INTERNAL MEDICINE CLINIC | Facility: CLINIC | Age: 50
End: 2020-04-16

## 2020-04-16 RX ORDER — CIMETIDINE 400 MG/1
400 TABLET, FILM COATED ORAL NIGHTLY
Qty: 90 TABLET | Refills: 1 | Status: SHIPPED | OUTPATIENT
Start: 2020-04-16 | End: 2020-10-20

## 2020-04-16 NOTE — TELEPHONE ENCOUNTER
From: Qi Marinelli  To: Mian Lara MD  Sent: 4/16/2020 7:51 AM CDT  Subject: Prescription Question    Dr. Gregg Donaldson,     I was trying to order some ranitidine last night through the express order.  And they have told me that they are no longer offering that

## 2020-04-16 NOTE — TELEPHONE ENCOUNTER
Script sent for Cimetidine to her pharmacy, patient notified. Ritchie Gray. Erickson Chapa MD  Diplomate, American Board of Internal Medicine  Member, American College of [address] [facility]  [address] [facility],[address], [address], [facility], [address] [place] [address]

## 2020-05-29 DIAGNOSIS — K21.00 REFLUX ESOPHAGITIS: ICD-10-CM

## 2020-06-02 DIAGNOSIS — K21.00 REFLUX ESOPHAGITIS: ICD-10-CM

## 2020-06-02 RX ORDER — OMEPRAZOLE 20 MG/1
CAPSULE, DELAYED RELEASE ORAL
Qty: 90 CAPSULE | Refills: 3 | Status: SHIPPED | OUTPATIENT
Start: 2020-06-02 | End: 2020-12-23

## 2020-06-02 NOTE — TELEPHONE ENCOUNTER
Omeprazole 20 mg 1 cap daily filled 3-23-20 90 with 0 refills     LOV 2-17-20   RTC 6 months.   No upcoming apt on file   Labs 3-7-20

## 2020-06-04 RX ORDER — OMEPRAZOLE 20 MG/1
20 CAPSULE, DELAYED RELEASE ORAL EVERY MORNING
Qty: 90 CAPSULE | Refills: 0 | OUTPATIENT
Start: 2020-06-04

## 2020-07-03 ENCOUNTER — HOSPITAL ENCOUNTER (OUTPATIENT)
Dept: MAMMOGRAPHY | Facility: HOSPITAL | Age: 50
Discharge: HOME | End: 2020-07-03
Attending: SURGERY
Payer: COMMERCIAL

## 2020-07-03 ENCOUNTER — HOSPITAL ENCOUNTER (OUTPATIENT)
Dept: MAMMOGRAPHY | Facility: HOSPITAL | Age: 50
End: 2020-07-03
Attending: SURGERY
Payer: COMMERCIAL

## 2020-07-03 DIAGNOSIS — D24.1 PAPILLOMA OF RIGHT BREAST: ICD-10-CM

## 2020-07-03 PROCEDURE — 77066 DX MAMMO INCL CAD BI: CPT | Performed by: SURGERY

## 2020-07-03 PROCEDURE — 77062 BREAST TOMOSYNTHESIS BI: CPT | Performed by: SURGERY

## 2020-07-13 ENCOUNTER — TELEPHONE (OUTPATIENT)
Dept: OBGYN CLINIC | Facility: CLINIC | Age: 50
End: 2020-07-13

## 2020-07-13 RX ORDER — FLUCONAZOLE 150 MG/1
TABLET ORAL
Qty: 2 TABLET | Refills: 0 | Status: SHIPPED | OUTPATIENT
Start: 2020-07-13 | End: 2020-09-21 | Stop reason: ALTCHOICE

## 2020-07-20 NOTE — TELEPHONE ENCOUNTER
Patient is still having yeast infection symptoms after using Monistat and Diflucan. Appointment scheduled for evaluation tomorrow. Patient was screened.

## 2020-07-21 ENCOUNTER — OFFICE VISIT (OUTPATIENT)
Dept: OBGYN CLINIC | Facility: CLINIC | Age: 50
End: 2020-07-21
Payer: COMMERCIAL

## 2020-07-21 VITALS
TEMPERATURE: 98 F | DIASTOLIC BLOOD PRESSURE: 70 MMHG | SYSTOLIC BLOOD PRESSURE: 138 MMHG | HEART RATE: 75 BPM | BODY MASS INDEX: 46 KG/M2 | WEIGHT: 250.38 LBS

## 2020-07-21 DIAGNOSIS — N76.0 VAGINITIS AND VULVOVAGINITIS: Primary | ICD-10-CM

## 2020-07-21 PROCEDURE — 87660 TRICHOMONAS VAGIN DIR PROBE: CPT | Performed by: NURSE PRACTITIONER

## 2020-07-21 PROCEDURE — 87480 CANDIDA DNA DIR PROBE: CPT | Performed by: NURSE PRACTITIONER

## 2020-07-21 PROCEDURE — 3078F DIAST BP <80 MM HG: CPT | Performed by: NURSE PRACTITIONER

## 2020-07-21 PROCEDURE — 87510 GARDNER VAG DNA DIR PROBE: CPT | Performed by: NURSE PRACTITIONER

## 2020-07-21 PROCEDURE — 3075F SYST BP GE 130 - 139MM HG: CPT | Performed by: NURSE PRACTITIONER

## 2020-07-21 PROCEDURE — 99213 OFFICE O/P EST LOW 20 MIN: CPT | Performed by: NURSE PRACTITIONER

## 2020-07-21 RX ORDER — GLUCOSAMINE SULFATE 500 MG
CAPSULE ORAL
COMMUNITY
End: 2021-06-15

## 2020-07-21 NOTE — PROGRESS NOTES
Gyne note     S: patient is a 44year old yo  here for a 2-3 week history of itching and irritation to her mons, vulva, groin and extending posterior to her rectum. She denies any lesions, skin changes, odor, vaginal symptoms or other concerns.  She

## 2020-08-21 DIAGNOSIS — K21.00 REFLUX ESOPHAGITIS: ICD-10-CM

## 2020-08-22 RX ORDER — GLYCOPYRROLATE 1.5 MG/1
TABLET ORAL
Qty: 180 TABLET | Refills: 1 | Status: SHIPPED | OUTPATIENT
Start: 2020-08-22 | End: 2020-12-23

## 2020-08-22 NOTE — TELEPHONE ENCOUNTER
Glycate 1.5 mg 1 tab bid prn filled 2-17-20 180 with 1 refill     LOV 2-17-20    RTC 6 months  Next apt 9-21-20   Labs 3-7-20

## 2020-09-08 DIAGNOSIS — Z51.81 ENCOUNTER FOR THERAPEUTIC DRUG MONITORING: ICD-10-CM

## 2020-09-08 DIAGNOSIS — E11.9 CONTROLLED TYPE 2 DIABETES MELLITUS WITHOUT COMPLICATION, WITHOUT LONG-TERM CURRENT USE OF INSULIN (HCC): ICD-10-CM

## 2020-09-08 DIAGNOSIS — E66.01 CLASS 3 SEVERE OBESITY DUE TO EXCESS CALORIES WITH SERIOUS COMORBIDITY AND BODY MASS INDEX (BMI) OF 40.0 TO 44.9 IN ADULT (HCC): ICD-10-CM

## 2020-09-08 NOTE — TELEPHONE ENCOUNTER
Requesting Ozempic  LOV: 3/24/20  RTC: 2-3 months  Last Relevant Labs: 3/7/20  Filled: 3/25/20 #6 pens with 0 refills    10/12/2020  4:40 PM Nela Jordan PA-C [facility] [address]   12/21/2020  5:00 PM VIKAS Mathias [facility] N [facility] [place]

## 2020-09-11 RX ORDER — SEMAGLUTIDE 1.34 MG/ML
1 INJECTION, SOLUTION SUBCUTANEOUS WEEKLY
Qty: 9 ML | Refills: 0 | Status: SHIPPED | OUTPATIENT
Start: 2020-09-11 | End: 2020-12-07

## 2020-09-14 NOTE — PROGRESS NOTES
[facility] Progress Note      Patient Name: Sugar Hansen   YOB: 1970  Medical Record Number: [id number]  Attending Physician: Grace Ramos. Mike Strickland M.D.      Date of Visit: 9/21/2020       Chief Complaint  Hodgkin's lymphoma tobacco, alcohol, and illicit drug use.       Current Medications     •  OZEMPIC, 1 MG/DOSE, 2 MG/1.5ML Subcutaneous Solution Pen-injector, Inject 1 mg into the skin once a week., Disp: 9 mL, Rfl: 0    •  GLYCATE 1.5 MG Oral Tab, TAKE 1 TABLET BY MOUTH TWIC loss.  Hematologic/Lymphatic No tender or enlarged lymph nodes; no easy bruising or bleeding. Breasts No new breast masses, nipple discharge, breast skin changes. Respiratory No dyspnea, cough, hemoptysis, pleuritic chest pain.   Gastrointestinal No nause primary care physician today who will also be drawing labs and she would like all to be drawn together; orders placed in Epic. Planned Follow Up  Patient will return for follow up in 1 year. Electronically signed by:    Yumi Casey M.D.   Assoc

## 2020-09-21 ENCOUNTER — OFFICE VISIT (OUTPATIENT)
Dept: INTERNAL MEDICINE CLINIC | Facility: CLINIC | Age: 50
End: 2020-09-21
Payer: COMMERCIAL

## 2020-09-21 ENCOUNTER — LAB ENCOUNTER (OUTPATIENT)
Dept: LAB | Age: 50
End: 2020-09-21
Attending: INTERNAL MEDICINE
Payer: COMMERCIAL

## 2020-09-21 ENCOUNTER — OFFICE VISIT (OUTPATIENT)
Dept: HEMATOLOGY/ONCOLOGY | Facility: HOSPITAL | Age: 50
End: 2020-09-21
Attending: SPECIALIST
Payer: COMMERCIAL

## 2020-09-21 VITALS
HEART RATE: 73 BPM | TEMPERATURE: 98 F | SYSTOLIC BLOOD PRESSURE: 147 MMHG | WEIGHT: 248 LBS | DIASTOLIC BLOOD PRESSURE: 84 MMHG | RESPIRATION RATE: 18 BRPM | OXYGEN SATURATION: 100 % | BODY MASS INDEX: 45 KG/M2

## 2020-09-21 VITALS
SYSTOLIC BLOOD PRESSURE: 122 MMHG | DIASTOLIC BLOOD PRESSURE: 86 MMHG | HEIGHT: 62 IN | TEMPERATURE: 99 F | BODY MASS INDEX: 45.82 KG/M2 | RESPIRATION RATE: 12 BRPM | WEIGHT: 249 LBS | HEART RATE: 64 BPM

## 2020-09-21 DIAGNOSIS — G47.33 OSA ON CPAP: ICD-10-CM

## 2020-09-21 DIAGNOSIS — C81.04 NODULAR LYMPHOCYTE PREDOMINANT HODGKIN LYMPHOMA OF LYMPH NODES OF AXILLA (HCC): Primary | ICD-10-CM

## 2020-09-21 DIAGNOSIS — E66.01 CLASS 3 SEVERE OBESITY DUE TO EXCESS CALORIES WITH SERIOUS COMORBIDITY AND BODY MASS INDEX (BMI) OF 40.0 TO 44.9 IN ADULT (HCC): ICD-10-CM

## 2020-09-21 DIAGNOSIS — C81.04 NODULAR LYMPHOCYTE PREDOMINANT HODGKIN LYMPHOMA OF LYMPH NODES OF AXILLA (HCC): ICD-10-CM

## 2020-09-21 DIAGNOSIS — I10 ESSENTIAL HYPERTENSION, BENIGN: ICD-10-CM

## 2020-09-21 DIAGNOSIS — K21.00 REFLUX ESOPHAGITIS: ICD-10-CM

## 2020-09-21 DIAGNOSIS — Z99.89 OSA ON CPAP: ICD-10-CM

## 2020-09-21 DIAGNOSIS — E11.9 CONTROLLED TYPE 2 DIABETES MELLITUS WITHOUT COMPLICATION, WITHOUT LONG-TERM CURRENT USE OF INSULIN (HCC): ICD-10-CM

## 2020-09-21 DIAGNOSIS — Z23 FLU VACCINE NEED: ICD-10-CM

## 2020-09-21 DIAGNOSIS — E11.9 CONTROLLED TYPE 2 DIABETES MELLITUS WITHOUT COMPLICATION, WITHOUT LONG-TERM CURRENT USE OF INSULIN (HCC): Primary | ICD-10-CM

## 2020-09-21 LAB
ALBUMIN SERPL-MCNC: 3.6 G/DL (ref 3.4–5)
ALBUMIN/GLOB SERPL: 0.9 {RATIO} (ref 1–2)
ALP LIVER SERPL-CCNC: 86 U/L
ALT SERPL-CCNC: 23 U/L
ANION GAP SERPL CALC-SCNC: 5 MMOL/L (ref 0–18)
AST SERPL-CCNC: 16 U/L (ref 15–37)
BASOPHILS # BLD AUTO: 0.02 X10(3) UL (ref 0–0.2)
BASOPHILS NFR BLD AUTO: 0.4 %
BILIRUB SERPL-MCNC: 0.4 MG/DL (ref 0.1–2)
BUN BLD-MCNC: 12 MG/DL (ref 7–18)
BUN/CREAT SERPL: 11.8 (ref 10–20)
CALCIUM BLD-MCNC: 9.8 MG/DL (ref 8.5–10.1)
CHLORIDE SERPL-SCNC: 106 MMOL/L (ref 98–112)
CHOLEST SMN-MCNC: 160 MG/DL (ref ?–200)
CO2 SERPL-SCNC: 29 MMOL/L (ref 21–32)
CREAT BLD-MCNC: 1.02 MG/DL
DEPRECATED RDW RBC AUTO: 45.8 FL (ref 35.1–46.3)
EOSINOPHIL # BLD AUTO: 0.15 X10(3) UL (ref 0–0.7)
EOSINOPHIL NFR BLD AUTO: 2.7 %
ERYTHROCYTE [DISTWIDTH] IN BLOOD BY AUTOMATED COUNT: 13.7 % (ref 11–15)
EST. AVERAGE GLUCOSE BLD GHB EST-MCNC: 123 MG/DL (ref 68–126)
GLOBULIN PLAS-MCNC: 3.8 G/DL (ref 2.8–4.4)
GLUCOSE BLD-MCNC: 74 MG/DL (ref 70–99)
HBA1C MFR BLD HPLC: 5.9 % (ref ?–5.7)
HCT VFR BLD AUTO: 43.7 %
HDLC SERPL-MCNC: 78 MG/DL (ref 40–59)
HGB BLD-MCNC: 14.1 G/DL
IMM GRANULOCYTES # BLD AUTO: 0.02 X10(3) UL (ref 0–1)
IMM GRANULOCYTES NFR BLD: 0.4 %
LDLC SERPL CALC-MCNC: 68 MG/DL (ref ?–100)
LYMPHOCYTES # BLD AUTO: 2.33 X10(3) UL (ref 1–4)
LYMPHOCYTES NFR BLD AUTO: 41.7 %
M PROTEIN MFR SERPL ELPH: 7.4 G/DL (ref 6.4–8.2)
MCH RBC QN AUTO: 29.3 PG (ref 26–34)
MCHC RBC AUTO-ENTMCNC: 32.3 G/DL (ref 31–37)
MCV RBC AUTO: 90.9 FL
MONOCYTES # BLD AUTO: 0.54 X10(3) UL (ref 0.1–1)
MONOCYTES NFR BLD AUTO: 9.7 %
NEUTROPHILS # BLD AUTO: 2.53 X10 (3) UL (ref 1.5–7.7)
NEUTROPHILS # BLD AUTO: 2.53 X10(3) UL (ref 1.5–7.7)
NEUTROPHILS NFR BLD AUTO: 45.1 %
NONHDLC SERPL-MCNC: 82 MG/DL (ref ?–130)
OSMOLALITY SERPL CALC.SUM OF ELEC: 288 MOSM/KG (ref 275–295)
PATIENT FASTING Y/N/NP: YES
PATIENT FASTING Y/N/NP: YES
PLATELET # BLD AUTO: 141 10(3)UL (ref 150–450)
POTASSIUM SERPL-SCNC: 4.1 MMOL/L (ref 3.5–5.1)
RBC # BLD AUTO: 4.81 X10(6)UL
SED RATE-ML: 14 MM/HR
SODIUM SERPL-SCNC: 140 MMOL/L (ref 136–145)
TRIGL SERPL-MCNC: 68 MG/DL (ref 30–149)
TSI SER-ACNC: 0.53 MIU/ML (ref 0.36–3.74)
VLDLC SERPL CALC-MCNC: 14 MG/DL (ref 0–30)
WBC # BLD AUTO: 5.6 X10(3) UL (ref 4–11)

## 2020-09-21 PROCEDURE — 3077F SYST BP >= 140 MM HG: CPT | Performed by: SPECIALIST

## 2020-09-21 PROCEDURE — 90471 IMMUNIZATION ADMIN: CPT | Performed by: INTERNAL MEDICINE

## 2020-09-21 PROCEDURE — 83036 HEMOGLOBIN GLYCOSYLATED A1C: CPT | Performed by: INTERNAL MEDICINE

## 2020-09-21 PROCEDURE — 3079F DIAST BP 80-89 MM HG: CPT | Performed by: SPECIALIST

## 2020-09-21 PROCEDURE — 3079F DIAST BP 80-89 MM HG: CPT | Performed by: INTERNAL MEDICINE

## 2020-09-21 PROCEDURE — 90686 IIV4 VACC NO PRSV 0.5 ML IM: CPT | Performed by: INTERNAL MEDICINE

## 2020-09-21 PROCEDURE — 80053 COMPREHEN METABOLIC PANEL: CPT | Performed by: INTERNAL MEDICINE

## 2020-09-21 PROCEDURE — 80061 LIPID PANEL: CPT | Performed by: INTERNAL MEDICINE

## 2020-09-21 PROCEDURE — 84443 ASSAY THYROID STIM HORMONE: CPT | Performed by: INTERNAL MEDICINE

## 2020-09-21 PROCEDURE — 3074F SYST BP LT 130 MM HG: CPT | Performed by: INTERNAL MEDICINE

## 2020-09-21 PROCEDURE — 3008F BODY MASS INDEX DOCD: CPT | Performed by: INTERNAL MEDICINE

## 2020-09-21 PROCEDURE — 99213 OFFICE O/P EST LOW 20 MIN: CPT | Performed by: SPECIALIST

## 2020-09-21 PROCEDURE — 99214 OFFICE O/P EST MOD 30 MIN: CPT | Performed by: INTERNAL MEDICINE

## 2020-09-21 NOTE — PROGRESS NOTES
Patient presents with: Follow - Up: 6 month follow up     HPI: Roly Luo presents for 6-month f/u select chronic medical conditions as follows:    1. Controlled DM2 - Stable on prescription medication + lifestyle measures. No new issues.    2. HTN - Stable on Disp: 90 capsule, Rfl: 3  •  cimetidine 400 MG Oral Tab, Take 1 tablet (400 mg total) by mouth nightly., Disp: 90 tablet, Rfl: 1  •  Glucose Blood (CONTOUR NEXT TEST) In Vitro Strip, Tests 1-2 x daily, Disp: 100 each, Rfl: 2  •  acetaminophen 500 MG Oral T : 248 lb (112.5 kg)  07/21/20 : 250 lb 6.4 oz (113.6 kg)  02/17/20 : 235 lb 8 oz (106.8 kg)  02/06/20 : 231 lb 0.7 oz (104.8 kg)  02/04/20 : 234 lb (106.1 kg)  Gen - A&Ox3, NAD, morbidly obese  HEENT - PERRL, EOMI, OP is clear  Lungs - CTAB  CV - RRR, nl s Prescriptions      No prescriptions requested or ordered in this encounter       Imaging & Consults:  FLULAVAL INFLUENZA VACCINE QUAD PRESERVATIVE FREE 0.5 ML

## 2020-09-21 NOTE — PROGRESS NOTES
Patient is here today for follow up with Jared Check for HX Hodgkins Lymphoma . Patient denies pain. Stated she is feeling good. Medication list and medical history were reviewed and updated.      Education Record    Learner:  Patient    Disease / Diagnosis

## 2020-09-23 ENCOUNTER — TELEPHONE (OUTPATIENT)
Dept: HEMATOLOGY/ONCOLOGY | Facility: HOSPITAL | Age: 50
End: 2020-09-23

## 2020-09-23 DIAGNOSIS — C81.04 NODULAR LYMPHOCYTE PREDOMINANT HODGKIN LYMPHOMA OF LYMPH NODES OF AXILLA (HCC): Primary | ICD-10-CM

## 2020-09-23 DIAGNOSIS — D69.6 THROMBOCYTOPENIA (HCC): ICD-10-CM

## 2020-09-23 NOTE — TELEPHONE ENCOUNTER
Pt called and notified per Dr. Og Ambriz- her platelet count is a little low at 141, should be above 150. Not sure the cause. All other labs look fine. Since this was done at her PCPs office and the blood sits around for a while, it could be a false result.

## 2020-09-23 NOTE — TELEPHONE ENCOUNTER
----- Message from Roxana Camacho MD sent at 9/23/2020  9:45 AM CDT -----    ----- Message -----  From: Lola Berg MD  Sent: 9/23/2020   7:26 AM CDT  To: Farida Yi RN    Please call patient.  Let her know that her platelet count is a

## 2020-09-24 ENCOUNTER — TELEPHONE (OUTPATIENT)
Dept: HEMATOLOGY/ONCOLOGY | Facility: HOSPITAL | Age: 50
End: 2020-09-24

## 2020-09-24 NOTE — TELEPHONE ENCOUNTER
Return call from patient - results and instruction given. Patient transferred to appointment desk to schedule 2 month lab appointment.

## 2020-09-24 NOTE — TELEPHONE ENCOUNTER
MD Amirah Gomez, RN             Please call patient. Let her know that her platelet count is a little low at 141. Should be above 150. Not sure the cause. All other labs look fine.  Since this was done at her PCPs office and the blo

## 2020-10-12 ENCOUNTER — OFFICE VISIT (OUTPATIENT)
Dept: INTERNAL MEDICINE CLINIC | Facility: CLINIC | Age: 50
End: 2020-10-12
Payer: COMMERCIAL

## 2020-10-12 VITALS
TEMPERATURE: 97 F | DIASTOLIC BLOOD PRESSURE: 80 MMHG | WEIGHT: 250 LBS | HEART RATE: 72 BPM | RESPIRATION RATE: 16 BRPM | SYSTOLIC BLOOD PRESSURE: 130 MMHG | HEIGHT: 63 IN | BODY MASS INDEX: 44.3 KG/M2

## 2020-10-12 DIAGNOSIS — I10 ESSENTIAL HYPERTENSION, BENIGN: ICD-10-CM

## 2020-10-12 DIAGNOSIS — E11.9 CONTROLLED TYPE 2 DIABETES MELLITUS WITHOUT COMPLICATION, WITHOUT LONG-TERM CURRENT USE OF INSULIN (HCC): ICD-10-CM

## 2020-10-12 DIAGNOSIS — Z51.81 ENCOUNTER FOR THERAPEUTIC DRUG MONITORING: Primary | ICD-10-CM

## 2020-10-12 DIAGNOSIS — Z72.3 LACK OF EXERCISE: ICD-10-CM

## 2020-10-12 PROCEDURE — 3079F DIAST BP 80-89 MM HG: CPT | Performed by: PHYSICIAN ASSISTANT

## 2020-10-12 PROCEDURE — 99214 OFFICE O/P EST MOD 30 MIN: CPT | Performed by: PHYSICIAN ASSISTANT

## 2020-10-12 PROCEDURE — 3075F SYST BP GE 130 - 139MM HG: CPT | Performed by: PHYSICIAN ASSISTANT

## 2020-10-12 PROCEDURE — 3008F BODY MASS INDEX DOCD: CPT | Performed by: PHYSICIAN ASSISTANT

## 2020-10-12 RX ORDER — PHENTERMINE HYDROCHLORIDE 15 MG/1
15 CAPSULE ORAL EVERY MORNING
Qty: 30 CAPSULE | Refills: 0 | Status: SHIPPED | OUTPATIENT
Start: 2020-10-12 | End: 2020-11-09

## 2020-10-12 NOTE — PROGRESS NOTES
HISTORY OF PRESENT ILLNESS  Patient presents with:  Weight Check: 16 pound weight gain      Ailyn Schwarz is a 44year old female here for follow up in medical weight loss program.   Up 16lbs  Compliant on Ozempic 1mg  Last OV was 3/24/20 virtual visit  I 09/21/2020    .0 (L) 09/21/2020    MPV 11.0 02/01/2013     Lab Results   Component Value Date    GLU 74 09/21/2020    BUN 12 09/21/2020    BUNCREA 11.8 09/21/2020    CREATSERUM 1.02 09/21/2020    ANIONGAP 5 09/21/2020    GFR 78 08/15/2017    GFRNAA Take by mouth daily. , Disp: , Rfl:     •  VALACYCLOVIR  MG Oral Tab, TAKE 1 TABLET(500 MG) BY MOUTH TWICE DAILY FOR 3 DAYS, Disp: 30 tablet, Rfl: 0    •  EPINEPHrine (EPIPEN 2-CHLOE) 0.3 MG/0.3ML Injection Solution Auto-injector, Take as directed for eat breakfast daily/ regular protein intake  · Begin logging foods - 100g carbs and 60g or more protein  · Discussed making sure she is dispersing her calories throughout the day, and not consuming the majority of them in the afternoon  · Medication use an

## 2020-10-20 RX ORDER — CIMETIDINE 400 MG/1
TABLET, FILM COATED ORAL
Qty: 90 TABLET | Refills: 1 | Status: SHIPPED | OUTPATIENT
Start: 2020-10-20 | End: 2021-02-12

## 2020-10-20 NOTE — TELEPHONE ENCOUNTER
Name from pharmacy: CIMETIDINE 400MG TABLETS          Will file in chart as: CIMETIDINE 400 MG Oral Tab    Sig: TAKE 1 TABLET BY MOUTH NIGHTLY    Disp:  90 tablet    Refills:  1 (Pharmacy requested: Not specified)    Start: 10/19/2020    Class: Normal    N

## 2020-11-09 ENCOUNTER — OFFICE VISIT (OUTPATIENT)
Dept: INTERNAL MEDICINE CLINIC | Facility: CLINIC | Age: 50
End: 2020-11-09
Payer: COMMERCIAL

## 2020-11-09 VITALS
DIASTOLIC BLOOD PRESSURE: 70 MMHG | SYSTOLIC BLOOD PRESSURE: 124 MMHG | BODY MASS INDEX: 43.05 KG/M2 | HEART RATE: 76 BPM | WEIGHT: 243 LBS | RESPIRATION RATE: 16 BRPM | HEIGHT: 63 IN

## 2020-11-09 DIAGNOSIS — Z99.89 OSA ON CPAP: ICD-10-CM

## 2020-11-09 DIAGNOSIS — I10 ESSENTIAL HYPERTENSION, BENIGN: ICD-10-CM

## 2020-11-09 DIAGNOSIS — G47.33 OSA ON CPAP: ICD-10-CM

## 2020-11-09 DIAGNOSIS — E11.9 CONTROLLED TYPE 2 DIABETES MELLITUS WITHOUT COMPLICATION, WITHOUT LONG-TERM CURRENT USE OF INSULIN (HCC): ICD-10-CM

## 2020-11-09 DIAGNOSIS — Z51.81 ENCOUNTER FOR THERAPEUTIC DRUG MONITORING: Primary | ICD-10-CM

## 2020-11-09 DIAGNOSIS — E55.9 VITAMIN D DEFICIENCY: ICD-10-CM

## 2020-11-09 DIAGNOSIS — E66.01 CLASS 3 SEVERE OBESITY DUE TO EXCESS CALORIES WITH SERIOUS COMORBIDITY AND BODY MASS INDEX (BMI) OF 40.0 TO 44.9 IN ADULT (HCC): ICD-10-CM

## 2020-11-09 DIAGNOSIS — F43.9 STRESS: ICD-10-CM

## 2020-11-09 PROCEDURE — 99072 ADDL SUPL MATRL&STAF TM PHE: CPT | Performed by: NURSE PRACTITIONER

## 2020-11-09 PROCEDURE — 3008F BODY MASS INDEX DOCD: CPT | Performed by: NURSE PRACTITIONER

## 2020-11-09 PROCEDURE — 99214 OFFICE O/P EST MOD 30 MIN: CPT | Performed by: NURSE PRACTITIONER

## 2020-11-09 PROCEDURE — 3074F SYST BP LT 130 MM HG: CPT | Performed by: NURSE PRACTITIONER

## 2020-11-09 PROCEDURE — 3078F DIAST BP <80 MM HG: CPT | Performed by: NURSE PRACTITIONER

## 2020-11-09 RX ORDER — PHENTERMINE HYDROCHLORIDE 15 MG/1
15 CAPSULE ORAL EVERY MORNING
Qty: 30 CAPSULE | Refills: 0 | Status: SHIPPED | OUTPATIENT
Start: 2020-11-09 | End: 2020-12-07

## 2020-11-09 NOTE — PROGRESS NOTES
HISTORY OF PRESENT ILLNESS  Patient presents with:  Weight Check: down 7 lbs    George Daugherty is a 44year old female here for follow up with medical weight loss program for the treatment of overweight, obesity, or morbid obesity.      Down 7 lbs  Complian Wrap-up  Day 4/8 IOP  Mood at 2 with 10 being the worse    Trouble with concentration, memory, or making decisions  Fatigue or loss of energy  Increased Irritabliliy  Easily distracted  Changes in normal sleep patterns (increase, decrease, or broken hoours of sleep)  Feelings of increased guilt  Increased nervous feelings/anxiety  Racing thoughts  NO SI  Good participation in classes and groups  Weekend Goals:  Practice mindfulness, go to [employer] and play video games   well developed, well nourished, in no apparent distress, A/O x3  SKIN: no rashes, no suspicious lesions  HEENT: conjunctiva pink, sclera non icteric, PERRLA  NECK: supple, no adenopathy  LUNGS: CTA in all fields, breathing non labored  CARDIO: RRR without MOUTH EVERY MORNING, Disp: 90 capsule, Rfl: 3    •  Glucose Blood (CONTOUR NEXT TEST) In Vitro Strip, Tests 1-2 x daily, Disp: 100 each, Rfl: 2    •  acetaminophen 500 MG Oral Tab, Take 1,000 mg by mouth one time. , Disp: , Rfl:     •  EVENING PRIMROSE OIL Data and Goal Weight Loss:  Initial consult: #254 lbs on 5/27/2020  Weight Calculations  Initial Weight: 254 lbs  Today's Weight: 243 lbs  5% Goal: 12.7  10% Goal: 25.4  Total Weight Loss: 11 lbs  Total weight loss: Down 7 lbs total, Net loss 11 lbs  · Con

## 2020-11-10 NOTE — PATIENT INSTRUCTIONS
We are here to support you with weight loss, but please remember that you still need your primary care provider for your routine health maintenance.       PLAN:  Continue with same medication  Follow up with me in 1 month  Schedule follow up appointments: T ** Daily INPUT> Look at nutrition section-- \"nutrients\" and it will break down your macros for the day (ie. Protein, carbs, fibers, sugars and fats). Try to stay within these numbers daily     2.  \"7 minute workout\" to help with exercise/a

## 2020-11-23 ENCOUNTER — NURSE ONLY (OUTPATIENT)
Dept: HEMATOLOGY/ONCOLOGY | Facility: HOSPITAL | Age: 50
End: 2020-11-23
Attending: SPECIALIST
Payer: COMMERCIAL

## 2020-11-23 ENCOUNTER — TELEPHONE (OUTPATIENT)
Dept: HEMATOLOGY/ONCOLOGY | Facility: HOSPITAL | Age: 50
End: 2020-11-23

## 2020-11-23 DIAGNOSIS — C81.04 NODULAR LYMPHOCYTE PREDOMINANT HODGKIN LYMPHOMA OF LYMPH NODES OF AXILLA (HCC): ICD-10-CM

## 2020-11-23 DIAGNOSIS — D69.6 THROMBOCYTOPENIA (HCC): ICD-10-CM

## 2020-11-23 PROCEDURE — 36415 COLL VENOUS BLD VENIPUNCTURE: CPT

## 2020-11-23 PROCEDURE — 85025 COMPLETE CBC W/AUTO DIFF WBC: CPT

## 2020-11-23 NOTE — TELEPHONE ENCOUNTER
MD Jacob Orona RN             Let her know that her platelet count is normal. All is good. See her next year. Patient notified. Will call back to schedule annual appointment.

## 2020-12-07 ENCOUNTER — TELEMEDICINE (OUTPATIENT)
Dept: INTERNAL MEDICINE CLINIC | Facility: CLINIC | Age: 50
End: 2020-12-07
Payer: COMMERCIAL

## 2020-12-07 VITALS — BODY MASS INDEX: 42 KG/M2 | WEIGHT: 239 LBS

## 2020-12-07 DIAGNOSIS — E66.01 CLASS 3 SEVERE OBESITY DUE TO EXCESS CALORIES WITH SERIOUS COMORBIDITY AND BODY MASS INDEX (BMI) OF 40.0 TO 44.9 IN ADULT (HCC): ICD-10-CM

## 2020-12-07 DIAGNOSIS — I10 ESSENTIAL HYPERTENSION, BENIGN: ICD-10-CM

## 2020-12-07 DIAGNOSIS — Z51.81 ENCOUNTER FOR THERAPEUTIC DRUG MONITORING: Primary | ICD-10-CM

## 2020-12-07 DIAGNOSIS — F43.9 STRESS: ICD-10-CM

## 2020-12-07 DIAGNOSIS — E11.9 CONTROLLED TYPE 2 DIABETES MELLITUS WITHOUT COMPLICATION, WITHOUT LONG-TERM CURRENT USE OF INSULIN (HCC): ICD-10-CM

## 2020-12-07 DIAGNOSIS — Z99.89 OSA ON CPAP: ICD-10-CM

## 2020-12-07 DIAGNOSIS — G47.33 OSA ON CPAP: ICD-10-CM

## 2020-12-07 DIAGNOSIS — E55.9 VITAMIN D DEFICIENCY: ICD-10-CM

## 2020-12-07 PROCEDURE — 99213 OFFICE O/P EST LOW 20 MIN: CPT | Performed by: NURSE PRACTITIONER

## 2020-12-07 RX ORDER — PHENTERMINE HYDROCHLORIDE 15 MG/1
15 CAPSULE ORAL EVERY MORNING
Qty: 30 CAPSULE | Refills: 0 | Status: SHIPPED | OUTPATIENT
Start: 2020-12-07 | End: 2021-01-04

## 2020-12-07 RX ORDER — SEMAGLUTIDE 1.34 MG/ML
1 INJECTION, SOLUTION SUBCUTANEOUS WEEKLY
Qty: 9 ML | Refills: 0 | Status: SHIPPED | OUTPATIENT
Start: 2020-12-07 | End: 2021-03-01

## 2020-12-07 NOTE — PATIENT INSTRUCTIONS
We are here to support you with weight loss, but please remember that you still need your primary care provider for your routine health maintenance.       PLAN:  Continue with phentermine and ozempic 1mg weekly   Follow up with me in 1 month  Schedule sandra ** Daily INPUT> Look at nutrition section-- \"nutrients\" and it will break down your macros for the day (ie. Protein, carbs, fibers, sugars and fats). Try to stay within these numbers daily     2.  \"7 minute workout\" to help with exercise/a

## 2020-12-07 NOTE — PROGRESS NOTES
[facility] WEIGHT MANAGEMENT VIRTUAL ENCOUNTER     Loni Cortes verbally consents to a Virtual/Telephone Check-In service on 12/07/20   Patient understands and accepts financial responsibility for any deductible, co-insurance and/or co-pays associat distress, speaking in full sentences comfortably   SKIN: warm, pink, dry without rashes to exposed area   EYES: conjunctiva pink  HEENT: atraumatic, normocephalic  LUNGS: normal work of breathing, non labored  CARDIO: normal work, no exertion  EXTREMITIES: SUPPRESSION, Disp: 180 tablet, Rfl: 1    •  Glucosamine 500 MG Oral Cap, Take by mouth., Disp: , Rfl:     •  OMEPRAZOLE 20 MG Oral Capsule Delayed Release, TAKE 1 CAPSULE BY MOUTH EVERY MORNING, Disp: 90 capsule, Rfl: 3    •  Glucose Blood (CONTOUR NEXT TE deficiency  · Continue with vitamin d OTC    Controlled type 2 diabetes mellitus without complication, without long-term current use of insulin (HCC)  -     OZEMPIC, 1 MG/DOSE, 2 MG/1.5ML Subcutaneous Solution Pen-injector;  Inject 1 mg into the skin once a and/or video communication. This has been done in good arin to provide continuity of care in the best interest of the provider-patient relationship, due to the ongoing public health crisis/national emergency and because of restrictions of visitation.   Bernett Scheuermann

## 2020-12-21 ENCOUNTER — OFFICE VISIT (OUTPATIENT)
Dept: OBGYN CLINIC | Facility: CLINIC | Age: 50
End: 2020-12-21
Payer: COMMERCIAL

## 2020-12-21 VITALS
BODY MASS INDEX: 42.03 KG/M2 | WEIGHT: 240.19 LBS | HEART RATE: 83 BPM | DIASTOLIC BLOOD PRESSURE: 74 MMHG | HEIGHT: 63.5 IN | SYSTOLIC BLOOD PRESSURE: 124 MMHG

## 2020-12-21 DIAGNOSIS — Z12.4 CERVICAL CANCER SCREENING: ICD-10-CM

## 2020-12-21 DIAGNOSIS — Z01.419 WELL WOMAN EXAM WITH ROUTINE GYNECOLOGICAL EXAM: Primary | ICD-10-CM

## 2020-12-21 DIAGNOSIS — N95.1 PERIMENOPAUSAL: ICD-10-CM

## 2020-12-21 PROCEDURE — 87625 HPV TYPES 16 & 18 ONLY: CPT | Performed by: NURSE PRACTITIONER

## 2020-12-21 PROCEDURE — 99396 PREV VISIT EST AGE 40-64: CPT | Performed by: NURSE PRACTITIONER

## 2020-12-21 PROCEDURE — 3074F SYST BP LT 130 MM HG: CPT | Performed by: NURSE PRACTITIONER

## 2020-12-21 PROCEDURE — 3008F BODY MASS INDEX DOCD: CPT | Performed by: NURSE PRACTITIONER

## 2020-12-21 PROCEDURE — 87624 HPV HI-RISK TYP POOLED RSLT: CPT | Performed by: NURSE PRACTITIONER

## 2020-12-21 PROCEDURE — 3078F DIAST BP <80 MM HG: CPT | Performed by: NURSE PRACTITIONER

## 2020-12-21 NOTE — PROGRESS NOTES
Here for Routine Annual Exam  No concerns, having hot flashes again. Denies any abnormal bleeding or pelvic pain or concerns. Menses are irregular, had a spotting episode over the summer but last real menses was 3/2020. Contraception- not active.   No

## 2020-12-23 ENCOUNTER — OFFICE VISIT (OUTPATIENT)
Dept: INTERNAL MEDICINE CLINIC | Facility: CLINIC | Age: 50
End: 2020-12-23
Payer: COMMERCIAL

## 2020-12-23 VITALS
WEIGHT: 237.31 LBS | BODY MASS INDEX: 41.53 KG/M2 | OXYGEN SATURATION: 96 % | TEMPERATURE: 99 F | HEIGHT: 63.5 IN | HEART RATE: 85 BPM | DIASTOLIC BLOOD PRESSURE: 70 MMHG | SYSTOLIC BLOOD PRESSURE: 115 MMHG | RESPIRATION RATE: 16 BRPM

## 2020-12-23 DIAGNOSIS — R10.13 EPIGASTRIC PAIN: Primary | ICD-10-CM

## 2020-12-23 DIAGNOSIS — K21.00 REFLUX ESOPHAGITIS: ICD-10-CM

## 2020-12-23 PROCEDURE — 3074F SYST BP LT 130 MM HG: CPT | Performed by: NURSE PRACTITIONER

## 2020-12-23 PROCEDURE — 3008F BODY MASS INDEX DOCD: CPT | Performed by: NURSE PRACTITIONER

## 2020-12-23 PROCEDURE — 99213 OFFICE O/P EST LOW 20 MIN: CPT | Performed by: NURSE PRACTITIONER

## 2020-12-23 PROCEDURE — 3078F DIAST BP <80 MM HG: CPT | Performed by: NURSE PRACTITIONER

## 2020-12-23 RX ORDER — GLYCOPYRROLATE 1.5 MG/1
1 TABLET ORAL
Qty: 180 TABLET | Refills: 1 | Status: SHIPPED | OUTPATIENT
Start: 2020-12-23 | End: 2021-02-12

## 2020-12-23 RX ORDER — OMEPRAZOLE 20 MG/1
20 CAPSULE, DELAYED RELEASE ORAL
Qty: 120 CAPSULE | Refills: 0 | Status: SHIPPED | OUTPATIENT
Start: 2020-12-23 | End: 2021-02-12

## 2020-12-23 NOTE — PATIENT INSTRUCTIONS
Tips to Control Acid Reflux    To control acid reflux, you’ll need to make some basic diet and lifestyle changes. The simple steps outlined below may be all you’ll need to ease discomfort. Watch what you eat  · Don't have fatty foods or spicy foods.   · Reducing stomach acid  Your provider may suggest antacids that you can buy over the counter. Antacids can give fast relief. Or you may be told to take a type of medicine called H2 blockers.  These are available over-the-counter and by prescription (for high

## 2020-12-23 NOTE — PROGRESS NOTES
Albino Springer is a 44year old female who presents to recheck her GERD condition. The medication has not been controlling her symptoms lately. Pt did run out of glycate 2 weeks ago.  Pt has gone off the medication and symptoms returned, but then resolved o Date Value   03/07/2020 83   09/28/2019 77   01/30/2016 76     AST (U/L)   Date Value   09/21/2020 16   03/07/2020 16   09/28/2019 17   02/07/2019 13 (L)   09/15/2018 14 (L)   01/30/2016 14     ALT (U/L)   Date Value   09/21/2020 23   03/07/2020 11   09/28 Predominant Hodgkin's Lymphoma (in remission)   • Depression    • Diabetes ([facility] [address]    • [facility]    • Dysmenorrhea    • Esophageal reflux    • Essential hypertension    • Exposure to medical diagnostic radiation     last dose 8/2012   • Fibro • Cancer Paternal Aunt         Lung CA   • Cancer Maternal Aunt         Breast CA hx, and colon   • Breast Cancer Maternal Aunt 48        IN HER 50'S  NOT SURE OF EXACT AGE   • Cancer Self 41        Predominant lymphocyte hodgkins lymphoma   • Cancer Mater 2. Epigastric pain  - H. PYLORI STO[employer], EIA; Future      PLAN:  Increase fluids, rest.  Meds as below. The patient is asked to return if sx's persist or worsen. If no improvement will refer to GI for possible EGD.      Meds & Refills for this Visit:  Yancy Corona Your healthcare provider has told you that you have acid reflux. This condition causes stomach acid to wash up into your throat. For most people, acid reflux is troubling but not dangerous. But left untreated, it can sometimes damages the esophagus.  Nicole Adkins

## 2021-01-04 ENCOUNTER — TELEMEDICINE (OUTPATIENT)
Dept: INTERNAL MEDICINE CLINIC | Facility: CLINIC | Age: 51
End: 2021-01-04
Payer: COMMERCIAL

## 2021-01-04 DIAGNOSIS — E11.9 CONTROLLED TYPE 2 DIABETES MELLITUS WITHOUT COMPLICATION, WITHOUT LONG-TERM CURRENT USE OF INSULIN (HCC): ICD-10-CM

## 2021-01-04 DIAGNOSIS — E55.9 VITAMIN D DEFICIENCY: ICD-10-CM

## 2021-01-04 DIAGNOSIS — E66.01 CLASS 3 SEVERE OBESITY DUE TO EXCESS CALORIES WITH SERIOUS COMORBIDITY AND BODY MASS INDEX (BMI) OF 40.0 TO 44.9 IN ADULT (HCC): ICD-10-CM

## 2021-01-04 DIAGNOSIS — Z51.81 ENCOUNTER FOR THERAPEUTIC DRUG MONITORING: Primary | ICD-10-CM

## 2021-01-04 DIAGNOSIS — Z99.89 OSA ON CPAP: ICD-10-CM

## 2021-01-04 DIAGNOSIS — G47.33 OSA ON CPAP: ICD-10-CM

## 2021-01-04 DIAGNOSIS — I10 ESSENTIAL HYPERTENSION, BENIGN: ICD-10-CM

## 2021-01-04 PROCEDURE — 99213 OFFICE O/P EST LOW 20 MIN: CPT | Performed by: NURSE PRACTITIONER

## 2021-01-04 RX ORDER — PHENTERMINE HYDROCHLORIDE 15 MG/1
15 CAPSULE ORAL EVERY MORNING
Qty: 30 CAPSULE | Refills: 0 | Status: SHIPPED | OUTPATIENT
Start: 2021-01-04 | End: 2021-02-01

## 2021-01-04 NOTE — PROGRESS NOTES
[facility] WEIGHT MANAGEMENT VIRTUAL ENCOUNTER     Roe Toro verbally consents to a Virtual/Telephone Check-In service on 01/03/21   Patient understands and accepts financial responsibility for any deductible, co-insurance and/or co-pays associat changes    Objective  EXAM  Reviewed most recent set of vitals   Physical Exam:  GENERAL: well developed, well nourished, in no apparent distress, speaking in full sentences comfortably   SKIN: warm, pink, dry without rashes to exposed area   EYES: conjunc Release, Take 1 capsule (20 mg total) by mouth 2 (two) times daily before meals. , Disp: 120 capsule, Rfl: 0    •  GLYCATE 1.5 MG Oral Tab, Take 1 tablet by mouth every 5 (five) hours as needed. , Disp: 180 tablet, Rfl: 1    •  OZEMPIC, 1 MG/DOSE, 2 MG/1.5ML deficiency    ANAHY on CPAP    Other orders  -     Phentermine HCl 15 MG Oral Cap; Take 1 capsule (15 mg total) by mouth every morning.       PLAN  · Continue with medications: ozempic 1mg weekly and phentermine 15mg  · --advised of side effects and adverse e limitations of this visit as no physical exam could be performed. Every conscious effort was taken to allow for sufficient and adequate time. This billing was spent on reviewing labs, medications, radiology tests and decision making.   Appropriate medical

## 2021-01-04 NOTE — PATIENT INSTRUCTIONS
We are here to support you with weight loss, but please remember that you still need your primary care provider for your routine health maintenance.       PLAN:  Will continue with ozempic 1mg weekly and phentermine 15mg   Follow up with me in 1 month  Sche ** Daily INPUT> Look at nutrition section-- \"nutrients\" and it will break down your macros for the day (ie. Protein, carbs, fibers, sugars and fats). Try to stay within these numbers daily     2.  \"7 minute workout\" to help with exercise/a

## 2021-01-21 ENCOUNTER — TELEPHONE (OUTPATIENT)
Dept: INTERNAL MEDICINE CLINIC | Facility: CLINIC | Age: 51
End: 2021-01-21

## 2021-01-21 NOTE — TELEPHONE ENCOUNTER
[facility] pharmacy called requesting alternative for:  GLYCATE 1.5 MG Oral Tab    Medication not available to order at [address] AT [facility], [phone], [phone]

## 2021-01-22 ENCOUNTER — LAB ENCOUNTER (OUTPATIENT)
Dept: LAB | Age: 51
End: 2021-01-22
Attending: NURSE PRACTITIONER
Payer: COMMERCIAL

## 2021-01-22 DIAGNOSIS — R10.13 EPIGASTRIC PAIN: ICD-10-CM

## 2021-01-22 DIAGNOSIS — K21.00 REFLUX ESOPHAGITIS: ICD-10-CM

## 2021-01-22 PROCEDURE — 87338 HPYLORI STOOL AG IA: CPT

## 2021-01-22 NOTE — TELEPHONE ENCOUNTER
Pharmacy is no longer carrying medication. Is there an alternative to take with the Omeprazole?     Please advise

## 2021-01-24 LAB — H PYLORI AG STL QL IA: NEGATIVE

## 2021-01-25 NOTE — TELEPHONE ENCOUNTER
Already on cimetidine and omeprazole. No additional medications, if uncontrolled GERD on these meds will need to see GI.  [facility] Dr. Joao Hassan

## 2021-01-25 NOTE — TELEPHONE ENCOUNTER
Patient is following up with Gastro on 2/5/21 and will discuss medications at that time.      Please disregard request

## 2021-01-31 NOTE — PROGRESS NOTES
HISTORY OF PRESENT ILLNESS  Patient presents with:  Weight Check: down 2    Francee Severance is a 46year old female here for follow up with medical weight loss program for the treatment of overweight, obesity, or morbid obesity.      Down 2 lbs   Compliant o nourished, in no apparent distress, A/O x3  SKIN: no rashes, no suspicious lesions  HEENT: conjunctiva pink, sclera non icteric, PERRLA  NECK: supple, no adenopathy  LUNGS: CTA in all fields, breathing non labored  CARDIO: RRR without murmur  GI: +BS, NT/N Oral Cap, Take by mouth., Disp: , Rfl:     •  Glucose Blood (CONTOUR NEXT TEST) In Vitro Strip, Tests 1-2 x daily, Disp: 100 each, Rfl: 2    •  EVENING PRIMROSE OIL OR, Take by mouth daily. , Disp: , Rfl:     •  VALACYCLOVIR  MG Oral Tab, TAKE 1 TABL phentermine 15mg  · --advised of side effects and adverse effects of this medication  · Contradictions: topamax (insomnia), stop  zonisamide (wasn't working anymore), stop jardance (wanted to get off), stopped metformin   · Reviewed labs from pcp  · Contin

## 2021-02-01 ENCOUNTER — OFFICE VISIT (OUTPATIENT)
Dept: INTERNAL MEDICINE CLINIC | Facility: CLINIC | Age: 51
End: 2021-02-01
Payer: COMMERCIAL

## 2021-02-01 VITALS
SYSTOLIC BLOOD PRESSURE: 126 MMHG | WEIGHT: 238 LBS | DIASTOLIC BLOOD PRESSURE: 70 MMHG | BODY MASS INDEX: 41.65 KG/M2 | HEART RATE: 80 BPM | RESPIRATION RATE: 16 BRPM | HEIGHT: 63.5 IN

## 2021-02-01 DIAGNOSIS — E11.9 CONTROLLED TYPE 2 DIABETES MELLITUS WITHOUT COMPLICATION, WITHOUT LONG-TERM CURRENT USE OF INSULIN (HCC): ICD-10-CM

## 2021-02-01 DIAGNOSIS — E55.9 VITAMIN D DEFICIENCY: ICD-10-CM

## 2021-02-01 DIAGNOSIS — Z51.81 ENCOUNTER FOR THERAPEUTIC DRUG MONITORING: Primary | ICD-10-CM

## 2021-02-01 DIAGNOSIS — E66.01 CLASS 3 SEVERE OBESITY DUE TO EXCESS CALORIES WITH SERIOUS COMORBIDITY AND BODY MASS INDEX (BMI) OF 40.0 TO 44.9 IN ADULT (HCC): ICD-10-CM

## 2021-02-01 DIAGNOSIS — Z99.89 OSA ON CPAP: ICD-10-CM

## 2021-02-01 DIAGNOSIS — G47.33 OSA ON CPAP: ICD-10-CM

## 2021-02-01 DIAGNOSIS — I10 ESSENTIAL HYPERTENSION, BENIGN: ICD-10-CM

## 2021-02-01 PROCEDURE — 3078F DIAST BP <80 MM HG: CPT | Performed by: NURSE PRACTITIONER

## 2021-02-01 PROCEDURE — 99213 OFFICE O/P EST LOW 20 MIN: CPT | Performed by: NURSE PRACTITIONER

## 2021-02-01 PROCEDURE — 3008F BODY MASS INDEX DOCD: CPT | Performed by: NURSE PRACTITIONER

## 2021-02-01 PROCEDURE — 3074F SYST BP LT 130 MM HG: CPT | Performed by: NURSE PRACTITIONER

## 2021-02-01 RX ORDER — PHENTERMINE HYDROCHLORIDE 15 MG/1
15 CAPSULE ORAL EVERY MORNING
Qty: 30 CAPSULE | Refills: 0 | Status: SHIPPED | OUTPATIENT
Start: 2021-02-01 | End: 2021-03-01

## 2021-02-02 NOTE — PATIENT INSTRUCTIONS
We are here to support you with weight loss, but please remember that you still need your primary care provider for your routine health maintenance.       PLAN:  Continue with phentermine 15mg, ozempic 1mg weekly   Follow up with me in 1 month  Schedule fol ** Daily INPUT> Look at nutrition section-- \"nutrients\" and it will break down your macros for the day (ie. Protein, carbs, fibers, sugars and fats). Try to stay within these numbers daily     2.  \"7 minute workout\" to help with exercise/a

## 2021-02-12 ENCOUNTER — OFFICE VISIT (OUTPATIENT)
Dept: INTERNAL MEDICINE CLINIC | Facility: CLINIC | Age: 51
End: 2021-02-12
Payer: COMMERCIAL

## 2021-02-12 ENCOUNTER — LAB ENCOUNTER (OUTPATIENT)
Dept: LAB | Age: 51
End: 2021-02-12
Attending: INTERNAL MEDICINE
Payer: COMMERCIAL

## 2021-02-12 VITALS
TEMPERATURE: 99 F | HEART RATE: 80 BPM | OXYGEN SATURATION: 98 % | RESPIRATION RATE: 18 BRPM | BODY MASS INDEX: 44.38 KG/M2 | DIASTOLIC BLOOD PRESSURE: 76 MMHG | HEIGHT: 62 IN | WEIGHT: 241.19 LBS | SYSTOLIC BLOOD PRESSURE: 136 MMHG

## 2021-02-12 DIAGNOSIS — E55.9 HYPOVITAMINOSIS D: ICD-10-CM

## 2021-02-12 DIAGNOSIS — K21.00 REFLUX ESOPHAGITIS: ICD-10-CM

## 2021-02-12 DIAGNOSIS — E11.9 CONTROLLED TYPE 2 DIABETES MELLITUS WITHOUT COMPLICATION, WITHOUT LONG-TERM CURRENT USE OF INSULIN (HCC): ICD-10-CM

## 2021-02-12 DIAGNOSIS — E11.9 CONTROLLED TYPE 2 DIABETES MELLITUS WITHOUT COMPLICATION, WITHOUT LONG-TERM CURRENT USE OF INSULIN (HCC): Primary | ICD-10-CM

## 2021-02-12 LAB
EST. AVERAGE GLUCOSE BLD GHB EST-MCNC: 126 MG/DL (ref 68–126)
HBA1C MFR BLD HPLC: 6 % (ref ?–5.7)
VIT D+METAB SERPL-MCNC: 51.6 NG/ML (ref 30–100)

## 2021-02-12 PROCEDURE — 3075F SYST BP GE 130 - 139MM HG: CPT | Performed by: INTERNAL MEDICINE

## 2021-02-12 PROCEDURE — 82306 VITAMIN D 25 HYDROXY: CPT

## 2021-02-12 PROCEDURE — 3078F DIAST BP <80 MM HG: CPT | Performed by: INTERNAL MEDICINE

## 2021-02-12 PROCEDURE — 99213 OFFICE O/P EST LOW 20 MIN: CPT | Performed by: INTERNAL MEDICINE

## 2021-02-12 PROCEDURE — 36415 COLL VENOUS BLD VENIPUNCTURE: CPT

## 2021-02-12 PROCEDURE — 3044F HG A1C LEVEL LT 7.0%: CPT | Performed by: NURSE PRACTITIONER

## 2021-02-12 PROCEDURE — 3008F BODY MASS INDEX DOCD: CPT | Performed by: INTERNAL MEDICINE

## 2021-02-12 PROCEDURE — 83036 HEMOGLOBIN GLYCOSYLATED A1C: CPT

## 2021-02-12 RX ORDER — OMEPRAZOLE 20 MG/1
20 CAPSULE, DELAYED RELEASE ORAL
Qty: 180 CAPSULE | Refills: 3 | Status: SHIPPED | OUTPATIENT
Start: 2021-02-12

## 2021-02-12 NOTE — PROGRESS NOTES
[facility] Internal Medicine Office Note  Chief Complaint:   Patient presents with:  Establish Care  Follow - Up: 6 months       HPI:   This is a 46year old female coming in for establishing care  HPI    DM - last a1c 5.9 in pre-diabetes range papilloma   • Needle biopsy right  11/2019    papilloma   • Other surgical history  2008    neuroplasty w/ transposition of ulnar nerve at elbow left                        • Other surgical history  2009    uterine myomectomy for uterine fibroids   • Ot TEST) In Vitro Strip Tests 1-2 x daily 100 each 2   • EVENING PRIMROSE OIL OR Take by mouth daily.      • VALACYCLOVIR  MG Oral Tab TAKE 1 TABLET(500 MG) BY MOUTH TWICE DAILY FOR 3 DAYS 30 tablet 0   • Azelastine HCl 0.1 % Nasal Solution 2 sprays by sounds. Pulmonary/Chest: Effort normal and breath sounds normal.   Neurological: She is alert. Skin: Skin is warm and dry. Psychiatric: She has a normal mood and affect.        ASSESSMENT AND PLAN:   Harjit Calhoun is a 46year old female with  Contr result of today.      Terrance Arredondo MD

## 2021-03-01 ENCOUNTER — TELEMEDICINE (OUTPATIENT)
Dept: INTERNAL MEDICINE CLINIC | Facility: CLINIC | Age: 51
End: 2021-03-01

## 2021-03-01 VITALS — BODY MASS INDEX: 44 KG/M2 | WEIGHT: 238 LBS

## 2021-03-01 DIAGNOSIS — F43.9 STRESS: ICD-10-CM

## 2021-03-01 DIAGNOSIS — E55.9 VITAMIN D DEFICIENCY: ICD-10-CM

## 2021-03-01 DIAGNOSIS — E11.9 CONTROLLED TYPE 2 DIABETES MELLITUS WITHOUT COMPLICATION, WITHOUT LONG-TERM CURRENT USE OF INSULIN (HCC): ICD-10-CM

## 2021-03-01 DIAGNOSIS — I10 ESSENTIAL HYPERTENSION, BENIGN: ICD-10-CM

## 2021-03-01 DIAGNOSIS — G47.33 OSA ON CPAP: ICD-10-CM

## 2021-03-01 DIAGNOSIS — E66.01 SEVERE OBESITY (BMI >= 40) (HCC): ICD-10-CM

## 2021-03-01 DIAGNOSIS — Z51.81 ENCOUNTER FOR THERAPEUTIC DRUG MONITORING: Primary | ICD-10-CM

## 2021-03-01 DIAGNOSIS — Z99.89 OSA ON CPAP: ICD-10-CM

## 2021-03-01 PROCEDURE — 99213 OFFICE O/P EST LOW 20 MIN: CPT | Performed by: NURSE PRACTITIONER

## 2021-03-01 PROCEDURE — 88305 TISSUE EXAM BY PATHOLOGIST: CPT | Performed by: INTERNAL MEDICINE

## 2021-03-01 RX ORDER — PHENTERMINE HYDROCHLORIDE 15 MG/1
15 CAPSULE ORAL EVERY MORNING
Qty: 30 CAPSULE | Refills: 0 | Status: SHIPPED | OUTPATIENT
Start: 2021-03-01 | End: 2021-04-23

## 2021-03-01 RX ORDER — SEMAGLUTIDE 1.34 MG/ML
1 INJECTION, SOLUTION SUBCUTANEOUS WEEKLY
Qty: 9 ML | Refills: 0 | Status: SHIPPED | OUTPATIENT
Start: 2021-03-01 | End: 2021-06-25

## 2021-03-01 NOTE — PROGRESS NOTES
[facility] WEIGHT MANAGEMENT VIRTUAL ENCOUNTER     [facility] verbally consents to a Virtual/Telephone Check-In service on 03/01/21   Patient understands and accepts financial responsibility for any deductible, co-insurance and/or co-pays associat Exam:  GENERAL: well developed, well nourished, in no apparent distress, speaking in full sentences comfortably   SKIN: warm, pink, dry without rashes to exposed area   EYES: conjunctiva pink  HEENT: atraumatic, normocephalic  LUNGS: normal work of Jey Greenchristen meals., Disp: 180 capsule, Rfl: 3    •  Glucosamine 500 MG Oral Cap, Take by mouth., Disp: , Rfl:     •  Glucose Blood (CONTOUR NEXT TEST) In Vitro Strip, Tests 1-2 x daily, Disp: 100 each, Rfl: 2    •  EVENING PRIMROSE OIL OR, Take by mouth daily. , Disp: effects of this medication  ?  Contradictions: topamax (insomnia), stop  zonisamide (wasn't working anymore), stop jardance (wanted to get off), stopped metformin   · Reviewed labs from pcp  · Continue with vitamin d OTC  · HTN  stable, follows with PCP  · exam could be performed. Every conscious effort was taken to allow for sufficient and adequate time. This billing was spent on reviewing labs, medications, radiology tests and decision making.   Appropriate medical decision-making and tests are ordered as

## 2021-03-02 NOTE — PATIENT INSTRUCTIONS
We are here to support you with weight loss, but please remember that you still need your primary care provider for your routine health maintenance.       PLAN:  Will continue with phentermine and ozempic 1mg weekly   Follow up with me in 1 month  Schedule ** Daily INPUT> Look at nutrition section-- \"nutrients\" and it will break down your macros for the day (ie. Protein, carbs, fibers, sugars and fats). Try to stay within these numbers daily     2.  \"7 minute workout\" to help with exercise/activity w

## 2021-03-15 ENCOUNTER — TELEPHONE (OUTPATIENT)
Dept: INTERNAL MEDICINE CLINIC | Facility: CLINIC | Age: 51
End: 2021-03-15

## 2021-03-17 DIAGNOSIS — Z23 NEED FOR VACCINATION: ICD-10-CM

## 2021-04-23 ENCOUNTER — TELEMEDICINE (OUTPATIENT)
Dept: INTERNAL MEDICINE CLINIC | Facility: CLINIC | Age: 51
End: 2021-04-23

## 2021-04-23 VITALS — BODY MASS INDEX: 44 KG/M2 | WEIGHT: 238 LBS

## 2021-04-23 DIAGNOSIS — Z51.81 ENCOUNTER FOR THERAPEUTIC DRUG MONITORING: Primary | ICD-10-CM

## 2021-04-23 DIAGNOSIS — G47.33 OSA ON CPAP: ICD-10-CM

## 2021-04-23 DIAGNOSIS — Z99.89 OSA ON CPAP: ICD-10-CM

## 2021-04-23 DIAGNOSIS — Z72.3 LACK OF EXERCISE: ICD-10-CM

## 2021-04-23 DIAGNOSIS — E55.9 VITAMIN D DEFICIENCY: ICD-10-CM

## 2021-04-23 DIAGNOSIS — F43.9 STRESS: ICD-10-CM

## 2021-04-23 DIAGNOSIS — E11.9 CONTROLLED TYPE 2 DIABETES MELLITUS WITHOUT COMPLICATION, WITHOUT LONG-TERM CURRENT USE OF INSULIN (HCC): ICD-10-CM

## 2021-04-23 DIAGNOSIS — I10 ESSENTIAL HYPERTENSION, BENIGN: ICD-10-CM

## 2021-04-23 PROCEDURE — 99213 OFFICE O/P EST LOW 20 MIN: CPT | Performed by: NURSE PRACTITIONER

## 2021-04-23 RX ORDER — PHENTERMINE HYDROCHLORIDE 15 MG/1
15 CAPSULE ORAL EVERY MORNING
Qty: 30 CAPSULE | Refills: 1 | Status: SHIPPED | OUTPATIENT
Start: 2021-04-26 | End: 2021-07-06

## 2021-04-23 NOTE — PATIENT INSTRUCTIONS
We are here to support you with weight loss, but please remember that you still need your primary care provider for your routine health maintenance.       PLAN:  Will continue with phentermine 15mg and ozempic 1mg weekly   Follow up with me in 1-2 month  Sc ** Daily INPUT> Look at nutrition section-- \"nutrients\" and it will break down your macros for the day (ie. Protein, carbs, fibers, sugars and fats). Try to stay within these numbers daily     2.  \"7 minute workout\" to help with exercise/act

## 2021-04-23 NOTE — PROGRESS NOTES
[facility] WEIGHT MANAGEMENT VIRTUAL ENCOUNTER     Kamila Batres verbally consents to a Virtual/Telephone Check-In service on 04/23/21   Patient understands and accepts financial responsibility for any deductible, co-insurance and/or co-pays associat warm, pink, dry without rashes to exposed area   EYES: conjunctiva pink  HEENT: atraumatic, normocephalic  LUNGS: normal work of breathing, non labored  CARDIO: normal work, no exertion  EXTREMITIES: no cyanosis, no clubbing, no edema  NEURO: Oriented time 0  omeprazole 20 MG Oral Capsule Delayed Release, Take 1 capsule (20 mg total) by mouth 2 (two) times daily before meals. , Disp: 180 capsule, Rfl: 3  Glucosamine 500 MG Oral Cap, Take by mouth., Disp: , Rfl:   Glucose Blood (CONTOUR NEXT TEST) In Vitro Str provider. · Nutrition: low carb diet/ recommended to eat breakfast daily/ regular protein intake  · Follow up with dietitian and psychologist as recommended. · Discussed the role of sleep and stress in weight management.   · Counseled on comprehensive freedom

## 2021-05-06 ENCOUNTER — TELEPHONE (OUTPATIENT)
Dept: INTERNAL MEDICINE CLINIC | Facility: CLINIC | Age: 51
End: 2021-05-06

## 2021-05-06 NOTE — TELEPHONE ENCOUNTER
Call patient to inform of below of omeprazole not being covered- she should contact her gastroenterologist

## 2021-06-15 ENCOUNTER — OFFICE VISIT (OUTPATIENT)
Dept: INTERNAL MEDICINE CLINIC | Facility: CLINIC | Age: 51
End: 2021-06-15
Payer: COMMERCIAL

## 2021-06-15 VITALS
WEIGHT: 244 LBS | DIASTOLIC BLOOD PRESSURE: 76 MMHG | BODY MASS INDEX: 44.9 KG/M2 | HEART RATE: 68 BPM | RESPIRATION RATE: 18 BRPM | HEIGHT: 62 IN | SYSTOLIC BLOOD PRESSURE: 130 MMHG

## 2021-06-15 DIAGNOSIS — Z51.81 ENCOUNTER FOR THERAPEUTIC DRUG MONITORING: Primary | ICD-10-CM

## 2021-06-15 DIAGNOSIS — E11.9 CONTROLLED TYPE 2 DIABETES MELLITUS WITHOUT COMPLICATION, WITHOUT LONG-TERM CURRENT USE OF INSULIN (HCC): ICD-10-CM

## 2021-06-15 DIAGNOSIS — E55.9 VITAMIN D DEFICIENCY: ICD-10-CM

## 2021-06-15 DIAGNOSIS — F43.9 STRESS: ICD-10-CM

## 2021-06-15 DIAGNOSIS — G47.33 OSA ON CPAP: ICD-10-CM

## 2021-06-15 DIAGNOSIS — Z99.89 OSA ON CPAP: ICD-10-CM

## 2021-06-15 DIAGNOSIS — I10 ESSENTIAL HYPERTENSION, BENIGN: ICD-10-CM

## 2021-06-15 PROCEDURE — 3075F SYST BP GE 130 - 139MM HG: CPT | Performed by: NURSE PRACTITIONER

## 2021-06-15 PROCEDURE — 3078F DIAST BP <80 MM HG: CPT | Performed by: NURSE PRACTITIONER

## 2021-06-15 PROCEDURE — 3008F BODY MASS INDEX DOCD: CPT | Performed by: NURSE PRACTITIONER

## 2021-06-15 PROCEDURE — 99214 OFFICE O/P EST MOD 30 MIN: CPT | Performed by: NURSE PRACTITIONER

## 2021-06-15 RX ORDER — SEMAGLUTIDE 1.34 MG/ML
1 INJECTION, SOLUTION SUBCUTANEOUS WEEKLY
Qty: 9 ML | Refills: 0 | Status: CANCELLED | OUTPATIENT
Start: 2021-06-15

## 2021-06-15 RX ORDER — PHENTERMINE HYDROCHLORIDE 15 MG/1
15 CAPSULE ORAL EVERY MORNING
Qty: 30 CAPSULE | Refills: 1 | Status: CANCELLED | OUTPATIENT
Start: 2021-06-15

## 2021-06-15 NOTE — PROGRESS NOTES
HISTORY OF PRESENT ILLNESS  Patient presents with:  Weight Check: up 53 Hebert Garcia is a 46year old female here for follow up with medical weight loss program for the treatment of overweight, obesity, or morbid obesity.      Up #6 lbs  Compliant on  o distress, A/O x3  SKIN: no rashes, no suspicious lesions  HEENT: conjunctiva pink, sclera non icteric, PERRLA  NECK: supple, no adenopathy  LUNGS: CTA in all fields, breathing non labored  CARDIO: RRR without murmur  GI: +BS, NT/ND, no masses or HSM  EXTRE Disp: 30 tablet, Rfl: 0  Azelastine HCl 0.1 % Nasal Solution, 2 sprays by Nasal route 2 (two) times daily as needed for Rhinitis., Disp: 1 Bottle, Rfl: 11  ACCU-CHEK FASTCLIX LANCETS Does not apply Misc, TEST X1 DAILY, Disp: 1 Box, Rfl: 1  Cholecalciferol topamax (insomnia), stop  zonisamide (wasn't working anymore), stop jardance (wanted to get off), stopped metformin   · Reviewed labs from pcp  · Continue with vitamin d OTC  · HTN  stable, follows with PCP  · DM type 2, reviewed last a1c 6.0% on 1/2021 (i

## 2021-06-15 NOTE — PATIENT INSTRUCTIONS
We are here to support you with weight loss, but please remember that you still need your primary care provider for your routine health maintenance.       PLAN:  Check out new medication called wegovy (to see if we can possibly replace the ozempic)   Check Activity level: not very active (can't count exercise towards calorie number per day)                   ** Daily INPUT> Look at nutrition section-- \"nutrients\" and it will break down your macros for the day (ie. Protein, carbs, fibers, sugars and fats).

## 2021-06-18 ENCOUNTER — PATIENT MESSAGE (OUTPATIENT)
Dept: INTERNAL MEDICINE CLINIC | Facility: CLINIC | Age: 51
End: 2021-06-18

## 2021-06-18 DIAGNOSIS — Z51.81 ENCOUNTER FOR THERAPEUTIC DRUG MONITORING: ICD-10-CM

## 2021-06-20 NOTE — TELEPHONE ENCOUNTER
From: Neelima Mane  To: DOREEN Arboleda  Sent: 6/18/2021 10:55 PM CDT  Subject: Prescription Question    Wen Luciano,   I was wondering if there is any information you can give me about the new medication?      Ruben Hale

## 2021-06-22 NOTE — TELEPHONE ENCOUNTER
Minerva Veliz will send in a script for wegovy to [place] (I will include her cell phone)- so she should get a call from them for payment, which would be $25 per month X 5 months.   I am going to start at 1mg dose of wegovy X 4 weeks and if she tolerates that

## 2021-06-28 ENCOUNTER — TELEPHONE (OUTPATIENT)
Dept: INTERNAL MEDICINE CLINIC | Facility: CLINIC | Age: 51
End: 2021-06-28

## 2021-06-28 DIAGNOSIS — Z51.81 ENCOUNTER FOR THERAPEUTIC DRUG MONITORING: Primary | ICD-10-CM

## 2021-06-28 RX ORDER — SEMAGLUTIDE 1 MG/.5ML
1 INJECTION, SOLUTION SUBCUTANEOUS WEEKLY
Qty: 4 EACH | Refills: 0 | Status: SHIPPED
Start: 2021-06-28 | End: 2021-08-31 | Stop reason: DRUGHIGH

## 2021-06-28 NOTE — TELEPHONE ENCOUNTER
Elizabeth Moeller was faxed to [place] although it is listed in [facility] to [place] mail order. I verified with MT and it was faxed to [facility].

## 2021-07-06 ENCOUNTER — TELEPHONE (OUTPATIENT)
Dept: INTERNAL MEDICINE CLINIC | Facility: CLINIC | Age: 51
End: 2021-07-06

## 2021-07-06 DIAGNOSIS — Z51.81 ENCOUNTER FOR THERAPEUTIC DRUG MONITORING: Primary | ICD-10-CM

## 2021-07-06 RX ORDER — PHENTERMINE HYDROCHLORIDE 15 MG/1
15 CAPSULE ORAL EVERY MORNING
Qty: 30 CAPSULE | Refills: 1 | Status: SHIPPED | OUTPATIENT
Start: 2021-07-06 | End: 2021-08-09

## 2021-07-06 NOTE — TELEPHONE ENCOUNTER
Requesting PHENTERMINE  LOV: 6/15/2021  RTC: 4 WEEKS??   Filled: 6/5//2021 #30 with 1 refills    Future Appointments   Date Time Provider Karina Piper   7/20/2021  8:20 AM DOREEN Adamson [facility] [address]   12/27/2021  3:00 PM VIKAS March [facility] [place]

## 2021-07-06 NOTE — TELEPHONE ENCOUNTER
Requesting phentermine   LOV: 6/15/2021  RTC: 4 weeks ? ?   Filled:4/26/21 #30 with 1 refills    Future Appointments   Date Time Provider Karina Piper   7/20/2021  8:20 AM DOREEN Ahn [address]   12/27/2021  3:00 PM Carlos Cormier

## 2021-07-20 ENCOUNTER — OFFICE VISIT (OUTPATIENT)
Dept: INTERNAL MEDICINE CLINIC | Facility: CLINIC | Age: 51
End: 2021-07-20
Payer: COMMERCIAL

## 2021-07-20 ENCOUNTER — OFFICE VISIT (OUTPATIENT)
Dept: SURGERY | Facility: CLINIC | Age: 51
End: 2021-07-20
Payer: COMMERCIAL

## 2021-07-20 VITALS
BODY MASS INDEX: 45.15 KG/M2 | SYSTOLIC BLOOD PRESSURE: 129 MMHG | RESPIRATION RATE: 18 BRPM | OXYGEN SATURATION: 99 % | WEIGHT: 245.38 LBS | DIASTOLIC BLOOD PRESSURE: 82 MMHG | HEART RATE: 74 BPM | HEIGHT: 62 IN

## 2021-07-20 VITALS
WEIGHT: 246 LBS | HEIGHT: 62 IN | HEART RATE: 78 BPM | SYSTOLIC BLOOD PRESSURE: 130 MMHG | BODY MASS INDEX: 45.27 KG/M2 | RESPIRATION RATE: 17 BRPM | OXYGEN SATURATION: 98 % | DIASTOLIC BLOOD PRESSURE: 78 MMHG

## 2021-07-20 DIAGNOSIS — Z80.3 FAMILY HISTORY OF BREAST CANCER: ICD-10-CM

## 2021-07-20 DIAGNOSIS — I10 ESSENTIAL HYPERTENSION, BENIGN: ICD-10-CM

## 2021-07-20 DIAGNOSIS — Z91.89 AT HIGH RISK FOR BREAST CANCER: ICD-10-CM

## 2021-07-20 DIAGNOSIS — Z12.39 ENCOUNTER FOR SCREENING FOR MALIGNANT NEOPLASM OF BREAST: Primary | ICD-10-CM

## 2021-07-20 DIAGNOSIS — D24.1 PAPILLOMA OF RIGHT BREAST: ICD-10-CM

## 2021-07-20 DIAGNOSIS — E55.9 VITAMIN D DEFICIENCY: ICD-10-CM

## 2021-07-20 DIAGNOSIS — E11.9 CONTROLLED TYPE 2 DIABETES MELLITUS WITHOUT COMPLICATION, WITHOUT LONG-TERM CURRENT USE OF INSULIN (HCC): ICD-10-CM

## 2021-07-20 DIAGNOSIS — F43.9 STRESS: ICD-10-CM

## 2021-07-20 DIAGNOSIS — Z92.3 PERSONAL HISTORY OF RADIATION EXPOSURE: ICD-10-CM

## 2021-07-20 DIAGNOSIS — Z85.71 HISTORY OF HODGKIN'S LYMPHOMA: ICD-10-CM

## 2021-07-20 DIAGNOSIS — R92.8 ABNORMAL MRI, BREAST: Primary | ICD-10-CM

## 2021-07-20 DIAGNOSIS — Z51.81 ENCOUNTER FOR THERAPEUTIC DRUG MONITORING: Primary | ICD-10-CM

## 2021-07-20 DIAGNOSIS — G47.33 OSA ON CPAP: ICD-10-CM

## 2021-07-20 DIAGNOSIS — Z99.89 OSA ON CPAP: ICD-10-CM

## 2021-07-20 PROCEDURE — 99214 OFFICE O/P EST MOD 30 MIN: CPT | Performed by: SURGERY

## 2021-07-20 PROCEDURE — 3008F BODY MASS INDEX DOCD: CPT | Performed by: NURSE PRACTITIONER

## 2021-07-20 PROCEDURE — 99214 OFFICE O/P EST MOD 30 MIN: CPT | Performed by: NURSE PRACTITIONER

## 2021-07-20 PROCEDURE — 3075F SYST BP GE 130 - 139MM HG: CPT | Performed by: NURSE PRACTITIONER

## 2021-07-20 PROCEDURE — 3079F DIAST BP 80-89 MM HG: CPT | Performed by: SURGERY

## 2021-07-20 PROCEDURE — 3008F BODY MASS INDEX DOCD: CPT | Performed by: SURGERY

## 2021-07-20 PROCEDURE — 3074F SYST BP LT 130 MM HG: CPT | Performed by: SURGERY

## 2021-07-20 PROCEDURE — 3078F DIAST BP <80 MM HG: CPT | Performed by: NURSE PRACTITIONER

## 2021-07-20 NOTE — PROGRESS NOTES
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     Stage: Cancer Staging  Hodgkin's disease [facility])  Staging form: Lymphoid Neoplasms, AJCC V6  - Clinical stage from 8/8/2016: Stage II ESOPHAGOGASTRODUODENOSCOPY, COLONOSCOPY, POSSIBLE BIOPSY, POSSIBLE POLYPECTOMY 33998, [id number];  Surgeon: Hunter Perez MD;  Location: [address] [place] [address]   • HAND/FINGER SURGERY UNLISTED      hand incision tendon sheath of a finger   • ROGELIO BIOPSY STEREO VALACYCLOVIR  MG Oral Tab, TAKE 1 TABLET(500 MG) BY MOUTH TWICE DAILY FOR 3 DAYS, Disp: 30 tablet, Rfl: 0  Azelastine HCl 0.1 % Nasal Solution, 2 sprays by Nasal route 2 (two) times daily as needed for Rhinitis., Disp: 1 Bottle, Rfl: 11  ACCU-CHEK of Systems:  General:   The patient denies, fever, chills, night sweats, fatigue, generalized weakness, change in appetite or weight loss.     HEENT:     The patient denies eye irritation, cataracts, redness, glaucoma, yellowing of the eyes, change in visio headaches, seizure/epilepsy, speech problems, coordination problems, trembling/tremors, fainting/black outs, dizziness, memory problems, loss of sensation/numbness, problems walking, weakness, +tingling or burning in hands/feet.  There is no history of abus prior imaging which was unremarkable. We discussed in light of her personal history of radiation for her Hodgkin's disease, she is at high risk for development of breast cancer in the future.   In light of this, we discussed that MRI has been found to incre

## 2021-07-20 NOTE — PATIENT INSTRUCTIONS
We are here to support you with weight loss, but please remember that you still need your primary care provider for your routine health maintenance.       PLAN:  Will continue with phentermine 15mg  Will stop ozempic and switch to wegovy    Follow up with dominic number per day)                   ** Daily INPUT> Look at nutrition section-- \"nutrients\" and it will break down your macros for the day (ie. Protein, carbs, fibers, sugars and fats). Try to stay within these numbers daily     2.  \"7 minute workout\" to

## 2021-07-20 NOTE — PROGRESS NOTES
HISTORY OF PRESENT ILLNESS  Patient presents with:  Weight Check: up 2    Jt Wesley is a 46year old female here for follow up with medical weight loss program for the treatment of overweight, obesity, or morbid obesity.      Up #2 lbs  Compliant on oz adenopathy  LUNGS: CTA in all fields, breathing non labored  CARDIO: RRR without murmur  GI: +BS, NT/ND, no masses or HSM  EXTREMITIES: no cyanosis, no clubbing, no edema  PSYCH: pleasant, cooperative, normal mood and affect    Lab Results   Component Valu Rhinitis., Disp: 1 Bottle, Rfl: 11  Cholecalciferol (VITAMIN D) 2000 units Oral Cap, Take 2,000 Units by mouth daily. , Disp: , Rfl:   Probiotic Product (VSL#3) Oral Cap, Take 1 capsule by mouth daily. , Disp: 30 capsule, Rfl: 3  Fluticasone Propionate (FL · --advised of side effects and adverse effects of this medication  · Contradictions: topamax (insomnia), stop  zonisamide (wasn't working anymore), stop jardance (wanted to get off), stopped metformin   · Reviewed labs  · Continue with vitamin d OTC  ·

## 2021-07-22 ENCOUNTER — HOSPITAL ENCOUNTER (OUTPATIENT)
Dept: MAMMOGRAPHY | Age: 51
Discharge: HOME | End: 2021-07-22
Attending: SURGERY
Payer: COMMERCIAL

## 2021-07-22 DIAGNOSIS — Z12.39 ENCOUNTER FOR SCREENING FOR MALIGNANT NEOPLASM OF BREAST: ICD-10-CM

## 2021-07-22 PROCEDURE — 77067 SCR MAMMO BI INCL CAD: CPT | Performed by: SURGERY

## 2021-07-22 PROCEDURE — 77063 BREAST TOMOSYNTHESIS BI: CPT | Performed by: SURGERY

## 2021-08-07 NOTE — PROGRESS NOTES
[facility] Progress Note      Patient Name: Jose Sutton   YOB: 1970  Medical Record Number: [id number]  Attending Physician: Mary Ellen De Dios. Cherise Basilio M.D.      Date of Visit: 8/9/2021      Chief Complaint  Hodgkin's lymphoma, tobacco, alcohol, and illicit drug use.       Current Medications   Semaglutide (WEGOVY 1MG/0.5ML) Subcutaneous Solution Auto-injector, Inject 0.5 mL (1 mg total) into the skin once a week., Disp: 4 each, Rfl: 0  CUSTOM MEDICATION, Wegovy 1mg/ 0.5ml, inject ears normal.  Neck Supple, no masses. Hematologic/Lymphatic No supraclavicular, axillary or inguinal lymphadenopathy. There is a possible enlarged lymph node in the right neck. There is no no left neck adenopathy.    Respiratory Normal effort; no respirato Absolute 0.12 0.00 - 0.70 x10(3) uL    Basophil Absolute 0.03 0.00 - 0.20 x10(3) uL    Immature Granulocyte Absolute 0.03 0.00 - 1.00 x10(3) uL    Neutrophil % 46.5 %    Lymphocyte % 38.9 %    Monocyte % 10.9 %    Eosinophil % 2.5 %    Basophil % 0.6 %

## 2021-08-09 ENCOUNTER — OFFICE VISIT (OUTPATIENT)
Dept: HEMATOLOGY/ONCOLOGY | Facility: HOSPITAL | Age: 51
End: 2021-08-09
Attending: SPECIALIST
Payer: COMMERCIAL

## 2021-08-09 VITALS
OXYGEN SATURATION: 98 % | BODY MASS INDEX: 45 KG/M2 | TEMPERATURE: 97 F | SYSTOLIC BLOOD PRESSURE: 162 MMHG | HEART RATE: 82 BPM | DIASTOLIC BLOOD PRESSURE: 88 MMHG | RESPIRATION RATE: 18 BRPM | WEIGHT: 247.38 LBS

## 2021-08-09 DIAGNOSIS — C81.04 NODULAR LYMPHOCYTE PREDOMINANT HODGKIN LYMPHOMA OF LYMPH NODES OF AXILLA (HCC): ICD-10-CM

## 2021-08-09 DIAGNOSIS — R59.1 LYMPHADENOPATHY OF HEAD AND NECK: Primary | ICD-10-CM

## 2021-08-09 LAB
ALBUMIN SERPL-MCNC: 3.3 G/DL (ref 3.4–5)
ALBUMIN/GLOB SERPL: 0.8 {RATIO} (ref 1–2)
ALP LIVER SERPL-CCNC: 105 U/L
ALT SERPL-CCNC: 26 U/L
ANION GAP SERPL CALC-SCNC: 0 MMOL/L (ref 0–18)
AST SERPL-CCNC: 18 U/L (ref 15–37)
BASOPHILS # BLD AUTO: 0.03 X10(3) UL (ref 0–0.2)
BASOPHILS NFR BLD AUTO: 0.6 %
BILIRUB SERPL-MCNC: 0.3 MG/DL (ref 0.1–2)
BUN BLD-MCNC: 12 MG/DL (ref 7–18)
CALCIUM BLD-MCNC: 9.3 MG/DL (ref 8.5–10.1)
CHLORIDE SERPL-SCNC: 111 MMOL/L (ref 98–112)
CO2 SERPL-SCNC: 29 MMOL/L (ref 21–32)
CREAT BLD-MCNC: 0.95 MG/DL
EOSINOPHIL # BLD AUTO: 0.12 X10(3) UL (ref 0–0.7)
EOSINOPHIL NFR BLD AUTO: 2.5 %
ERYTHROCYTE [DISTWIDTH] IN BLOOD BY AUTOMATED COUNT: 13.7 %
GLOBULIN PLAS-MCNC: 4 G/DL (ref 2.8–4.4)
GLUCOSE BLD-MCNC: 95 MG/DL (ref 70–99)
HCT VFR BLD AUTO: 41.6 %
HGB BLD-MCNC: 13.7 G/DL
IMM GRANULOCYTES # BLD AUTO: 0.03 X10(3) UL (ref 0–1)
IMM GRANULOCYTES NFR BLD: 0.6 %
LYMPHOCYTES # BLD AUTO: 1.9 X10(3) UL (ref 1–4)
LYMPHOCYTES NFR BLD AUTO: 38.9 %
M PROTEIN MFR SERPL ELPH: 7.3 G/DL (ref 6.4–8.2)
MCH RBC QN AUTO: 29.7 PG (ref 26–34)
MCHC RBC AUTO-ENTMCNC: 32.9 G/DL (ref 31–37)
MCV RBC AUTO: 90.2 FL
MONOCYTES # BLD AUTO: 0.53 X10(3) UL (ref 0.1–1)
MONOCYTES NFR BLD AUTO: 10.9 %
NEUTROPHILS # BLD AUTO: 2.27 X10 (3) UL (ref 1.5–7.7)
NEUTROPHILS # BLD AUTO: 2.27 X10(3) UL (ref 1.5–7.7)
NEUTROPHILS NFR BLD AUTO: 46.5 %
OSMOLALITY SERPL CALC.SUM OF ELEC: 290 MOSM/KG (ref 275–295)
PATIENT FASTING Y/N/NP: NO
PLATELET # BLD AUTO: 165 10(3)UL (ref 150–450)
POTASSIUM SERPL-SCNC: 4.1 MMOL/L (ref 3.5–5.1)
RBC # BLD AUTO: 4.61 X10(6)UL
SED RATE-ML: 15 MM/HR
SODIUM SERPL-SCNC: 140 MMOL/L (ref 136–145)
WBC # BLD AUTO: 4.9 X10(3) UL (ref 4–11)

## 2021-08-09 PROCEDURE — 99214 OFFICE O/P EST MOD 30 MIN: CPT | Performed by: SPECIALIST

## 2021-08-09 NOTE — PROGRESS NOTES
Patient is here today for annual follow up with Molly Dyer for Hodgkin's Lymphoma. Patient denies pain. Stated she is feeling good. Medication list and medical history were reviewed and updated.      Education Record    Learner:  Patient    Disease / Radha Galloway

## 2021-08-10 ENCOUNTER — HOSPITAL ENCOUNTER (OUTPATIENT)
Dept: ULTRASOUND IMAGING | Age: 51
Discharge: HOME | End: 2021-08-10
Attending: SPECIALIST
Payer: COMMERCIAL

## 2021-08-10 DIAGNOSIS — R59.1 LYMPHADENOPATHY OF HEAD AND NECK: ICD-10-CM

## 2021-08-10 DIAGNOSIS — C81.04 NODULAR LYMPHOCYTE PREDOMINANT HODGKIN LYMPHOMA OF LYMPH NODES OF AXILLA (HCC): ICD-10-CM

## 2021-08-10 PROCEDURE — 76536 US EXAM OF HEAD AND NECK: CPT | Performed by: SPECIALIST

## 2021-08-16 NOTE — PATIENT INSTRUCTIONS
Plan:  Continue with medications: ozempic 0.25mg dose per week X 4 weeks) and then increase to 0.5mg X 4 weeks  Can stop phentermine 15mg and zonisamide 100mg  Can try over the counter melontin   Follow up with me in 1 month  Schedule follow up appointment A-T Advancement Flap Text: The defect edges were debeveled with a #15 scalpel blade.  Given the location of the defect, shape of the defect and the proximity to free margins an A-T advancement flap was deemed most appropriate.  Using a sterile surgical marker, an appropriate advancement flap was drawn incorporating the defect and placing the expected incisions within the relaxed skin tension lines where possible.    The area thus outlined was incised deep to adipose tissue with a #15 scalpel blade.  The skin margins were undermined to an appropriate distance in all directions utilizing iris scissors.

## 2021-08-19 ENCOUNTER — OFFICE VISIT (OUTPATIENT)
Dept: INTERNAL MEDICINE CLINIC | Facility: CLINIC | Age: 51
End: 2021-08-19
Payer: COMMERCIAL

## 2021-08-19 VITALS
OXYGEN SATURATION: 99 % | WEIGHT: 247.81 LBS | SYSTOLIC BLOOD PRESSURE: 136 MMHG | HEIGHT: 62 IN | DIASTOLIC BLOOD PRESSURE: 70 MMHG | HEART RATE: 81 BPM | RESPIRATION RATE: 18 BRPM | BODY MASS INDEX: 45.6 KG/M2 | TEMPERATURE: 98 F

## 2021-08-19 DIAGNOSIS — Z00.00 LABORATORY EXAM ORDERED AS PART OF ROUTINE GENERAL MEDICAL EXAMINATION: ICD-10-CM

## 2021-08-19 DIAGNOSIS — Z00.00 ENCOUNTER FOR ROUTINE ADULT MEDICAL EXAMINATION: Primary | ICD-10-CM

## 2021-08-19 DIAGNOSIS — M25.559 HIP PAIN: ICD-10-CM

## 2021-08-19 DIAGNOSIS — E11.9 CONTROLLED TYPE 2 DIABETES MELLITUS WITHOUT COMPLICATION, WITHOUT LONG-TERM CURRENT USE OF INSULIN (HCC): ICD-10-CM

## 2021-08-19 LAB
CARTRIDGE LOT#: 790 NUMERIC
HEMOGLOBIN A1C: 5.6 % (ref 4.3–5.6)

## 2021-08-19 PROCEDURE — 3044F HG A1C LEVEL LT 7.0%: CPT | Performed by: INTERNAL MEDICINE

## 2021-08-19 PROCEDURE — 3008F BODY MASS INDEX DOCD: CPT | Performed by: INTERNAL MEDICINE

## 2021-08-19 PROCEDURE — 3078F DIAST BP <80 MM HG: CPT | Performed by: INTERNAL MEDICINE

## 2021-08-19 PROCEDURE — 99396 PREV VISIT EST AGE 40-64: CPT | Performed by: INTERNAL MEDICINE

## 2021-08-19 PROCEDURE — 83036 HEMOGLOBIN GLYCOSYLATED A1C: CPT | Performed by: INTERNAL MEDICINE

## 2021-08-19 PROCEDURE — 3075F SYST BP GE 130 - 139MM HG: CPT | Performed by: INTERNAL MEDICINE

## 2021-08-19 NOTE — PROGRESS NOTES
[facility] Internal Medicine Office Note  Chief Complaint:   No chief complaint on file.       HPI:   This is a 46year old female coming in for physical   HPI    She has some joint stiffness in hips   Also has plantar fascitis    She is seeing  Last 2 refills - have requested BMP - please ask her to have before next refill Thanks 8/10   • Bautista localization wire 1 site right (cpt=19281)  01/2020    papilloma   • Needle biopsy right  11/2019    papilloma   • Other surgical history  2008    neuroplasty w/ transposition of ulnar nerve at elbow left                        • Other surgica daily before meals. 180 capsule 3   • EVENING PRIMROSE OIL OR Take by mouth daily.      • VALACYCLOVIR  MG Oral Tab TAKE 1 TABLET(500 MG) BY MOUTH TWICE DAILY FOR 3 DAYS 30 tablet 0   • Azelastine HCl 0.1 % Nasal Solution 2 sprays by Nasal route 2 (t and atraumatic. Right Ear: Tympanic membrane normal.      Left Ear: Tympanic membrane normal.   Eyes:      Conjunctiva/sclera: Conjunctivae normal.   Cardiovascular:      Rate and Rhythm: Normal rate and regular rhythm.       Heart sounds: Normal heart PHYSICAL THERAPY & REHAB        Orders Placed This Encounter      Microalb/Creat Ratio, Random Urine      HEMOGLOBIN A1C      Lipid Panel      TSH W Reflex To Free T4      CBC With Differential With Platelet      Comp Metabolic Panel (14)      Meds & Refil

## 2021-08-20 ENCOUNTER — LAB ENCOUNTER (OUTPATIENT)
Dept: LAB | Age: 51
End: 2021-08-20
Attending: INTERNAL MEDICINE
Payer: COMMERCIAL

## 2021-08-20 DIAGNOSIS — Z00.00 LABORATORY EXAM ORDERED AS PART OF ROUTINE GENERAL MEDICAL EXAMINATION: ICD-10-CM

## 2021-08-20 DIAGNOSIS — E11.9 CONTROLLED TYPE 2 DIABETES MELLITUS WITHOUT COMPLICATION, WITHOUT LONG-TERM CURRENT USE OF INSULIN (HCC): ICD-10-CM

## 2021-08-20 LAB
ALBUMIN SERPL-MCNC: 3.2 G/DL (ref 3.4–5)
ALBUMIN/GLOB SERPL: 0.9 {RATIO} (ref 1–2)
ALP LIVER SERPL-CCNC: 87 U/L
ALT SERPL-CCNC: 22 U/L
ANION GAP SERPL CALC-SCNC: 3 MMOL/L (ref 0–18)
AST SERPL-CCNC: 16 U/L (ref 15–37)
BASOPHILS # BLD AUTO: 0.02 X10(3) UL (ref 0–0.2)
BASOPHILS NFR BLD AUTO: 0.4 %
BILIRUB SERPL-MCNC: 0.3 MG/DL (ref 0.1–2)
BUN BLD-MCNC: 10 MG/DL (ref 7–18)
CALCIUM BLD-MCNC: 8.8 MG/DL (ref 8.5–10.1)
CHLORIDE SERPL-SCNC: 111 MMOL/L (ref 98–112)
CHOLEST SMN-MCNC: 168 MG/DL (ref ?–200)
CO2 SERPL-SCNC: 28 MMOL/L (ref 21–32)
CREAT BLD-MCNC: 0.94 MG/DL
CREAT UR-SCNC: 249 MG/DL
EOSINOPHIL # BLD AUTO: 0.12 X10(3) UL (ref 0–0.7)
EOSINOPHIL NFR BLD AUTO: 2.4 %
ERYTHROCYTE [DISTWIDTH] IN BLOOD BY AUTOMATED COUNT: 14 %
GLOBULIN PLAS-MCNC: 3.6 G/DL (ref 2.8–4.4)
GLUCOSE BLD-MCNC: 82 MG/DL (ref 70–99)
HCT VFR BLD AUTO: 43.1 %
HDLC SERPL-MCNC: 73 MG/DL (ref 40–59)
HGB BLD-MCNC: 13.8 G/DL
IMM GRANULOCYTES # BLD AUTO: 0.02 X10(3) UL (ref 0–1)
IMM GRANULOCYTES NFR BLD: 0.4 %
LDLC SERPL CALC-MCNC: 83 MG/DL (ref ?–100)
LYMPHOCYTES # BLD AUTO: 1.94 X10(3) UL (ref 1–4)
LYMPHOCYTES NFR BLD AUTO: 39.4 %
M PROTEIN MFR SERPL ELPH: 6.8 G/DL (ref 6.4–8.2)
MCH RBC QN AUTO: 29.4 PG (ref 26–34)
MCHC RBC AUTO-ENTMCNC: 32 G/DL (ref 31–37)
MCV RBC AUTO: 91.7 FL
MICROALBUMIN UR-MCNC: 0.78 MG/DL
MICROALBUMIN/CREAT 24H UR-RTO: 3.1 UG/MG (ref ?–30)
MONOCYTES # BLD AUTO: 0.47 X10(3) UL (ref 0.1–1)
MONOCYTES NFR BLD AUTO: 9.5 %
NEUTROPHILS # BLD AUTO: 2.36 X10 (3) UL (ref 1.5–7.7)
NEUTROPHILS # BLD AUTO: 2.36 X10(3) UL (ref 1.5–7.7)
NEUTROPHILS NFR BLD AUTO: 47.9 %
NONHDLC SERPL-MCNC: 95 MG/DL (ref ?–130)
OSMOLALITY SERPL CALC.SUM OF ELEC: 292 MOSM/KG (ref 275–295)
PATIENT FASTING Y/N/NP: YES
PATIENT FASTING Y/N/NP: YES
PLATELET # BLD AUTO: 172 10(3)UL (ref 150–450)
POTASSIUM SERPL-SCNC: 3.8 MMOL/L (ref 3.5–5.1)
RBC # BLD AUTO: 4.7 X10(6)UL
SODIUM SERPL-SCNC: 142 MMOL/L (ref 136–145)
TRIGL SERPL-MCNC: 59 MG/DL (ref 30–149)
TSI SER-ACNC: 0.65 MIU/ML (ref 0.36–3.74)
VLDLC SERPL CALC-MCNC: 9 MG/DL (ref 0–30)
WBC # BLD AUTO: 4.9 X10(3) UL (ref 4–11)

## 2021-08-20 PROCEDURE — 3061F NEG MICROALBUMINURIA REV: CPT | Performed by: NURSE PRACTITIONER

## 2021-08-20 PROCEDURE — 80061 LIPID PANEL: CPT | Performed by: INTERNAL MEDICINE

## 2021-08-20 PROCEDURE — 82570 ASSAY OF URINE CREATININE: CPT | Performed by: INTERNAL MEDICINE

## 2021-08-20 PROCEDURE — 82043 UR ALBUMIN QUANTITATIVE: CPT | Performed by: INTERNAL MEDICINE

## 2021-08-20 PROCEDURE — 80050 GENERAL HEALTH PANEL: CPT | Performed by: INTERNAL MEDICINE

## 2021-08-30 NOTE — TELEPHONE ENCOUNTER
Requesting Wegovy 1 mg  LOV: 7/20/21  RTC: 2 months  Last Relevant Labs: na  Filled: 6/28/21 #4 with 0 refills    Future Appointments   Date Time Provider Karina Piper   9/7/2021  3:40 PM Michael Farley APRN [facility]

## 2021-08-31 RX ORDER — SEMAGLUTIDE 1 MG/.5ML
INJECTION, SOLUTION SUBCUTANEOUS
Qty: 2 ML | Refills: 0 | OUTPATIENT
Start: 2021-08-31

## 2021-08-31 RX ORDER — SEMAGLUTIDE 1.7 MG/.75ML
1.7 INJECTION, SOLUTION SUBCUTANEOUS WEEKLY
Qty: 3 ML | Refills: 0 | Status: SHIPPED | OUTPATIENT
Start: 2021-08-31 | End: 2021-09-07

## 2021-08-31 NOTE — TELEPHONE ENCOUNTER
Patient left me a message with same information on nurse line. I tried to call her back and left message to verify we are not jumping to 1.7 mg wegovy so she wont be sick.

## 2021-09-01 ENCOUNTER — TELEPHONE (OUTPATIENT)
Dept: INTERNAL MEDICINE CLINIC | Facility: CLINIC | Age: 51
End: 2021-09-01

## 2021-09-01 NOTE — TELEPHONE ENCOUNTER
Pt left message on nurse line to discuss medicine. I called her back and got voicemail asking her to let me know how I can help her.

## 2021-09-02 NOTE — TELEPHONE ENCOUNTER
Patient left message her wegovy needs a PA. I tried calling pharmacy and nobody answers.   No paperwork received to do pa

## 2021-09-02 NOTE — TELEPHONE ENCOUNTER
PA entered on CMM for Alondra SNOW (Sung: [facility]- PSYCHIATRY & ADDICTIVE MED)    Awaiting determination

## 2021-09-03 NOTE — TELEPHONE ENCOUNTER
Received denial from [employer] for [facility]KAYLYNN LIMA. Weight loss meds are not cover in plan. Please advise.

## 2021-09-03 NOTE — TELEPHONE ENCOUNTER
Pt left message on nurse line that wegovy needs PA. She wanted to know if we did this. LM applied for PA 9/2/21 and can take 7-10 days. Will let her know as soon as decision is received.

## 2021-09-07 ENCOUNTER — OFFICE VISIT (OUTPATIENT)
Dept: INTERNAL MEDICINE CLINIC | Facility: CLINIC | Age: 51
End: 2021-09-07
Payer: COMMERCIAL

## 2021-09-07 VITALS
RESPIRATION RATE: 17 BRPM | OXYGEN SATURATION: 96 % | HEART RATE: 85 BPM | WEIGHT: 248 LBS | HEIGHT: 62 IN | BODY MASS INDEX: 45.64 KG/M2 | SYSTOLIC BLOOD PRESSURE: 126 MMHG | DIASTOLIC BLOOD PRESSURE: 70 MMHG

## 2021-09-07 DIAGNOSIS — E55.9 VITAMIN D DEFICIENCY: ICD-10-CM

## 2021-09-07 DIAGNOSIS — I10 ESSENTIAL HYPERTENSION, BENIGN: ICD-10-CM

## 2021-09-07 DIAGNOSIS — G47.33 OSA ON CPAP: ICD-10-CM

## 2021-09-07 DIAGNOSIS — Z51.81 ENCOUNTER FOR THERAPEUTIC DRUG MONITORING: Primary | ICD-10-CM

## 2021-09-07 DIAGNOSIS — Z99.89 OSA ON CPAP: ICD-10-CM

## 2021-09-07 DIAGNOSIS — F43.9 STRESS: ICD-10-CM

## 2021-09-07 DIAGNOSIS — E11.9 CONTROLLED TYPE 2 DIABETES MELLITUS WITHOUT COMPLICATION, WITHOUT LONG-TERM CURRENT USE OF INSULIN (HCC): ICD-10-CM

## 2021-09-07 PROCEDURE — 3008F BODY MASS INDEX DOCD: CPT | Performed by: NURSE PRACTITIONER

## 2021-09-07 PROCEDURE — 99214 OFFICE O/P EST MOD 30 MIN: CPT | Performed by: NURSE PRACTITIONER

## 2021-09-07 PROCEDURE — 3074F SYST BP LT 130 MM HG: CPT | Performed by: NURSE PRACTITIONER

## 2021-09-07 PROCEDURE — 3078F DIAST BP <80 MM HG: CPT | Performed by: NURSE PRACTITIONER

## 2021-09-07 RX ORDER — SEMAGLUTIDE 1.34 MG/ML
1 INJECTION, SOLUTION SUBCUTANEOUS WEEKLY
Qty: 9 ML | Refills: 0 | Status: SHIPPED | OUTPATIENT
Start: 2021-09-07 | End: 2021-12-26

## 2021-09-07 RX ORDER — DIETHYLPROPION HYDROCHLORIDE 25 MG/1
1 TABLET ORAL 2 TIMES DAILY WITH MEALS
Qty: 60 TABLET | Refills: 1 | Status: SHIPPED | OUTPATIENT
Start: 2021-09-07 | End: 2021-10-07

## 2021-09-07 NOTE — TELEPHONE ENCOUNTER
Please tell her it got denied. I believe she still has old ozempic that she can go back on. Does she want to do this? When and what dose was her last wegovy?

## 2021-09-07 NOTE — PROGRESS NOTES
HISTORY OF PRESENT ILLNESS  Patient presents with:  Weight Check: up 2    Magali Duarte is a 46year old female here for follow up with medical weight loss program for the treatment of overweight, obesity, or morbid obesity.      Up #2 lbs  Compliant on oz x3  SKIN: no rashes, no suspicious lesions  HEENT: conjunctiva pink, sclera non icteric, PERRLA  NECK: supple, no adenopathy  LUNGS: CTA in all fields, breathing non labored  CARDIO: RRR without murmur  GI: +BS, NT/ND, no masses or HSM  EXTREMITIES: no cya [address] Does not apply Misc, TEST X1 DAILY, Disp: 1 Box, Rfl: 1  Cholecalciferol (VITAMIN D) 2000 units Oral Cap, Take 2,000 Units by mouth daily. , Disp: , Rfl:   Probiotic Product (VSL#3) Oral Cap, Take 1 capsule by mouth daily. , past   · Reviewed labs  · Continue with vitamin d OTC  · HTN  stable, follows with PCP  · DM type 2, reviewed last a1c 6.0% on 1/2021- continue with ozempic  · ANAHY- continue cpap machine nightly   · Stress- discussed someway's to help with meal prep (optio

## 2021-09-08 NOTE — PATIENT INSTRUCTIONS
We are here to support you with weight loss, but please remember that you still need your primary care provider for your routine health maintenance.       PLAN:  Will continue with ozempic 1mg weekly  Will trial diethylpropion 25mg (1 tablet 2 times per day towards calorie number per day)                   ** Daily INPUT> Look at nutrition section-- \"nutrients\" and it will break down your macros for the day (ie. Protein, carbs, fibers, sugars and fats). Try to stay within these numbers daily     2.  \"7 elena

## 2021-09-10 ENCOUNTER — HOSPITAL ENCOUNTER (OUTPATIENT)
Age: 51
Discharge: HOME | End: 2021-09-10
Payer: COMMERCIAL

## 2021-09-10 ENCOUNTER — TELEPHONE (OUTPATIENT)
Dept: INTERNAL MEDICINE CLINIC | Facility: CLINIC | Age: 51
End: 2021-09-10

## 2021-09-10 VITALS
TEMPERATURE: 97 F | BODY MASS INDEX: 45.08 KG/M2 | HEIGHT: 62 IN | DIASTOLIC BLOOD PRESSURE: 74 MMHG | SYSTOLIC BLOOD PRESSURE: 161 MMHG | WEIGHT: 245 LBS | HEART RATE: 95 BPM | RESPIRATION RATE: 18 BRPM

## 2021-09-10 DIAGNOSIS — J30.2 SEASONAL ALLERGIES: Primary | ICD-10-CM

## 2021-09-10 LAB — SARS-COV-2 RNA RESP QL NAA+PROBE: NOT DETECTED

## 2021-09-10 PROCEDURE — 99213 OFFICE O/P EST LOW 20 MIN: CPT

## 2021-09-10 RX ORDER — AZELASTINE 1 MG/ML
2 SPRAY, METERED NASAL 2 TIMES DAILY PRN
Qty: 1 EACH | Refills: 1 | Status: SHIPPED | OUTPATIENT
Start: 2021-09-10

## 2021-09-10 NOTE — TELEPHONE ENCOUNTER
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  Pt requesting an order for a test.     Reports of sxs headache, runny nose     Please advise

## 2021-09-10 NOTE — ED PROVIDER NOTES
Patient Seen in: [facility] [place]      History   Patient presents with:  Cough/URI    Stated Complaint: COVID TEST    HPI/Subjective:   57-year-old female who presents the IC with complaints of nasal congestion for last couple days.   Patient ha BIOPSY STEREO NODULE 1 SITE RIGHT (CPT=19081)  04/2018    3 SITE, RADIAL SCAR   • ROGELIO BIOPSY STEREO NODULE 2 SITE BILAT (CPT=19081/88668)  2009    NON HODGKINS LYMPHOMA/RADIATION THERAPY 8/10   • ROGELIO LOCALIZATION WIRE 1 SITE RIGHT (CPT=19281)  01/2020    p auscultation. There is no respiratory distress noted. HEART/CARDIOVASCULAR: Regular. There is no tachycardia. SKIN: There is no rash. NEURO: The patient is awake, alert, and oriented. The patient is cooperative.     ED Course     Labs Reviewed   PRANAV

## 2021-09-13 ENCOUNTER — OFFICE VISIT (OUTPATIENT)
Dept: INTERNAL MEDICINE CLINIC | Facility: CLINIC | Age: 51
End: 2021-09-13
Payer: COMMERCIAL

## 2021-09-13 VITALS
DIASTOLIC BLOOD PRESSURE: 72 MMHG | BODY MASS INDEX: 45.82 KG/M2 | HEIGHT: 62 IN | RESPIRATION RATE: 18 BRPM | HEART RATE: 92 BPM | SYSTOLIC BLOOD PRESSURE: 136 MMHG | WEIGHT: 249 LBS | TEMPERATURE: 99 F

## 2021-09-13 DIAGNOSIS — G47.33 OSA ON CPAP: ICD-10-CM

## 2021-09-13 DIAGNOSIS — Z99.89 OSA ON CPAP: ICD-10-CM

## 2021-09-13 DIAGNOSIS — I10 ESSENTIAL HYPERTENSION, BENIGN: ICD-10-CM

## 2021-09-13 DIAGNOSIS — E11.9 CONTROLLED TYPE 2 DIABETES MELLITUS WITHOUT COMPLICATION, WITHOUT LONG-TERM CURRENT USE OF INSULIN (HCC): ICD-10-CM

## 2021-09-13 DIAGNOSIS — E66.01 CLASS 3 SEVERE OBESITY DUE TO EXCESS CALORIES WITH SERIOUS COMORBIDITY AND BODY MASS INDEX (BMI) OF 40.0 TO 44.9 IN ADULT (HCC): ICD-10-CM

## 2021-09-13 DIAGNOSIS — Z01.818 PREOP EXAM FOR INTERNAL MEDICINE: Primary | ICD-10-CM

## 2021-09-13 DIAGNOSIS — C81.04 NODULAR LYMPHOCYTE PREDOMINANT HODGKIN LYMPHOMA OF LYMPH NODES OF AXILLA (HCC): ICD-10-CM

## 2021-09-13 PROCEDURE — 3078F DIAST BP <80 MM HG: CPT | Performed by: INTERNAL MEDICINE

## 2021-09-13 PROCEDURE — 3008F BODY MASS INDEX DOCD: CPT | Performed by: INTERNAL MEDICINE

## 2021-09-13 PROCEDURE — 99244 OFF/OP CNSLTJ NEW/EST MOD 40: CPT | Performed by: INTERNAL MEDICINE

## 2021-09-13 PROCEDURE — 93000 ELECTROCARDIOGRAM COMPLETE: CPT | Performed by: INTERNAL MEDICINE

## 2021-09-13 PROCEDURE — 3075F SYST BP GE 130 - 139MM HG: CPT | Performed by: INTERNAL MEDICINE

## 2021-09-13 NOTE — PROGRESS NOTES
Preoperative evaluation    Patient presents with:  Pre-Op: DOS 9/23/2021, Dr. Brandy Rivera.  R index finger      Harjit Calhoun is a 46year old female who presents for a pre-operative physical exam.   Harjit Calhoun is scheduled for a right index finger procedur daily before meals. , Disp: 180 capsule, Rfl: 3  •  EVENING PRIMROSE OIL OR, Take by mouth daily. , Disp: , Rfl:   •  VALACYCLOVIR  MG Oral Tab, TAKE 1 TABLET(500 MG) BY MOUTH TWICE DAILY FOR 3 DAYS, Disp: 30 tablet, Rfl: 0  •  ACCU-CHEK FASTCLIX [facility] maternal grandfather, and paternal aunt; Cancer (age of onset: 37) in her self; Diabetes in her mother; Heart Attack in her father; Hypertension in her father; Other in her mother.   Social: Social History    Tobacco Use      Smoking status: Never Smoker hypertension, benign  Plan: well controlled off medication    (G47.33,  Z99.89) ANAHY on CPAP  Plan: compliant     (E11.9) Controlled type 2 diabetes mellitus without complication, without long-term current use of insulin ([facility] [address]  Plan: well controlled.  On oze

## 2021-09-14 ENCOUNTER — TELEPHONE (OUTPATIENT)
Dept: INTERNAL MEDICINE CLINIC | Facility: CLINIC | Age: 51
End: 2021-09-14

## 2021-09-14 NOTE — TELEPHONE ENCOUNTER
Faxed over EKG and Progress notes to Dr Easton Hassan.  #[phone]      Copy of EKG place in accordion pod #2, original placed for scan

## 2021-11-01 ENCOUNTER — OFFICE VISIT (OUTPATIENT)
Dept: INTERNAL MEDICINE CLINIC | Facility: CLINIC | Age: 51
End: 2021-11-01
Payer: COMMERCIAL

## 2021-11-01 VITALS
BODY MASS INDEX: 45.64 KG/M2 | HEART RATE: 80 BPM | DIASTOLIC BLOOD PRESSURE: 78 MMHG | SYSTOLIC BLOOD PRESSURE: 126 MMHG | WEIGHT: 248 LBS | RESPIRATION RATE: 16 BRPM | HEIGHT: 62 IN

## 2021-11-01 DIAGNOSIS — Z51.81 ENCOUNTER FOR THERAPEUTIC DRUG MONITORING: Primary | ICD-10-CM

## 2021-11-01 DIAGNOSIS — E11.9 CONTROLLED TYPE 2 DIABETES MELLITUS WITHOUT COMPLICATION, WITHOUT LONG-TERM CURRENT USE OF INSULIN (HCC): ICD-10-CM

## 2021-11-01 DIAGNOSIS — Z99.89 OSA ON CPAP: ICD-10-CM

## 2021-11-01 DIAGNOSIS — I10 ESSENTIAL HYPERTENSION, BENIGN: ICD-10-CM

## 2021-11-01 DIAGNOSIS — E55.9 VITAMIN D DEFICIENCY: ICD-10-CM

## 2021-11-01 DIAGNOSIS — F43.9 STRESS: ICD-10-CM

## 2021-11-01 DIAGNOSIS — G47.33 OSA ON CPAP: ICD-10-CM

## 2021-11-01 PROCEDURE — 3074F SYST BP LT 130 MM HG: CPT | Performed by: NURSE PRACTITIONER

## 2021-11-01 PROCEDURE — 3078F DIAST BP <80 MM HG: CPT | Performed by: NURSE PRACTITIONER

## 2021-11-01 PROCEDURE — 3008F BODY MASS INDEX DOCD: CPT | Performed by: NURSE PRACTITIONER

## 2021-11-01 PROCEDURE — 99213 OFFICE O/P EST LOW 20 MIN: CPT | Performed by: NURSE PRACTITIONER

## 2021-11-01 RX ORDER — DIETHYLPROPION HYDROCHLORIDE 25 MG/1
1 TABLET ORAL 2 TIMES DAILY WITH MEALS
Qty: 60 TABLET | Refills: 1 | Status: SHIPPED | OUTPATIENT
Start: 2021-11-08

## 2021-11-01 RX ORDER — DIETHYLPROPION HYDROCHLORIDE 25 MG/1
1 TABLET ORAL 2 TIMES DAILY WITH MEALS
COMMUNITY
Start: 2021-10-11 | End: 2021-11-01

## 2021-11-01 NOTE — PROGRESS NOTES
HISTORY OF PRESENT ILLNESS  Patient presents with:  Weight Check: no weight change. Saniya Lucero is a 46year old female here for follow up with medical weight loss program for the treatment of overweight, obesity, or morbid obesity.      Down 0 lbs conjunctiva pink, sclera non icteric, PERRLA  NECK: supple, no adenopathy  LUNGS: CTA in all fields, breathing non labored  CARDIO: RRR without murmur  GI: +BS, NT/ND, no masses or HSM  EXTREMITIES: no cyanosis, no clubbing, no edema  PSYCH: pleasant, coop Misc, TEST X1 DAILY, Disp: 1 Box, Rfl: 1  Cholecalciferol (VITAMIN D) 2000 units Oral Cap, Take 2,000 Units by mouth daily. , Disp: , Rfl:   Probiotic Product (VSL#3) Oral Cap, Take 1 capsule by mouth daily. , Disp: 30 capsule, Rfl: 3  Fluticasone Propiona 6.0% on 1/2021- continue with ozempic  · ANAHY- continue cpap machine nightly   · Stress- discussed someway's to help with meal prep (options provided) and save time  · Needs to start trying new ways to cook with vegs (mentioned again)   · Nutrition: Low car

## 2021-11-01 NOTE — PATIENT INSTRUCTIONS
We are here to support you with weight loss, but please remember that you still need your primary care provider for your routine health maintenance.       PLAN:  Will continue with ozempic 1mg weekly and diethylproion 25mg (2 times per day)   Check out fact count exercise towards calorie number per day)                   ** Daily INPUT> Look at nutrition section-- \"nutrients\" and it will break down your macros for the day (ie. Protein, carbs, fibers, sugars and fats).  Try to stay within these numbers daily

## 2021-11-29 ENCOUNTER — HOSPITAL ENCOUNTER (OUTPATIENT)
Dept: MRI IMAGING | Facility: HOSPITAL | Age: 51
Discharge: HOME | End: 2021-11-29
Attending: SURGERY
Payer: COMMERCIAL

## 2021-11-29 DIAGNOSIS — Z91.89 AT HIGH RISK FOR BREAST CANCER: ICD-10-CM

## 2021-11-29 DIAGNOSIS — R92.8 ABNORMAL MRI, BREAST: ICD-10-CM

## 2021-11-29 PROCEDURE — 77049 MRI BREAST C-+ W/CAD BI: CPT | Performed by: SURGERY

## 2021-11-29 PROCEDURE — A9575 INJ GADOTERATE MEGLUMI 0.1ML: HCPCS

## 2021-12-02 DIAGNOSIS — R92.8 ABNORMAL MRI, BREAST: Primary | ICD-10-CM

## 2021-12-07 ENCOUNTER — TELEPHONE (OUTPATIENT)
Dept: GENERAL RADIOLOGY | Facility: HOSPITAL | Age: 51
End: 2021-12-07

## 2021-12-07 NOTE — IMAGING NOTE
Marcell Blake is recommended for a magnetic resonance image guided biopsy of the right breast x 2 sites by Morgan Marr. 1510: Spoke with Ms. Alycia Marcus at this time. History obtained:   At high risk for breast cancer  Abnormal MRI, breast   History of lympho Take 1 tablet by mouth 2 (two) times daily with meals. 60 tablet 1   • Azelastine HCl 0.1 % Nasal Solution 2 sprays by Nasal route 2 (two) times daily as needed for Rhinitis.  1 each 1   • Semaglutide, 1 MG/DOSE, (OZEMPIC, 1 MG/DOSE,) 4 MG/3ML Subcutaneous

## 2021-12-08 ENCOUNTER — TELEPHONE (OUTPATIENT)
Dept: GENERAL RADIOLOGY | Facility: HOSPITAL | Age: 51
End: 2021-12-08

## 2021-12-08 NOTE — TELEPHONE ENCOUNTER
1630: Jm Orellana contacted the breast care coordinator regarding scheduling recommended biopsy procedure. Informed Ms. Naima Streeter that the breast center schedulers had received the order and were working to coordinate  an appointment with the Radiology [facility]

## 2021-12-15 ENCOUNTER — HOSPITAL ENCOUNTER (OUTPATIENT)
Dept: MAMMOGRAPHY | Facility: HOSPITAL | Age: 51
Discharge: HOME | End: 2021-12-15
Attending: SURGERY
Payer: COMMERCIAL

## 2021-12-15 ENCOUNTER — HOSPITAL ENCOUNTER (OUTPATIENT)
Dept: MRI IMAGING | Facility: HOSPITAL | Age: 51
Discharge: HOME | End: 2021-12-15
Attending: SURGERY
Payer: COMMERCIAL

## 2021-12-15 DIAGNOSIS — R92.8 ABNORMAL MRI, BREAST: ICD-10-CM

## 2021-12-15 PROCEDURE — 19086 BX BREAST ADD LESION MR IMAG: CPT | Performed by: SURGERY

## 2021-12-15 PROCEDURE — A9575 INJ GADOTERATE MEGLUMI 0.1ML: HCPCS | Performed by: SURGERY

## 2021-12-15 PROCEDURE — 19085 BX BREAST 1ST LESION MR IMAG: CPT | Performed by: SURGERY

## 2021-12-23 ENCOUNTER — PATIENT MESSAGE (OUTPATIENT)
Dept: INTERNAL MEDICINE CLINIC | Facility: CLINIC | Age: 51
End: 2021-12-23

## 2021-12-23 DIAGNOSIS — Z51.81 ENCOUNTER FOR THERAPEUTIC DRUG MONITORING: ICD-10-CM

## 2021-12-23 DIAGNOSIS — E11.9 CONTROLLED TYPE 2 DIABETES MELLITUS WITHOUT COMPLICATION, WITHOUT LONG-TERM CURRENT USE OF INSULIN (HCC): ICD-10-CM

## 2021-12-23 NOTE — TELEPHONE ENCOUNTER
From: Rubio Yuan  To: DOREEN Krishnan  Sent: 12/23/2021 7:53 AM CST  Subject: Medicine     Good morning,     I did not receive a prescription this week. Can a 90 day supply be sent to the local pharmacy?      Tammie Valdez     Thank you, Ignacio Nielson

## 2021-12-23 NOTE — TELEPHONE ENCOUNTER
Requesting ozempic 1 mg  LOV: 11/1/21  RTC: 2 months  Last Relevant Labs: 8/19/21  Filled: 9/7/21 #9ml with 0 refills    Future Appointments   Date Time Provider Karina Piper   1/3/2022  3:40 PM Gladys Christina APRN [facility]

## 2021-12-26 RX ORDER — SEMAGLUTIDE 1.34 MG/ML
1 INJECTION, SOLUTION SUBCUTANEOUS WEEKLY
Qty: 9 ML | Refills: 0 | Status: SHIPPED | OUTPATIENT
Start: 2021-12-26

## 2022-01-03 ENCOUNTER — HOSPITAL ENCOUNTER (OUTPATIENT)
Age: 52
Discharge: HOME | End: 2022-01-03
Payer: COMMERCIAL

## 2022-01-03 ENCOUNTER — TELEMEDICINE (OUTPATIENT)
Dept: INTERNAL MEDICINE CLINIC | Facility: CLINIC | Age: 52
End: 2022-01-03

## 2022-01-03 VITALS
BODY MASS INDEX: 44.16 KG/M2 | DIASTOLIC BLOOD PRESSURE: 62 MMHG | RESPIRATION RATE: 18 BRPM | SYSTOLIC BLOOD PRESSURE: 141 MMHG | HEIGHT: 62 IN | TEMPERATURE: 97 F | HEART RATE: 84 BPM | WEIGHT: 240 LBS | OXYGEN SATURATION: 95 %

## 2022-01-03 DIAGNOSIS — G47.33 OSA ON CPAP: ICD-10-CM

## 2022-01-03 DIAGNOSIS — I10 ESSENTIAL HYPERTENSION, BENIGN: ICD-10-CM

## 2022-01-03 DIAGNOSIS — E55.9 VITAMIN D DEFICIENCY: ICD-10-CM

## 2022-01-03 DIAGNOSIS — E11.9 CONTROLLED TYPE 2 DIABETES MELLITUS WITHOUT COMPLICATION, WITHOUT LONG-TERM CURRENT USE OF INSULIN (HCC): ICD-10-CM

## 2022-01-03 DIAGNOSIS — Z99.89 OSA ON CPAP: ICD-10-CM

## 2022-01-03 DIAGNOSIS — U07.1 COVID-19 VIRUS INFECTION: Primary | ICD-10-CM

## 2022-01-03 DIAGNOSIS — Z51.81 ENCOUNTER FOR THERAPEUTIC DRUG MONITORING: Primary | ICD-10-CM

## 2022-01-03 LAB — SARS-COV-2 RNA RESP QL NAA+PROBE: DETECTED

## 2022-01-03 PROCEDURE — 99213 OFFICE O/P EST LOW 20 MIN: CPT | Performed by: NURSE PRACTITIONER

## 2022-01-03 PROCEDURE — 99212 OFFICE O/P EST SF 10 MIN: CPT

## 2022-01-03 PROCEDURE — 99213 OFFICE O/P EST LOW 20 MIN: CPT

## 2022-01-03 NOTE — PATIENT INSTRUCTIONS
We are here to support you with weight loss, but please remember that you still need your primary care provider for your routine health maintenance.       PLAN:  Will continue with ozempic 1mg weekly and diethylpropion   Try to add in vegs  Follow up with dominic per day)                   ** Daily INPUT> Look at nutrition section-- \"nutrients\" and it will break down your macros for the day (ie. Protein, carbs, fibers, sugars and fats). Try to stay within these numbers daily     2.  \"7 minute workout\" to help wi

## 2022-01-03 NOTE — ED INITIAL ASSESSMENT (HPI)
Patient c/o HA, body aches,nasal congestion, chills and fatigue since last Wednesday. Also reports stomach ache, denies N/V/D. Patient is vaccinated with no booster. Possible exposure to covid last Monday.

## 2022-01-03 NOTE — PROGRESS NOTES
[facility] WEIGHT MANAGEMENT VIRTUAL ENCOUNTER     Madison Nascimento verbally consents to a Virtual/Telephone Check-In service on 01/03/22   Patient understands and accepts financial responsibility for any deductible, co-insurance and/or co-pays associat EYES: conjunctiva pink  HEENT: atraumatic, normocephalic  LUNGS: normal work of breathing, non labored  CARDIO: normal work, no exertion  EXTREMITIES: no cyanosis, no clubbing, no edema  NEURO: Oriented times three  PSYCH: pleasant, cooperative, normal m as needed for Rhinitis., Disp: 1 each, Rfl: 1  omeprazole 20 MG Oral Capsule Delayed Release, Take 1 capsule (20 mg total) by mouth 2 (two) times daily before meals. , Disp: 180 capsule, Rfl: 3  EVENING PRIMROSE OIL OR, Take by mouth daily. , Disp: , Rfl: ozempic  · ANAHY- continue cpap machine nightly   · Stress- discussed someway's to help with meal prep (options provided) and save time  · Needs to start trying new ways to cook with vegs (mentioned again)   · Advised to monitor blood pressure and pulse at h decision-making and tests are ordered as detailed in the plan of care above.      Govind Constantino, DOREEN  1/3/2022

## 2022-01-03 NOTE — ED PROVIDER NOTES
Patient Seen in: [facility] [place]      History   Patient presents with:  Headache  Body ache and/or chills    Stated Complaint: body aches,headache    Subjective:   HPI  47-year-old female who is Covid vaccinated without the booster presents co neuroplasty w/ transposition of ulnar nerve at elbow left                        • OTHER SURGICAL HISTORY  2009    uterine myomectomy for uterine fibroids   • OTHER SURGICAL HISTORY  2018    R breast bx (x3)   • REVISE MEDIAN N/CARPAL TUNNEL SURG      l SARS-COV-2 BY PCR - Abnormal; Notable for the following components:       Result Value    Rapid SARS-CoV-2 by PCR Detected (*)     All other components within normal limits                   MDM    Covid test is positive.   Patient instructed on symptomatic

## 2022-01-12 ENCOUNTER — TELEMEDICINE (OUTPATIENT)
Dept: INTERNAL MEDICINE CLINIC | Facility: CLINIC | Age: 52
End: 2022-01-12

## 2022-01-12 DIAGNOSIS — J01.90 ACUTE SINUSITIS, RECURRENCE NOT SPECIFIED, UNSPECIFIED LOCATION: ICD-10-CM

## 2022-01-12 DIAGNOSIS — U07.1 COVID-19 VIRUS INFECTION: Primary | ICD-10-CM

## 2022-01-12 PROCEDURE — 99213 OFFICE O/P EST LOW 20 MIN: CPT | Performed by: PHYSICIAN ASSISTANT

## 2022-01-12 RX ORDER — AMOXICILLIN AND CLAVULANATE POTASSIUM 875; 125 MG/1; MG/1
1 TABLET, FILM COATED ORAL 2 TIMES DAILY
Qty: 14 TABLET | Refills: 0 | Status: SHIPPED | OUTPATIENT
Start: 2022-01-12 | End: 2022-01-19

## 2022-01-12 NOTE — PROGRESS NOTES
Virtual Video Check-In     This visit is conducted using Telemedicine with live, interactive video and audio. Lara Pickard, who has verified his/her identification by name and , verbally consents to a Virtual/Telephone Check-In visit on 22. Take 2,000 Units by mouth daily. • Probiotic Product (VSL#3) Oral Cap Take 1 capsule by mouth daily.  30 capsule 3   • Fluticasone Propionate (FLONASE) 50 MCG/ACT Nasal Suspension 2 sprays in each nostril daily as needed 1 Bottle 0   • cetirizine 10 M bilateral   • TONSILLECTOMY  2000    w/ adenoidectomy   • UPPER GI ENDOSCOPY,EXAM  2010      Social History    Tobacco Use      Smoking status: Never Smoker      Smokeless tobacco: Never Used    Vaping Use      Vaping Use: Never used    Alcohol use:  Yes There are limitations of this visit as physical exam could not be fully performed. Every conscious effort was taken to allow for sufficient and adequate time.   Included in this visit, time may have been spent reviewing labs, medications, radiology tests

## 2022-02-07 ENCOUNTER — HOSPITAL ENCOUNTER (OUTPATIENT)
Dept: ULTRASOUND IMAGING | Age: 52
Discharge: HOME | End: 2022-02-07
Attending: SPECIALIST
Payer: COMMERCIAL

## 2022-02-07 DIAGNOSIS — R59.1 LYMPHADENOPATHY OF HEAD AND NECK: ICD-10-CM

## 2022-02-07 DIAGNOSIS — C81.04 NODULAR LYMPHOCYTE PREDOMINANT HODGKIN LYMPHOMA OF LYMPH NODES OF AXILLA (HCC): ICD-10-CM

## 2022-02-07 PROCEDURE — 76536 US EXAM OF HEAD AND NECK: CPT | Performed by: SPECIALIST

## 2022-02-21 ENCOUNTER — LAB ENCOUNTER (OUTPATIENT)
Dept: LAB | Age: 52
End: 2022-02-21
Attending: INTERNAL MEDICINE
Payer: COMMERCIAL

## 2022-02-21 ENCOUNTER — OFFICE VISIT (OUTPATIENT)
Dept: INTERNAL MEDICINE CLINIC | Facility: CLINIC | Age: 52
End: 2022-02-21
Payer: COMMERCIAL

## 2022-02-21 VITALS
WEIGHT: 242 LBS | RESPIRATION RATE: 16 BRPM | BODY MASS INDEX: 44.53 KG/M2 | OXYGEN SATURATION: 97 % | HEART RATE: 87 BPM | SYSTOLIC BLOOD PRESSURE: 119 MMHG | TEMPERATURE: 98 F | DIASTOLIC BLOOD PRESSURE: 60 MMHG | HEIGHT: 62 IN

## 2022-02-21 DIAGNOSIS — E66.01 CLASS 3 SEVERE OBESITY DUE TO EXCESS CALORIES WITH SERIOUS COMORBIDITY AND BODY MASS INDEX (BMI) OF 40.0 TO 44.9 IN ADULT (HCC): ICD-10-CM

## 2022-02-21 DIAGNOSIS — E11.9 CONTROLLED TYPE 2 DIABETES MELLITUS WITHOUT COMPLICATION, WITHOUT LONG-TERM CURRENT USE OF INSULIN (HCC): ICD-10-CM

## 2022-02-21 DIAGNOSIS — J30.9 CHRONIC ALLERGIC RHINITIS: ICD-10-CM

## 2022-02-21 DIAGNOSIS — I10 ESSENTIAL HYPERTENSION, BENIGN: Primary | ICD-10-CM

## 2022-02-21 DIAGNOSIS — G47.33 OSA ON CPAP: ICD-10-CM

## 2022-02-21 DIAGNOSIS — K21.00 GASTROESOPHAGEAL REFLUX DISEASE WITH ESOPHAGITIS, UNSPECIFIED WHETHER HEMORRHAGE: ICD-10-CM

## 2022-02-21 DIAGNOSIS — C81.04 NODULAR LYMPHOCYTE PREDOMINANT HODGKIN LYMPHOMA OF LYMPH NODES OF AXILLA (HCC): ICD-10-CM

## 2022-02-21 DIAGNOSIS — Z99.89 OSA ON CPAP: ICD-10-CM

## 2022-02-21 LAB
EST. AVERAGE GLUCOSE BLD GHB EST-MCNC: 120 MG/DL (ref 68–126)
HBA1C MFR BLD: 5.8 % (ref ?–5.7)

## 2022-02-21 PROCEDURE — 99214 OFFICE O/P EST MOD 30 MIN: CPT | Performed by: INTERNAL MEDICINE

## 2022-02-21 PROCEDURE — 83036 HEMOGLOBIN GLYCOSYLATED A1C: CPT | Performed by: INTERNAL MEDICINE

## 2022-02-21 PROCEDURE — 3074F SYST BP LT 130 MM HG: CPT | Performed by: INTERNAL MEDICINE

## 2022-02-21 PROCEDURE — 3078F DIAST BP <80 MM HG: CPT | Performed by: INTERNAL MEDICINE

## 2022-02-21 PROCEDURE — 3044F HG A1C LEVEL LT 7.0%: CPT | Performed by: NURSE PRACTITIONER

## 2022-02-21 PROCEDURE — 3008F BODY MASS INDEX DOCD: CPT | Performed by: INTERNAL MEDICINE

## 2022-02-21 NOTE — PATIENT INSTRUCTIONS
Continue to exercise at least 150 minutes a week and Eat a plant based diet   Please take 2000 IU of vitamin D daily    Please try the exercises for the back.  Apply heat/ice for 10 minutes three times a day    You can take 400 mg of ibuprofen daily with food as needed    Test for diabetes has been ordered    See you back in 6 months

## 2022-02-28 ENCOUNTER — OFFICE VISIT (OUTPATIENT)
Dept: INTERNAL MEDICINE CLINIC | Facility: CLINIC | Age: 52
End: 2022-02-28
Payer: COMMERCIAL

## 2022-02-28 VITALS
HEART RATE: 76 BPM | DIASTOLIC BLOOD PRESSURE: 80 MMHG | HEIGHT: 62 IN | RESPIRATION RATE: 16 BRPM | SYSTOLIC BLOOD PRESSURE: 118 MMHG | BODY MASS INDEX: 44.9 KG/M2 | OXYGEN SATURATION: 99 % | WEIGHT: 244 LBS

## 2022-02-28 DIAGNOSIS — Z99.89 OSA ON CPAP: ICD-10-CM

## 2022-02-28 DIAGNOSIS — Z51.81 ENCOUNTER FOR THERAPEUTIC DRUG MONITORING: Primary | ICD-10-CM

## 2022-02-28 DIAGNOSIS — G47.33 OSA ON CPAP: ICD-10-CM

## 2022-02-28 DIAGNOSIS — F43.9 STRESS: ICD-10-CM

## 2022-02-28 DIAGNOSIS — E11.9 CONTROLLED TYPE 2 DIABETES MELLITUS WITHOUT COMPLICATION, WITHOUT LONG-TERM CURRENT USE OF INSULIN (HCC): ICD-10-CM

## 2022-02-28 DIAGNOSIS — I10 ESSENTIAL HYPERTENSION, BENIGN: ICD-10-CM

## 2022-02-28 DIAGNOSIS — E55.9 VITAMIN D DEFICIENCY: ICD-10-CM

## 2022-02-28 PROCEDURE — 3008F BODY MASS INDEX DOCD: CPT | Performed by: NURSE PRACTITIONER

## 2022-02-28 PROCEDURE — 3079F DIAST BP 80-89 MM HG: CPT | Performed by: NURSE PRACTITIONER

## 2022-02-28 PROCEDURE — 99213 OFFICE O/P EST LOW 20 MIN: CPT | Performed by: NURSE PRACTITIONER

## 2022-02-28 PROCEDURE — 3074F SYST BP LT 130 MM HG: CPT | Performed by: NURSE PRACTITIONER

## 2022-02-28 RX ORDER — SEMAGLUTIDE 1.34 MG/ML
1 INJECTION, SOLUTION SUBCUTANEOUS WEEKLY
Qty: 9 ML | Refills: 0 | Status: SHIPPED | OUTPATIENT
Start: 2022-02-28

## 2022-02-28 RX ORDER — DIETHYLPROPION HYDROCHLORIDE 25 MG/1
1 TABLET ORAL 2 TIMES DAILY WITH MEALS
Qty: 60 TABLET | Refills: 1 | Status: SHIPPED | OUTPATIENT
Start: 2022-02-28

## 2022-03-01 NOTE — PATIENT INSTRUCTIONS
We are here to support you with weight loss, but please remember that you still need your primary care provider for your routine health maintenance. PLAN:  Will continue with ozempic and diethylpropion    Follow up with me in 8 weeks  Schedule follow up appointments: Tiarra Nugent (dietitian) or Padmini Joel (presurgery dietitian)   Check for insurance coverage for dietitian and labwork prior to scheduling appointment. Please try to work on the following dietary changes:  1. Goals: Aim for 20-30 grams of protein/ meal  i. Aim for 120 grams of carbohydrates/day  ii. Eat 4-6 vegetables/day  iii. Avoid skipping meals- eat every 4-5 hours  iv. Aim for 3 meals/day  2. Drink lots of water and cut down on soda/juice consumption if soda/juice drinker  3. Focus on protein: (15-30 grams with each meal) ie. greek yogurt, cottage cheese, string cheese, hard boiled eggs  4. Healthy snacks: peanut butter and apples, hummus and carrots, berries, nuts (1/4 cup), tuna and crackers                 Protein Shakes: Premier protein or Core Power                Protein Bars: Rx Bars, Oatmega, Power Crunch                 S[employer]ento balanced breaks (cheese and nuts)- without chocolate  5. Reduce carbohydrates which includes sweets as well as rice, pasta, potatoes, bread, corn and instead choose whole grain options or more protein or vegetables (4-6 servings of vegetables per day)  6. Get a good night of sleep  7. Try to decrease stress in life     Please download apps:  1. \"My Fitness Pal\" (other option is Lose it)) to help you to monitor daily dietary intake and you will be able to see if you are eating the right amount of calories, protein, carbs                With My Fitness Pal-->When you set-up the vinh or need to adjust settings:                Goals should include:                  Lose 1.5-2 lbs per week                Activity level: not very active (can't count exercise towards calorie number per day)                   ** Daily INPUT> Look at nutrition section-- \"nutrients\" and it will break down your macros for the day (ie. Protein, carbs, fibers, sugars and fats). Try to stay within these numbers daily     2. \"7 minute workout\" to help with exercise/activity which takes 7 minutes of your day and that you can do at home! 3. \"Calm\" or \"Headspace\" which helps with mindfulness, meditation, clarity, sleep, and lexus to your daily life. 4. [employer] blog for healthy recipe ideas  5. [employer] for low carb resources    HIGH PROTEIN SNACK IDEAS  -cottage cheese  -plain yogurt  -kefir  -hard-boiled eggs  -natural cheeses  -nuts (measure portion size)   -unsweetened nut butters  -dried edamame   -arsh seeds soaked in water or almond milk  -soy nuts  -cured meats (monitor for sodium issues)   -hummus with vegetables  -bean dip with vegetables     FRUIT  Low carb fruit options   Raspberries: Half a cup (60 grams) contains 3 grams of carbs. Blackberries: Half a cup (70 grams) contains 4 grams of carbs. Strawberries: Half a cup (100 grams) contains 6 grams of carbs. Blueberries: Half a cup (50 grams) contains 6 grams of carbs. Plum: One medium-sized (80 grams) contains 6 grams of carbs.      VEGETABLES  Low carb vegetables

## 2022-03-08 ENCOUNTER — HOSPITAL ENCOUNTER (OUTPATIENT)
Dept: NUCLEAR MEDICINE | Facility: HOSPITAL | Age: 52
Discharge: HOME | End: 2022-03-08
Attending: SPECIALIST
Payer: COMMERCIAL

## 2022-03-08 DIAGNOSIS — C81.02 NODULAR LYMPHOCYTE PREDOMINANT HODGKIN LYMPHOMA OF INTRATHORACIC LYMPH NODES (HCC): ICD-10-CM

## 2022-03-08 LAB — GLUCOSE BLD-MCNC: 70 MG/DL (ref 70–99)

## 2022-03-08 PROCEDURE — 78815 PET IMAGE W/CT SKULL-THIGH: CPT | Performed by: SPECIALIST

## 2022-03-08 PROCEDURE — 82962 GLUCOSE BLOOD TEST: CPT

## 2022-03-09 ENCOUNTER — TELEPHONE (OUTPATIENT)
Dept: HEMATOLOGY/ONCOLOGY | Facility: HOSPITAL | Age: 52
End: 2022-03-09

## 2022-03-09 NOTE — TELEPHONE ENCOUNTER
Order placed for US guided LN biopsy, left message for patient with central scheduling phone number. She can also call our office for assistance if needed. [employer] message sent with the same information.

## 2022-03-14 ENCOUNTER — OFFICE VISIT (OUTPATIENT)
Dept: OBGYN CLINIC | Facility: CLINIC | Age: 52
End: 2022-03-14
Payer: COMMERCIAL

## 2022-03-14 VITALS
HEART RATE: 84 BPM | SYSTOLIC BLOOD PRESSURE: 126 MMHG | DIASTOLIC BLOOD PRESSURE: 78 MMHG | WEIGHT: 241.81 LBS | BODY MASS INDEX: 42.84 KG/M2 | HEIGHT: 63 IN

## 2022-03-14 DIAGNOSIS — Z12.4 CERVICAL CANCER SCREENING: ICD-10-CM

## 2022-03-14 DIAGNOSIS — L29.0 PRURITUS ANI: ICD-10-CM

## 2022-03-14 DIAGNOSIS — N89.8 VAGINAL CYST: ICD-10-CM

## 2022-03-14 DIAGNOSIS — Z01.419 WELL WOMAN EXAM WITH ROUTINE GYNECOLOGICAL EXAM: Primary | ICD-10-CM

## 2022-03-14 PROCEDURE — 87624 HPV HI-RISK TYP POOLED RSLT: CPT | Performed by: NURSE PRACTITIONER

## 2022-03-14 PROCEDURE — 3074F SYST BP LT 130 MM HG: CPT | Performed by: NURSE PRACTITIONER

## 2022-03-14 PROCEDURE — 3008F BODY MASS INDEX DOCD: CPT | Performed by: NURSE PRACTITIONER

## 2022-03-14 PROCEDURE — 3078F DIAST BP <80 MM HG: CPT | Performed by: NURSE PRACTITIONER

## 2022-03-14 PROCEDURE — 99396 PREV VISIT EST AGE 40-64: CPT | Performed by: NURSE PRACTITIONER

## 2022-03-21 ENCOUNTER — NURSE ONLY (OUTPATIENT)
Dept: LAB | Facility: HOSPITAL | Age: 52
End: 2022-03-21
Attending: NURSE PRACTITIONER
Payer: COMMERCIAL

## 2022-03-21 ENCOUNTER — HOSPITAL ENCOUNTER (OUTPATIENT)
Dept: ULTRASOUND IMAGING | Facility: HOSPITAL | Age: 52
Discharge: HOME | End: 2022-03-21
Attending: NURSE PRACTITIONER
Payer: COMMERCIAL

## 2022-03-21 DIAGNOSIS — Z85.71 PERSONAL HISTORY OF HODGKIN LYMPHOMA: ICD-10-CM

## 2022-03-21 DIAGNOSIS — R70.0 ELEVATED ERYTHROCYTE SEDIMENTATION RATE: ICD-10-CM

## 2022-03-21 DIAGNOSIS — R94.8 ABNORMAL POSITRON EMISSION TOMOGRAPHY (PET) SCAN: ICD-10-CM

## 2022-03-21 PROCEDURE — 88185 FLOWCYTOMETRY/TC ADD-ON: CPT | Performed by: NURSE PRACTITIONER

## 2022-03-21 PROCEDURE — 88184 FLOWCYTOMETRY/ TC 1 MARKER: CPT | Performed by: NURSE PRACTITIONER

## 2022-03-21 PROCEDURE — 76942 ECHO GUIDE FOR BIOPSY: CPT | Performed by: NURSE PRACTITIONER

## 2022-03-21 PROCEDURE — 88307 TISSUE EXAM BY PATHOLOGIST: CPT | Performed by: NURSE PRACTITIONER

## 2022-03-21 PROCEDURE — 38505 NEEDLE BIOPSY LYMPH NODES: CPT | Performed by: NURSE PRACTITIONER

## 2022-03-21 PROCEDURE — 88342 IMHCHEM/IMCYTCHM 1ST ANTB: CPT | Performed by: NURSE PRACTITIONER

## 2022-03-21 PROCEDURE — 88321 CONSLTJ&REPRT SLD PREP ELSWR: CPT | Performed by: NURSE PRACTITIONER

## 2022-03-21 PROCEDURE — 88341 IMHCHEM/IMCYTCHM EA ADD ANTB: CPT | Performed by: NURSE PRACTITIONER

## 2022-03-22 ENCOUNTER — PATIENT MESSAGE (OUTPATIENT)
Dept: INTERNAL MEDICINE CLINIC | Facility: CLINIC | Age: 52
End: 2022-03-22

## 2022-03-22 NOTE — TELEPHONE ENCOUNTER
From: Sharmin Silvestre  To: Martha Bloom MD  Sent: 3/22/2022 9:53 AM CDT  Subject: Efe Rod,  I had covid back in December. When can I get a booster?     [employer]

## 2022-03-28 LAB — HPV I/H RISK 1 DNA SPEC QL NAA+PROBE: NEGATIVE

## 2022-04-04 LAB
CD10 CELLS NFR SPEC: 1 %
CD19/CD10 CELLS: <1 %
CD20 CELLS NFR SPEC: 54 %
CD23 CELLS NFR SPEC: 10 %
CD3 CELLS NFR SPEC: 48 %
CD3+CD4+ CELLS NFR SPEC: 38 %
CD3+CD8+ CELLS NFR SPEC: 9 %
CD45 CELLS NFR SPEC: 100 %
CD5 CELLS NFR SPEC: 52 %
CD5/CD19 CELLS: 4 %
CD56 CELLS NFR SPEC: <1 %
CELL SURF KAPPA/LAMBDA RATIO: 2
CELL SURF LAMBDA LIGHT CHAIN: 18 %
CELL SURFACE KAPPA LIGHT CHAIN: 36 %
FMC7 CELLS NFR SPEC: 52 %

## 2022-04-12 ENCOUNTER — OFFICE VISIT (OUTPATIENT)
Dept: HEMATOLOGY/ONCOLOGY | Facility: HOSPITAL | Age: 52
End: 2022-04-12
Attending: SPECIALIST
Payer: COMMERCIAL

## 2022-04-12 VITALS
WEIGHT: 248 LBS | SYSTOLIC BLOOD PRESSURE: 132 MMHG | DIASTOLIC BLOOD PRESSURE: 78 MMHG | OXYGEN SATURATION: 98 % | TEMPERATURE: 98 F | RESPIRATION RATE: 18 BRPM | HEART RATE: 86 BPM | BODY MASS INDEX: 44 KG/M2

## 2022-04-12 DIAGNOSIS — C81.04 NODULAR LYMPHOCYTE PREDOMINANT HODGKIN LYMPHOMA OF LYMPH NODES OF AXILLA (HCC): ICD-10-CM

## 2022-04-12 PROCEDURE — 99211 OFF/OP EST MAY X REQ PHY/QHP: CPT

## 2022-04-12 PROCEDURE — 36415 COLL VENOUS BLD VENIPUNCTURE: CPT

## 2022-04-12 RX ORDER — ZINC OXIDE 13 %
1 CREAM (GRAM) TOPICAL DAILY
COMMUNITY

## 2022-04-12 NOTE — PROGRESS NOTES
Patient is here today for follow up with Laurel Friedman for Hodgkins disease . Patient denies pain. Medication list and medical history were reviewed and updated. Education Record    Learner:  Patient    Disease / Diagnosis: Hodgkin's disease    Barriers / Limitations:  None   Comments:    Method:  Brief focused, Discussion, Printed material and Reinforcement   Comments:    General Topics:  Medication, Pain, Procedure and Plan of care reviewed   Comments:    Outcome:  Shows understanding   Comments:    AVS provided and follow up reviewed. Patient instructed to call as needed.

## 2022-04-22 ENCOUNTER — OFFICE VISIT (OUTPATIENT)
Dept: SURGERY | Facility: CLINIC | Age: 52
End: 2022-04-22
Payer: COMMERCIAL

## 2022-04-22 VITALS
SYSTOLIC BLOOD PRESSURE: 142 MMHG | TEMPERATURE: 97 F | RESPIRATION RATE: 16 BRPM | HEART RATE: 76 BPM | OXYGEN SATURATION: 98 % | DIASTOLIC BLOOD PRESSURE: 87 MMHG

## 2022-04-22 DIAGNOSIS — C81.04 NODULAR LYMPHOCYTE PREDOMINANT HODGKIN LYMPHOMA OF LYMPH NODES OF AXILLA (HCC): Primary | ICD-10-CM

## 2022-04-22 DIAGNOSIS — Z85.71 HISTORY OF HODGKIN'S LYMPHOMA: ICD-10-CM

## 2022-04-22 PROCEDURE — 3079F DIAST BP 80-89 MM HG: CPT | Performed by: SURGERY

## 2022-04-22 PROCEDURE — 99245 OFF/OP CONSLTJ NEW/EST HI 55: CPT | Performed by: SURGERY

## 2022-04-22 PROCEDURE — 3077F SYST BP >= 140 MM HG: CPT | Performed by: SURGERY

## 2022-04-27 ENCOUNTER — TELEMEDICINE (OUTPATIENT)
Dept: INTERNAL MEDICINE CLINIC | Facility: CLINIC | Age: 52
End: 2022-04-27

## 2022-04-27 DIAGNOSIS — J01.01 ACUTE RECURRENT MAXILLARY SINUSITIS: Primary | ICD-10-CM

## 2022-04-27 PROCEDURE — 99213 OFFICE O/P EST LOW 20 MIN: CPT | Performed by: INTERNAL MEDICINE

## 2022-04-27 RX ORDER — AMOXICILLIN AND CLAVULANATE POTASSIUM 875; 125 MG/1; MG/1
1 TABLET, FILM COATED ORAL 2 TIMES DAILY
Qty: 14 TABLET | Refills: 0 | Status: SHIPPED | OUTPATIENT
Start: 2022-04-27 | End: 2022-05-04

## 2022-04-28 ENCOUNTER — TELEPHONE (OUTPATIENT)
Dept: SURGERY | Facility: CLINIC | Age: 52
End: 2022-04-28

## 2022-04-28 NOTE — TELEPHONE ENCOUNTER
Called Geraldine Ignacio and spoke with her to let her know that she will be having surgery at [facility] on 06/07/22  around mid morning.

## 2022-05-18 ENCOUNTER — OFFICE VISIT (OUTPATIENT)
Dept: INTERNAL MEDICINE CLINIC | Facility: CLINIC | Age: 52
End: 2022-05-18
Payer: COMMERCIAL

## 2022-05-18 VITALS
DIASTOLIC BLOOD PRESSURE: 78 MMHG | WEIGHT: 257 LBS | OXYGEN SATURATION: 98 % | SYSTOLIC BLOOD PRESSURE: 130 MMHG | BODY MASS INDEX: 47.29 KG/M2 | RESPIRATION RATE: 16 BRPM | HEIGHT: 62 IN | HEART RATE: 80 BPM

## 2022-05-18 DIAGNOSIS — E11.9 CONTROLLED TYPE 2 DIABETES MELLITUS WITHOUT COMPLICATION, WITHOUT LONG-TERM CURRENT USE OF INSULIN (HCC): ICD-10-CM

## 2022-05-18 DIAGNOSIS — Z51.81 ENCOUNTER FOR THERAPEUTIC DRUG MONITORING: Primary | ICD-10-CM

## 2022-05-18 DIAGNOSIS — E66.01 SEVERE OBESITY (BMI >= 40) (HCC): ICD-10-CM

## 2022-05-18 DIAGNOSIS — E55.9 VITAMIN D DEFICIENCY: ICD-10-CM

## 2022-05-18 DIAGNOSIS — Z99.89 OSA ON CPAP: ICD-10-CM

## 2022-05-18 DIAGNOSIS — F43.9 STRESS: ICD-10-CM

## 2022-05-18 DIAGNOSIS — G47.33 OSA ON CPAP: ICD-10-CM

## 2022-05-18 DIAGNOSIS — I10 ESSENTIAL HYPERTENSION, BENIGN: ICD-10-CM

## 2022-05-18 PROCEDURE — 3078F DIAST BP <80 MM HG: CPT | Performed by: NURSE PRACTITIONER

## 2022-05-18 PROCEDURE — 99214 OFFICE O/P EST MOD 30 MIN: CPT | Performed by: NURSE PRACTITIONER

## 2022-05-18 PROCEDURE — 3008F BODY MASS INDEX DOCD: CPT | Performed by: NURSE PRACTITIONER

## 2022-05-18 PROCEDURE — 3075F SYST BP GE 130 - 139MM HG: CPT | Performed by: NURSE PRACTITIONER

## 2022-05-18 RX ORDER — SEMAGLUTIDE 2.4 MG/.75ML
2.4 INJECTION, SOLUTION SUBCUTANEOUS WEEKLY
Qty: 9 ML | Refills: 0 | Status: SHIPPED | OUTPATIENT
Start: 2022-05-18 | End: 2022-05-23

## 2022-05-18 RX ORDER — HYDROCORTISONE 25 MG/G
CREAM TOPICAL
COMMUNITY
Start: 2022-04-14

## 2022-05-20 ENCOUNTER — TELEPHONE (OUTPATIENT)
Dept: INTERNAL MEDICINE CLINIC | Facility: CLINIC | Age: 52
End: 2022-05-20

## 2022-05-20 DIAGNOSIS — E11.9 CONTROLLED TYPE 2 DIABETES MELLITUS WITHOUT COMPLICATION, WITHOUT LONG-TERM CURRENT USE OF INSULIN (HCC): Primary | ICD-10-CM

## 2022-05-20 NOTE — TELEPHONE ENCOUNTER
Form received from Claudia Verdugo to apply for coverage of Wegovy 2.4 mg  ID # [id number]    Coverage Exception form faxed by Claudia Verdugo and patient  Filled in and needs to be faxed back with last note to [phone]

## 2022-05-23 ENCOUNTER — OFFICE VISIT (OUTPATIENT)
Dept: INTERNAL MEDICINE CLINIC | Facility: CLINIC | Age: 52
End: 2022-05-23
Payer: COMMERCIAL

## 2022-05-23 VITALS
RESPIRATION RATE: 16 BRPM | TEMPERATURE: 98 F | HEART RATE: 81 BPM | OXYGEN SATURATION: 97 % | BODY MASS INDEX: 46.01 KG/M2 | HEIGHT: 62 IN | DIASTOLIC BLOOD PRESSURE: 60 MMHG | WEIGHT: 250 LBS | SYSTOLIC BLOOD PRESSURE: 124 MMHG

## 2022-05-23 DIAGNOSIS — K21.00 GASTROESOPHAGEAL REFLUX DISEASE WITH ESOPHAGITIS, UNSPECIFIED WHETHER HEMORRHAGE: ICD-10-CM

## 2022-05-23 DIAGNOSIS — I10 ESSENTIAL HYPERTENSION, BENIGN: ICD-10-CM

## 2022-05-23 DIAGNOSIS — Z01.818 PREOP EXAM FOR INTERNAL MEDICINE: Primary | ICD-10-CM

## 2022-05-23 DIAGNOSIS — C81.04 NODULAR LYMPHOCYTE PREDOMINANT HODGKIN LYMPHOMA OF LYMPH NODES OF AXILLA (HCC): ICD-10-CM

## 2022-05-23 DIAGNOSIS — E11.9 CONTROLLED TYPE 2 DIABETES MELLITUS WITHOUT COMPLICATION, WITHOUT LONG-TERM CURRENT USE OF INSULIN (HCC): ICD-10-CM

## 2022-05-23 DIAGNOSIS — Z99.89 OSA ON CPAP: ICD-10-CM

## 2022-05-23 DIAGNOSIS — E66.01 CLASS 3 SEVERE OBESITY DUE TO EXCESS CALORIES WITH SERIOUS COMORBIDITY AND BODY MASS INDEX (BMI) OF 40.0 TO 44.9 IN ADULT (HCC): ICD-10-CM

## 2022-05-23 DIAGNOSIS — G47.33 OSA ON CPAP: ICD-10-CM

## 2022-05-24 ENCOUNTER — TELEPHONE (OUTPATIENT)
Dept: MAMMOGRAPHY | Facility: HOSPITAL | Age: 52
End: 2022-05-24

## 2022-05-24 NOTE — TELEPHONE ENCOUNTER
Phoned Yuli Cole regarding wire localization process of groin lymph node for lumpectomy scheduled for 6-7-22 with Dr. Mara Woods. Procedure explained and all questions answered. Pt to be transported via W/C through [facility]. Pt verbalized understanding and had no further questions at this time.

## 2022-05-25 RX ORDER — SEMAGLUTIDE 1.34 MG/ML
1 INJECTION, SOLUTION SUBCUTANEOUS WEEKLY
Qty: 9 ML | Refills: 0 | Status: CANCELLED | OUTPATIENT
Start: 2022-05-25

## 2022-05-25 NOTE — TELEPHONE ENCOUNTER
We applied for coverage of WEgovy 2.4 mg and it was denied by insurance stating weight loss drugs are excluded from plain. How do you want to proceed?

## 2022-05-26 ENCOUNTER — PATIENT MESSAGE (OUTPATIENT)
Dept: INTERNAL MEDICINE CLINIC | Facility: CLINIC | Age: 52
End: 2022-05-26

## 2022-05-26 RX ORDER — SEMAGLUTIDE 2.68 MG/ML
2 INJECTION, SOLUTION SUBCUTANEOUS WEEKLY
Qty: 9 ML | Refills: 0 | Status: SHIPPED | OUTPATIENT
Start: 2022-05-26

## 2022-05-27 RX ORDER — SEMAGLUTIDE 2.68 MG/ML
2 INJECTION, SOLUTION SUBCUTANEOUS WEEKLY
Qty: 9 ML | Refills: 0 | Status: SHIPPED | OUTPATIENT
Start: 2022-05-27

## 2022-06-03 RX ORDER — MULTIVIT-MIN/IRON FUM/FOLIC AC 7.5 MG-4
1 TABLET ORAL DAILY
COMMUNITY

## 2022-06-04 ENCOUNTER — EKG ENCOUNTER (OUTPATIENT)
Dept: LAB | Age: 52
End: 2022-06-04
Attending: SURGERY
Payer: COMMERCIAL

## 2022-06-04 ENCOUNTER — LAB ENCOUNTER (OUTPATIENT)
Dept: LAB | Age: 52
End: 2022-06-04
Attending: INTERNAL MEDICINE
Payer: COMMERCIAL

## 2022-06-04 DIAGNOSIS — C81.90 HODGKIN'S DISEASE (HCC): ICD-10-CM

## 2022-06-04 DIAGNOSIS — Z01.818 PREOP EXAM FOR INTERNAL MEDICINE: ICD-10-CM

## 2022-06-04 LAB
ALBUMIN SERPL-MCNC: 3.5 G/DL (ref 3.4–5)
ALBUMIN/GLOB SERPL: 1 {RATIO} (ref 1–2)
ALP LIVER SERPL-CCNC: 102 U/L
ALT SERPL-CCNC: 19 U/L
ANION GAP SERPL CALC-SCNC: 4 MMOL/L (ref 0–18)
AST SERPL-CCNC: 18 U/L (ref 15–37)
ATRIAL RATE: 65 BPM
BASOPHILS # BLD AUTO: 0.02 X10(3) UL (ref 0–0.2)
BASOPHILS NFR BLD AUTO: 0.4 %
BILIRUB SERPL-MCNC: 0.4 MG/DL (ref 0.1–2)
BUN BLD-MCNC: 13 MG/DL (ref 7–18)
CALCIUM BLD-MCNC: 9.1 MG/DL (ref 8.5–10.1)
CHLORIDE SERPL-SCNC: 108 MMOL/L (ref 98–112)
CO2 SERPL-SCNC: 28 MMOL/L (ref 21–32)
CREAT BLD-MCNC: 1.15 MG/DL
EOSINOPHIL # BLD AUTO: 0.12 X10(3) UL (ref 0–0.7)
EOSINOPHIL NFR BLD AUTO: 2.3 %
ERYTHROCYTE [DISTWIDTH] IN BLOOD BY AUTOMATED COUNT: 13.7 %
FASTING STATUS PATIENT QL REPORTED: YES
GLOBULIN PLAS-MCNC: 3.6 G/DL (ref 2.8–4.4)
GLUCOSE BLD-MCNC: 81 MG/DL (ref 70–99)
HCT VFR BLD AUTO: 41.9 %
HGB BLD-MCNC: 13.3 G/DL
IMM GRANULOCYTES # BLD AUTO: 0.01 X10(3) UL (ref 0–1)
IMM GRANULOCYTES NFR BLD: 0.2 %
LYMPHOCYTES # BLD AUTO: 2.14 X10(3) UL (ref 1–4)
LYMPHOCYTES NFR BLD AUTO: 41.6 %
MCH RBC QN AUTO: 29.6 PG (ref 26–34)
MCHC RBC AUTO-ENTMCNC: 31.7 G/DL (ref 31–37)
MCV RBC AUTO: 93.1 FL
MONOCYTES # BLD AUTO: 0.54 X10(3) UL (ref 0.1–1)
MONOCYTES NFR BLD AUTO: 10.5 %
NEUTROPHILS # BLD AUTO: 2.31 X10 (3) UL (ref 1.5–7.7)
NEUTROPHILS # BLD AUTO: 2.31 X10(3) UL (ref 1.5–7.7)
NEUTROPHILS NFR BLD AUTO: 45 %
OSMOLALITY SERPL CALC.SUM OF ELEC: 289 MOSM/KG (ref 275–295)
P AXIS: 68 DEGREES
P-R INTERVAL: 170 MS
PLATELET # BLD AUTO: 120 10(3)UL (ref 150–450)
POTASSIUM SERPL-SCNC: 4 MMOL/L (ref 3.5–5.1)
PROT SERPL-MCNC: 7.1 G/DL (ref 6.4–8.2)
Q-T INTERVAL: 430 MS
QRS DURATION: 92 MS
QTC CALCULATION (BEZET): 447 MS
R AXIS: 42 DEGREES
RBC # BLD AUTO: 4.5 X10(6)UL
SODIUM SERPL-SCNC: 140 MMOL/L (ref 136–145)
T AXIS: 24 DEGREES
VENTRICULAR RATE: 65 BPM
WBC # BLD AUTO: 5.1 X10(3) UL (ref 4–11)

## 2022-06-04 PROCEDURE — 80053 COMPREHEN METABOLIC PANEL: CPT

## 2022-06-04 PROCEDURE — 93010 ELECTROCARDIOGRAM REPORT: CPT | Performed by: INTERNAL MEDICINE

## 2022-06-04 PROCEDURE — 93005 ELECTROCARDIOGRAM TRACING: CPT

## 2022-06-04 PROCEDURE — 85025 COMPLETE CBC W/AUTO DIFF WBC: CPT

## 2022-06-04 PROCEDURE — 36415 COLL VENOUS BLD VENIPUNCTURE: CPT

## 2022-06-05 LAB — SARS-COV-2 RNA RESP QL NAA+PROBE: NOT DETECTED

## 2022-06-06 ENCOUNTER — ANESTHESIA EVENT (OUTPATIENT)
Dept: SURGERY | Facility: HOSPITAL | Age: 52
End: 2022-06-06
Payer: COMMERCIAL

## 2022-06-06 DIAGNOSIS — D69.6 THROMBOCYTOPENIA (HCC): ICD-10-CM

## 2022-06-06 DIAGNOSIS — N17.9 AKI (ACUTE KIDNEY INJURY) (HCC): Primary | ICD-10-CM

## 2022-06-07 ENCOUNTER — ANESTHESIA (OUTPATIENT)
Dept: SURGERY | Facility: HOSPITAL | Age: 52
End: 2022-06-07
Payer: COMMERCIAL

## 2022-06-07 ENCOUNTER — HOSPITAL ENCOUNTER (OUTPATIENT)
Dept: ULTRASOUND IMAGING | Facility: HOSPITAL | Age: 52
Discharge: HOME | End: 2022-06-07
Attending: SURGERY
Payer: COMMERCIAL

## 2022-06-07 ENCOUNTER — HOSPITAL ENCOUNTER (OUTPATIENT)
Facility: HOSPITAL | Age: 52
Setting detail: HOSPITAL OUTPATIENT SURGERY
Discharge: HOME | End: 2022-06-07
Attending: SURGERY | Admitting: SURGERY
Payer: COMMERCIAL

## 2022-06-07 VITALS
RESPIRATION RATE: 18 BRPM | TEMPERATURE: 98 F | OXYGEN SATURATION: 98 % | BODY MASS INDEX: 46.25 KG/M2 | DIASTOLIC BLOOD PRESSURE: 44 MMHG | HEART RATE: 66 BPM | WEIGHT: 251.31 LBS | HEIGHT: 62 IN | SYSTOLIC BLOOD PRESSURE: 127 MMHG

## 2022-06-07 DIAGNOSIS — Z85.71 HISTORY OF HODGKIN'S LYMPHOMA: ICD-10-CM

## 2022-06-07 DIAGNOSIS — C81.04 NODULAR LYMPHOCYTE PREDOMINANT HODGKIN LYMPHOMA OF LYMPH NODES OF AXILLA (HCC): ICD-10-CM

## 2022-06-07 DIAGNOSIS — C81.90 HODGKIN'S DISEASE (HCC): Primary | ICD-10-CM

## 2022-06-07 LAB
B-HCG UR QL: NEGATIVE
GLUCOSE BLD-MCNC: 71 MG/DL (ref 70–99)

## 2022-06-07 PROCEDURE — 88342 IMHCHEM/IMCYTCHM 1ST ANTB: CPT | Performed by: SURGERY

## 2022-06-07 PROCEDURE — 81025 URINE PREGNANCY TEST: CPT

## 2022-06-07 PROCEDURE — 88321 CONSLTJ&REPRT SLD PREP ELSWR: CPT | Performed by: SURGERY

## 2022-06-07 PROCEDURE — 88333 PATH CONSLTJ SURG CYTO XM 1: CPT | Performed by: SURGERY

## 2022-06-07 PROCEDURE — 82962 GLUCOSE BLOOD TEST: CPT

## 2022-06-07 PROCEDURE — 88341 IMHCHEM/IMCYTCHM EA ADD ANTB: CPT | Performed by: SURGERY

## 2022-06-07 PROCEDURE — 88307 TISSUE EXAM BY PATHOLOGIST: CPT | Performed by: SURGERY

## 2022-06-07 PROCEDURE — 07BH0ZX EXCISION OF RIGHT INGUINAL LYMPHATIC, OPEN APPROACH, DIAGNOSTIC: ICD-10-PCS | Performed by: SURGERY

## 2022-06-07 PROCEDURE — 88365 INSITU HYBRIDIZATION (FISH): CPT | Performed by: SURGERY

## 2022-06-07 PROCEDURE — 10035 PLMT SFT TISS LOCLZJ DEV 1ST: CPT | Performed by: SURGERY

## 2022-06-07 RX ORDER — HYDROMORPHONE HYDROCHLORIDE 1 MG/ML
0.4 INJECTION, SOLUTION INTRAMUSCULAR; INTRAVENOUS; SUBCUTANEOUS EVERY 5 MIN PRN
Status: DISCONTINUED | OUTPATIENT
Start: 2022-06-07 | End: 2022-06-07

## 2022-06-07 RX ORDER — NALOXONE HYDROCHLORIDE 0.4 MG/ML
80 INJECTION, SOLUTION INTRAMUSCULAR; INTRAVENOUS; SUBCUTANEOUS AS NEEDED
Status: DISCONTINUED | OUTPATIENT
Start: 2022-06-07 | End: 2022-06-07

## 2022-06-07 RX ORDER — HEPARIN SODIUM 5000 [USP'U]/ML
5000 INJECTION, SOLUTION INTRAVENOUS; SUBCUTANEOUS ONCE
Status: COMPLETED | OUTPATIENT
Start: 2022-06-07 | End: 2022-06-07

## 2022-06-07 RX ORDER — BUPIVACAINE HYDROCHLORIDE 2.5 MG/ML
INJECTION, SOLUTION EPIDURAL; INFILTRATION; INTRACAUDAL AS NEEDED
Status: DISCONTINUED | OUTPATIENT
Start: 2022-06-07 | End: 2022-06-07 | Stop reason: HOSPADM

## 2022-06-07 RX ORDER — NICOTINE POLACRILEX 4 MG
30 LOZENGE BUCCAL
Status: DISCONTINUED | OUTPATIENT
Start: 2022-06-07 | End: 2022-06-07

## 2022-06-07 RX ORDER — CEFAZOLIN SODIUM/WATER 2 G/20 ML
SYRINGE (ML) INTRAVENOUS
Status: DISCONTINUED
Start: 2022-06-07 | End: 2022-06-07

## 2022-06-07 RX ORDER — HYDROMORPHONE HYDROCHLORIDE 1 MG/ML
0.6 INJECTION, SOLUTION INTRAMUSCULAR; INTRAVENOUS; SUBCUTANEOUS EVERY 5 MIN PRN
Status: DISCONTINUED | OUTPATIENT
Start: 2022-06-07 | End: 2022-06-07

## 2022-06-07 RX ORDER — PROCHLORPERAZINE EDISYLATE 5 MG/ML
5 INJECTION INTRAMUSCULAR; INTRAVENOUS EVERY 8 HOURS PRN
Status: DISCONTINUED | OUTPATIENT
Start: 2022-06-07 | End: 2022-06-07

## 2022-06-07 RX ORDER — METOCLOPRAMIDE HYDROCHLORIDE 5 MG/ML
INJECTION INTRAMUSCULAR; INTRAVENOUS AS NEEDED
Status: DISCONTINUED | OUTPATIENT
Start: 2022-06-07 | End: 2022-06-07 | Stop reason: SURG

## 2022-06-07 RX ORDER — DEXTROSE MONOHYDRATE 25 G/50ML
50 INJECTION, SOLUTION INTRAVENOUS
Status: DISCONTINUED | OUTPATIENT
Start: 2022-06-07 | End: 2022-06-07

## 2022-06-07 RX ORDER — SODIUM CHLORIDE, SODIUM LACTATE, POTASSIUM CHLORIDE, CALCIUM CHLORIDE 600; 310; 30; 20 MG/100ML; MG/100ML; MG/100ML; MG/100ML
INJECTION, SOLUTION INTRAVENOUS CONTINUOUS
Status: DISCONTINUED | OUTPATIENT
Start: 2022-06-07 | End: 2022-06-07

## 2022-06-07 RX ORDER — HYDROCODONE BITARTRATE AND ACETAMINOPHEN 5; 325 MG/1; MG/1
1 TABLET ORAL ONCE AS NEEDED
Status: DISCONTINUED | OUTPATIENT
Start: 2022-06-07 | End: 2022-06-07

## 2022-06-07 RX ORDER — CEFAZOLIN SODIUM/WATER 2 G/20 ML
2 SYRINGE (ML) INTRAVENOUS ONCE
Status: COMPLETED | OUTPATIENT
Start: 2022-06-07 | End: 2022-06-07

## 2022-06-07 RX ORDER — TRAMADOL HYDROCHLORIDE 50 MG/1
50 TABLET ORAL EVERY 6 HOURS PRN
Qty: 8 TABLET | Refills: 0 | Status: SHIPPED | OUTPATIENT
Start: 2022-06-07

## 2022-06-07 RX ORDER — HYDROCODONE BITARTRATE AND ACETAMINOPHEN 5; 325 MG/1; MG/1
2 TABLET ORAL ONCE AS NEEDED
Status: DISCONTINUED | OUTPATIENT
Start: 2022-06-07 | End: 2022-06-07

## 2022-06-07 RX ORDER — DIAZEPAM 5 MG/1
5 TABLET ORAL AS NEEDED
Status: DISCONTINUED | OUTPATIENT
Start: 2022-06-07 | End: 2022-06-07 | Stop reason: HOSPADM

## 2022-06-07 RX ORDER — ONDANSETRON 2 MG/ML
4 INJECTION INTRAMUSCULAR; INTRAVENOUS EVERY 6 HOURS PRN
Status: DISCONTINUED | OUTPATIENT
Start: 2022-06-07 | End: 2022-06-07

## 2022-06-07 RX ORDER — ACETAMINOPHEN 500 MG
1000 TABLET ORAL ONCE AS NEEDED
Status: DISCONTINUED | OUTPATIENT
Start: 2022-06-07 | End: 2022-06-07

## 2022-06-07 RX ORDER — NICOTINE POLACRILEX 4 MG
15 LOZENGE BUCCAL
Status: DISCONTINUED | OUTPATIENT
Start: 2022-06-07 | End: 2022-06-07

## 2022-06-07 RX ORDER — LIDOCAINE HYDROCHLORIDE 10 MG/ML
INJECTION, SOLUTION EPIDURAL; INFILTRATION; INTRACAUDAL; PERINEURAL AS NEEDED
Status: DISCONTINUED | OUTPATIENT
Start: 2022-06-07 | End: 2022-06-07 | Stop reason: SURG

## 2022-06-07 RX ORDER — ONDANSETRON 2 MG/ML
INJECTION INTRAMUSCULAR; INTRAVENOUS AS NEEDED
Status: DISCONTINUED | OUTPATIENT
Start: 2022-06-07 | End: 2022-06-07 | Stop reason: SURG

## 2022-06-07 RX ORDER — SCOLOPAMINE TRANSDERMAL SYSTEM 1 MG/1
1 PATCH, EXTENDED RELEASE TRANSDERMAL ONCE
Status: DISCONTINUED | OUTPATIENT
Start: 2022-06-07 | End: 2022-06-07 | Stop reason: HOSPADM

## 2022-06-07 RX ORDER — HYDROMORPHONE HYDROCHLORIDE 1 MG/ML
0.2 INJECTION, SOLUTION INTRAMUSCULAR; INTRAVENOUS; SUBCUTANEOUS EVERY 5 MIN PRN
Status: DISCONTINUED | OUTPATIENT
Start: 2022-06-07 | End: 2022-06-07

## 2022-06-07 RX ORDER — ACETAMINOPHEN 500 MG
1000 TABLET ORAL ONCE
Status: DISCONTINUED | OUTPATIENT
Start: 2022-06-07 | End: 2022-06-07 | Stop reason: HOSPADM

## 2022-06-07 RX ORDER — DEXTROSE MONOHYDRATE 25 G/50ML
50 INJECTION, SOLUTION INTRAVENOUS
Status: DISCONTINUED | OUTPATIENT
Start: 2022-06-07 | End: 2022-06-07 | Stop reason: HOSPADM

## 2022-06-07 RX ADMIN — ONDANSETRON 4 MG: 2 INJECTION INTRAMUSCULAR; INTRAVENOUS at 14:01:00

## 2022-06-07 RX ADMIN — CEFAZOLIN SODIUM/WATER 2 G: 2 G/20 ML SYRINGE (ML) INTRAVENOUS at 13:20:00

## 2022-06-07 RX ADMIN — LIDOCAINE HYDROCHLORIDE 50 MG: 10 INJECTION, SOLUTION EPIDURAL; INFILTRATION; INTRACAUDAL; PERINEURAL at 12:57:00

## 2022-06-07 RX ADMIN — SODIUM CHLORIDE, SODIUM LACTATE, POTASSIUM CHLORIDE, CALCIUM CHLORIDE: 600; 310; 30; 20 INJECTION, SOLUTION INTRAVENOUS at 14:19:00

## 2022-06-07 RX ADMIN — SODIUM CHLORIDE, SODIUM LACTATE, POTASSIUM CHLORIDE, CALCIUM CHLORIDE: 600; 310; 30; 20 INJECTION, SOLUTION INTRAVENOUS at 12:50:00

## 2022-06-07 RX ADMIN — METOCLOPRAMIDE HYDROCHLORIDE 10 MG: 5 INJECTION INTRAMUSCULAR; INTRAVENOUS at 14:01:00

## 2022-06-07 NOTE — ANESTHESIA POSTPROCEDURE EVALUATION
[address] Patient Status:  Hospital Outpatient Surgery   Age/Gender 45year old female MRN [id number]   [facility] SURGERY Attending Renae Valdovinos MD   [address] Day # 0 PCP Alejandrina Moralez MD       Anesthesia Post-op Note    Wire localized excisional biopsy right groin     Procedure Summary     Date: 06/07/22 Room / Location: [facility] OR 02 / [facility] OR    Anesthesia Start: 1250 Anesthesia Stop: 0500    Procedure: Wire localized excisional biopsy right groin (Right Groin) Diagnosis:       History of Hodgkin's lymphoma      (History of Hodgkin's lymphoma [Z85.71])    Surgeons: Renae Valdovinos MD Anesthesiologist: Jose Lundy MD    Anesthesia Type: general ASA Status: 3          Anesthesia Type: general    Vitals Value Taken Time   /61 06/07/22 1420   Temp 97.6 06/07/22 1420   Pulse 76 06/07/22 1420   Resp 14 06/07/22 1420   SpO2 100 06/07/22 1420       Patient Location: PACU    Anesthesia Type: general    Airway Patency: patent and extubated    Postop Pain Control: adequate    Mental Status: mildly sedated but able to meaningfully participate in the post-anesthesia evaluation    Nausea/Vomiting: none    Cardiopulmonary/Hydration status: stable euvolemic    Complications: no apparent anesthesia related complications    Postop vital signs: stable    Dental Exam: Unchanged from Preop    Patient to be discharged from PACU when criteria met.

## 2022-06-07 NOTE — BRIEF OP NOTE
Pre-Operative Diagnosis: History of Hodgkin's lymphoma [Z85.71]     Post-Operative Diagnosis: History of Hodgkin's lymphoma [Z85.71]      Procedure Performed:   Wire localized excisional biopsy right groin     Surgeon(s) and Role:     * Porsche Cook MD - Primary    Assistant(s):  PA: Zamzam Glaser PA-C     Surgical Findings: pending final path     Specimen: right inguinal lymph node     Estimated Blood Loss: Blood Output: 10 mL (6/7/2022  2:05 PM)      Dictation Number:  N/a    Fredi Jeans, PA-C  6/7/2022  2:15 PM

## 2022-06-07 NOTE — ANESTHESIA PROCEDURE NOTES
Airway  Date/Time: 6/7/2022 12:58 PM  Urgency: elective    Airway not difficult    General Information and Staff    Patient location during procedure: OR  Anesthesiologist: Vickie Dance Jun, MD  Performed: anesthesiologist     Indications and Patient Condition  Indications for airway management: anesthesia  Spontaneous Ventilation: absent  Sedation level: deep  Preoxygenated: yes  Patient position: sniffing  Mask difficulty assessment: 1 - vent by mask    Final Airway Details  Final airway type: endotracheal airway      Successful airway: ETT  Cuffed: yes   Successful intubation technique: direct laryngoscopy  Endotracheal tube insertion site: oral  Blade: Jose Juan  Blade size: #3  ETT size (mm): 7.0    Cormack-Lehane Classification: grade IIA - partial view of glottis  Placement verified by: chest auscultation and capnometry   Measured from: lips  ETT to lips (cm): 21  Number of attempts at approach: 1

## 2022-06-07 NOTE — IMAGING NOTE
Assisted Dr Damian Vee, Interventional Radiologist with placement of needle localization in preparation for surgery. 7.5 F 20 cm [place] BLN placed in right groin under ultrasound guidance. Device locked per Dr Damian Vee and secured with rolled gauze and paper tape and covered with styrofoam cup and taped in placed. Pt escorted via gurney to OR holding [address] by [employer] after brief report to Samuel Garcia in [address] holding. Pt tolerated procedure fairly well.

## 2022-06-08 ENCOUNTER — TELEPHONE (OUTPATIENT)
Dept: SURGERY | Facility: CLINIC | Age: 52
End: 2022-06-08

## 2022-06-08 NOTE — TELEPHONE ENCOUNTER
Called pt to check on her after her procedure with Dr. Julissa Ambrocio. Pt doing well, no post op issues, no pain at this time. Path pending. Post op appt made with Dr. Julissa Ambrocio on 6/17 at 12:30pm. Pt knows to call with any questions or concerns.

## 2022-06-09 NOTE — OPERATIVE REPORT
Date of operation 6/7/2022    Preoperative diagnosis:  1. Right inguinal lymphadenopathy    Operations performed:  1. Wire localized, deep excisional right inguinal lymph node biopsy    Postoperative diagnosis:  1. Same    Operating Surgeon   1. Abbey Burciaga MD    Assistant:  1. AVE Trejo    Indications: This is a 58-year-old female who was found to have generalized lymphadenopathy. 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 A repeat core guided biopsy of 2 right inguinal lymph nodes was nondiagnostic. She presents for excisional lymph node biopsy. Given her BMI and that the lymph node was deep and adjacent to the femoral vein, wire localization took place preoperatively. Details of the operation: The patient was brought to the operating room and laid supine on the operating table. Monitored anesthesia care was induced without complication. All pressure points were padded appropriately. The right groin was clipped prepped and draped in standard sterile fashion. Time was completed verifying the patient's name, procedure to be performed, laterality and the administration of antibiotics. Everybody in the room was in agreement. A total of 50 cc of 1% lidocaine with 1.5% Marcaine with epinephrine were infiltrated. Incision was made in the right groin. This was deepened through skin subcutaneous tissue with electrocautery. A self-retaining retractor was placed. Then, dissection was carried towards the wire which was brought through the incision. A stay stitch of 3-0 Vicryl was placed for traction. Additional dissection was then Undevia taken identifying the femoral vein. The node was medial and deep to the femoral vein. Continued dissection was undertaken with electrocautery  the vein off of the lymph node. Small lymphatic channels were clipped when encountered. Ultimately, the node and wire were completely excised.   There were sent off fresh for analysis. Hemostasis was achieved. The wound was closed in 2 layers: 3-0 Vicryl running to close Magaly's fascia and 4-0 Vicryl running subcuticular. Steri-Strips and sterile dressings were applied. The patient tolerated the procedure well was taken to the postoperative anesthesia care into the stable state. I was present for the entire case. Darby Cain MD  Complex General Surgical Oncology  [place] [id number]  Pager [phone]  [email or web address]. [email or web address]

## 2022-06-21 ENCOUNTER — OFFICE VISIT (OUTPATIENT)
Dept: HEMATOLOGY/ONCOLOGY | Facility: HOSPITAL | Age: 52
End: 2022-06-21
Attending: SPECIALIST
Payer: COMMERCIAL

## 2022-06-21 VITALS
RESPIRATION RATE: 18 BRPM | WEIGHT: 247 LBS | HEART RATE: 87 BPM | SYSTOLIC BLOOD PRESSURE: 126 MMHG | TEMPERATURE: 97 F | BODY MASS INDEX: 45 KG/M2 | DIASTOLIC BLOOD PRESSURE: 85 MMHG | OXYGEN SATURATION: 96 %

## 2022-06-21 DIAGNOSIS — C81.05 NODULAR LYMPHOCYTE PREDOMINANT HODGKIN LYMPHOMA OF LYMPH NODES OF INGUINAL REGION (HCC): Primary | ICD-10-CM

## 2022-06-21 LAB
ALBUMIN SERPL-MCNC: 3.5 G/DL (ref 3.4–5)
ALBUMIN/GLOB SERPL: 0.9 {RATIO} (ref 1–2)
ALP LIVER SERPL-CCNC: 120 U/L
ALT SERPL-CCNC: 18 U/L
ANION GAP SERPL CALC-SCNC: 6 MMOL/L (ref 0–18)
AST SERPL-CCNC: 16 U/L (ref 15–37)
BASOPHILS # BLD AUTO: 0.02 X10(3) UL (ref 0–0.2)
BASOPHILS NFR BLD AUTO: 0.3 %
BILIRUB SERPL-MCNC: 0.4 MG/DL (ref 0.1–2)
BUN BLD-MCNC: 13 MG/DL (ref 7–18)
CALCIUM BLD-MCNC: 9.5 MG/DL (ref 8.5–10.1)
CHLORIDE SERPL-SCNC: 108 MMOL/L (ref 98–112)
CO2 SERPL-SCNC: 27 MMOL/L (ref 21–32)
CREAT BLD-MCNC: 1.05 MG/DL
EOSINOPHIL # BLD AUTO: 0.1 X10(3) UL (ref 0–0.7)
EOSINOPHIL NFR BLD AUTO: 1.6 %
ERYTHROCYTE [DISTWIDTH] IN BLOOD BY AUTOMATED COUNT: 13.3 %
ERYTHROCYTE [SEDIMENTATION RATE] IN BLOOD: 23 MM/HR
FASTING STATUS PATIENT QL REPORTED: NO
GLOBULIN PLAS-MCNC: 4.1 G/DL (ref 2.8–4.4)
GLUCOSE BLD-MCNC: 97 MG/DL (ref 70–99)
HCT VFR BLD AUTO: 43.5 %
HGB BLD-MCNC: 13.9 G/DL
IMM GRANULOCYTES # BLD AUTO: 0.02 X10(3) UL (ref 0–1)
IMM GRANULOCYTES NFR BLD: 0.3 %
LYMPHOCYTES # BLD AUTO: 2.24 X10(3) UL (ref 1–4)
LYMPHOCYTES NFR BLD AUTO: 36.7 %
MCH RBC QN AUTO: 29.2 PG (ref 26–34)
MCHC RBC AUTO-ENTMCNC: 32 G/DL (ref 31–37)
MCV RBC AUTO: 91.4 FL
MONOCYTES # BLD AUTO: 0.51 X10(3) UL (ref 0.1–1)
MONOCYTES NFR BLD AUTO: 8.4 %
NEUTROPHILS # BLD AUTO: 3.21 X10 (3) UL (ref 1.5–7.7)
NEUTROPHILS # BLD AUTO: 3.21 X10(3) UL (ref 1.5–7.7)
NEUTROPHILS NFR BLD AUTO: 52.7 %
OSMOLALITY SERPL CALC.SUM OF ELEC: 292 MOSM/KG (ref 275–295)
PLATELET # BLD AUTO: 207 10(3)UL (ref 150–450)
POTASSIUM SERPL-SCNC: 3.8 MMOL/L (ref 3.5–5.1)
PROT SERPL-MCNC: 7.6 G/DL (ref 6.4–8.2)
RBC # BLD AUTO: 4.76 X10(6)UL
SODIUM SERPL-SCNC: 141 MMOL/L (ref 136–145)
WBC # BLD AUTO: 6.1 X10(3) UL (ref 4–11)

## 2022-06-21 PROCEDURE — 99215 OFFICE O/P EST HI 40 MIN: CPT | Performed by: SPECIALIST

## 2022-06-21 NOTE — PROGRESS NOTES
Patient is here today for follow up  with Nick Agrawal for test results. Patient stated surgical site pain. Medication list and medical history were reviewed and updated. Education Record    Learner:  Patient    Disease / Diagnosis:  Hodgkin's disease    Barriers / Limitations:  None   Comments:    Method:  Brief focused, Discussion, Printed material and Reinforcement   Comments:    General Topics:  Medication, Pain, Procedure and Plan of care reviewed   Comments:    Outcome:  Shows understanding   Comments:    AVS provided and follow up reviewed. Patient instructed to call as needed.

## 2022-06-22 ENCOUNTER — OFFICE VISIT (OUTPATIENT)
Dept: SURGERY | Facility: CLINIC | Age: 52
End: 2022-06-22
Payer: COMMERCIAL

## 2022-06-22 VITALS
SYSTOLIC BLOOD PRESSURE: 144 MMHG | TEMPERATURE: 97 F | OXYGEN SATURATION: 99 % | HEART RATE: 82 BPM | DIASTOLIC BLOOD PRESSURE: 73 MMHG | RESPIRATION RATE: 16 BRPM

## 2022-06-22 DIAGNOSIS — Z85.71 HISTORY OF HODGKIN'S LYMPHOMA: Primary | ICD-10-CM

## 2022-06-22 PROCEDURE — 3077F SYST BP >= 140 MM HG: CPT | Performed by: SURGERY

## 2022-06-22 PROCEDURE — 3078F DIAST BP <80 MM HG: CPT | Performed by: SURGERY

## 2022-06-22 PROCEDURE — 99024 POSTOP FOLLOW-UP VISIT: CPT | Performed by: SURGERY

## 2022-06-22 NOTE — PROGRESS NOTES
Surgical Oncology Progress Note    Subjective:  Patient presents today for postoperative appointment. She is status post excisional biopsy right inguinal lymph node 6/7/2022. She is doing well. Denies pain, fevers, chills, problems with incision    Objective:  Patient alert, oriented, no acute distress. Incision is clean, dry, intact. No swelling or erythema. No LE edema    Assessment/Plan:  Status post right inguinal excisional biopsy 6/7/2022  Hodgkin's lymphoma    Patient is doing well from postoperative standpoint  Follow-up with Dr. Xochilt Mota for further workup and treatment  She may follow-up with us on an as-needed basis    Abdon Reynolds PA-C  Department [facility]  [phone]  Pager: [phone]    Agree  Doing well  Follow up with Dr Jyoti Duran MD  Complex General Surgical Oncology  [facility]  Pager [phone]. [email or web address]

## 2022-06-24 DIAGNOSIS — Z51.81 ENCOUNTER FOR THERAPEUTIC DRUG MONITORING: ICD-10-CM

## 2022-06-26 RX ORDER — DIETHYLPROPION HYDROCHLORIDE 25 MG/1
TABLET ORAL
Qty: 60 TABLET | Refills: 0 | OUTPATIENT
Start: 2022-06-26

## 2022-06-27 DIAGNOSIS — Z51.81 ENCOUNTER FOR THERAPEUTIC DRUG MONITORING: ICD-10-CM

## 2022-06-27 RX ORDER — VALACYCLOVIR HYDROCHLORIDE 500 MG/1
500 TABLET, FILM COATED ORAL 2 TIMES DAILY
Qty: 30 TABLET | Refills: 0 | Status: SHIPPED | OUTPATIENT
Start: 2022-06-27

## 2022-06-27 RX ORDER — DIETHYLPROPION HYDROCHLORIDE 25 MG/1
TABLET ORAL
Qty: 60 TABLET | Refills: 0 | Status: SHIPPED | OUTPATIENT
Start: 2022-06-27

## 2022-06-27 NOTE — TELEPHONE ENCOUNTER
Last OV: 03/14/2022  Last refill date: 07/08/2019  Follow-up: 1 year  Next appt.: None scheduled; Due 03/2023    Medication sent.

## 2022-06-30 ENCOUNTER — HOSPITAL ENCOUNTER (OUTPATIENT)
Dept: MRI IMAGING | Facility: HOSPITAL | Age: 52
Discharge: HOME | End: 2022-06-30
Attending: SURGERY
Payer: COMMERCIAL

## 2022-06-30 DIAGNOSIS — R92.8 ABNORMAL MRI, BREAST: ICD-10-CM

## 2022-06-30 PROCEDURE — A9575 INJ GADOTERATE MEGLUMI 0.1ML: HCPCS | Performed by: SURGERY

## 2022-06-30 PROCEDURE — 77049 MRI BREAST C-+ W/CAD BI: CPT | Performed by: SURGERY

## 2022-07-01 DIAGNOSIS — R92.8 ABNORMAL MRI, BREAST: ICD-10-CM

## 2022-07-01 DIAGNOSIS — D24.1 PAPILLOMA OF RIGHT BREAST: Primary | ICD-10-CM

## 2022-07-01 DIAGNOSIS — Z91.89 AT HIGH RISK FOR BREAST CANCER: ICD-10-CM

## 2022-07-21 ENCOUNTER — LAB ENCOUNTER (OUTPATIENT)
Dept: LAB | Age: 52
End: 2022-07-21
Attending: INTERNAL MEDICINE
Payer: COMMERCIAL

## 2022-07-21 DIAGNOSIS — D69.6 THROMBOCYTOPENIA (HCC): ICD-10-CM

## 2022-07-21 DIAGNOSIS — N17.9 AKI (ACUTE KIDNEY INJURY) (HCC): ICD-10-CM

## 2022-07-21 LAB
ANION GAP SERPL CALC-SCNC: 4 MMOL/L (ref 0–18)
BASOPHILS # BLD AUTO: 0.02 X10(3) UL (ref 0–0.2)
BASOPHILS NFR BLD AUTO: 0.4 %
BUN BLD-MCNC: 12 MG/DL (ref 7–18)
CALCIUM BLD-MCNC: 9.2 MG/DL (ref 8.5–10.1)
CHLORIDE SERPL-SCNC: 110 MMOL/L (ref 98–112)
CO2 SERPL-SCNC: 29 MMOL/L (ref 21–32)
CREAT BLD-MCNC: 1.04 MG/DL
EOSINOPHIL # BLD AUTO: 0.13 X10(3) UL (ref 0–0.7)
EOSINOPHIL NFR BLD AUTO: 2.8 %
ERYTHROCYTE [DISTWIDTH] IN BLOOD BY AUTOMATED COUNT: 14.2 %
FASTING STATUS PATIENT QL REPORTED: YES
GLUCOSE BLD-MCNC: 81 MG/DL (ref 70–99)
HCT VFR BLD AUTO: 42.8 %
HGB BLD-MCNC: 13.5 G/DL
IMM GRANULOCYTES # BLD AUTO: 0.02 X10(3) UL (ref 0–1)
IMM GRANULOCYTES NFR BLD: 0.4 %
LYMPHOCYTES # BLD AUTO: 1.92 X10(3) UL (ref 1–4)
LYMPHOCYTES NFR BLD AUTO: 41.2 %
MCH RBC QN AUTO: 29.5 PG (ref 26–34)
MCHC RBC AUTO-ENTMCNC: 31.5 G/DL (ref 31–37)
MCV RBC AUTO: 93.7 FL
MONOCYTES # BLD AUTO: 0.42 X10(3) UL (ref 0.1–1)
MONOCYTES NFR BLD AUTO: 9 %
NEUTROPHILS # BLD AUTO: 2.15 X10 (3) UL (ref 1.5–7.7)
NEUTROPHILS # BLD AUTO: 2.15 X10(3) UL (ref 1.5–7.7)
NEUTROPHILS NFR BLD AUTO: 46.2 %
OSMOLALITY SERPL CALC.SUM OF ELEC: 295 MOSM/KG (ref 275–295)
PLATELET # BLD AUTO: 124 10(3)UL (ref 150–450)
POTASSIUM SERPL-SCNC: 4.1 MMOL/L (ref 3.5–5.1)
RBC # BLD AUTO: 4.57 X10(6)UL
SODIUM SERPL-SCNC: 143 MMOL/L (ref 136–145)
WBC # BLD AUTO: 4.7 X10(3) UL (ref 4–11)

## 2022-07-21 PROCEDURE — 85025 COMPLETE CBC W/AUTO DIFF WBC: CPT | Performed by: INTERNAL MEDICINE

## 2022-07-21 PROCEDURE — 80048 BASIC METABOLIC PNL TOTAL CA: CPT | Performed by: INTERNAL MEDICINE

## 2022-08-11 ENCOUNTER — OFFICE VISIT (OUTPATIENT)
Dept: INTERNAL MEDICINE CLINIC | Facility: CLINIC | Age: 52
End: 2022-08-11
Payer: COMMERCIAL

## 2022-08-11 VITALS
HEART RATE: 79 BPM | OXYGEN SATURATION: 100 % | HEIGHT: 62 IN | BODY MASS INDEX: 46.56 KG/M2 | WEIGHT: 253 LBS | RESPIRATION RATE: 17 BRPM

## 2022-08-11 DIAGNOSIS — E55.9 VITAMIN D DEFICIENCY: ICD-10-CM

## 2022-08-11 DIAGNOSIS — E11.9 CONTROLLED TYPE 2 DIABETES MELLITUS WITHOUT COMPLICATION, WITHOUT LONG-TERM CURRENT USE OF INSULIN (HCC): ICD-10-CM

## 2022-08-11 DIAGNOSIS — F43.9 STRESS: ICD-10-CM

## 2022-08-11 DIAGNOSIS — Z99.89 OSA ON CPAP: ICD-10-CM

## 2022-08-11 DIAGNOSIS — G47.33 OSA ON CPAP: ICD-10-CM

## 2022-08-11 DIAGNOSIS — Z51.81 ENCOUNTER FOR THERAPEUTIC DRUG MONITORING: Primary | ICD-10-CM

## 2022-08-11 DIAGNOSIS — I10 ESSENTIAL HYPERTENSION, BENIGN: ICD-10-CM

## 2022-08-11 PROCEDURE — 99214 OFFICE O/P EST MOD 30 MIN: CPT | Performed by: NURSE PRACTITIONER

## 2022-08-11 PROCEDURE — 3008F BODY MASS INDEX DOCD: CPT | Performed by: NURSE PRACTITIONER

## 2022-08-11 RX ORDER — SEMAGLUTIDE 2.68 MG/ML
2 INJECTION, SOLUTION SUBCUTANEOUS WEEKLY
Qty: 9 ML | Refills: 0 | Status: CANCELLED | OUTPATIENT
Start: 2022-08-11

## 2022-08-11 RX ORDER — TAPINAROF 10 MG/1000MG
CREAM TOPICAL
COMMUNITY
Start: 2022-08-03

## 2022-08-11 RX ORDER — TIRZEPATIDE 5 MG/.5ML
5 INJECTION, SOLUTION SUBCUTANEOUS WEEKLY
Qty: 2 ML | Refills: 0 | Status: SHIPPED | OUTPATIENT
Start: 2022-08-11

## 2022-08-11 NOTE — PATIENT INSTRUCTIONS
We are here to support you with weight loss, but please remember that you still need your primary care provider for your routine health maintenance. PLAN:   Will start mounjaro 5mg weekly X 4 weeks and then increase to 7.5mg weekly (will need to send in Croak.it message in 3-4 weeks to tell me what dosage you want)    Follow up with me in 8-12 weeks  Schedule follow up appointments: Yelena Hennessy (dietitian) or Ru Huff (presurgery dietitian)   Check for insurance coverage for dietitian and labwork prior to scheduling appointment. Please try to work on the following dietary changes:  1. Goals: Aim for 20-30 grams of protein/ meal  i. Aim for 140 grams of carbohydrates/day  ii. Eat 4-6 vegetables/day  iii. Avoid skipping meals- eat every 4-5 hours  iv. Aim for 3 meals/day  2. Drink lots of water and cut down on soda/juice consumption if soda/juice drinker  3. Focus on protein: (15-30 grams with each meal) ie. greek yogurt, cottage cheese, string cheese, hard boiled eggs  4. Healthy snacks: peanut butter and apples, hummus and carrots, berries, nuts (1/4 cup), tuna and crackers                 Protein Shakes: Premier protein or Core Power                Protein Bars: Rx Bars, Oatmega, Power Crunch                 Sargento balanced breaks (cheese and nuts)- without chocolate  5. Reduce carbohydrates which includes sweets as well as rice, pasta, potatoes, bread, corn and instead choose whole grain options or more protein or vegetables (4-6 servings of vegetables per day)  6. Get a good night of sleep  7. Try to decrease stress in life     Please download apps:  1. \"My Fitness Pal\" (other option is Lose it)) to help you to monitor daily dietary intake and you will be able to see if you are eating the right amount of calories, protein, carbs                With My Fitness Pal-->When you set-up the vinh or need to adjust settings:                Goals should include:                  Lose 1.5-2 lbs per week Activity level: not very active (can't count exercise towards calorie number per day)                   ** Daily INPUT> Look at nutrition section-- \"nutrients\" and it will break down your macros for the day (ie. Protein, carbs, fibers, sugars and fats). Try to stay within these numbers daily     2. \"7 minute workout\" to help with exercise/activity which takes 7 minutes of your day and that you can do at home! 3. \"Calm\" or \"Headspace\" which helps with mindfulness, meditation, clarity, sleep, and lexus to your daily life. 4. [employer] blog for healthy recipe ideas  5. Integration Management for low carb resources    HIGH PROTEIN SNACK IDEAS  -cottage cheese  -plain yogurt  -kefir  -hard-boiled eggs  -natural cheeses  -nuts (measure portion size)   -unsweetened nut butters  -dried edamame   -arsh seeds soaked in water or almond milk  -soy nuts  -cured meats (monitor for sodium issues)   -hummus with vegetables  -bean dip with vegetables     FRUIT  Low carb fruit options   Raspberries: Half a cup (60 grams) contains 3 grams of carbs. Blackberries: Half a cup (70 grams) contains 4 grams of carbs. Strawberries: Half a cup (100 grams) contains 6 grams of carbs. Blueberries: Half a cup (50 grams) contains 6 grams of carbs. Plum: One medium-sized (80 grams) contains 6 grams of carbs.      VEGETABLES  Low carb vegetables

## 2022-09-06 ENCOUNTER — HOSPITAL ENCOUNTER (OUTPATIENT)
Dept: MAMMOGRAPHY | Facility: HOSPITAL | Age: 52
Discharge: HOME | End: 2022-09-06
Payer: COMMERCIAL

## 2022-09-06 ENCOUNTER — OFFICE VISIT (OUTPATIENT)
Dept: INTERNAL MEDICINE CLINIC | Facility: CLINIC | Age: 52
End: 2022-09-06
Payer: COMMERCIAL

## 2022-09-06 ENCOUNTER — OFFICE VISIT (OUTPATIENT)
Dept: SURGERY | Facility: CLINIC | Age: 52
End: 2022-09-06
Payer: COMMERCIAL

## 2022-09-06 ENCOUNTER — LAB ENCOUNTER (OUTPATIENT)
Dept: LAB | Age: 52
End: 2022-09-06
Attending: INTERNAL MEDICINE
Payer: COMMERCIAL

## 2022-09-06 VITALS
TEMPERATURE: 98 F | RESPIRATION RATE: 19 BRPM | SYSTOLIC BLOOD PRESSURE: 143 MMHG | OXYGEN SATURATION: 96 % | HEART RATE: 79 BPM | DIASTOLIC BLOOD PRESSURE: 85 MMHG | WEIGHT: 245 LBS | BODY MASS INDEX: 45 KG/M2

## 2022-09-06 VITALS
SYSTOLIC BLOOD PRESSURE: 120 MMHG | BODY MASS INDEX: 46 KG/M2 | WEIGHT: 250 LBS | OXYGEN SATURATION: 100 % | TEMPERATURE: 98 F | RESPIRATION RATE: 16 BRPM | HEART RATE: 83 BPM | DIASTOLIC BLOOD PRESSURE: 62 MMHG

## 2022-09-06 DIAGNOSIS — Z99.89 OSA ON CPAP: ICD-10-CM

## 2022-09-06 DIAGNOSIS — I10 ESSENTIAL HYPERTENSION, BENIGN: Primary | ICD-10-CM

## 2022-09-06 DIAGNOSIS — G47.33 OSA ON CPAP: ICD-10-CM

## 2022-09-06 DIAGNOSIS — E66.01 CLASS 3 SEVERE OBESITY DUE TO EXCESS CALORIES WITH SERIOUS COMORBIDITY AND BODY MASS INDEX (BMI) OF 40.0 TO 44.9 IN ADULT (HCC): ICD-10-CM

## 2022-09-06 DIAGNOSIS — E11.9 CONTROLLED TYPE 2 DIABETES MELLITUS WITHOUT COMPLICATION, WITHOUT LONG-TERM CURRENT USE OF INSULIN (HCC): ICD-10-CM

## 2022-09-06 DIAGNOSIS — R92.8 ABNORMAL MRI, BREAST: Primary | ICD-10-CM

## 2022-09-06 DIAGNOSIS — Z91.89 AT HIGH RISK FOR BREAST CANCER: ICD-10-CM

## 2022-09-06 DIAGNOSIS — D24.1 PAPILLOMA OF RIGHT BREAST: ICD-10-CM

## 2022-09-06 DIAGNOSIS — L30.9 ECZEMA, UNSPECIFIED TYPE: ICD-10-CM

## 2022-09-06 DIAGNOSIS — C81.04 NODULAR LYMPHOCYTE PREDOMINANT HODGKIN LYMPHOMA OF LYMPH NODES OF AXILLA (HCC): ICD-10-CM

## 2022-09-06 DIAGNOSIS — R92.8 ABNORMAL MRI, BREAST: ICD-10-CM

## 2022-09-06 DIAGNOSIS — K21.00 GASTROESOPHAGEAL REFLUX DISEASE WITH ESOPHAGITIS, UNSPECIFIED WHETHER HEMORRHAGE: ICD-10-CM

## 2022-09-06 DIAGNOSIS — J30.9 CHRONIC ALLERGIC RHINITIS: ICD-10-CM

## 2022-09-06 PROBLEM — J30.89 ALLERGIC RHINITIS DUE TO HOUSE DUST MITE: Status: RESOLVED | Noted: 2017-07-07 | Resolved: 2022-09-06

## 2022-09-06 PROBLEM — J30.81 ALLERGIC RHINITIS DUE TO ANIMAL (CAT) (DOG) HAIR AND DANDER: Status: RESOLVED | Noted: 2017-07-07 | Resolved: 2022-09-06

## 2022-09-06 PROBLEM — J30.89 ALLERGIC RHINITIS DUE TO MOLD: Status: RESOLVED | Noted: 2017-07-07 | Resolved: 2022-09-06

## 2022-09-06 LAB
ALT SERPL-CCNC: 22 U/L
AST SERPL-CCNC: 20 U/L (ref 15–37)
CHOLEST SERPL-MCNC: 161 MG/DL (ref ?–200)
CREAT UR-SCNC: 286 MG/DL
EST. AVERAGE GLUCOSE BLD GHB EST-MCNC: 120 MG/DL (ref 68–126)
FASTING PATIENT LIPID ANSWER: NO
HBA1C MFR BLD: 5.8 % (ref ?–5.7)
HDLC SERPL-MCNC: 72 MG/DL (ref 40–59)
LDLC SERPL CALC-MCNC: 73 MG/DL (ref ?–100)
MICROALBUMIN UR-MCNC: 1.03 MG/DL
MICROALBUMIN/CREAT 24H UR-RTO: 3.6 UG/MG (ref ?–30)
NONHDLC SERPL-MCNC: 89 MG/DL (ref ?–130)
TRIGL SERPL-MCNC: 84 MG/DL (ref 30–149)
VLDLC SERPL CALC-MCNC: 13 MG/DL (ref 0–30)

## 2022-09-06 PROCEDURE — 84460 ALANINE AMINO (ALT) (SGPT): CPT | Performed by: INTERNAL MEDICINE

## 2022-09-06 PROCEDURE — 3074F SYST BP LT 130 MM HG: CPT | Performed by: INTERNAL MEDICINE

## 2022-09-06 PROCEDURE — 99213 OFFICE O/P EST LOW 20 MIN: CPT | Performed by: SURGERY

## 2022-09-06 PROCEDURE — 77062 BREAST TOMOSYNTHESIS BI: CPT

## 2022-09-06 PROCEDURE — 3078F DIAST BP <80 MM HG: CPT | Performed by: INTERNAL MEDICINE

## 2022-09-06 PROCEDURE — 99214 OFFICE O/P EST MOD 30 MIN: CPT | Performed by: INTERNAL MEDICINE

## 2022-09-06 PROCEDURE — 3044F HG A1C LEVEL LT 7.0%: CPT | Performed by: INTERNAL MEDICINE

## 2022-09-06 PROCEDURE — 82043 UR ALBUMIN QUANTITATIVE: CPT | Performed by: INTERNAL MEDICINE

## 2022-09-06 PROCEDURE — 3077F SYST BP >= 140 MM HG: CPT | Performed by: SURGERY

## 2022-09-06 PROCEDURE — 3061F NEG MICROALBUMINURIA REV: CPT | Performed by: INTERNAL MEDICINE

## 2022-09-06 PROCEDURE — 77066 DX MAMMO INCL CAD BI: CPT

## 2022-09-06 PROCEDURE — 82570 ASSAY OF URINE CREATININE: CPT | Performed by: INTERNAL MEDICINE

## 2022-09-06 PROCEDURE — 84450 TRANSFERASE (AST) (SGOT): CPT | Performed by: INTERNAL MEDICINE

## 2022-09-06 PROCEDURE — 80061 LIPID PANEL: CPT | Performed by: INTERNAL MEDICINE

## 2022-09-06 PROCEDURE — 83036 HEMOGLOBIN GLYCOSYLATED A1C: CPT | Performed by: INTERNAL MEDICINE

## 2022-09-06 PROCEDURE — 3079F DIAST BP 80-89 MM HG: CPT | Performed by: SURGERY

## 2022-09-06 NOTE — PATIENT INSTRUCTIONS
Continue your medications    I do recommend the shingles vaccine.  You can get it at your local pharmacy    Please have your blood tests done    Please take 2000 IU of vitamin D daily    See you back in 6 months

## 2022-09-10 ENCOUNTER — LAB ENCOUNTER (OUTPATIENT)
Dept: LAB | Age: 52
End: 2022-09-10
Attending: INTERNAL MEDICINE
Payer: COMMERCIAL

## 2022-09-25 ENCOUNTER — PATIENT MESSAGE (OUTPATIENT)
Dept: INTERNAL MEDICINE CLINIC | Facility: CLINIC | Age: 52
End: 2022-09-25

## 2022-09-26 RX ORDER — TIRZEPATIDE 7.5 MG/.5ML
7.5 INJECTION, SOLUTION SUBCUTANEOUS WEEKLY
Qty: 2 ML | Refills: 0 | Status: SHIPPED | OUTPATIENT
Start: 2022-09-26

## 2022-09-26 NOTE — TELEPHONE ENCOUNTER
From: Mark Fish  To: DOREEN Wren  Sent: 9/25/2022 7:41 PM CDT  Subject: [place] Ven[place]ata evening Demetra,     I have one week left of the [facility]. I handled the 5 mg well I think I can move up to 7.5 mg or should we go higher?      [employer]

## 2022-09-26 NOTE — TELEPHONE ENCOUNTER
Requesting Mounjaro increase  LOV: 8/11/22  RTC: 8 weeks  Last Relevant Labs: 9/6/22  Filled: 8/11/22 #2ml with 0 refills  Mounjaro 5 mg    Future Appointments   Date Time Provider Karina Piper   11/3/2022  8:20 AM DOREEN [id number]

## 2022-10-03 ENCOUNTER — TELEPHONE (OUTPATIENT)
Dept: INTERNAL MEDICINE CLINIC | Facility: CLINIC | Age: 52
End: 2022-10-03

## 2022-10-07 NOTE — TELEPHONE ENCOUNTER
Prime fax the denial on mounjaro, as they want pt to try and failed one more alternatives like Victoza or Bydureon, As pt has already tried ozempic. So plan wants to try one more alternative and if that's fail then they can consider Mounjaro. My chart sent to use saving card.

## 2022-10-26 ENCOUNTER — PATIENT MESSAGE (OUTPATIENT)
Dept: INTERNAL MEDICINE CLINIC | Facility: CLINIC | Age: 52
End: 2022-10-26

## 2022-10-26 RX ORDER — TIRZEPATIDE 10 MG/.5ML
10 INJECTION, SOLUTION SUBCUTANEOUS WEEKLY
Qty: 2 ML | Refills: 0 | Status: SHIPPED | OUTPATIENT
Start: 2022-10-26

## 2022-10-26 NOTE — TELEPHONE ENCOUNTER
Requesting Mounjaro increase  LOV: 8/11/22  RTC: 8 weeks  Last Relevant Labs: 9/6/22  Filled: 10/3/22 #2ml with 0 refills  Mounjaro 7.5 mg    Future Appointments   Date Time Provider Karina Piper   11/3/2022  8:20 AM DOREEN Corrales [id number]

## 2022-10-26 NOTE — TELEPHONE ENCOUNTER
From: Pilo Mota  To: DOREEN Watson  Sent: 10/26/2022 11:38 AM CDT  Subject: Monjour    Good morning,     I have tolerated the 7.5 very well. I am ready to move up to the next level.      Thank you, Dionna Isaacs

## 2022-10-31 ENCOUNTER — E-VISIT (OUTPATIENT)
Dept: TELEHEALTH | Age: 52
End: 2022-10-31

## 2022-10-31 DIAGNOSIS — Z02.9 ADMINISTRATIVE ENCOUNTER: Primary | ICD-10-CM

## 2022-10-31 NOTE — PROGRESS NOTES
E-visit initiated for URI and lower respiratory symptoms. Patient is undergoing Cancer care. Referred to the [facility] in [facility] for in person evaluation and COVID testing. E-visit cancelled with no charge. Patient responded that she would present there today.

## 2022-11-04 ENCOUNTER — PATIENT MESSAGE (OUTPATIENT)
Dept: INTERNAL MEDICINE CLINIC | Facility: CLINIC | Age: 52
End: 2022-11-04

## 2022-11-04 DIAGNOSIS — J01.01 ACUTE RECURRENT MAXILLARY SINUSITIS: Primary | ICD-10-CM

## 2022-11-04 RX ORDER — AMOXICILLIN AND CLAVULANATE POTASSIUM 875; 125 MG/1; MG/1
1 TABLET, FILM COATED ORAL 2 TIMES DAILY
Qty: 14 TABLET | Refills: 0 | Status: SHIPPED | OUTPATIENT
Start: 2022-11-04 | End: 2022-11-11

## 2022-11-08 ENCOUNTER — OFFICE VISIT (OUTPATIENT)
Dept: INTERNAL MEDICINE CLINIC | Facility: CLINIC | Age: 52
End: 2022-11-08
Payer: COMMERCIAL

## 2022-11-08 VITALS
SYSTOLIC BLOOD PRESSURE: 126 MMHG | BODY MASS INDEX: 46.01 KG/M2 | HEART RATE: 79 BPM | DIASTOLIC BLOOD PRESSURE: 79 MMHG | RESPIRATION RATE: 16 BRPM | HEIGHT: 62 IN | WEIGHT: 250 LBS | OXYGEN SATURATION: 96 %

## 2022-11-08 DIAGNOSIS — Z51.81 ENCOUNTER FOR THERAPEUTIC DRUG MONITORING: Primary | ICD-10-CM

## 2022-11-08 DIAGNOSIS — E55.9 VITAMIN D DEFICIENCY: ICD-10-CM

## 2022-11-08 DIAGNOSIS — Z99.89 OSA ON CPAP: ICD-10-CM

## 2022-11-08 DIAGNOSIS — F43.9 STRESS: ICD-10-CM

## 2022-11-08 DIAGNOSIS — R79.89 LOW VITAMIN D LEVEL: ICD-10-CM

## 2022-11-08 DIAGNOSIS — I10 ESSENTIAL HYPERTENSION, BENIGN: ICD-10-CM

## 2022-11-08 DIAGNOSIS — G47.33 OSA ON CPAP: ICD-10-CM

## 2022-11-08 DIAGNOSIS — E11.9 CONTROLLED TYPE 2 DIABETES MELLITUS WITHOUT COMPLICATION, WITHOUT LONG-TERM CURRENT USE OF INSULIN (HCC): ICD-10-CM

## 2022-11-08 PROCEDURE — 3074F SYST BP LT 130 MM HG: CPT | Performed by: NURSE PRACTITIONER

## 2022-11-08 PROCEDURE — 3008F BODY MASS INDEX DOCD: CPT | Performed by: NURSE PRACTITIONER

## 2022-11-08 PROCEDURE — 99214 OFFICE O/P EST MOD 30 MIN: CPT | Performed by: NURSE PRACTITIONER

## 2022-11-08 PROCEDURE — 3078F DIAST BP <80 MM HG: CPT | Performed by: NURSE PRACTITIONER

## 2022-11-08 RX ORDER — CLOBETASOL PROPIONATE 0.5 MG/G
OINTMENT TOPICAL
COMMUNITY
Start: 2022-09-21

## 2022-11-08 NOTE — PATIENT INSTRUCTIONS
Next steps:  1. Fill your prescribed medication and take as discussed and prescribed: mounjaro 10mg weekly x 4 weeks   2. Schedule a personal nutrition consultation with one of our registered dieticians     Please try to work on the following dietary changes:    1. Drink water with meals and throughout the day, cut down on soda and/or juice if consumed. Consider flavored water options like Bubbly, Spindrift, Hint and Trent. 2.  Eat breakfast daily and focus on having protein with each meal, examples include: greek yogurt, cottage cheese, hard boiled egg, whole grain toast with peanut butter. 3.  Reduce refined carbohydrates and sugars which includes items such as sweets, as well as rice, pasta, and bread and make sure to choose whole grain options when having them with just 1 serving per meal about the size of your inner palm. 4.  Consume non starchy veggies daily working towards making them a good 50% of your daily food intake. Add them to lunch and dinner consistently. 5.  Start a daily probiotic: VSL#3 is recommended, (order on line at www.vsl3. com). Take 1 capsule daily with water for 30 days, then reduce to 1 every other day (this will reduce the cost). Capsules can be left out for 2 weeks, but then must be refrigerated. Please download vinh My Fitness Anders Tafoya! Or Net Diary to monitor daily dietary intake and you will be able to see if you are eating the right amount of calories or too much or too little which would hinder weight loss. Additionally this will help to see your daily carbohydrate and protein intake. When you set the vinh up choose 1-2 lbs/week as a goal.  Keeping a paper food journal is an option as well to remain accountable for your choices- this is the start to mindful eating! A low calorie diet has been consistently shown to support weight loss. Continue or start exercising to help establish a routine.  If not already exercising begin with 1 day and progress as able with long-term goal of 30 minutes 5 days a week at a minimum. Meditation daily can help manage and control stress. Chronic stress can make weight loss difficult. Exercising is one way to help with stress, but meditation using the CALM Keo or another comparable alternative can be done in your home or place of work with little time commitment. This Keo can also help work on behavior change and improve sleep. Check out the segment under Calm Masterclass and listen to The 4 Pillars of Health. A great way to begin learning about the foundation of lifestyle with practical tips to use in your every day. Check out www.yourweightmatters. org blog for continued daily support and education along this weight loss journey! Patient Resources:     Personal Training/Fitness Classes/Health Coaching     Christi Nieves and Alex Sophiaside @ [email or web address]-[email or web address]/ Full fitness center with group fitness and personal training. Discount available as client of [facility]. Health Coaching and Personal Training with Radha Miles at our [facility]- individual weekly coaching with option to add personal training and small group fitness classes targeted at weight loss- [phone] and/or email @ [email or web address]  360FIT [place] [email or web address] Group Fitness [phone] and/or email Rani Gregory at [email or web address]  [address] [facility] with multiple locations: Aetna (www.OneName), Eat The C[employer] Fitness (www.Legend Silicon. TagArray), Fit Body Bootcamp (www.Genetic Financebodybootcamp.com), Legend Silicon Fitness (www.Wolfpack Chassis. TagArray), The Exercise  (www.exercisecoach.TagArray)     Online Fitness  Fitness  on [employer] in 10 DVD series- www. mjovf33NSK. TagArray  Sit and Be Fit - Chair exercise series Www.sitandbefit. org  Hip Hop Fit with Alen Ferraro at www.hiphopfit. net     Apps for on the [employer] 7 Minute Workout (orange box with white 7) - free on the go HIIT training keo  Peloton Keo @ www. Digital Vega     Nutrition Trackers and Tools  LoseIT! And My Fitness Pal apps and on line for tracking nutrition  NOOM - virtual health coaching  FitFoundation (healthy meals on the go) in [facility] @ www. pytfyjcrizkbw8k. Zamzam Hooks MD @ www.bistromdbarter.li and Virgen Newman (keto and low carb plans recommended) @ www. UMEJTA49.QQO, Metabolic Meals @ www. GLO ScienceMetabolicMeals. com - individual prepared meals to go  Collactive, XGraph, International Business Machines, Every Plate, SatNav Technologies- on line meal delivery programs for preparation at home  [place] S[employer]ze[employer] in [place] for homemade meals to go @ wwwAdonit. NuAx  Diet Doctor @ www. dietdoctor. NuAx - low carb swaps  YuAffine - meal prep and planning keo (www.yummly. com)     Stress Management/Behavior/Mindful Eating  CALM meditation keo (www.Anapa Biotech)  Headspace  Am I Hungry? Mindful eating virtual  keo  Www.yourweightmatters. org - Obesity Action Coalition sponsored Blog posts daily  Motivation keo (black box with white \")- daily supportive messages sent to your phone     Books/Video Education/Podcasts  Mindless Eating by Lashell Bacon  Why We Get Sick by Tova Danielle (a book about insulin resistance)  Atomic Habits by Maddi Dominguez (a book about taking small steps to promote greater behavior change)   Can't Hurt Me by Skylar Ny (a book exploring the power of discipline in achieving your goals)  The End of Dieting: How to Live for Life by Dr. Cecile Luna M.D. or listen to The [address] [facility] [address] Episode 61: Understanding \"Nutritarian\" Eating w/Dr. Cecile Luna  Your Body in Balance: The World [employer] of Food, Hormones, and Health by Dr. Yris Morris  The Menopause Diet Plan by Cresencio Mckinney and [facility] AT [facility]  The Complete Guide to fasting by Dr. Keanu Sotomayor, [address] [facility] by Lester Fernandez, Ph.D, R.D.   Weight Loss Surgery Will Not Treat Food Addiction by Joi Massey Ph.D  The Game Changers- Gerry Documentary on plant based nutrition  Fed Up - documentary about obesity (Free on Utube)  The Truth About Sugar - documentary on sugar (Free on Utube, https://youtu. be/3G4llueSW7n)  The Dr. Raymond Crouch by Dr. Brianna Pulido MD  Fitlosophy Fitspiration - journal to better health (found at [employer] in fitness aisle)  What Happened to You?- a look at the impact trauma has on behavior written by Colton Alcantara and Dr. Mony Kirkpatrick Again by Mary Thompson - discovering your true self after trauma  Jacey Rush talk on [employer], The Call to Courage  Podcasts: The Exam Room by the Physician's Committee, Nutrition Facts by Dr. Elida Mckeon    We are here to support you with weight loss, but please remember that you still need your primary care provider for your routine health maintenance.

## 2022-11-16 ENCOUNTER — PATIENT MESSAGE (OUTPATIENT)
Dept: INTERNAL MEDICINE CLINIC | Facility: CLINIC | Age: 52
End: 2022-11-16

## 2022-11-16 RX ORDER — TIRZEPATIDE 12.5 MG/.5ML
12.5 INJECTION, SOLUTION SUBCUTANEOUS WEEKLY
Qty: 2 ML | Refills: 0 | Status: SHIPPED | OUTPATIENT
Start: 2022-11-16

## 2022-11-16 NOTE — TELEPHONE ENCOUNTER
From: [facility]  To: DOREEN Monsivais  Sent: 11/16/2022 11:15 AM CST  Subject: Asmita Parody     Good morning,     Can you send a prescription for the 12.5 mg to the pharmacy in [facility]? Thank you.      Jose Angel Garnica

## 2022-11-16 NOTE — TELEPHONE ENCOUNTER
Requesting Mounjaro increase  LOV: 11/8/22  RTC: 3 months  Last Relevant Labs: 9/6/22  Filled: 10/26/22 #2ml with 0 refills  Mounjaro 10 mg    1/12/2023  1:40 PM DOREEN Gatica [id number]

## 2022-12-17 ENCOUNTER — PATIENT MESSAGE (OUTPATIENT)
Dept: INTERNAL MEDICINE CLINIC | Facility: CLINIC | Age: 52
End: 2022-12-17

## 2022-12-19 ENCOUNTER — OFFICE VISIT (OUTPATIENT)
Dept: HEMATOLOGY/ONCOLOGY | Facility: HOSPITAL | Age: 52
End: 2022-12-19
Attending: SPECIALIST
Payer: COMMERCIAL

## 2022-12-19 ENCOUNTER — HOSPITAL ENCOUNTER (OUTPATIENT)
Dept: MRI IMAGING | Facility: HOSPITAL | Age: 52
Discharge: HOME | End: 2022-12-19
Attending: SURGERY
Payer: COMMERCIAL

## 2022-12-19 VITALS
BODY MASS INDEX: 46 KG/M2 | RESPIRATION RATE: 18 BRPM | TEMPERATURE: 98 F | DIASTOLIC BLOOD PRESSURE: 76 MMHG | WEIGHT: 251 LBS | SYSTOLIC BLOOD PRESSURE: 130 MMHG | HEART RATE: 80 BPM | OXYGEN SATURATION: 100 %

## 2022-12-19 DIAGNOSIS — C81.05 NODULAR LYMPHOCYTE PREDOMINANT HODGKIN LYMPHOMA OF LYMPH NODES OF INGUINAL REGION (HCC): Primary | ICD-10-CM

## 2022-12-19 DIAGNOSIS — R92.8 ABNORMAL MRI, BREAST: ICD-10-CM

## 2022-12-19 DIAGNOSIS — C81.04 NODULAR LYMPHOCYTE PREDOMINANT HODGKIN LYMPHOMA OF LYMPH NODES OF AXILLA (HCC): ICD-10-CM

## 2022-12-19 LAB
ALBUMIN SERPL-MCNC: 3.2 G/DL (ref 3.4–5)
ALBUMIN/GLOB SERPL: 0.8 {RATIO} (ref 1–2)
ALP LIVER SERPL-CCNC: 100 U/L
ALT SERPL-CCNC: 22 U/L
ANION GAP SERPL CALC-SCNC: 2 MMOL/L (ref 0–18)
AST SERPL-CCNC: 19 U/L (ref 15–37)
BASOPHILS # BLD AUTO: 0.02 X10(3) UL (ref 0–0.2)
BASOPHILS NFR BLD AUTO: 0.4 %
BILIRUB SERPL-MCNC: 0.4 MG/DL (ref 0.1–2)
BUN BLD-MCNC: 11 MG/DL (ref 7–18)
CALCIUM BLD-MCNC: 9 MG/DL (ref 8.5–10.1)
CHLORIDE SERPL-SCNC: 112 MMOL/L (ref 98–112)
CO2 SERPL-SCNC: 28 MMOL/L (ref 21–32)
CREAT BLD-MCNC: 1.12 MG/DL
EOSINOPHIL # BLD AUTO: 0.16 X10(3) UL (ref 0–0.7)
EOSINOPHIL NFR BLD AUTO: 2.9 %
ERYTHROCYTE [DISTWIDTH] IN BLOOD BY AUTOMATED COUNT: 13.8 %
ERYTHROCYTE [SEDIMENTATION RATE] IN BLOOD: 26 MM/HR
FASTING STATUS PATIENT QL REPORTED: NO
GFR SERPLBLD BASED ON 1.73 SQ M-ARVRAT: 58 ML/MIN/1.73M2 (ref 60–?)
GLOBULIN PLAS-MCNC: 3.9 G/DL (ref 2.8–4.4)
GLUCOSE BLD-MCNC: 96 MG/DL (ref 70–99)
HCT VFR BLD AUTO: 39.9 %
HGB BLD-MCNC: 13.3 G/DL
IMM GRANULOCYTES # BLD AUTO: 0.02 X10(3) UL (ref 0–1)
IMM GRANULOCYTES NFR BLD: 0.4 %
LDH SERPL L TO P-CCNC: 179 U/L
LYMPHOCYTES # BLD AUTO: 2.17 X10(3) UL (ref 1–4)
LYMPHOCYTES NFR BLD AUTO: 39.8 %
MCH RBC QN AUTO: 30.2 PG (ref 26–34)
MCHC RBC AUTO-ENTMCNC: 33.3 G/DL (ref 31–37)
MCV RBC AUTO: 90.5 FL
MONOCYTES # BLD AUTO: 0.54 X10(3) UL (ref 0.1–1)
MONOCYTES NFR BLD AUTO: 9.9 %
NEUTROPHILS # BLD AUTO: 2.54 X10 (3) UL (ref 1.5–7.7)
NEUTROPHILS # BLD AUTO: 2.54 X10(3) UL (ref 1.5–7.7)
NEUTROPHILS NFR BLD AUTO: 46.6 %
OSMOLALITY SERPL CALC.SUM OF ELEC: 293 MOSM/KG (ref 275–295)
PLATELET # BLD AUTO: 178 10(3)UL (ref 150–450)
POTASSIUM SERPL-SCNC: 3.9 MMOL/L (ref 3.5–5.1)
PROT SERPL-MCNC: 7.1 G/DL (ref 6.4–8.2)
RBC # BLD AUTO: 4.41 X10(6)UL
SODIUM SERPL-SCNC: 142 MMOL/L (ref 136–145)
WBC # BLD AUTO: 5.5 X10(3) UL (ref 4–11)

## 2022-12-19 PROCEDURE — 77049 MRI BREAST C-+ W/CAD BI: CPT | Performed by: SURGERY

## 2022-12-19 PROCEDURE — A9575 INJ GADOTERATE MEGLUMI 0.1ML: HCPCS | Performed by: SURGERY

## 2022-12-19 PROCEDURE — 99214 OFFICE O/P EST MOD 30 MIN: CPT | Performed by: SPECIALIST

## 2022-12-19 RX ORDER — TIRZEPATIDE 15 MG/.5ML
15 INJECTION, SOLUTION SUBCUTANEOUS WEEKLY
Qty: 2 ML | Refills: 1 | Status: SHIPPED | OUTPATIENT
Start: 2022-12-19

## 2022-12-19 RX ORDER — GADOTERATE MEGLUMINE 376.9 MG/ML
20 INJECTION INTRAVENOUS
Status: COMPLETED | OUTPATIENT
Start: 2022-12-19 | End: 2022-12-19

## 2022-12-19 RX ADMIN — GADOTERATE MEGLUMINE 20 ML: 376.9 INJECTION INTRAVENOUS at 12:35:00

## 2022-12-19 NOTE — TELEPHONE ENCOUNTER
Requesting mounjaro increase  LOV: 11/8/22  RTC: 3 months  Last Relevant Labs: 9/6/22  Filled: 11/16/22 #2ml with 0 refills  Mounjaro 12.5 mg

## 2022-12-19 NOTE — PROGRESS NOTES
Patient is here today for 6 monthfollow up with [facility] [place] for hodgkin's lymphoma. Patient denies pain. Stated she is feeling good. Medication list and medical history  were reviewed and updated. Education Record    Learner:  Patient      Disease / Diagnosis: Hodgkin's Lymphoma    Barriers / Limitations:  None   Comments:    Method:  Brief focused, Discussion, Printed material and Reinforcement   Comments:    General Topics:  Medication, Pain, Procedure and Plan of care reviewed   Comments:    Outcome:  Shows understanding   Comments:    AVS provided and follow up reviewed. Patient instructed to call as needed.

## 2022-12-19 NOTE — TELEPHONE ENCOUNTER
From: Jhony De Leon  To: DOREEN Francisco  Sent: 12/17/2022 10:43 AM CST  Subject: Dax Dress     Good morning,     I have one week left. I would like to move up to the 15mg. Thank you.  Happy Zahida All

## 2023-02-14 RX ORDER — TIRZEPATIDE 15 MG/.5ML
INJECTION, SOLUTION SUBCUTANEOUS
Qty: 2 ML | Refills: 1 | Status: SHIPPED | OUTPATIENT
Start: 2023-02-14

## 2023-02-16 ENCOUNTER — TELEMEDICINE (OUTPATIENT)
Dept: INTERNAL MEDICINE CLINIC | Facility: CLINIC | Age: 53
End: 2023-02-16
Payer: COMMERCIAL

## 2023-02-16 DIAGNOSIS — E11.9 CONTROLLED TYPE 2 DIABETES MELLITUS WITHOUT COMPLICATION, WITHOUT LONG-TERM CURRENT USE OF INSULIN (HCC): ICD-10-CM

## 2023-02-16 DIAGNOSIS — Z99.89 OSA ON CPAP: ICD-10-CM

## 2023-02-16 DIAGNOSIS — E66.01 CLASS 3 SEVERE OBESITY DUE TO EXCESS CALORIES WITH SERIOUS COMORBIDITY AND BODY MASS INDEX (BMI) OF 40.0 TO 44.9 IN ADULT (HCC): ICD-10-CM

## 2023-02-16 DIAGNOSIS — G47.33 OSA ON CPAP: ICD-10-CM

## 2023-02-16 DIAGNOSIS — F43.9 STRESS: ICD-10-CM

## 2023-02-16 DIAGNOSIS — R79.89 LOW VITAMIN D LEVEL: ICD-10-CM

## 2023-02-16 DIAGNOSIS — Z51.81 ENCOUNTER FOR THERAPEUTIC DRUG MONITORING: Primary | ICD-10-CM

## 2023-02-16 DIAGNOSIS — I10 ESSENTIAL HYPERTENSION, BENIGN: ICD-10-CM

## 2023-02-16 DIAGNOSIS — Z72.3 LACK OF EXERCISE: ICD-10-CM

## 2023-02-17 ENCOUNTER — TELEMEDICINE (OUTPATIENT)
Dept: INTERNAL MEDICINE CLINIC | Facility: CLINIC | Age: 53
End: 2023-02-17
Payer: COMMERCIAL

## 2023-02-17 DIAGNOSIS — I10 ESSENTIAL HYPERTENSION, BENIGN: ICD-10-CM

## 2023-02-17 DIAGNOSIS — R79.89 LOW VITAMIN D LEVEL: ICD-10-CM

## 2023-02-17 DIAGNOSIS — E11.9 CONTROLLED TYPE 2 DIABETES MELLITUS WITHOUT COMPLICATION, WITHOUT LONG-TERM CURRENT USE OF INSULIN (HCC): ICD-10-CM

## 2023-02-17 DIAGNOSIS — Z99.89 OSA ON CPAP: ICD-10-CM

## 2023-02-17 DIAGNOSIS — E66.01 CLASS 3 SEVERE OBESITY DUE TO EXCESS CALORIES WITH SERIOUS COMORBIDITY AND BODY MASS INDEX (BMI) OF 40.0 TO 44.9 IN ADULT (HCC): ICD-10-CM

## 2023-02-17 DIAGNOSIS — F43.9 STRESS: ICD-10-CM

## 2023-02-17 DIAGNOSIS — Z51.81 ENCOUNTER FOR THERAPEUTIC DRUG MONITORING: Primary | ICD-10-CM

## 2023-02-17 DIAGNOSIS — G47.33 OSA ON CPAP: ICD-10-CM

## 2023-02-17 NOTE — PATIENT INSTRUCTIONS
Next steps:  1. Fill your prescribed medication and take as discussed and prescribed: mounjaro 15mg weekly   2. Schedule a personal nutrition consultation with one of our registered dieticians     Please try to work on the following dietary changes:    1. Drink water with meals and throughout the day, cut down on soda and/or juice if consumed. Consider flavored water options like Bubbly, Spindrift, Hint and Trent. 2.  Eat breakfast daily and focus on having protein with each meal, examples include: greek yogurt, cottage cheese, hard boiled egg, whole grain toast with peanut butter. 3.  Reduce refined carbohydrates and sugars which includes items such as sweets, as well as rice, pasta, and bread and make sure to choose whole grain options when having them with just 1 serving per meal about the size of your inner palm. 4.  Consume non starchy veggies daily working towards making them a good 50% of your daily food intake. Add them to lunch and dinner consistently. 5.  Start a daily probiotic: VSL#3 is recommended, (order on line at www.vsl3. com). Take 1 capsule daily with water for 30 days, then reduce to 1 every other day (this will reduce the cost). Capsules can be left out for 2 weeks, but then must be refrigerated. Please download vinh My Fitness Vamshi Brownlee! Or Net Diary to monitor daily dietary intake and you will be able to see if you are eating the right amount of calories or too much or too little which would hinder weight loss. Additionally this will help to see your daily carbohydrate and protein intake. When you set the vinh up choose 1-2 lbs/week as a goal.  Keeping a paper food journal is an option as well to remain accountable for your choices- this is the start to mindful eating! A low calorie diet has been consistently shown to support weight loss. Continue or start exercising to help establish a routine.  If not already exercising begin with 1 day and progress as able with long-term goal of 30 minutes 5 days a week at a minimum. Meditation daily can help manage and control stress. Chronic stress can make weight loss difficult. Exercising is one way to help with stress, but meditation using the CALM Keo or another comparable alternative can be done in your home or place of work with little time commitment. This Keo can also help work on behavior change and improve sleep. Check out the segment under Calm Masterclass and listen to The 4 Pillars of Health. A great way to begin learning about the foundation of lifestyle with practical tips to use in your every day. Check out www.yourweightmatters. org blog for continued daily support and education along this weight loss journey! Patient Resources:     Personal Training/Fitness Classes/Health Coaching     Christi Nieves and Alex Sophiaside @ [email or web address]/ Full fitness center with group fitness and personal training. Discount available as client of [facility]. Health Coaching and Personal Training with Hamilton Agudelo at our [facility]- individual weekly coaching with option to add personal training and small group fitness classes targeted at weight loss- [phone] and/or email @ [email or web address]  360FIT Pompey [email or web address] Group Fitness [phone] and/or email Tao Dietrich at [email or web address]  [address] [facility] with multiple locations: Aetna (www.BrandFiesta), Eat The ContinuityX Solutions Fitness (www.Nexalogy. Testive), Fit Body Bootcamp (www.CashSentinelbodybootcamp.Testive), Kenzei (www.Sumerian. Testive), The Exercise  (www.exercisecoach.Testive)     Online Fitness  Fitness  on [employer] in 10 DVD series- www. lldzr58NZC. Testive  Sit and Be Fit - Chair exercise series Www.sitandbefit. org  Hip Hop Fit with Alen Ferraro at www.hiphopfit. net     Apps for on the [employer] 7 Minute Workout (orange box with white 7) - free on the go HIIT training keo  Peloton Keo @ www. Firepro Systems     Nutrition Trackers and Tools  LoseIT! And My Fitness Pal apps and on line for tracking nutrition  NOOM - virtual health coaching  FitFoundation (healthy meals on the go) in [facility] @ www. dmkswqjumdwia3y. Caroll Brunner MD @ www.QuantaLifedAscension [facility] and Krystina Hamilton (keto and low carb plans recommended) @ www. OALWDZ77.APO, Metabolic Meals @ www. MyMetabolicMeals. com - individual prepared meals to go  Invistics, Scribz, [employer] Machines, Every Plate, UserApp- on line meal delivery programs for preparation at home  AK Odyssey [employer] in [place] for homemade meals to go @ wwwIncujectormealSendmybag. BeatSwitch  Diet Doctor @ www. dietdoctor. com - low carb swaps  YuAnipipo - meal prep and planning keo (www.yummly. com)     Stress Management/Behavior/Mindful Eating  CALM meditation keo (www.Groupoff)  Headspace  Am I Hungry? Mindful eating virtual  keo  Www.yourweightmatters. org - Obesity Action Coalition sponsored Blog posts daily  Motivation keo (black box with white \")- daily supportive messages sent to your phone     Books/Video Education/Podcasts  Mindless Eating by Pranav Doyle  Why We Get Sick by Jeannette Fonseca (a book about insulin resistance)  Atomic Habits by Reji Barr (a book about taking small steps to promote greater behavior change)   Can't Hurt Me by Vickie Sandra (a book exploring the power of discipline in achieving your goals)  The End of Dieting: How to Live for Life by Dr. Diane Rubio M.D. or listen to The [facility] Episode 61: Understanding \"Nutritarian\" Eating w/Dr. Diane Rubio  Your Body in Balance: The World [employer] of Food, Hormones, and Health by Dr. Olegario Ayala  The Menopause Diet Plan by Paulina Rosales and [facility] AT [facility]  The Complete Guide to fasting by Dr. Tip Zepeda, [address] [facility] by Kush See, Ph.D, R.D.   Weight Loss Surgery Will Not Treat Food Addiction by Kita Bowers Ph.D  The Game Changers- Gerry Documentary on plant based nutrition  Fed Up - documentary about obesity (Free on Utube)  The Truth About Sugar - documentary on sugar (Free on Utube, https://youtu. be/9R5gwwuFV4r)  The Dr. Eliz Barahona by Dr. Marlene Peterson MD  Fitlosophy Fitspiration - journal to better health (found at [employer] in fitness aisle)  What Happened to You?- a look at the impact trauma has on behavior written by Paz Rosen and Dr. Love Fong Again by Dontrell Bar - discovering your true self after trauma  Bailee Armijo talk on iPAYst, The Call to Courage  Podcasts: The Exam Room by the Physician's Committee, Nutrition Facts by Dr. Tyson Sosa    We are here to support you with weight loss, but please remember that you still need your primary care provider for your routine health maintenance.

## 2023-02-22 ENCOUNTER — PATIENT MESSAGE (OUTPATIENT)
Dept: INTERNAL MEDICINE CLINIC | Facility: CLINIC | Age: 53
End: 2023-02-22

## 2023-02-22 NOTE — TELEPHONE ENCOUNTER
From: Lauren Chaudhry  To: [facility],   Sent: 2/22/2023 9:24 AM CST  Subject: Upset stomach     Dr. Alhaji Duran,   I have had diarrhea for the last four days. It started Saturday night and has not stopped. I thought it was my loading of sugar this past weekend. I was sick with a sinus infection, body aches and headache. Now just sinus drainage. Could this be the cause of the diarrhea? If so, what now?      [employer]

## 2023-03-03 ENCOUNTER — TELEMEDICINE (OUTPATIENT)
Dept: INTERNAL MEDICINE CLINIC | Facility: CLINIC | Age: 53
End: 2023-03-03
Payer: COMMERCIAL

## 2023-03-03 DIAGNOSIS — J01.01 ACUTE RECURRENT MAXILLARY SINUSITIS: Primary | ICD-10-CM

## 2023-03-03 PROCEDURE — 99213 OFFICE O/P EST LOW 20 MIN: CPT | Performed by: INTERNAL MEDICINE

## 2023-03-03 RX ORDER — AMOXICILLIN AND CLAVULANATE POTASSIUM 875; 125 MG/1; MG/1
1 TABLET, FILM COATED ORAL 2 TIMES DAILY
Qty: 14 TABLET | Refills: 0 | Status: SHIPPED | OUTPATIENT
Start: 2023-03-03 | End: 2023-03-10

## 2023-03-03 NOTE — PROGRESS NOTES
Virtual Telephone Check-In    This visit is conducted using Telemedicine with live, interactive video and audio. [place] in [place]   Patient understands and accepts financial responsibility for any deductible, co-insurance and/or co-pays associated with this service. Telehealth outside of [employer] Verbal Consent   I conducted a telehealth visit with Valerie Jones on 3/3/2023   which was completed using two-way, real-time interactive audio and video  communication. This has been done in good arin to provide continuity of care in the best interest of the provider-patient relationship, due to the COVID -19 public health crisis/national emergency where restrictions of face-to-face office visits are ongoing. Every conscious effort was taken to allow for sufficient and adequate time to complete the visit. The patient was made aware of the limitations of the telehealth visit, including treatment limitations as no physical exam could be performed. The patient was advised to call 911 or to go to the ER in case there was an emergency. The patient was also advised of the potential privacy & security concerns related to the telehealth platform. The patient was made aware of where to find [facility] notice of privacy practices, telehealth consent form and other related consent forms and documents. which are located on the [facility] website. The patient verbally agreed to telehealth consent form, related consents and the risks discussed. Lastly, the patient confirmed that they were in [place]. Included in this visit, time may have been spent reviewing labs, medications, radiology tests and decision making. Appropriate medical decision-making and tests are ordered as detailed in the plan of care above. Coding/billing information is submitted for this visit based on complexity of care and/or time spent for the visit.   Time spent: 6 minutes   HPI: Christin Rubio is a 52year old female who is calling about sinusitis, laryngitis. Happening for a few days and worsening. Similar to her previous episodes. Over the counter medications not affective  ROS:  General: Feels well overall  Skin: Denies any unusual skin lesions  Eyes: Denies blurred vision or double vision  All systems negative, except for above  Physical:  GENERAL: Alert and oriented, well developed, well nourished,in no apparent distress  SKIN: no rashes,no suspicious lesions on face  LUNGS: no audible wheezing  PSYCH: pleasant, appropriate mood and affect    Assessment and Plan  (J01.01) Acute recurrent maxillary sinusitis  (primary encounter diagnosis)  Plan: amoxicillin clavulanate 875-125 MG Oral Tab  Note: antibiotics ordered. Follow up in 7 to 10 days or sooner if symptoms do not improve or worsen.  Continue symptomatic treatment  Ibeth Purcell DO

## 2023-03-14 ENCOUNTER — OFFICE VISIT (OUTPATIENT)
Dept: INTERNAL MEDICINE CLINIC | Facility: CLINIC | Age: 53
End: 2023-03-14
Payer: COMMERCIAL

## 2023-03-14 VITALS
RESPIRATION RATE: 16 BRPM | HEIGHT: 62 IN | TEMPERATURE: 98 F | WEIGHT: 247 LBS | OXYGEN SATURATION: 98 % | HEART RATE: 88 BPM | SYSTOLIC BLOOD PRESSURE: 130 MMHG | DIASTOLIC BLOOD PRESSURE: 68 MMHG | BODY MASS INDEX: 45.45 KG/M2

## 2023-03-14 DIAGNOSIS — Z99.89 OSA ON CPAP: ICD-10-CM

## 2023-03-14 DIAGNOSIS — C81.04 NODULAR LYMPHOCYTE PREDOMINANT HODGKIN LYMPHOMA OF LYMPH NODES OF AXILLA (HCC): ICD-10-CM

## 2023-03-14 DIAGNOSIS — G47.33 OSA ON CPAP: ICD-10-CM

## 2023-03-14 DIAGNOSIS — K21.00 GASTROESOPHAGEAL REFLUX DISEASE WITH ESOPHAGITIS, UNSPECIFIED WHETHER HEMORRHAGE: ICD-10-CM

## 2023-03-14 DIAGNOSIS — E11.9 CONTROLLED TYPE 2 DIABETES MELLITUS WITHOUT COMPLICATION, WITHOUT LONG-TERM CURRENT USE OF INSULIN (HCC): ICD-10-CM

## 2023-03-14 DIAGNOSIS — E66.01 CLASS 3 SEVERE OBESITY DUE TO EXCESS CALORIES WITH SERIOUS COMORBIDITY AND BODY MASS INDEX (BMI) OF 40.0 TO 44.9 IN ADULT (HCC): ICD-10-CM

## 2023-03-14 DIAGNOSIS — Z00.00 ROUTINE PHYSICAL EXAMINATION: Primary | ICD-10-CM

## 2023-03-14 DIAGNOSIS — I10 ESSENTIAL HYPERTENSION, BENIGN: ICD-10-CM

## 2023-03-14 DIAGNOSIS — J30.9 CHRONIC ALLERGIC RHINITIS: ICD-10-CM

## 2023-03-14 DIAGNOSIS — L30.9 ECZEMA, UNSPECIFIED TYPE: ICD-10-CM

## 2023-03-14 PROCEDURE — 90471 IMMUNIZATION ADMIN: CPT | Performed by: INTERNAL MEDICINE

## 2023-03-14 PROCEDURE — 90750 HZV VACC RECOMBINANT IM: CPT | Performed by: INTERNAL MEDICINE

## 2023-03-14 PROCEDURE — 3075F SYST BP GE 130 - 139MM HG: CPT | Performed by: INTERNAL MEDICINE

## 2023-03-14 PROCEDURE — 3078F DIAST BP <80 MM HG: CPT | Performed by: INTERNAL MEDICINE

## 2023-03-14 PROCEDURE — 3008F BODY MASS INDEX DOCD: CPT | Performed by: INTERNAL MEDICINE

## 2023-03-14 PROCEDURE — 99396 PREV VISIT EST AGE 40-64: CPT | Performed by: INTERNAL MEDICINE

## 2023-03-14 RX ORDER — OMEPRAZOLE 40 MG/1
40 CAPSULE, DELAYED RELEASE ORAL
COMMUNITY
Start: 2023-03-09

## 2023-03-18 ENCOUNTER — LAB ENCOUNTER (OUTPATIENT)
Dept: LAB | Age: 53
End: 2023-03-18
Attending: INTERNAL MEDICINE
Payer: COMMERCIAL

## 2023-03-18 DIAGNOSIS — E11.9 CONTROLLED TYPE 2 DIABETES MELLITUS WITHOUT COMPLICATION, WITHOUT LONG-TERM CURRENT USE OF INSULIN (HCC): ICD-10-CM

## 2023-03-18 LAB
CHOLEST SERPL-MCNC: 166 MG/DL (ref ?–200)
EST. AVERAGE GLUCOSE BLD GHB EST-MCNC: 117 MG/DL (ref 68–126)
FASTING PATIENT LIPID ANSWER: NO
HBA1C MFR BLD: 5.7 % (ref ?–5.7)
HDLC SERPL-MCNC: 77 MG/DL (ref 40–59)
LDLC SERPL CALC-MCNC: 67 MG/DL (ref ?–100)
NONHDLC SERPL-MCNC: 89 MG/DL (ref ?–130)
TRIGL SERPL-MCNC: 131 MG/DL (ref 30–149)
VLDLC SERPL CALC-MCNC: 20 MG/DL (ref 0–30)

## 2023-03-18 PROCEDURE — 83036 HEMOGLOBIN GLYCOSYLATED A1C: CPT

## 2023-03-18 PROCEDURE — 3044F HG A1C LEVEL LT 7.0%: CPT | Performed by: INTERNAL MEDICINE

## 2023-03-18 PROCEDURE — 80061 LIPID PANEL: CPT

## 2023-03-18 PROCEDURE — 36415 COLL VENOUS BLD VENIPUNCTURE: CPT

## 2023-04-10 RX ORDER — TIRZEPATIDE 15 MG/.5ML
INJECTION, SOLUTION SUBCUTANEOUS
Qty: 2 ML | Refills: 2 | Status: SHIPPED | OUTPATIENT
Start: 2023-04-10

## 2023-04-10 NOTE — TELEPHONE ENCOUNTER
Requesting Mounjaro 15 mg  LOV: 2/17/23  RTC: 3 months  Last Relevant Labs: 3/18/23  Filled: 2/14/23 #2ml with 1 refills    Future Appointments   Date Time Provider Karina Piper   6/5/2023  1:15 PM VIKAS Levy EMG OB/GYN N EMG [place]   6/20/2023 10:45 AM Henrique Orantes MD [address]

## 2023-04-22 ENCOUNTER — TELEPHONE (OUTPATIENT)
Dept: INTERNAL MEDICINE CLINIC | Facility: CLINIC | Age: 53
End: 2023-04-22

## 2023-05-20 ENCOUNTER — PATIENT MESSAGE (OUTPATIENT)
Dept: INTERNAL MEDICINE CLINIC | Facility: CLINIC | Age: 53
End: 2023-05-20

## 2023-05-22 ENCOUNTER — OFFICE VISIT (OUTPATIENT)
Dept: INTERNAL MEDICINE CLINIC | Facility: CLINIC | Age: 53
End: 2023-05-22
Payer: COMMERCIAL

## 2023-05-22 VITALS
HEIGHT: 62 IN | WEIGHT: 245 LBS | HEART RATE: 87 BPM | TEMPERATURE: 98 F | OXYGEN SATURATION: 99 % | BODY MASS INDEX: 45.08 KG/M2 | SYSTOLIC BLOOD PRESSURE: 126 MMHG | DIASTOLIC BLOOD PRESSURE: 76 MMHG | RESPIRATION RATE: 20 BRPM

## 2023-05-22 DIAGNOSIS — M79.671 RIGHT FOOT PAIN: Primary | ICD-10-CM

## 2023-05-22 PROCEDURE — 3008F BODY MASS INDEX DOCD: CPT | Performed by: INTERNAL MEDICINE

## 2023-05-22 PROCEDURE — 99213 OFFICE O/P EST LOW 20 MIN: CPT | Performed by: INTERNAL MEDICINE

## 2023-05-22 PROCEDURE — 3078F DIAST BP <80 MM HG: CPT | Performed by: INTERNAL MEDICINE

## 2023-05-22 PROCEDURE — 3074F SYST BP LT 130 MM HG: CPT | Performed by: INTERNAL MEDICINE

## 2023-05-23 ENCOUNTER — HOSPITAL ENCOUNTER (OUTPATIENT)
Dept: GENERAL RADIOLOGY | Age: 53
Discharge: HOME | End: 2023-05-23
Attending: INTERNAL MEDICINE
Payer: COMMERCIAL

## 2023-05-23 DIAGNOSIS — M79.671 RIGHT FOOT PAIN: ICD-10-CM

## 2023-05-23 PROCEDURE — 73630 X-RAY EXAM OF FOOT: CPT | Performed by: INTERNAL MEDICINE

## 2023-06-05 ENCOUNTER — OFFICE VISIT (OUTPATIENT)
Dept: OBGYN CLINIC | Facility: CLINIC | Age: 53
End: 2023-06-05
Payer: COMMERCIAL

## 2023-06-05 VITALS
SYSTOLIC BLOOD PRESSURE: 120 MMHG | DIASTOLIC BLOOD PRESSURE: 74 MMHG | HEART RATE: 83 BPM | BODY MASS INDEX: 45 KG/M2 | WEIGHT: 246.13 LBS

## 2023-06-05 DIAGNOSIS — Z12.4 CERVICAL CANCER SCREENING: ICD-10-CM

## 2023-06-05 DIAGNOSIS — Z01.419 WELL WOMAN EXAM WITH ROUTINE GYNECOLOGICAL EXAM: Primary | ICD-10-CM

## 2023-06-05 DIAGNOSIS — N95.0 POST-MENOPAUSAL BLEEDING: ICD-10-CM

## 2023-06-15 LAB — HPV I/H RISK 1 DNA SPEC QL NAA+PROBE: NEGATIVE

## 2023-06-16 LAB — LAST PAP RESULT: NORMAL

## 2023-06-17 ENCOUNTER — PATIENT MESSAGE (OUTPATIENT)
Dept: INTERNAL MEDICINE CLINIC | Facility: CLINIC | Age: 53
End: 2023-06-17

## 2023-06-17 DIAGNOSIS — E66.01 CLASS 3 SEVERE OBESITY DUE TO EXCESS CALORIES WITH SERIOUS COMORBIDITY AND BODY MASS INDEX (BMI) OF 40.0 TO 44.9 IN ADULT (HCC): Primary | ICD-10-CM

## 2023-06-17 DIAGNOSIS — Z51.81 ENCOUNTER FOR THERAPEUTIC DRUG MONITORING: ICD-10-CM

## 2023-06-17 DIAGNOSIS — E11.9 CONTROLLED TYPE 2 DIABETES MELLITUS WITHOUT COMPLICATION, WITHOUT LONG-TERM CURRENT USE OF INSULIN (HCC): ICD-10-CM

## 2023-06-19 ENCOUNTER — TELEPHONE (OUTPATIENT)
Dept: INTERNAL MEDICINE CLINIC | Facility: CLINIC | Age: 53
End: 2023-06-19

## 2023-06-19 RX ORDER — PEN NEEDLE, DIABETIC 30 GX3/16"
1 NEEDLE, DISPOSABLE MISCELLANEOUS DAILY
Qty: 100 EACH | Refills: 1 | Status: SHIPPED | OUTPATIENT
Start: 2023-06-19

## 2023-06-19 RX ORDER — SEMAGLUTIDE 1.34 MG/ML
1 INJECTION, SOLUTION SUBCUTANEOUS WEEKLY
Qty: 9 ML | Refills: 0 | Status: SHIPPED | OUTPATIENT
Start: 2023-06-19

## 2023-06-19 NOTE — TELEPHONE ENCOUNTER
From: Samira Diaz  To: DOREEN Sena  Sent: 6/17/2023 4:11 PM CDT  Subject: Medication     Good afternoon, I have been trying to get some MounJaro. It seems to be on back order everywhere I call. Since June was going to be the last month I could take it, can I just go back on the Ozympic?     Bryan Montague

## 2023-06-20 ENCOUNTER — APPOINTMENT (OUTPATIENT)
Dept: HEMATOLOGY/ONCOLOGY | Facility: HOSPITAL | Age: 53
End: 2023-06-20
Attending: SPECIALIST
Payer: COMMERCIAL

## 2023-06-21 ENCOUNTER — HOSPITAL ENCOUNTER (OUTPATIENT)
Dept: ULTRASOUND IMAGING | Age: 53
Discharge: HOME | End: 2023-06-21
Attending: NURSE PRACTITIONER
Payer: COMMERCIAL

## 2023-06-21 DIAGNOSIS — N95.0 POST-MENOPAUSAL BLEEDING: ICD-10-CM

## 2023-06-21 PROCEDURE — 76830 TRANSVAGINAL US NON-OB: CPT | Performed by: NURSE PRACTITIONER

## 2023-06-21 PROCEDURE — 76856 US EXAM PELVIC COMPLETE: CPT | Performed by: NURSE PRACTITIONER

## 2023-06-22 RX ORDER — MISOPROSTOL 200 UG/1
TABLET ORAL
Qty: 2 TABLET | Refills: 0 | Status: SHIPPED | OUTPATIENT
Start: 2023-06-22

## 2023-06-22 NOTE — PROGRESS NOTES
Contacted patient results and recommendations given. Patient verbalized understanding and willingness to comply. Cytotec sent to the pharmacy. No further questions. PSR - Please call the patient and scheduled next available EMB.

## 2023-06-28 ENCOUNTER — OFFICE VISIT (OUTPATIENT)
Dept: HEMATOLOGY/ONCOLOGY | Facility: HOSPITAL | Age: 53
End: 2023-06-28
Attending: SPECIALIST
Payer: COMMERCIAL

## 2023-06-28 ENCOUNTER — PATIENT MESSAGE (OUTPATIENT)
Dept: INTERNAL MEDICINE CLINIC | Facility: CLINIC | Age: 53
End: 2023-06-28

## 2023-06-28 VITALS
WEIGHT: 255.63 LBS | RESPIRATION RATE: 18 BRPM | HEIGHT: 62 IN | SYSTOLIC BLOOD PRESSURE: 141 MMHG | DIASTOLIC BLOOD PRESSURE: 61 MMHG | OXYGEN SATURATION: 98 % | TEMPERATURE: 98 F | BODY MASS INDEX: 47.04 KG/M2 | HEART RATE: 84 BPM

## 2023-06-28 DIAGNOSIS — C81.05 NODULAR LYMPHOCYTE PREDOMINANT HODGKIN LYMPHOMA OF LYMPH NODES OF INGUINAL REGION (HCC): Primary | ICD-10-CM

## 2023-06-28 DIAGNOSIS — C81.05 NODULAR LYMPHOCYTE PREDOMINANT HODGKIN LYMPHOMA OF LYMPH NODES OF INGUINAL REGION (HCC): ICD-10-CM

## 2023-06-28 LAB
ALBUMIN SERPL-MCNC: 3.3 G/DL (ref 3.4–5)
ALBUMIN/GLOB SERPL: 0.9 {RATIO} (ref 1–2)
ALP LIVER SERPL-CCNC: 110 U/L
ALT SERPL-CCNC: 43 U/L
ANION GAP SERPL CALC-SCNC: 5 MMOL/L (ref 0–18)
AST SERPL-CCNC: 33 U/L (ref 15–37)
BASOPHILS # BLD AUTO: 0.03 X10(3) UL (ref 0–0.2)
BASOPHILS NFR BLD AUTO: 0.5 %
BILIRUB SERPL-MCNC: 0.3 MG/DL (ref 0.1–2)
BUN BLD-MCNC: 13 MG/DL (ref 7–18)
BUN/CREAT SERPL: 11.7 (ref 10–20)
CALCIUM BLD-MCNC: 9 MG/DL (ref 8.5–10.1)
CHLORIDE SERPL-SCNC: 111 MMOL/L (ref 98–112)
CO2 SERPL-SCNC: 29 MMOL/L (ref 21–32)
CREAT BLD-MCNC: 1.11 MG/DL
DEPRECATED RDW RBC AUTO: 46.2 FL (ref 35.1–46.3)
EOSINOPHIL # BLD AUTO: 0.13 X10(3) UL (ref 0–0.7)
EOSINOPHIL NFR BLD AUTO: 2 %
ERYTHROCYTE [DISTWIDTH] IN BLOOD BY AUTOMATED COUNT: 13.7 % (ref 11–15)
ERYTHROCYTE [SEDIMENTATION RATE] IN BLOOD: 26 MM/HR
GFR SERPLBLD BASED ON 1.73 SQ M-ARVRAT: 59 ML/MIN/1.73M2 (ref 60–?)
GLOBULIN PLAS-MCNC: 3.7 G/DL (ref 2.8–4.4)
GLUCOSE BLD-MCNC: 103 MG/DL (ref 70–99)
HCT VFR BLD AUTO: 40.7 %
HGB BLD-MCNC: 13.1 G/DL
IMM GRANULOCYTES # BLD AUTO: 0.05 X10(3) UL (ref 0–1)
IMM GRANULOCYTES NFR BLD: 0.8 %
LDH SERPL L TO P-CCNC: 231 U/L
LYMPHOCYTES # BLD AUTO: 2.25 X10(3) UL (ref 1–4)
LYMPHOCYTES NFR BLD AUTO: 33.8 %
MCH RBC QN AUTO: 29.4 PG (ref 26–34)
MCHC RBC AUTO-ENTMCNC: 32.2 G/DL (ref 31–37)
MCV RBC AUTO: 91.5 FL
MONOCYTES # BLD AUTO: 0.66 X10(3) UL (ref 0.1–1)
MONOCYTES NFR BLD AUTO: 9.9 %
NEUTROPHILS # BLD AUTO: 3.54 X10 (3) UL (ref 1.5–7.7)
NEUTROPHILS # BLD AUTO: 3.54 X10(3) UL (ref 1.5–7.7)
NEUTROPHILS NFR BLD AUTO: 53 %
OSMOLALITY SERPL CALC.SUM OF ELEC: 300 MOSM/KG (ref 275–295)
PLATELET # BLD AUTO: 190 10(3)UL (ref 150–450)
POTASSIUM SERPL-SCNC: 4 MMOL/L (ref 3.5–5.1)
PROT SERPL-MCNC: 7 G/DL (ref 6.4–8.2)
RBC # BLD AUTO: 4.45 X10(6)UL
SODIUM SERPL-SCNC: 145 MMOL/L (ref 136–145)
WBC # BLD AUTO: 6.7 X10(3) UL (ref 4–11)

## 2023-06-28 PROCEDURE — 99214 OFFICE O/P EST MOD 30 MIN: CPT | Performed by: SPECIALIST

## 2023-06-28 PROCEDURE — 85025 COMPLETE CBC W/AUTO DIFF WBC: CPT

## 2023-06-28 PROCEDURE — 80053 COMPREHEN METABOLIC PANEL: CPT

## 2023-06-28 PROCEDURE — 83615 LACTATE (LD) (LDH) ENZYME: CPT

## 2023-06-28 PROCEDURE — 85652 RBC SED RATE AUTOMATED: CPT

## 2023-06-28 PROCEDURE — 36415 COLL VENOUS BLD VENIPUNCTURE: CPT

## 2023-06-30 NOTE — TELEPHONE ENCOUNTER
PA done in epic, for ozempic 1mg dose, AWAITING ON DETERMINATION         Type 2 dm E11.9  Class 3 severe obesity due to excess calories with serious comorbidity and body mass index (BMI) of 40.0 to 44.9 in adult (HCC) E66.01, Z68.41    Essential hypertension, benign I10    Stress F43.9    ANAHY on CPAP G47.33, Z9

## 2023-07-07 ENCOUNTER — TELEPHONE (OUTPATIENT)
Dept: OBGYN CLINIC | Facility: CLINIC | Age: 53
End: 2023-07-07

## 2023-07-07 NOTE — TELEPHONE ENCOUNTER
Voicemail left for patient to return our call. Office phone number provided.      Cytotec was ordered 6/22/23 to [facility]

## 2023-07-07 NOTE — TELEPHONE ENCOUNTER
PT called asking if medication can be prescribed to her to soften cervix. Pt was also wondering if she can take tramadol instead of tylenol prior to the procedure.

## 2023-07-17 NOTE — TELEPHONE ENCOUNTER
Approved    Prior authorization approved   Case ID: [id number]      Payer: [address] [place] [address] Road    [phone]   The case has been Approved from  [id number]   Approval Details    Authorized from July 1, 2023 to July 1, 2024      Electronic appeal: Not supported   View History     Medication Being Authorized     semaglutide (OZEMPIC, 1 MG/DOSE,) 4 MG/3ML Subcutaneous Solution Pen-injector        Patient already got med

## 2023-07-25 LAB
AMB EXT CREATININE, URINE: 176.72 MG/DL
AMB EXT MALB URINE CALC: 1 MG/24HR
AMB EXT MALB/CRE CALC: 6.8 UG/MG

## 2023-07-25 PROCEDURE — 3061F NEG MICROALBUMINURIA REV: CPT | Performed by: INTERNAL MEDICINE

## 2023-07-25 RX ORDER — MISOPROSTOL 200 UG/1
TABLET ORAL
Qty: 2 TABLET | Refills: 0 | Status: SHIPPED | OUTPATIENT
Start: 2023-07-25

## 2023-07-25 NOTE — TELEPHONE ENCOUNTER
Contacted patient. Cytotec sent to local [facility]. Instructions provided. Patient desires to use Tramadol for procedure. Has rx already. Would like to know if Adwoa Tavares would be amenable to this. She has had an EMB previously and felt otc meds were not sufficient for pain management. Will consult with Adwoa Tavares and call patient back.

## 2023-07-26 NOTE — TELEPHONE ENCOUNTER
She would need to arrive around 30 minutes prior to the procedure to sign the consent forms then take the medication as it can cause drowsiness and alter judgement. She should have a  as well.

## 2023-07-28 ENCOUNTER — PATIENT MESSAGE (OUTPATIENT)
Dept: INTERNAL MEDICINE CLINIC | Facility: CLINIC | Age: 53
End: 2023-07-28

## 2023-07-28 DIAGNOSIS — Z51.81 ENCOUNTER FOR THERAPEUTIC DRUG MONITORING: Primary | ICD-10-CM

## 2023-07-28 DIAGNOSIS — E11.9 CONTROLLED TYPE 2 DIABETES MELLITUS WITHOUT COMPLICATION, WITHOUT LONG-TERM CURRENT USE OF INSULIN (HCC): ICD-10-CM

## 2023-07-28 NOTE — TELEPHONE ENCOUNTER
Requesting OZEMPIC   LOV: 2/17/23  RTC: not noted   Last Relevant Labs:   Filled: 9ml on 6/21/23 # with  refills    Future Appointments   Date Time Provider Karina Piper   8/31/2023  9:30 AM VIKAS Mckeon EMG OB/GYN P EMG [address]   8/31/2023  1:40 PM DOREEN Cazares EMGWEI [id number]   9/18/2023  5:00 PM Rodney Moran DO EMG [address] [facility] [place]   12/28/2023  9:00 AM Keli Hardin MD [address] [facility]     Wants to move up.

## 2023-07-28 NOTE — TELEPHONE ENCOUNTER
From: [facility]  To: DOREEN Monsivais  Sent: 7/28/2023 9:27 AM CDT  Subject: Medication     Good morning, I have been taking 1 mg of Ozympic for the last month. Can I increase it to 2 mg today?     [employer]

## 2023-08-09 DIAGNOSIS — D24.1 PAPILLOMA OF RIGHT BREAST: Primary | ICD-10-CM

## 2023-08-09 DIAGNOSIS — R92.8 ABNORMAL MRI, BREAST: ICD-10-CM

## 2023-08-09 DIAGNOSIS — Z91.89 AT HIGH RISK FOR BREAST CANCER: ICD-10-CM

## 2023-08-15 LAB — AMB EXT HGBA1C: 5.6 %

## 2023-08-15 PROCEDURE — 3044F HG A1C LEVEL LT 7.0%: CPT | Performed by: INTERNAL MEDICINE

## 2023-08-31 ENCOUNTER — OFFICE VISIT (OUTPATIENT)
Dept: OBGYN CLINIC | Facility: CLINIC | Age: 53
End: 2023-08-31
Payer: COMMERCIAL

## 2023-08-31 ENCOUNTER — OFFICE VISIT (OUTPATIENT)
Dept: INTERNAL MEDICINE CLINIC | Facility: CLINIC | Age: 53
End: 2023-08-31
Payer: COMMERCIAL

## 2023-08-31 VITALS
DIASTOLIC BLOOD PRESSURE: 74 MMHG | HEART RATE: 90 BPM | BODY MASS INDEX: 46.93 KG/M2 | OXYGEN SATURATION: 96 % | HEIGHT: 62 IN | SYSTOLIC BLOOD PRESSURE: 122 MMHG | RESPIRATION RATE: 18 BRPM | WEIGHT: 255 LBS

## 2023-08-31 VITALS
HEIGHT: 62 IN | SYSTOLIC BLOOD PRESSURE: 118 MMHG | WEIGHT: 256.13 LBS | DIASTOLIC BLOOD PRESSURE: 74 MMHG | BODY MASS INDEX: 47.13 KG/M2 | HEART RATE: 71 BPM

## 2023-08-31 DIAGNOSIS — G47.33 OSA ON CPAP: ICD-10-CM

## 2023-08-31 DIAGNOSIS — Z51.81 ENCOUNTER FOR THERAPEUTIC DRUG MONITORING: Primary | ICD-10-CM

## 2023-08-31 DIAGNOSIS — R79.89 LOW VITAMIN D LEVEL: ICD-10-CM

## 2023-08-31 DIAGNOSIS — E55.9 VITAMIN D DEFICIENCY: ICD-10-CM

## 2023-08-31 DIAGNOSIS — F43.9 STRESS: ICD-10-CM

## 2023-08-31 DIAGNOSIS — N95.0 POST-MENOPAUSAL BLEEDING: Primary | ICD-10-CM

## 2023-08-31 DIAGNOSIS — E66.01 CLASS 3 SEVERE OBESITY DUE TO EXCESS CALORIES WITH SERIOUS COMORBIDITY AND BODY MASS INDEX (BMI) OF 40.0 TO 44.9 IN ADULT (HCC): ICD-10-CM

## 2023-08-31 DIAGNOSIS — I10 ESSENTIAL HYPERTENSION, BENIGN: ICD-10-CM

## 2023-08-31 DIAGNOSIS — E11.9 CONTROLLED TYPE 2 DIABETES MELLITUS WITHOUT COMPLICATION, WITHOUT LONG-TERM CURRENT USE OF INSULIN (HCC): ICD-10-CM

## 2023-08-31 DIAGNOSIS — Z72.3 LACK OF EXERCISE: ICD-10-CM

## 2023-08-31 PROCEDURE — 3074F SYST BP LT 130 MM HG: CPT | Performed by: NURSE PRACTITIONER

## 2023-08-31 PROCEDURE — 3078F DIAST BP <80 MM HG: CPT | Performed by: NURSE PRACTITIONER

## 2023-08-31 PROCEDURE — 88305 TISSUE EXAM BY PATHOLOGIST: CPT | Performed by: NURSE PRACTITIONER

## 2023-08-31 PROCEDURE — 58100 BIOPSY OF UTERUS LINING: CPT | Performed by: NURSE PRACTITIONER

## 2023-08-31 PROCEDURE — 3008F BODY MASS INDEX DOCD: CPT | Performed by: NURSE PRACTITIONER

## 2023-08-31 RX ORDER — MULTIVITAMIN WITH IRON
250 TABLET ORAL AS DIRECTED
COMMUNITY

## 2023-09-04 ENCOUNTER — OFFICE VISIT (OUTPATIENT)
Dept: FAMILY MEDICINE CLINIC | Facility: CLINIC | Age: 53
End: 2023-09-04
Payer: COMMERCIAL

## 2023-09-04 VITALS
HEART RATE: 77 BPM | OXYGEN SATURATION: 99 % | RESPIRATION RATE: 16 BRPM | BODY MASS INDEX: 46.38 KG/M2 | WEIGHT: 252 LBS | HEIGHT: 62 IN | DIASTOLIC BLOOD PRESSURE: 76 MMHG | SYSTOLIC BLOOD PRESSURE: 130 MMHG | TEMPERATURE: 99 F

## 2023-09-04 DIAGNOSIS — J01.00 SUBACUTE MAXILLARY SINUSITIS: Primary | ICD-10-CM

## 2023-09-04 PROCEDURE — 3075F SYST BP GE 130 - 139MM HG: CPT | Performed by: NURSE PRACTITIONER

## 2023-09-04 PROCEDURE — 3078F DIAST BP <80 MM HG: CPT | Performed by: NURSE PRACTITIONER

## 2023-09-04 PROCEDURE — 99213 OFFICE O/P EST LOW 20 MIN: CPT | Performed by: NURSE PRACTITIONER

## 2023-09-04 PROCEDURE — 3008F BODY MASS INDEX DOCD: CPT | Performed by: NURSE PRACTITIONER

## 2023-09-04 RX ORDER — AMOXICILLIN AND CLAVULANATE POTASSIUM 875; 125 MG/1; MG/1
1 TABLET, FILM COATED ORAL 2 TIMES DAILY
Qty: 20 TABLET | Refills: 0 | Status: SHIPPED | OUTPATIENT
Start: 2023-09-04 | End: 2023-09-14

## 2023-09-06 ENCOUNTER — TELEPHONE (OUTPATIENT)
Dept: OBGYN CLINIC | Facility: CLINIC | Age: 53
End: 2023-09-06

## 2023-09-06 NOTE — TELEPHONE ENCOUNTER
Pt returning your call to go over results.  Thank you pt awake and alert. pt has made no attempts to harm herself or others. pt c/o lower back pain 7/10 and requesting PRN Oxycontin which was dispensed. will re-assess for pain.

## 2023-09-07 NOTE — TELEPHONE ENCOUNTER
LM for patient to call back to go over result notes. Patient had inadequate pathology. She did not tolerate the biopsy well so I would recommend she see an MD to discuss possible Hysteroscopy with D&C. Her endometrium was thickened for a post menopausal women and with her bleeding we would recommend further sampling to ensure we can get adequate tissue for a diagnosis.

## 2023-09-12 NOTE — TELEPHONE ENCOUNTER
Patient notified by detailed message left on cell voicemail. Phone number provided for patient to call back and schedule appointment with MD or with additional questions or concerns.

## 2023-09-18 ENCOUNTER — OFFICE VISIT (OUTPATIENT)
Dept: INTERNAL MEDICINE CLINIC | Facility: CLINIC | Age: 53
End: 2023-09-18
Payer: COMMERCIAL

## 2023-09-18 VITALS
DIASTOLIC BLOOD PRESSURE: 72 MMHG | RESPIRATION RATE: 14 BRPM | WEIGHT: 255.63 LBS | TEMPERATURE: 98 F | OXYGEN SATURATION: 96 % | BODY MASS INDEX: 47.04 KG/M2 | SYSTOLIC BLOOD PRESSURE: 124 MMHG | HEART RATE: 63 BPM | HEIGHT: 62 IN

## 2023-09-18 DIAGNOSIS — J30.9 CHRONIC ALLERGIC RHINITIS: ICD-10-CM

## 2023-09-18 DIAGNOSIS — C81.04 NODULAR LYMPHOCYTE PREDOMINANT HODGKIN LYMPHOMA OF LYMPH NODES OF AXILLA (HCC): ICD-10-CM

## 2023-09-18 DIAGNOSIS — E11.9 CONTROLLED TYPE 2 DIABETES MELLITUS WITHOUT COMPLICATION, WITHOUT LONG-TERM CURRENT USE OF INSULIN (HCC): Primary | ICD-10-CM

## 2023-09-18 DIAGNOSIS — G47.33 OSA ON CPAP: ICD-10-CM

## 2023-09-18 DIAGNOSIS — E66.01 CLASS 3 SEVERE OBESITY DUE TO EXCESS CALORIES WITH SERIOUS COMORBIDITY AND BODY MASS INDEX (BMI) OF 40.0 TO 44.9 IN ADULT (HCC): ICD-10-CM

## 2023-09-18 DIAGNOSIS — K21.00 GASTROESOPHAGEAL REFLUX DISEASE WITH ESOPHAGITIS, UNSPECIFIED WHETHER HEMORRHAGE: ICD-10-CM

## 2023-09-18 DIAGNOSIS — I10 ESSENTIAL HYPERTENSION, BENIGN: ICD-10-CM

## 2023-09-18 PROBLEM — M26.609 TMJ DYSFUNCTION: Status: RESOLVED | Noted: 2018-09-20 | Resolved: 2023-09-18

## 2023-09-18 PROCEDURE — 99214 OFFICE O/P EST MOD 30 MIN: CPT | Performed by: INTERNAL MEDICINE

## 2023-09-18 PROCEDURE — 3074F SYST BP LT 130 MM HG: CPT | Performed by: INTERNAL MEDICINE

## 2023-09-18 PROCEDURE — 3008F BODY MASS INDEX DOCD: CPT | Performed by: INTERNAL MEDICINE

## 2023-09-18 PROCEDURE — 3078F DIAST BP <80 MM HG: CPT | Performed by: INTERNAL MEDICINE

## 2023-09-30 ENCOUNTER — LAB ENCOUNTER (OUTPATIENT)
Dept: LAB | Age: 53
End: 2023-09-30
Attending: INTERNAL MEDICINE
Payer: COMMERCIAL

## 2023-09-30 DIAGNOSIS — E11.9 CONTROLLED TYPE 2 DIABETES MELLITUS WITHOUT COMPLICATION, WITHOUT LONG-TERM CURRENT USE OF INSULIN (HCC): ICD-10-CM

## 2023-09-30 LAB
CHOLEST SERPL-MCNC: 160 MG/DL (ref ?–200)
FASTING PATIENT LIPID ANSWER: YES
HDLC SERPL-MCNC: 79 MG/DL (ref 40–59)
LDLC SERPL CALC-MCNC: 67 MG/DL (ref ?–100)
NONHDLC SERPL-MCNC: 81 MG/DL (ref ?–130)
TRIGL SERPL-MCNC: 70 MG/DL (ref 30–149)
VLDLC SERPL CALC-MCNC: 11 MG/DL (ref 0–30)

## 2023-09-30 PROCEDURE — 80061 LIPID PANEL: CPT | Performed by: INTERNAL MEDICINE

## 2023-10-04 DIAGNOSIS — E11.9 CONTROLLED TYPE 2 DIABETES MELLITUS WITHOUT COMPLICATION, WITHOUT LONG-TERM CURRENT USE OF INSULIN (HCC): ICD-10-CM

## 2023-10-04 RX ORDER — SEMAGLUTIDE 2.68 MG/ML
2 INJECTION, SOLUTION SUBCUTANEOUS WEEKLY
Qty: 3 ML | Refills: 2 | Status: SHIPPED | OUTPATIENT
Start: 2023-10-04

## 2023-10-04 NOTE — TELEPHONE ENCOUNTER
Requesting   Requested Prescriptions     Pending Prescriptions Disp Refills    OZEMPIC, 2 MG/DOSE, 8 MG/3ML Subcutaneous Solution Pen-injector [Pharmacy Med Name: Dayna Crow 2MG PER DOSE (1X8MG PEN)] 3 mL 1     Sig: INJECT 2MG UNDER THE SKIN EVERY WEEK     LOV:8/31/23  RTC: 3 months  Filled: 7/28/23 #3 with 1 refills    Future Appointments   Date Time Provider Karina Piper   10/9/2023  7:40 AM [facility]   10/25/2023  1:45 PM Kp Garcia MD EMG OB/GYN N EM[facility] [place]   12/28/2023  9:00 AM Asia De La Fuente [facility]

## 2023-10-09 ENCOUNTER — TELEPHONE (OUTPATIENT)
Dept: HEMATOLOGY/ONCOLOGY | Facility: HOSPITAL | Age: 53
End: 2023-10-09

## 2023-10-09 ENCOUNTER — HOSPITAL ENCOUNTER (OUTPATIENT)
Dept: MAMMOGRAPHY | Facility: HOSPITAL | Age: 53
Discharge: HOME | End: 2023-10-09
Attending: SURGERY
Payer: COMMERCIAL

## 2023-10-09 ENCOUNTER — TELEPHONE (OUTPATIENT)
Dept: RHEUMATOLOGY | Facility: CLINIC | Age: 53
End: 2023-10-09

## 2023-10-09 DIAGNOSIS — R92.8 ABNORMAL MRI, BREAST: ICD-10-CM

## 2023-10-09 DIAGNOSIS — Z91.89 AT HIGH RISK FOR BREAST CANCER: ICD-10-CM

## 2023-10-09 DIAGNOSIS — D24.1 PAPILLOMA OF RIGHT BREAST: ICD-10-CM

## 2023-10-09 PROCEDURE — 77062 BREAST TOMOSYNTHESIS BI: CPT | Performed by: SURGERY

## 2023-10-09 PROCEDURE — 77066 DX MAMMO INCL CAD BI: CPT | Performed by: SURGERY

## 2023-10-25 ENCOUNTER — OFFICE VISIT (OUTPATIENT)
Dept: OBGYN CLINIC | Facility: CLINIC | Age: 53
End: 2023-10-25

## 2023-10-25 VITALS
HEART RATE: 80 BPM | BODY MASS INDEX: 47 KG/M2 | DIASTOLIC BLOOD PRESSURE: 80 MMHG | SYSTOLIC BLOOD PRESSURE: 120 MMHG | WEIGHT: 254.5 LBS

## 2023-10-25 DIAGNOSIS — N95.0 POST-MENOPAUSAL BLEEDING: Primary | ICD-10-CM

## 2023-10-25 PROCEDURE — 3079F DIAST BP 80-89 MM HG: CPT | Performed by: STUDENT IN AN ORGANIZED HEALTH CARE EDUCATION/TRAINING PROGRAM

## 2023-10-25 PROCEDURE — 3074F SYST BP LT 130 MM HG: CPT | Performed by: STUDENT IN AN ORGANIZED HEALTH CARE EDUCATION/TRAINING PROGRAM

## 2023-10-25 PROCEDURE — 99212 OFFICE O/P EST SF 10 MIN: CPT | Performed by: STUDENT IN AN ORGANIZED HEALTH CARE EDUCATION/TRAINING PROGRAM

## 2023-10-25 RX ORDER — FLUOCINOLONE ACETONIDE 0.11 MG/ML
OIL TOPICAL
COMMUNITY
Start: 2023-10-09

## 2023-10-25 NOTE — PROGRESS NOTES
GYN PROBLEM VISIT    Subjective: This is a 52year old [place] presenting for GYN visit. Is postmenopausal for >2 yrs then  had bleeding 3/2023 and again in 4/2023. Ultrasound was performed 6/22/2023 which revealed: The endometrial stripe is heterogeneous, measured up to 1.4 cm. Pt had EMB in office 8/2023 which was insufficient for diagnosis. Here to discuss further work up      Review of Systems   Constitutional: Negative. HENT: Negative. Respiratory: Negative. Gastrointestinal: Negative. Endocrine: Negative. Genitourinary: Negative. Musculoskeletal: Negative. Skin: Negative. Allergic/Immunologic: Negative. Neurological: Negative. Past Medical History:   Diagnosis Date    Abnormal uterine bleeding     Allergic rhinitis     Amenorrhea     Anemia     Anxiety     Arthritis May 2015    Cancer [facility]) 2010    Predominant Hodgkin's Lymphoma (in remission)    COVID 12/27/2021    Had ferver, cough, congestion, SOB x 6 weeks. No hospitalization or lingering S/S.     Depression     Diabetes ([address]     Diabetes mellitus ([address]     Dysmenorrhea     Esophageal reflux     Essential hypertension     Exposure to medical diagnostic radiation     last dose 8/2012    Fibroids     Genital herpes simplex     High blood pressure     Obesity     ANAHY on CPAP     Osteoarthritis     knees and neck    Sexually transmitted disease     Sleep apnea     Visual impairment     glasses       Past Surgical History:   Procedure Laterality Date    ADENOIDECTOMY  1998    BENIGN BIOPSY LEFT      COLONOSCOPY  2010    COLONOSCOPY  03/2021    EGD N/A 03/01/2021    Procedure: ESOPHAGOGASTRODUODENOSCOPY, COLONOSCOPY, POSSIBLE BIOPSY, POSSIBLE POLYPECTOMY 72565, 12135;  Surgeon: Noris Pereira MD;  Location: [address] [place] [address]    HAND/FINGER SURGERY UNLISTED      hand incision tendon sheath of a finger    ROGELIO BIOPSY STEREO NODULE 1 SITE LEFT (CPT=19081)  10yrs  back    ciara    ROGELIO BIOPSY STEREO NODULE 1 SITE RIGHT (CPT=19081)  04/2018    3 SITE, RADIAL SCAR    ROGELIO BIOPSY STEREO NODULE 2 SITE BILAT (CPT=19081/27447)  2009    NON HODGKINS LYMPHOMA/RADIATION THERAPY 8/10    ROGELIO LOCALIZATION WIRE 1 SITE RIGHT (CPT=19281)  01/2020    papilloma    NEEDLE BIOPSY RIGHT  11/2019    papilloma    OTHER SURGICAL HISTORY  2008    neuroplasty w/ transposition of ulnar nerve at elbow left                         OTHER SURGICAL HISTORY  2009    uterine myomectomy for uterine fibroids    OTHER SURGICAL HISTORY  2018    R breast bx (x3)    REVISE MEDIAN N/CARPAL TUNNEL SURG      laterality and bilateral    TONSILLECTOMY  2000    w/ adenoidectomy    UPPER GI ENDOSCOPY,EXAM  2010         Guaifenesin Er          JITTERY  Mucinex                 JITTERY  Sudafed                 JITTERY    Fluocinolone Acetonide Scalp 0.01 % External Oil, APPLY TOPICALLY TO THE SCALP 1 TIME A WEEK, Disp: , Rfl:   semaglutide (OZEMPIC, 1 MG/DOSE,) 4 MG/3ML Subcutaneous Solution Pen-injector, Inject 1 mg into the skin once a week., Disp: 3 mL, Rfl: 0  semaglutide (OZEMPIC, 2 MG/DOSE,) 8 MG/3ML Subcutaneous Solution Pen-injector, INJECT 2MG UNDER THE SKIN EVERY WEEK, Disp: 3 mL, Rfl: 2  magnesium 250 MG Oral Tab, Take 1 tablet (250 mg total) by mouth As Directed., Disp: , Rfl:   Omeprazole 40 MG Oral Capsule Delayed Release, Take 1 capsule (40 mg total) by mouth 2 (two) times daily before meals. , Disp: , Rfl:   valACYclovir 500 MG Oral Tab, Take 1 tablet (500 mg total) by mouth 2 (two) times daily. , Disp: 30 tablet, Rfl: 0  Multiple Vitamins-Minerals (MULTI-VITAMIN/MINERALS) Oral Tab, Take 1 tablet by mouth daily. , Disp: , Rfl:   Probiotic Product (PROBIOTIC DAILY) Oral Cap, Take 1 capsule by mouth daily. , Disp: , Rfl:   Azelastine HCl 0.1 % Nasal Solution, 2 sprays by Nasal route 2 (two) times daily as needed for Rhinitis., Disp: 1 each, Rfl: 1  EVENING PRIMROSE OIL OR, Take by mouth daily. , Disp: , Rfl:   Cholecalciferol (VITAMIN D) 2000 units Oral Cap, Take 1 capsule (2,000 Units total) by mouth daily. , Disp: , Rfl:   Fluticasone Propionate (FLONASE) 50 MCG/ACT Nasal Suspension, 2 sprays in each nostril daily as needed, Disp: 1 Bottle, Rfl: 0  cetirizine 10 MG Oral Tab, Take 0.5 tablets (5 mg total) by mouth daily. , Disp: , Rfl:           Objective:    Physical Exam      10/25/23  1348   BP: 120/80   Pulse: 80        Constitutional: She is oriented to person, place, and time. She appears well-developed and well-nourished. Assessment/Plan: This is a 52year old [place] presenting with postmenopausal bleeding. Thickened stripe on ultrasound and insufficient tissue for diagnosis on in office EMB. Discussed recommendation for hysteroscopy, d&c, possible polypectomy. Discussed risk of bleeding, infection, uterine perforation, damage to tissue. All questions answered. Request for surgery placed.        Dania Day MD

## 2023-10-31 ENCOUNTER — TELEPHONE (OUTPATIENT)
Dept: OBGYN CLINIC | Facility: CLINIC | Age: 53
End: 2023-10-31

## 2023-10-31 DIAGNOSIS — N95.0 POSTMENOPAUSAL BLEEDING: Primary | ICD-10-CM

## 2023-10-31 NOTE — TELEPHONE ENCOUNTER
----- Message from Nicole Vasquez MD sent at 10/25/2023  2:28 PM CDT -----  Pt is off week of thanksgiving and week of xmas through 1/5/24 and would like surgery then. Surgeon: Nicole Vasquez MD  Assistant: No     Type of Admit: Outpatient Surgery  Procedure Location: Main OR    PreOp Dx: postmenopausal bleeding    Procedure:  hysteroscopy, dilatation and curettage, possible polypectomy    Anesthesia: General with LMA or MAC per anesthesia    Special Equipment/Comments: Tom-clear scope and device. Antibiotics: No antibiotics indicated. No penicillin or cephalosporin allergy.      Pre Op Orders: initiate my Pre-op standing orders, if none exist please use [employer] Order     Labs: Type and screen    Medical clearance: No    Post-op follow up appointment: 2 week postop phone call please

## 2023-10-31 NOTE — TELEPHONE ENCOUNTER
Surgery scheduled for 12/29/23 at 1am  Post op   Future Appointments   Date Time Provider Karina Piper   11/14/2023 10:45 AM Pk Yi MD [facility] EMG Surg/Onc   12/1/2023 10:00 AM DOREEN Bloom EMGWEI EMG [address]   12/12/2023  2:45 PM Colleen Abarca MD [facility] HEM [address]   1/17/2024  7:30 AM Tracy Angel MD EMG OB/GYN N EMG [place]     Orders entered  Added to calendar  PA - no auth needed  Reference Number  [id number]    CPT 97704  DX N95.0

## 2023-11-06 ENCOUNTER — PATIENT MESSAGE (OUTPATIENT)
Dept: INTERNAL MEDICINE CLINIC | Facility: CLINIC | Age: 53
End: 2023-11-06

## 2023-11-06 DIAGNOSIS — E11.9 CONTROLLED TYPE 2 DIABETES MELLITUS WITHOUT COMPLICATION, WITHOUT LONG-TERM CURRENT USE OF INSULIN (HCC): ICD-10-CM

## 2023-11-06 DIAGNOSIS — E66.01 CLASS 3 SEVERE OBESITY DUE TO EXCESS CALORIES WITH SERIOUS COMORBIDITY AND BODY MASS INDEX (BMI) OF 40.0 TO 44.9 IN ADULT (HCC): ICD-10-CM

## 2023-11-06 DIAGNOSIS — Z51.81 ENCOUNTER FOR THERAPEUTIC DRUG MONITORING: ICD-10-CM

## 2023-11-06 RX ORDER — SEMAGLUTIDE 1.34 MG/ML
1 INJECTION, SOLUTION SUBCUTANEOUS WEEKLY
Qty: 3 ML | Refills: 2 | Status: SHIPPED | OUTPATIENT
Start: 2023-11-06

## 2023-11-06 NOTE — TELEPHONE ENCOUNTER
Requesting Ozempic  LOV: 8/31/23  RTC: 3 months  Last Relevant Labs: 8/15/23  Filled: 10/16/23 #3ml with 0 refills  Ozempic 1 mg    Future Appointments   Date Time Provider Karina Piper   11/14/2023 10:45 AM Carlos Zacarias MD [facility] EMG Surg/Onc   12/1/2023 10:00 AM Eugene Mcdonald APRN EMGWEI EMG [facility] [address]

## 2023-11-06 NOTE — TELEPHONE ENCOUNTER
From: Jad Sharif  To: Celeste Lesches  Sent: 11/6/2023 11:40 AM CST  Subject: Jorge Kushalpaco,  Would you please send a request for a refill at the [facility] END location. Thank you.

## 2023-11-08 ENCOUNTER — TELEMEDICINE (OUTPATIENT)
Dept: INTERNAL MEDICINE CLINIC | Facility: CLINIC | Age: 53
End: 2023-11-08
Payer: COMMERCIAL

## 2023-11-08 DIAGNOSIS — R05.1 ACUTE COUGH: ICD-10-CM

## 2023-11-08 DIAGNOSIS — J01.01 ACUTE RECURRENT MAXILLARY SINUSITIS: Primary | ICD-10-CM

## 2023-11-08 PROCEDURE — 99213 OFFICE O/P EST LOW 20 MIN: CPT | Performed by: INTERNAL MEDICINE

## 2023-11-08 RX ORDER — AMOXICILLIN AND CLAVULANATE POTASSIUM 875; 125 MG/1; MG/1
1 TABLET, FILM COATED ORAL 2 TIMES DAILY
Qty: 14 TABLET | Refills: 0 | Status: SHIPPED | OUTPATIENT
Start: 2023-11-08 | End: 2023-11-15

## 2023-11-08 RX ORDER — CODEINE PHOSPHATE AND GUAIFENESIN 10; 100 MG/5ML; MG/5ML
5 SOLUTION ORAL NIGHTLY PRN
Qty: 118 ML | Refills: 0 | Status: SHIPPED | OUTPATIENT
Start: 2023-11-08

## 2023-11-08 NOTE — PROGRESS NOTES
Virtual Telephone Check-In    This visit is conducted using Telemedicine with live, interactive video and audio. Patient resides in [place]   Patient understands and accepts financial responsibility for any deductible, co-insurance and/or co-pays associated with this service. Telehealth outside of [employer] [place] [employer] Verbal Consent   I conducted a telehealth visit with Destiny Thornton on 11/8/2023   which was completed using two-way, real-time interactive audio and video  communication. This has been done in good arin to provide continuity of care in the best interest of the provider-patient relationship, due to the COVID -19 public health crisis/national emergency where restrictions of face-to-face office visits are ongoing. Every conscious effort was taken to allow for sufficient and adequate time to complete the visit. The patient was made aware of the limitations of the telehealth visit, including treatment limitations as no physical exam could be performed. The patient was advised to call 911 or to go to the ER in case there was an emergency. The patient was also advised of the potential privacy & security concerns related to the telehealth platform. The patient was made aware of where to find [facility] notice of privacy practices, telehealth consent form and other related consent forms and documents. which are located on the [facility] website. The patient verbally agreed to telehealth consent form, related consents and the risks discussed. Lastly, the patient confirmed that they were in [place]. Included in this visit, time may have been spent reviewing labs, medications, radiology tests and decision making. Appropriate medical decision-making and tests are ordered as detailed in the plan of care above. Coding/billing information is submitted for this visit based on complexity of care and/or time spent for the visit.   Time spent: 10 minutes   HPI: Nevin Maurer is a 52year old female who is calling about sinusitis. Has been going on for a week and is worsening. She did not check for covid. Has been taking over the counter medication with mild relief. She has been coughing at night time, which has made it difficult for her to sleep  ROS:  General: Feels well overall  Skin: Denies any unusual skin lesions  Eyes: Denies blurred vision or double vision  All systems negative, except for above  Physical:  GENERAL: Alert and oriented, well developed, well nourished,in no apparent distress  SKIN: no rashes,no suspicious lesions on face  LUNGS: no audible wheezing  PSYCH: pleasant, appropriate mood and affect    Assessment and Plan  1. Acute recurrent maxillary sinusitis  - amoxicillin clavulanate 875-125 MG Oral Tab; Take 1 tablet by mouth 2 (two) times daily for 7 days. Dispense: 14 tablet; Refill: 0  Note: will start medications above. Continue symptomatic treatment    2. Acute cough  - guaiFENesin-codeine (CHERATUSSIN AC) 100-10 MG/5ML Oral Solution; Take 5 mL by mouth nightly as needed for cough. Dispense: 118 mL; Refill: 0  Note: has tolerated robitussin over the counter.  Discussed the side effects    Follow up in 7 to 10 days or sooner if symptoms do not improve or worsen   Ibeth Jeffries DO

## 2023-11-08 NOTE — TELEPHONE ENCOUNTER
Appt scheduled    Future Appointments   Date Time Provider Karina Piper   11/8/2023 12:45 PM Shirley Ortega DO [facility] [place]

## 2023-11-29 NOTE — TELEPHONE ENCOUNTER
Last OV: 10/25/2023  Last refill date: 06/27/2022  Follow-up: 06/05/2023  Next appt.: 01/17/2024

## 2023-11-30 ENCOUNTER — OFFICE VISIT (OUTPATIENT)
Dept: SURGERY | Facility: CLINIC | Age: 53
End: 2023-11-30
Payer: COMMERCIAL

## 2023-11-30 VITALS
TEMPERATURE: 97 F | DIASTOLIC BLOOD PRESSURE: 76 MMHG | HEIGHT: 62.5 IN | BODY MASS INDEX: 45.46 KG/M2 | WEIGHT: 253.38 LBS | OXYGEN SATURATION: 98 % | RESPIRATION RATE: 18 BRPM | HEART RATE: 80 BPM | SYSTOLIC BLOOD PRESSURE: 131 MMHG

## 2023-11-30 DIAGNOSIS — N64.89 RADIAL SCAR OF BREAST: ICD-10-CM

## 2023-11-30 DIAGNOSIS — Z91.89 AT HIGH RISK FOR BREAST CANCER: ICD-10-CM

## 2023-11-30 DIAGNOSIS — Z12.31 ENCOUNTER FOR SCREENING MAMMOGRAM FOR HIGH-RISK PATIENT: Primary | ICD-10-CM

## 2023-11-30 DIAGNOSIS — R92.8 ABNORMAL MRI, BREAST: ICD-10-CM

## 2023-11-30 RX ORDER — VALACYCLOVIR HYDROCHLORIDE 500 MG/1
500 TABLET, FILM COATED ORAL 2 TIMES DAILY
Qty: 30 TABLET | Refills: 0 | Status: SHIPPED | OUTPATIENT
Start: 2023-11-30

## 2023-12-01 ENCOUNTER — TELEPHONE (OUTPATIENT)
Dept: INTERNAL MEDICINE CLINIC | Facility: CLINIC | Age: 53
End: 2023-12-01

## 2023-12-01 ENCOUNTER — TELEMEDICINE (OUTPATIENT)
Dept: INTERNAL MEDICINE CLINIC | Facility: CLINIC | Age: 53
End: 2023-12-01
Payer: COMMERCIAL

## 2023-12-01 DIAGNOSIS — R79.89 LOW VITAMIN D LEVEL: ICD-10-CM

## 2023-12-01 DIAGNOSIS — E55.9 VITAMIN D DEFICIENCY: ICD-10-CM

## 2023-12-01 DIAGNOSIS — E11.9 CONTROLLED TYPE 2 DIABETES MELLITUS WITHOUT COMPLICATION, WITHOUT LONG-TERM CURRENT USE OF INSULIN (HCC): ICD-10-CM

## 2023-12-01 DIAGNOSIS — I10 ESSENTIAL HYPERTENSION, BENIGN: ICD-10-CM

## 2023-12-01 DIAGNOSIS — Z51.81 ENCOUNTER FOR THERAPEUTIC DRUG MONITORING: Primary | ICD-10-CM

## 2023-12-01 DIAGNOSIS — G47.33 OSA ON CPAP: ICD-10-CM

## 2023-12-01 DIAGNOSIS — E66.01 CLASS 3 SEVERE OBESITY DUE TO EXCESS CALORIES WITH SERIOUS COMORBIDITY AND BODY MASS INDEX (BMI) OF 40.0 TO 44.9 IN ADULT (HCC): ICD-10-CM

## 2023-12-01 DIAGNOSIS — F43.9 STRESS: ICD-10-CM

## 2023-12-01 PROCEDURE — 99213 OFFICE O/P EST LOW 20 MIN: CPT | Performed by: NURSE PRACTITIONER

## 2023-12-01 RX ORDER — SEMAGLUTIDE 2.68 MG/ML
2 INJECTION, SOLUTION SUBCUTANEOUS WEEKLY
Qty: 9 ML | Refills: 0 | Status: SHIPPED | OUTPATIENT
Start: 2023-12-01

## 2023-12-01 NOTE — PROGRESS NOTES
[facility] WEIGHT MANAGEMENT VIRTUAL ENCOUNTER     Kathryn Stuart verbally consents to a Virtual/Telephone Check-In service on 12/01/23   Patient understands and accepts financial responsibility for any deductible, co-insurance and/or co-pays associated with this service. HISTORY OF PRESENT ILLNESS  Chief Complaint   Patient presents with    Other     F/u on weight management      Kathryn Stuart is a 52year old female is being evaluated as a video visit using Telemedicine with live, interactive video and audio    Weight gain/loss since LOV based on home monitoring:   Home scale: 244  Has lost  #10 lbs since LOV 3 months ago     Compliance with medication: ozempic 1mg weekly (hasn't been able to get 2mg from pharmacy due to shortages)   Tolerating well, helping with decreasing appetite and no side effects     Was previously doing yoga (but had stopped)- is going to try to restart since she felt so good doing it before   Has been trying to cook more recipes with her sister   Admits that she does notice a difference between 1 and 2 mg of ozempic. On the 2mg curbs appetite more   Is having procedure at the end of the month and was told that she needs to hold medication 1 week before   Exercise/Activity: 3-4x/ week, via walking dog, not doing anything routine as far as exercise  Nutrition: eating regular meals, +protein, minimal veggies. not tracking reports  Stress is manageable   Sleep: 7-8 hours/night, waking up feeling rested    Denies chest pain, shortness of breath, dizziness, blurred vision, headache, paresthesia, nausea/vomiting.      Wt Readings from Last 6 Encounters:   11/30/23 253 lb 6.4 oz (114.9 kg)   11/29/23 244 lb (110.7 kg)   10/25/23 254 lb 8 oz (115.4 kg)   09/18/23 255 lb 9.6 oz (115.9 kg)   09/04/23 252 lb (114.3 kg)   08/31/23 255 lb (115.7 kg)          Subjective  REVIEW OF SYSTEMS  GENERAL HEALTH: feels well otherwise, denied any fevers chills or night sweats   RESPIRATORY: denies shortness of breath   CARDIOVASCULAR: denies chest pain  GI: denies abdominal pain  PSYCH: denies any mood changes    Objective  EXAM  Reviewed most recent set of vitals   Physical Exam:  GENERAL: well developed, well nourished, in no apparent distress, speaking in full sentences comfortably   SKIN: warm, pink, dry without rashes to exposed area   EYES: conjunctiva pink  HEENT: atraumatic, normocephalic  LUNGS: normal work of breathing, non labored  CARDIO: normal work, no exertion  EXTREMITIES: no cyanosis, no clubbing, no edema  NEURO: Oriented times three  PSYCH: pleasant, cooperative, normal mood and affect    Lab Results   Component Value Date    WBC 6.7 06/28/2023    RBC 4.45 06/28/2023    HGB 13.1 06/28/2023    HCT 40.7 06/28/2023    MCV 91.5 06/28/2023    MCH 29.4 06/28/2023    MCHC 32.2 06/28/2023    RDW 13.7 06/28/2023    .0 06/28/2023    MPV 11.0 02/01/2013     Lab Results   Component Value Date     (H) 06/28/2023    BUN 13 06/28/2023    BUNCREA 11.7 06/28/2023    CREATSERUM 1.11 (H) 06/28/2023    ANIONGAP 5 06/28/2023    GFR 78 08/15/2017    GFRNAA 61 07/21/2022    GFRAA 70 07/21/2022    CA 9.0 06/28/2023    OSMOCALC 300 (H) 06/28/2023    ALKPHO 110 (H) 06/28/2023    AST 33 06/28/2023    ALT 43 06/28/2023    BILT 0.3 06/28/2023    TP 7.0 06/28/2023    ALB 3.3 (L) 06/28/2023    GLOBULIN 3.7 06/28/2023    AGRATIO 1.5 03/07/2020     06/28/2023    K 4.0 06/28/2023     06/28/2023    CO2 29.0 06/28/2023     Lab Results   Component Value Date     03/18/2023    A1C 5.6 08/15/2023     Lab Results   Component Value Date    CHOLEST 160 09/30/2023    TRIG 70 09/30/2023    HDL 79 (H) 09/30/2023    LDL 67 09/30/2023    VLDL 11 09/30/2023    TCHDLRATIO 2.4 03/07/2020    [place] 81 09/30/2023     Lab Results   Component Value Date    TSH 0.646 08/20/2021     Lab Results   Component Value Date    B12 435 05/25/2017    VITB12 325 12/28/2011     Lab Results   Component Value Date    VITD 51.6 02/12/2021       Current Outpatient Medications on File Prior to Visit   Medication Sig Dispense Refill    valACYclovir 500 MG Oral Tab Take 1 tablet (500 mg total) by mouth 2 (two) times daily. 30 tablet 0    TURMERIC OR Take by mouth. semaglutide (OZEMPIC, 1 MG/DOSE,) 4 MG/3ML Subcutaneous Solution Pen-injector Inject 1 mg into the skin once a week. (Patient taking differently: Inject 1 mg into the skin once a week. INJECTIONS ON THURSDAY) 3 mL 2    Fluocinolone Acetonide Scalp 0.01 % External Oil APPLY TOPICALLY TO THE SCALP 1 TIME A WEEK      [DISCONTINUED] semaglutide (OZEMPIC, 2 MG/DOSE,) 8 MG/3ML Subcutaneous Solution Pen-injector INJECT 2MG UNDER THE SKIN EVERY WEEK 3 mL 2    magnesium 250 MG Oral Tab Take 1 tablet (250 mg total) by mouth As Directed. Omeprazole 40 MG Oral Capsule Delayed Release Take 1 capsule (40 mg total) by mouth 2 (two) times daily before meals. Multiple Vitamins-Minerals (MULTI-VITAMIN/MINERALS) Oral Tab Take 1 tablet by mouth daily. Probiotic Product (PROBIOTIC DAILY) Oral Cap Take 1 capsule by mouth daily. Azelastine HCl 0.1 % Nasal Solution 2 sprays by Nasal route 2 (two) times daily as needed for Rhinitis. 1 each 1    EVENING PRIMROSE OIL OR Take by mouth daily. Cholecalciferol (VITAMIN D) 2000 units Oral Cap Take 1 capsule (2,000 Units total) by mouth daily. Fluticasone Propionate (FLONASE) 50 MCG/ACT Nasal Suspension 2 sprays in each nostril daily as needed 1 Bottle 0    cetirizine 10 MG Oral Tab Take 0.5 tablets (5 mg total) by mouth daily. No current facility-administered medications on file prior to visit.        ASSESSMENT  Analyzed weight data:       Diagnoses and all orders for this visit:    Encounter for therapeutic drug monitoring    Class 3 severe obesity due to excess calories with serious comorbidity and body mass index (BMI) of 40.0 to 44.9 in [facility])    Essential hypertension, benign    ANAHY on CPAP    Controlled type 2 diabetes mellitus without complication, without long-term current use of insulin (HCC)    Low vitamin D level    Stress    Vitamin D deficiency        PLAN  Continue with medications: ozempic 2mg weekly (will see if they have it in stock) - mentioned possibly going back to [facility] (since supply should be better)   --advised of side effects and adverse effects of this medication  Contradictions:  topamax (insomnia), stop  zonisamide (wasn't working anymore), stop jardance (wanted to get off), stopped metformin, has done phentermine and diethylpropion in the past, ozempic, mounjaro- can't get supply   Reviewed labs  Continue with vitamin d OTC  HTN  stable, follows with PCP  Hx of DM type 2, reviewed last a1c 5.6% on 7/2023- will continue with ozempic   ANAHY- continue cpap machine nightly   Try to get back to yoga  Advised to monitor blood pressure and pulse at home/ given parameters to review and contact provider. Nutrition: low carb diet/ recommended to eat breakfast daily/ regular protein intake  Follow up with dietitian and psychologist as recommended. Discussed the role of sleep and stress in weight management. Counseled on comprehensive weight loss plan including attention to nutrition, exercise and behavior/stress management for success. See patient instruction below for more details. Discussed strategies to overcome barriers to successful weight loss and weight maintenance  FITTE: ACSM recommendations (150-300 minutes/ week in active weight loss)       There are no Patient Instructions on file for this visit. No follow-ups on file. Patient verbalizes understanding. Total time spent on chart review, pre-charting, obtaining history, counseling, and educating, reviewing labs was 25 minutes. Pt understands phone/video evaluation is not a substitute for face to face examination or emergency care. Pt advised to go to the ER or call 911 for worsening symptoms or acute distress.        Please note that the following visit was completed using two-way, real-time interactive audio and/or video communication. This has been done in good arin to provide continuity of care in the best interest of the provider-patient relationship, due to the ongoing public health crisis/national emergency and because of restrictions of visitation. There are limitations of this visit as no physical exam could be performed. Every conscious effort was taken to allow for sufficient and adequate time. This billing was spent on reviewing labs, medications, radiology tests and decision making. Appropriate medical decision-making and tests are ordered as detailed in the plan of care above. NOTE TO PATIENT: The 21st Century Cures Act makes clinical notes like these available to patients in the interest of transparency. Clinical notes are medical documents used by physicians and care providers to communicate with each other. These documents include medical language and terminology, abbreviations, and treatment information that may sound technical and at times possibly unfamiliar. In addition, at times, the verbiage may appear blunt or direct. These documents are one tool providers use to communicate relevant information and clinical opinions of the care providers in a way that allows common understanding of the clinical context.      Ashley Ni, APRN  12/1/2023

## 2023-12-01 NOTE — PATIENT INSTRUCTIONS
Next steps:  1. Fill your prescribed medication and take as discussed and prescribed: ozempic 2mg weekly   2. Schedule a personal nutrition consultation with one of our registered dieticians     Please try to work on the following dietary changes:    1. Drink water with meals and throughout the day, cut down on soda and/or juice if consumed. Consider flavored water options like Bubbly, Spindrift, Hint and Trent. 2.  Eat breakfast daily and focus on having protein with each meal, examples include: greek yogurt, cottage cheese, hard boiled egg, whole grain toast with peanut butter. 3.  Reduce refined carbohydrates and sugars which includes items such as sweets, as well as rice, pasta, and bread and make sure to choose whole grain options when having them with just 1 serving per meal about the size of your inner palm. 4.  Consume non starchy veggies daily working towards making them a good 50% of your daily food intake. Add them to lunch and dinner consistently. 5.  Start a daily probiotic: VSL#3 is recommended, (order on line at www.vsl3. com). Take 1 capsule daily with water for 30 days, then reduce to 1 every other day (this will reduce the cost). Capsules can be left out for 2 weeks, but then must be refrigerated. Please download vinh My Fitness Esau Primus! Or Net Diary to monitor daily dietary intake and you will be able to see if you are eating the right amount of calories or too much or too little which would hinder weight loss. Additionally this will help to see your daily carbohydrate and protein intake. When you set the vinh up choose 1-2 lbs/week as a goal.  Keeping a paper food journal is an option as well to remain accountable for your choices- this is the start to mindful eating! A low calorie diet has been consistently shown to support weight loss. Continue or start exercising to help establish a routine.  If not already exercising begin with 1 day and progress as able with long-term goal of 30 minutes 5 days a week at a minimum. Meditation daily can help manage and control stress. Chronic stress can make weight loss difficult. Exercising is one way to help with stress, but meditation using the CALM Keo or another comparable alternative can be done in your home or place of work with little time commitment. This Keo can also help work on behavior change and improve sleep. Check out the segment under Calm Masterclass and listen to The 4 Pillars of Health. A great way to begin learning about the foundation of lifestyle with practical tips to use in your every day. Check out www.yourweightmatters. org blog for continued daily support and education along this weight loss journey! Patient Resources:     Personal Training/Fitness Classes/Health Coaching     Christi Nieves and Alex Sophiaside @ [email or web address] Full fitness center with group fitness and personal training. Discount available as client of [facility]. Health Coaching and Personal Training with Mariano Kimbrough at our [facility]- individual weekly coaching with option to add personal training and small group fitness classes targeted at weight loss- [phone] and/or email @ [email or web address]  360FIT [place] [email or web address] Group Fitness [phone] and/or email Emil Pappas at [email or web address] Selleration  [address] [facility] with multiple locations: Aetna (www.UseTogether), Eat The Tripbirds Fitness (www.IronPlanet. Selleration), Fit Body Bootcamp (www.Press-sensebodybootcamp.com), MoneyMan (www.Everyday Solutions. Selleration), The Exercise  (www.exercisecoach.Selleration)     Online Fitness  Fitness  on [employer] in 10 DVD series- www. oodwq34MYF. Selleration  Sit and Be Fit - Chair exercise series Www.sitandbefit. org  Hip Hop Fit with Alen Ferraro at www.hiphopfit. net     Apps for on the [employer] 7 Minute Workout (orange box with white 7) - free on the go HIIT training keo  Peloton Keo @ www. Terrace Software     Nutrition Trackers and Tools  LoseIT! And My Fitness Pal apps and on line for tracking nutrition  NOOM - virtual health coaching  FitFoundation (healthy meals on the go) in [facility] @ www. cmfasxeoierjd7g. Jose Eduardo Parra MD @ www.Advanced Micro-Fabrication EquipmenttromdREMOTV and Tutu Cueto (keto and low carb plans recommended) @ www. HCGXZO79.IEL, Metabolic Meals @ www. MyMetabolicMeals. com - individual prepared meals to go  Daily Pic, "Gabuduck[employer] Inc.", [employer], Every Plate, JumpLinc- on line meal delivery programs for preparation at home  [place] in [place] for homemade meals to go @ wwwCineFlow. Total Prestige  Diet Doctor @ www. dietdoctor. Total Prestige - low carb swaps  YuI2 TELE[employer] INTERNATIONA - meal prep and planning keo (www.yummly. com)     Stress Management/Behavior/Mindful Eating  CALM meditation keo (www.Evolv)  Headspace  Am I Hungry? Mindful eating virtual  keo  Www.yourweightmatters. org - Obesity Action Coalition sponsored Blog posts daily  Motivation keo (black box with white \")- daily supportive messages sent to your phone     Books/Video Education/Podcasts  Mindless Eating by Marilynn Cockayne  Why We Get Sick by Kym Green (a book about insulin resistance)  Atomic Habits by John Ask (a book about taking small steps to promote greater behavior change)   Can't Hurt Me by Cris Dixon (a book exploring the power of discipline in achieving your goals)  The End of Dieting: How to Live for Life by Dr. Arturo Perez M.D. or listen to The [address] [facility] [address] [address] Episode 61: Understanding \"Nutritarian\" Eating w/Dr. Arturo Perez  Your Body in Balance: The [employer] of Food, Hormones, and Health by Dr. Michelle Cao  The Menopause Diet Plan by Stefan Anderson and [facility] AT [facility]  The Complete Guide to fasting by Dr. Merrill Lim, [address] [facility] by Michael Nichols, Ph.D, R.D.   Weight Loss Surgery Will Not Treat Food Addiction by Hortensia Conde Ph.D  The [facility] on plant based nutrition  Fed Up - documentary about obesity (Free on Utube)  The Truth About Sugar - documentary on sugar (Free on Utube, https://youtu. be/6M5caowUF4c)  The Dr. Ruffin Gaucher by Dr. Bard Samara MD  Fitlosophy Fitspiration - journal to better health (found at [employer] in fitness aisle)  What Happened to You?- a look at the impact trauma has on behavior written by Zana Cardoso and Dr. Krysten Aldana Again by Taylor Yuan - discovering your true self after trauma  Faiza Quiñones talk on RetiDiag, The Call to Courage  Podcasts: The Exam Room by the Physician's Committee, Nutrition Facts by Dr. Arlene Bartholomew    We are here to support you with weight loss, but please remember that you still need your primary care provider for your routine health maintenance.

## 2023-12-12 ENCOUNTER — OFFICE VISIT (OUTPATIENT)
Dept: HEMATOLOGY/ONCOLOGY | Facility: HOSPITAL | Age: 53
End: 2023-12-12
Attending: SPECIALIST
Payer: COMMERCIAL

## 2023-12-12 VITALS
HEART RATE: 80 BPM | TEMPERATURE: 97 F | DIASTOLIC BLOOD PRESSURE: 78 MMHG | OXYGEN SATURATION: 98 % | SYSTOLIC BLOOD PRESSURE: 134 MMHG | BODY MASS INDEX: 46 KG/M2 | RESPIRATION RATE: 16 BRPM | WEIGHT: 254 LBS

## 2023-12-12 DIAGNOSIS — C81.05 NODULAR LYMPHOCYTE PREDOMINANT HODGKIN LYMPHOMA OF LYMPH NODES OF INGUINAL REGION (HCC): Primary | ICD-10-CM

## 2023-12-12 DIAGNOSIS — C81.04 NODULAR LYMPHOCYTE PREDOMINANT HODGKIN LYMPHOMA OF LYMPH NODES OF AXILLA (HCC): ICD-10-CM

## 2023-12-12 LAB
ALBUMIN SERPL-MCNC: 3.6 G/DL (ref 3.4–5)
ALBUMIN/GLOB SERPL: 1.1 {RATIO} (ref 1–2)
ALP LIVER SERPL-CCNC: 114 U/L
ALT SERPL-CCNC: 24 U/L
ANION GAP SERPL CALC-SCNC: 2 MMOL/L (ref 0–18)
AST SERPL-CCNC: 16 U/L (ref 15–37)
BASOPHILS # BLD AUTO: 0.02 X10(3) UL (ref 0–0.2)
BASOPHILS NFR BLD AUTO: 0.4 %
BILIRUB SERPL-MCNC: 0.4 MG/DL (ref 0.1–2)
BUN BLD-MCNC: 13 MG/DL (ref 9–23)
CALCIUM BLD-MCNC: 8.9 MG/DL (ref 8.5–10.1)
CHLORIDE SERPL-SCNC: 111 MMOL/L (ref 98–112)
CO2 SERPL-SCNC: 30 MMOL/L (ref 21–32)
CREAT BLD-MCNC: 1.28 MG/DL
EGFRCR SERPLBLD CKD-EPI 2021: 49 ML/MIN/1.73M2 (ref 60–?)
EOSINOPHIL # BLD AUTO: 0.16 X10(3) UL (ref 0–0.7)
EOSINOPHIL NFR BLD AUTO: 3 %
ERYTHROCYTE [DISTWIDTH] IN BLOOD BY AUTOMATED COUNT: 14 %
ERYTHROCYTE [SEDIMENTATION RATE] IN BLOOD: 36 MM/HR
FASTING STATUS PATIENT QL REPORTED: NO
GLOBULIN PLAS-MCNC: 3.4 G/DL (ref 2.8–4.4)
GLUCOSE BLD-MCNC: 88 MG/DL (ref 70–99)
HCT VFR BLD AUTO: 40.8 %
HGB BLD-MCNC: 13.5 G/DL
IMM GRANULOCYTES # BLD AUTO: 0.01 X10(3) UL (ref 0–1)
IMM GRANULOCYTES NFR BLD: 0.2 %
LDH SERPL L TO P-CCNC: 193 U/L
LYMPHOCYTES # BLD AUTO: 2.09 X10(3) UL (ref 1–4)
LYMPHOCYTES NFR BLD AUTO: 38.6 %
MCH RBC QN AUTO: 29 PG (ref 26–34)
MCHC RBC AUTO-ENTMCNC: 33.1 G/DL (ref 31–37)
MCV RBC AUTO: 87.6 FL
MONOCYTES # BLD AUTO: 0.5 X10(3) UL (ref 0.1–1)
MONOCYTES NFR BLD AUTO: 9.2 %
NEUTROPHILS # BLD AUTO: 2.63 X10 (3) UL (ref 1.5–7.7)
NEUTROPHILS # BLD AUTO: 2.63 X10(3) UL (ref 1.5–7.7)
NEUTROPHILS NFR BLD AUTO: 48.6 %
OSMOLALITY SERPL CALC.SUM OF ELEC: 296 MOSM/KG (ref 275–295)
PLATELET # BLD AUTO: 181 10(3)UL (ref 150–450)
POTASSIUM SERPL-SCNC: 3.9 MMOL/L (ref 3.5–5.1)
PROT SERPL-MCNC: 7 G/DL (ref 6.4–8.2)
RBC # BLD AUTO: 4.66 X10(6)UL
SODIUM SERPL-SCNC: 143 MMOL/L (ref 136–145)
WBC # BLD AUTO: 5.4 X10(3) UL (ref 4–11)

## 2023-12-12 PROCEDURE — 99214 OFFICE O/P EST MOD 30 MIN: CPT | Performed by: SPECIALIST

## 2023-12-12 RX ORDER — POLYETHYLENE GLYCOL 3350 17 G/17G
17 POWDER, FOR SOLUTION ORAL DAILY
COMMUNITY

## 2023-12-12 NOTE — PROGRESS NOTES
Patient is here for MD f/u for Hodgkin's disease. Denies fevers, night sweats or chills. Appetite is good. Feeling well. Labs drawn today.        Education Record    Learner:  Patient    Disease / Diagnosis:  Hodgkin's disease     Barriers / Limitations:  None   Comments:    Method:  Discussion   Comments:    General Topics:  Plan of care reviewed   Comments:    Outcome:  Shows understanding   Comments:

## 2023-12-20 ENCOUNTER — OFFICE VISIT (OUTPATIENT)
Dept: INTERNAL MEDICINE CLINIC | Facility: CLINIC | Age: 53
End: 2023-12-20
Payer: COMMERCIAL

## 2023-12-20 VITALS
DIASTOLIC BLOOD PRESSURE: 74 MMHG | TEMPERATURE: 97 F | WEIGHT: 253.81 LBS | BODY MASS INDEX: 45.54 KG/M2 | OXYGEN SATURATION: 98 % | RESPIRATION RATE: 16 BRPM | SYSTOLIC BLOOD PRESSURE: 120 MMHG | HEIGHT: 62.5 IN | HEART RATE: 80 BPM

## 2023-12-20 DIAGNOSIS — Z01.818 PREOP EXAM FOR INTERNAL MEDICINE: Primary | ICD-10-CM

## 2023-12-20 DIAGNOSIS — G47.33 OSA ON CPAP: ICD-10-CM

## 2023-12-20 DIAGNOSIS — C81.04 NODULAR LYMPHOCYTE PREDOMINANT HODGKIN LYMPHOMA OF LYMPH NODES OF AXILLA (HCC): ICD-10-CM

## 2023-12-20 DIAGNOSIS — E11.9 CONTROLLED TYPE 2 DIABETES MELLITUS WITHOUT COMPLICATION, WITHOUT LONG-TERM CURRENT USE OF INSULIN (HCC): ICD-10-CM

## 2023-12-20 DIAGNOSIS — E66.01 CLASS 3 SEVERE OBESITY DUE TO EXCESS CALORIES WITH SERIOUS COMORBIDITY AND BODY MASS INDEX (BMI) OF 40.0 TO 44.9 IN ADULT (HCC): ICD-10-CM

## 2023-12-20 DIAGNOSIS — I10 ESSENTIAL HYPERTENSION, BENIGN: ICD-10-CM

## 2023-12-20 DIAGNOSIS — K21.00 GASTROESOPHAGEAL REFLUX DISEASE WITH ESOPHAGITIS, UNSPECIFIED WHETHER HEMORRHAGE: ICD-10-CM

## 2023-12-20 LAB
ATRIAL RATE: 71 BPM
P AXIS: 63 DEGREES
P-R INTERVAL: 162 MS
Q-T INTERVAL: 410 MS
QRS DURATION: 88 MS
QTC CALCULATION (BEZET): 445 MS
R AXIS: 47 DEGREES
T AXIS: 20 DEGREES
VENTRICULAR RATE: 71 BPM

## 2023-12-20 PROCEDURE — 3074F SYST BP LT 130 MM HG: CPT | Performed by: INTERNAL MEDICINE

## 2023-12-20 PROCEDURE — 3078F DIAST BP <80 MM HG: CPT | Performed by: INTERNAL MEDICINE

## 2023-12-20 PROCEDURE — 93000 ELECTROCARDIOGRAM COMPLETE: CPT | Performed by: INTERNAL MEDICINE

## 2023-12-20 PROCEDURE — 99243 OFF/OP CNSLTJ NEW/EST LOW 30: CPT | Performed by: INTERNAL MEDICINE

## 2023-12-20 PROCEDURE — 3008F BODY MASS INDEX DOCD: CPT | Performed by: INTERNAL MEDICINE

## 2023-12-26 ENCOUNTER — ANESTHESIA EVENT (OUTPATIENT)
Dept: SURGERY | Facility: HOSPITAL | Age: 53
End: 2023-12-26
Payer: COMMERCIAL

## 2023-12-28 ENCOUNTER — APPOINTMENT (OUTPATIENT)
Dept: HEMATOLOGY/ONCOLOGY | Facility: HOSPITAL | Age: 53
End: 2023-12-28
Payer: COMMERCIAL

## 2023-12-29 ENCOUNTER — HOSPITAL ENCOUNTER (OUTPATIENT)
Facility: HOSPITAL | Age: 53
Setting detail: HOSPITAL OUTPATIENT SURGERY
Discharge: HOME | End: 2023-12-29
Attending: STUDENT IN AN ORGANIZED HEALTH CARE EDUCATION/TRAINING PROGRAM | Admitting: STUDENT IN AN ORGANIZED HEALTH CARE EDUCATION/TRAINING PROGRAM
Payer: COMMERCIAL

## 2023-12-29 ENCOUNTER — ANESTHESIA (OUTPATIENT)
Dept: SURGERY | Facility: HOSPITAL | Age: 53
End: 2023-12-29
Payer: COMMERCIAL

## 2023-12-29 VITALS
WEIGHT: 254.13 LBS | HEIGHT: 62.5 IN | OXYGEN SATURATION: 100 % | BODY MASS INDEX: 45.6 KG/M2 | TEMPERATURE: 97 F | RESPIRATION RATE: 18 BRPM | SYSTOLIC BLOOD PRESSURE: 144 MMHG | HEART RATE: 59 BPM | DIASTOLIC BLOOD PRESSURE: 69 MMHG

## 2023-12-29 DIAGNOSIS — N95.0 POSTMENOPAUSAL BLEEDING: ICD-10-CM

## 2023-12-29 LAB
B-HCG UR QL: NEGATIVE
GLUCOSE BLD-MCNC: 88 MG/DL (ref 70–99)
GLUCOSE BLD-MCNC: 93 MG/DL (ref 70–99)

## 2023-12-29 PROCEDURE — 0UB98ZX EXCISION OF UTERUS, VIA NATURAL OR ARTIFICIAL OPENING ENDOSCOPIC, DIAGNOSTIC: ICD-10-PCS | Performed by: STUDENT IN AN ORGANIZED HEALTH CARE EDUCATION/TRAINING PROGRAM

## 2023-12-29 PROCEDURE — 58558 HYSTEROSCOPY BIOPSY: CPT | Performed by: STUDENT IN AN ORGANIZED HEALTH CARE EDUCATION/TRAINING PROGRAM

## 2023-12-29 RX ORDER — IBUPROFEN 600 MG/1
600 TABLET ORAL EVERY 6 HOURS PRN
Qty: 30 TABLET | Refills: 0 | Status: SHIPPED | OUTPATIENT
Start: 2023-12-29

## 2023-12-29 RX ORDER — HYDROMORPHONE HYDROCHLORIDE 1 MG/ML
0.2 INJECTION, SOLUTION INTRAMUSCULAR; INTRAVENOUS; SUBCUTANEOUS EVERY 5 MIN PRN
Status: DISCONTINUED | OUTPATIENT
Start: 2023-12-29 | End: 2023-12-29

## 2023-12-29 RX ORDER — ONDANSETRON 2 MG/ML
INJECTION INTRAMUSCULAR; INTRAVENOUS AS NEEDED
Status: DISCONTINUED | OUTPATIENT
Start: 2023-12-29 | End: 2023-12-29 | Stop reason: SURG

## 2023-12-29 RX ORDER — NICOTINE POLACRILEX 4 MG
15 LOZENGE BUCCAL
Status: DISCONTINUED | OUTPATIENT
Start: 2023-12-29 | End: 2023-12-29

## 2023-12-29 RX ORDER — ONDANSETRON 2 MG/ML
4 INJECTION INTRAMUSCULAR; INTRAVENOUS EVERY 6 HOURS PRN
Status: DISCONTINUED | OUTPATIENT
Start: 2023-12-29 | End: 2023-12-29

## 2023-12-29 RX ORDER — HYDROMORPHONE HYDROCHLORIDE 1 MG/ML
0.4 INJECTION, SOLUTION INTRAMUSCULAR; INTRAVENOUS; SUBCUTANEOUS EVERY 5 MIN PRN
Status: DISCONTINUED | OUTPATIENT
Start: 2023-12-29 | End: 2023-12-29

## 2023-12-29 RX ORDER — MIDAZOLAM HYDROCHLORIDE 1 MG/ML
1 INJECTION INTRAMUSCULAR; INTRAVENOUS EVERY 5 MIN PRN
Status: DISCONTINUED | OUTPATIENT
Start: 2023-12-29 | End: 2023-12-29

## 2023-12-29 RX ORDER — HYDROMORPHONE HYDROCHLORIDE 1 MG/ML
0.6 INJECTION, SOLUTION INTRAMUSCULAR; INTRAVENOUS; SUBCUTANEOUS EVERY 5 MIN PRN
Status: DISCONTINUED | OUTPATIENT
Start: 2023-12-29 | End: 2023-12-29

## 2023-12-29 RX ORDER — SODIUM CHLORIDE, SODIUM LACTATE, POTASSIUM CHLORIDE, CALCIUM CHLORIDE 600; 310; 30; 20 MG/100ML; MG/100ML; MG/100ML; MG/100ML
INJECTION, SOLUTION INTRAVENOUS CONTINUOUS
Status: DISCONTINUED | OUTPATIENT
Start: 2023-12-29 | End: 2023-12-29

## 2023-12-29 RX ORDER — DEXTROSE MONOHYDRATE 25 G/50ML
50 INJECTION, SOLUTION INTRAVENOUS
Status: DISCONTINUED | OUTPATIENT
Start: 2023-12-29 | End: 2023-12-29

## 2023-12-29 RX ORDER — IBUPROFEN 200 MG
600 TABLET ORAL EVERY 6 HOURS
Status: DISCONTINUED | OUTPATIENT
Start: 2023-12-29 | End: 2023-12-29

## 2023-12-29 RX ORDER — LIDOCAINE HYDROCHLORIDE 10 MG/ML
INJECTION, SOLUTION EPIDURAL; INFILTRATION; INTRACAUDAL; PERINEURAL AS NEEDED
Status: DISCONTINUED | OUTPATIENT
Start: 2023-12-29 | End: 2023-12-29 | Stop reason: SURG

## 2023-12-29 RX ORDER — ONDANSETRON 2 MG/ML
4 INJECTION INTRAMUSCULAR; INTRAVENOUS EVERY 8 HOURS PRN
Status: DISCONTINUED | OUTPATIENT
Start: 2023-12-29 | End: 2023-12-29

## 2023-12-29 RX ORDER — NALOXONE HYDROCHLORIDE 0.4 MG/ML
80 INJECTION, SOLUTION INTRAMUSCULAR; INTRAVENOUS; SUBCUTANEOUS AS NEEDED
Status: DISCONTINUED | OUTPATIENT
Start: 2023-12-29 | End: 2023-12-29

## 2023-12-29 RX ORDER — HYDROCODONE BITARTRATE AND ACETAMINOPHEN 5; 325 MG/1; MG/1
2 TABLET ORAL ONCE AS NEEDED
Status: DISCONTINUED | OUTPATIENT
Start: 2023-12-29 | End: 2023-12-29

## 2023-12-29 RX ORDER — PROCHLORPERAZINE EDISYLATE 5 MG/ML
5 INJECTION INTRAMUSCULAR; INTRAVENOUS EVERY 8 HOURS PRN
Status: DISCONTINUED | OUTPATIENT
Start: 2023-12-29 | End: 2023-12-29

## 2023-12-29 RX ORDER — DEXAMETHASONE SODIUM PHOSPHATE 4 MG/ML
VIAL (ML) INJECTION AS NEEDED
Status: DISCONTINUED | OUTPATIENT
Start: 2023-12-29 | End: 2023-12-29 | Stop reason: SURG

## 2023-12-29 RX ORDER — DIPHENHYDRAMINE HYDROCHLORIDE 50 MG/ML
12.5 INJECTION INTRAMUSCULAR; INTRAVENOUS AS NEEDED
Status: DISCONTINUED | OUTPATIENT
Start: 2023-12-29 | End: 2023-12-29

## 2023-12-29 RX ORDER — GLYCOPYRROLATE 0.2 MG/ML
INJECTION, SOLUTION INTRAMUSCULAR; INTRAVENOUS AS NEEDED
Status: DISCONTINUED | OUTPATIENT
Start: 2023-12-29 | End: 2023-12-29 | Stop reason: SURG

## 2023-12-29 RX ORDER — HYDROCODONE BITARTRATE AND ACETAMINOPHEN 5; 325 MG/1; MG/1
1 TABLET ORAL ONCE AS NEEDED
Status: DISCONTINUED | OUTPATIENT
Start: 2023-12-29 | End: 2023-12-29

## 2023-12-29 RX ORDER — MEPERIDINE HYDROCHLORIDE 25 MG/ML
12.5 INJECTION INTRAMUSCULAR; INTRAVENOUS; SUBCUTANEOUS AS NEEDED
Status: DISCONTINUED | OUTPATIENT
Start: 2023-12-29 | End: 2023-12-29

## 2023-12-29 RX ORDER — ONDANSETRON 4 MG/1
4 TABLET, FILM COATED ORAL EVERY 8 HOURS PRN
Status: DISCONTINUED | OUTPATIENT
Start: 2023-12-29 | End: 2023-12-29

## 2023-12-29 RX ORDER — KETAMINE HYDROCHLORIDE 50 MG/ML
INJECTION, SOLUTION INTRAMUSCULAR; INTRAVENOUS AS NEEDED
Status: DISCONTINUED | OUTPATIENT
Start: 2023-12-29 | End: 2023-12-29 | Stop reason: SURG

## 2023-12-29 RX ORDER — LABETALOL HYDROCHLORIDE 5 MG/ML
5 INJECTION, SOLUTION INTRAVENOUS EVERY 5 MIN PRN
Status: DISCONTINUED | OUTPATIENT
Start: 2023-12-29 | End: 2023-12-29

## 2023-12-29 RX ORDER — ACETAMINOPHEN 500 MG
1000 TABLET ORAL ONCE AS NEEDED
Status: DISCONTINUED | OUTPATIENT
Start: 2023-12-29 | End: 2023-12-29

## 2023-12-29 RX ORDER — NICOTINE POLACRILEX 4 MG
30 LOZENGE BUCCAL
Status: DISCONTINUED | OUTPATIENT
Start: 2023-12-29 | End: 2023-12-29

## 2023-12-29 RX ADMIN — DEXAMETHASONE SODIUM PHOSPHATE 4 MG: 4 MG/ML VIAL (ML) INJECTION at 07:49:00

## 2023-12-29 RX ADMIN — ONDANSETRON 4 MG: 2 INJECTION INTRAMUSCULAR; INTRAVENOUS at 07:49:00

## 2023-12-29 RX ADMIN — SODIUM CHLORIDE, SODIUM LACTATE, POTASSIUM CHLORIDE, CALCIUM CHLORIDE: 600; 310; 30; 20 INJECTION, SOLUTION INTRAVENOUS at 07:57:00

## 2023-12-29 RX ADMIN — SODIUM CHLORIDE, SODIUM LACTATE, POTASSIUM CHLORIDE, CALCIUM CHLORIDE: 600; 310; 30; 20 INJECTION, SOLUTION INTRAVENOUS at 07:46:00

## 2023-12-29 RX ADMIN — GLYCOPYRROLATE 0.2 MG: 0.2 INJECTION, SOLUTION INTRAMUSCULAR; INTRAVENOUS at 07:54:00

## 2023-12-29 RX ADMIN — LIDOCAINE HYDROCHLORIDE 50 MG: 10 INJECTION, SOLUTION EPIDURAL; INFILTRATION; INTRACAUDAL; PERINEURAL at 07:44:00

## 2023-12-29 RX ADMIN — KETAMINE HYDROCHLORIDE 25 MG: 50 INJECTION, SOLUTION INTRAMUSCULAR; INTRAVENOUS at 07:46:00

## 2023-12-29 NOTE — DISCHARGE INSTRUCTIONS
Home Care Instructions Following Your Hysteroscopy     Suma Mcgregor-  We hope you were pleased with your care at [facility].  We wish you the best outcome and overall experience with your operation. These instructions will help to minimize pain, limit the risk for an infection, and improve the likelihood of a successful result. What to Expect:  Expect some vaginal bleeding, watery vaginal discharge, or spotting for 1-2 weeks after your procedure  You might also experience abdominal cramping for 1-2 days  Call the office, if you experience heavy bleeding (saturating a pad every hour)    Over-The-Counter Medication  Non-prescription anti-inflammatory medications can also help to ease the pain.   You can take Aleve, Tylenol or Ibuprofen   Take as directed on the bottle  Drink a full glass of water with the medication    Bathing/Showers  You may resume showers in one day  No baths, swimming, hot tubs for two weeks    Home Medication  Resume your home medications as instructed    Diet  Resume your normal diet    Activity  Refrain from vaginal intercourse, vaginal suppositories, tampon use or douches until your first office appointment with the doctor  No strenuous activity or heavy lifting for two days  You may go up and down the stairs as tolerated    No physical exercise, sports, or gym workouts for two days    Driving  You may resume driving tomorrow    Follow-up Appointment with Your Gynecologist  Call your gynecologist's office today for an appointment as instructed    Verify your appointment date, day, time, and location  At your postoperative office appointment, your progress will be evaluated, findings reviewed, and any additional concerns and instructions will be discussed    Questions or Concerns  Call the office if you experience severe pain not controlled by pain medication, swelling, heavy bleeding, foul smelling vaginal discharge, shortness of breath, chest pain, leg pain, fever (100.4 or greater), or other concerns  If your call is made after office hours, the physician on call will be available to help you. There is always a doctor from the group covering our gynecology patients, when your provider is unavailable    Akosua Maria,  Thank you for coming to [facility] for your operation. The nurses and the anesthesiologist try very hard to make sure you receive the best care possible. Your trust in them as well as us is greatly appreciated.   Thanks so much

## 2023-12-29 NOTE — OPERATIVE REPORT
HYSTEROSCOPY OPERATIVE NOTE   2023     Patient: Mimi Glaser   [id number]    Pre-op Dx:   52year old [place] presenting with postmenopausal bleeding and thickened endometrial stripe     Post-op Ox: Same     Procedure: Hysteroscopy, directed endometrial sampling, Dilation and curettage    Indications: This is a 52year old   undergoing hysteroscopy for  postmenopausal bleeding. All risks including risk of bleeding and need for transfusion, infection, perforation and damage to surrounding organs (bowel, bladder, ovaries, ureters, blood vessels and nerves) were explained. Patient expressed full understanding or her risk and alternatives and desires to undergo said procedure. Written informed consent obtained. Surgeon: Adolfo Matthews MD  Anesthesia Staff: Braxton Squires MD  Anesthesia Type: General   Estimated Blood Loss: 20   UOP: 150cc   Fluid WWGACHN:[id number]  Complications: none     Findings: On speculum exam, cervix and vaginal mucosa was noted to appear normal. Intrauterine cavity remarkable for a dense adhesion between the anterior and posterior uterine walls. Bilateral tubal ostia seen. Cervix hemostatic upon tenaculum removal.     Specimens Removed: directed endometrial sampling, endometrial currettings     Procedure: The patient was taken to the operating room where a time out was performed confirming correct patient and procedure. General anesthesia was established without difficulty. The patient was positioned on the operating table in the dorsal lithotomy position with the legs supported using Yellow Fin stirrups with care taken to pad the pressure points. The patient was then prepped and draped in the usual sterile fashion. The bladder was drained. A speculum was used to visualize the cervix. The anterior lip of the cervix was visualized and grasped using a single tooth tenaculum. The cervix was dilated to size 8 Hegar dilator. The hysteroscope was passed through the cervix.  Before advancing to the uterine cavity, a thick adhesive band was seen between the anterior and posterior uterine walls, essentially bisecting the uterine cavity. Given concern for perforation and creation of a false passage, this tissue was cautiously dissected away with the Trueclear soft tissue device. After resection, it did reveal a normal atrophic uterine cavity. Bilateral tubal ostia were visualized. The hysteroscope was then removed. The D&C then ensued. A sharp curette was used to enter the uterus until the fundus was reached. Gentle scraping motions were used to sample 360 degrees of the uterine cavity. The tenaculum and speculum were then removed. Direct pressure was applied to the tenaculum site and excellent hemostasis was noted. All sponge and instrument counts were correct x2. The patient tolerated the procedure well and was transfered to PACU in stable condition.      García Reese MD

## 2024-01-12 ENCOUNTER — APPOINTMENT (OUTPATIENT)
Dept: CT IMAGING | Age: 54
End: 2024-01-12
Attending: INTERNAL MEDICINE
Payer: COMMERCIAL

## 2024-01-17 ENCOUNTER — HOSPITAL ENCOUNTER (OUTPATIENT)
Dept: CT IMAGING | Age: 54
Discharge: HOME | End: 2024-01-17
Attending: INTERNAL MEDICINE
Payer: COMMERCIAL

## 2024-01-17 ENCOUNTER — OFFICE VISIT (OUTPATIENT)
Dept: OBGYN CLINIC | Facility: CLINIC | Age: 54
End: 2024-01-17
Payer: COMMERCIAL

## 2024-01-17 VITALS
WEIGHT: 256.13 LBS | BODY MASS INDEX: 46 KG/M2 | SYSTOLIC BLOOD PRESSURE: 120 MMHG | DIASTOLIC BLOOD PRESSURE: 80 MMHG | HEART RATE: 87 BPM

## 2024-01-17 DIAGNOSIS — J84.10 LUNG GRANULOMA (HCC): ICD-10-CM

## 2024-01-17 DIAGNOSIS — Z09 POSTOP CHECK: Primary | ICD-10-CM

## 2024-01-17 PROCEDURE — 71250 CT THORAX DX C-: CPT | Performed by: INTERNAL MEDICINE

## 2024-01-17 PROCEDURE — 3074F SYST BP LT 130 MM HG: CPT | Performed by: STUDENT IN AN ORGANIZED HEALTH CARE EDUCATION/TRAINING PROGRAM

## 2024-01-17 PROCEDURE — 3079F DIAST BP 80-89 MM HG: CPT | Performed by: STUDENT IN AN ORGANIZED HEALTH CARE EDUCATION/TRAINING PROGRAM

## 2024-01-17 NOTE — PROGRESS NOTES
Postop: trueclear hysteroscopy, D&C    S: Doing well. Reports mild cramping. Had bleeding with wiping on Friday. None since. Denies fevers, chills, foul smelling discharge.     O:  A.  Endometrium, sampling:  -Fragments of benign inactive endometrium.     B.  Endometrium, curettage:  -Fragments of benign inactive endometrium and cervical squamous epithelium.    A+P:  Doing well. RTC for annual exam when due.     Pravin Lima MD

## 2024-02-29 ENCOUNTER — APPOINTMENT (OUTPATIENT)
Dept: CT IMAGING | Age: 54
End: 2024-02-29
Attending: EMERGENCY MEDICINE
Payer: OTHER MISCELLANEOUS

## 2024-02-29 ENCOUNTER — HOSPITAL ENCOUNTER (OUTPATIENT)
Age: 54
Discharge: HOME | End: 2024-02-29
Payer: OTHER MISCELLANEOUS

## 2024-02-29 VITALS
BODY MASS INDEX: 45 KG/M2 | HEART RATE: 79 BPM | SYSTOLIC BLOOD PRESSURE: 137 MMHG | OXYGEN SATURATION: 100 % | DIASTOLIC BLOOD PRESSURE: 80 MMHG | TEMPERATURE: 98 F | WEIGHT: 251 LBS | RESPIRATION RATE: 20 BRPM

## 2024-02-29 DIAGNOSIS — S09.90XA INJURY OF HEAD, INITIAL ENCOUNTER: Primary | ICD-10-CM

## 2024-02-29 LAB — GLUCOSE BLD-MCNC: 88 MG/DL (ref 70–99)

## 2024-02-29 PROCEDURE — 82962 GLUCOSE BLOOD TEST: CPT

## 2024-02-29 PROCEDURE — 76377 3D RENDER W/INTRP POSTPROCES: CPT | Performed by: EMERGENCY MEDICINE

## 2024-02-29 PROCEDURE — 70450 CT HEAD/BRAIN W/O DYE: CPT | Performed by: EMERGENCY MEDICINE

## 2024-02-29 PROCEDURE — 99214 OFFICE O/P EST MOD 30 MIN: CPT

## 2024-02-29 PROCEDURE — 70486 CT MAXILLOFACIAL W/O DYE: CPT | Performed by: EMERGENCY MEDICINE

## 2024-03-01 NOTE — ED PROVIDER NOTES
Patient Seen in: [facility]      History     Chief Complaint   Patient presents with    Head Neck Injury     Stated Complaint: WC Head trauma, nose injury with clear fluid    Subjective:   HPI    53 YO female presents to immediate care for nose pain after being hit in the face with a basketball yesterday.  Patient states she had a runny nose prior to injury yesterday but runny nose has persisted.  She endorses pain at the nasal bridge. Denies nosebleeds. Further denies LOC, headache, dizziness, amnesia.        Objective:   Past Medical History:   Diagnosis Date    Abnormal uterine bleeding     Allergic rhinitis     Amenorrhea     Anemia     Anxiety     Arthritis 05/2015    Cancer (HCC) 2010    Predominant Hodgkin's Lymphoma, REDIAGNOSED THIS YEAR 2023    COVID 12/27/2021    Had ferver, cough, congestion, SOB x 6 weeks. No hospitalization or lingering S/S.    Depression     Diabetes (HCC)     Diabetes mellitus (HCC)     Diverticulosis of large intestine     Dysmenorrhea     Esophageal reflux     Essential hypertension     Exposure to medical diagnostic radiation     last dose 8/2012    Fibroids     Genital herpes simplex     High blood pressure     Obesity     ANAHY on CPAP     Osteoarthritis     knees and neck    Sexually transmitted disease     Sleep apnea     CPAP    Visual impairment     glasses              Past Surgical History:   Procedure Laterality Date    ADENOIDECTOMY  1998    BENIGN BIOPSY LEFT      COLONOSCOPY  2010    COLONOSCOPY  03/2021    EGD N/A 03/01/2021    Procedure: ESOPHAGOGASTRODUODENOSCOPY, COLONOSCOPY, POSSIBLE BIOPSY, POSSIBLE POLYPECTOMY 31580, 19787;  Surgeon: Candice Chan MD;  [place] [facility] [place] [facility]    HAND/FINGER SURGERY UNLISTED      hand incision tendon sheath of a finger    ROGELIO BIOPSY STEREO NODULE 1 SITE LEFT (CPT=19081)  10yrs  back    ciara    ROGELIO BIOPSY STEREO NODULE 1 SITE RIGHT (CPT=19081)  04/2018    3 SITE, RADIAL SCAR    ROGELIO BIOPSY STEREO NODULE 2  SITE BILAT (CPT=19081/42029)  2009    NON HODGKINS LYMPHOMA/RADIATION THERAPY 8/10    ROGELIO LOCALIZATION WIRE 1 SITE RIGHT (CPT=19281)  01/2020    papilloma    NEEDLE BIOPSY RIGHT  11/2019    papilloma    OTHER SURGICAL HISTORY  2008    neuroplasty w/ transposition of ulnar nerve at elbow left                         OTHER SURGICAL HISTORY  2009    uterine myomectomy for uterine fibroids    OTHER SURGICAL HISTORY  2018    R breast bx (x3)    REVISE MEDIAN N/CARPAL TUNNEL SURG      laterality and bilateral    TONSILLECTOMY  2000    w/ adenoidectomy    UPPER GI ENDOSCOPY,EXAM  2010                Social History     Socioeconomic History    Marital status: Single   Occupational History    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  Constitutional and vital signs reviewed.      All other systems reviewed and negative except as noted above.    Physical Exam     ED Triage Vitals [02/29/24 1818]   /80   Pulse 79   Resp 20   Temp 98.2 °F (36.8 °C)   Temp src Temporal   SpO2 100 %   O2 Device None (Room air)       Current:/80   Pulse 79   Temp 98.2 °F (36.8 °C) (Temporal)   Resp 20   Wt 113.9 kg   LMP 04/06/2023   SpO2 100%   BMI 45.18 kg/m²         Physical Exam  Vitals and nursing note reviewed.   Constitutional:       General: She is not in acute distress.     Appearance: Normal appearance. She is not ill-appearing,  toxic-appearing or diaphoretic.   HENT:      Nose: No nasal deformity.      Right Nostril: No epistaxis or septal hematoma.      Left Nostril: No epistaxis or septal hematoma.   Cardiovascular:      Rate and Rhythm: Normal rate and regular rhythm.   Pulmonary:      Effort: Pulmonary effort is normal. No respiratory distress.      Breath sounds: Normal breath sounds.   Neurological:      General: No focal deficit present.      Mental Status: She is alert and oriented to person, place, and time.      GCS: GCS eye subscore is 4. GCS verbal subscore is 5. GCS motor subscore is 6.      Cranial Nerves: No cranial nerve deficit.   Psychiatric:         Mood and Affect: Mood normal.         Behavior: Behavior normal.         ED Course     Labs Reviewed   POCT GLUCOSE - Normal     CT FACIAL BONES (CPT=70486)    Result Date: 2/29/2024  PROCEDURE:  CT FACIAL BONES (CPT=70486)  COMPARISON:  KIRAN HERNANDEZ, CT SINUS (CPT=70486), 10/03/2017, 5:06 PM.  INDICATIONS:  WC Head trauma, nose injury with clear fluid  TECHNIQUE:  Noncontrast CT scanning is performed through the facial bones. 3D shaded surface renderings are created on an independent CT scanner workstation. Dose reduction techniques were used. Dose information is transmitted to the ACR (American College of Radiology) NRDR (National Radiology Data Registry) which includes the Dose Index Registry.  3-D RENDERING:  Three dimensional image processing was completed using a separate workstation under concurrent supervision. Images were archived.  PATIENT STATED HISTORY:(As transcribed by Technologist)  Patient got hit in face with basketball yesterday.  No loss of consciousness.  Headache now with running nose.  Neck pain too.    FINDINGS:  SINUSES:  No visible mass, significant fluid or mucosal thickening.  NASAL FOSSA:  Leftward deviation of nasal septum is noted. SKULL BASE:  No mass or bone destruction.  FACIAL BONES:  No bony lesion or fracture  ORBITS:  No visible mass,  hematoma, edema or fracture.  CAVERNOUS SINUS:  Symmetric appearance with no visible lesion.  SALIVARY GLANDS:  The parotid and submandibular glands are unremarkable.  OTHER:  The nasopharynx, oropharynx, and oral cavity are unremarkable.  No lymphadenopathy.             CONCLUSION:  There is no CT evidence of a facial bone fracture.   LOCATION:  [facility]   Dictated by (CST): Arie Webb MD on 2/29/2024 at 7:31 PM     Finalized by (CST): Arie Webb MD on 2/29/2024 at 7:34 PM       CT BRAIN OR HEAD (60220)    Result Date: 2/29/2024  PROCEDURE:  CT BRAIN OR HEAD (49411)  COMPARISON:  None.  INDICATIONS:  WC Head trauma, nose injury with clear fluid  TECHNIQUE:  Noncontrast CT scanning is performed through the brain. Dose reduction techniques were used. Dose information is transmitted to the ACR (American College of Radiology) NRDR (National Radiology Data Registry) which includes the Dose Index Registry.  PATIENT STATED HISTORY: (As transcribed by Technologist)  Patient got hit in face with basketball yesterday.  No loss of consciousness.  Headache now with running nose.  Neck pain too.    FINDINGS:  VENTRICLES/SULCI:  Ventricles and sulci are normal in size.  INTRACRANIAL:  There are no abnormal extraaxial fluid collections.  There is no midline shift.  There are no intraparenchymal brain abnormalities.  There is nothing specific for acute infarct.  There is no hemorrhage or mass lesion.  There is an empty sella which is of uncertain significance.  There are no typical imaging features of intracranial hypotension. SINUSES:           No sign of acute sinusitis.  MASTOIDS:          No sign of acute inflammation. SKULL:             No evidence for fracture or osseous abnormality. OTHER:             None.            CONCLUSION:  1. There is no acute abnormality detected on the noncontrast CT of the head. 2. There is an empty sella noted which is of uncertain significance.  There are no typical findings of intracranial  hypotension.    LOCATION:  [facility]   Dictated by (CST): Arie Webb MD on 2/29/2024 at 7:25 PM     Finalized by (CST): Arie Webb MD on 2/29/2024 at 7:31 PM          MDM      This is a well-appearing 53 YO female that presents to immediate care for nose pain after being hit in the face with a basketball yesterday.      Differential diagnosis considered but not limited to concussion, nasal bones fracture, acute intracranial process    Physical exam is unremarkable.  Neurological exam unremarkable.  No focal deficits.  GCS 15.  CT facial bones negative and CT brain negative for acute findings.   Home care and ER precautions discussed with understanding.         Medical Decision Making  Amount and/or Complexity of Data Reviewed  Radiology: ordered. Decision-making details documented in ED Course.        Disposition and Plan     Clinical Impression:  1. Injury of head, initial encounter         Disposition:  Discharge  2/29/2024  7:42 pm    Follow-up:  [facility]  [address]  [facility] [address]  [phone]    If symptoms worsen          Medications Prescribed:  Discharge Medication List as of 2/29/2024  7:47 PM

## 2024-03-01 NOTE — ED INITIAL ASSESSMENT (HPI)
Pt here after hit straight in face w basketball yesterday. States thought she had a runny nose but it was clear fluid from nose last night. Pain to base of nose.

## 2024-03-02 ENCOUNTER — PATIENT MESSAGE (OUTPATIENT)
Dept: INTERNAL MEDICINE CLINIC | Facility: CLINIC | Age: 54
End: 2024-03-02

## 2024-03-04 RX ORDER — SEMAGLUTIDE 2.68 MG/ML
2 INJECTION, SOLUTION SUBCUTANEOUS WEEKLY
Qty: 3 ML | Refills: 0 | Status: SHIPPED | OUTPATIENT
Start: 2024-03-04

## 2024-03-04 NOTE — TELEPHONE ENCOUNTER
Please advise on patient message. Thank you.   Per 12/1/23 video visit: Continue with medications: ozempic 2mg weekly (will see if they have it in stock) - mentioned possibly going back to mounjaro (since supply should be better)

## 2024-03-04 NOTE — TELEPHONE ENCOUNTER
From: Lizette Camp  To: Kay Cooper  Sent: 3/2/2024 3:31 PM CST  Subject: Medication     Good afternoon Kay,   I have run out of Ozempic. I don’t see you until the 12. Do you want to renew the Olympic or go ahead and start me on the Mounjaro?   Lizette

## 2024-03-06 ENCOUNTER — PATIENT MESSAGE (OUTPATIENT)
Dept: INTERNAL MEDICINE CLINIC | Facility: CLINIC | Age: 54
End: 2024-03-06

## 2024-03-06 DIAGNOSIS — J01.01 ACUTE RECURRENT MAXILLARY SINUSITIS: Primary | ICD-10-CM

## 2024-03-07 NOTE — TELEPHONE ENCOUNTER
From: Lizette aCmp  To: Ibeth Crawford  Sent: 3/6/2024 11:07 AM CST  Subject: Headache     Good morning, I am still having a lot of sinus issues. A headache, runny nose and some nausea. I know I see you next week, but can you prescribe me something?   Lizette

## 2024-03-08 RX ORDER — AMOXICILLIN AND CLAVULANATE POTASSIUM 875; 125 MG/1; MG/1
1 TABLET, FILM COATED ORAL 2 TIMES DAILY
Qty: 14 TABLET | Refills: 0 | Status: SHIPPED | OUTPATIENT
Start: 2024-03-08 | End: 2024-03-15

## 2024-03-12 ENCOUNTER — OFFICE VISIT (OUTPATIENT)
Dept: INTERNAL MEDICINE CLINIC | Facility: CLINIC | Age: 54
End: 2024-03-12
Payer: COMMERCIAL

## 2024-03-12 VITALS
WEIGHT: 252.63 LBS | OXYGEN SATURATION: 98 % | HEART RATE: 85 BPM | HEIGHT: 62 IN | BODY MASS INDEX: 46.49 KG/M2 | DIASTOLIC BLOOD PRESSURE: 70 MMHG | TEMPERATURE: 98 F | SYSTOLIC BLOOD PRESSURE: 110 MMHG | RESPIRATION RATE: 18 BRPM

## 2024-03-12 VITALS
DIASTOLIC BLOOD PRESSURE: 62 MMHG | WEIGHT: 252 LBS | RESPIRATION RATE: 16 BRPM | BODY MASS INDEX: 46.38 KG/M2 | SYSTOLIC BLOOD PRESSURE: 110 MMHG | HEART RATE: 82 BPM | HEIGHT: 62 IN

## 2024-03-12 DIAGNOSIS — K21.00 GASTROESOPHAGEAL REFLUX DISEASE WITH ESOPHAGITIS, UNSPECIFIED WHETHER HEMORRHAGE: ICD-10-CM

## 2024-03-12 DIAGNOSIS — I10 ESSENTIAL HYPERTENSION, BENIGN: ICD-10-CM

## 2024-03-12 DIAGNOSIS — E11.9 CONTROLLED TYPE 2 DIABETES MELLITUS WITHOUT COMPLICATION, WITHOUT LONG-TERM CURRENT USE OF INSULIN (HCC): Primary | ICD-10-CM

## 2024-03-12 DIAGNOSIS — E55.9 VITAMIN D DEFICIENCY: ICD-10-CM

## 2024-03-12 DIAGNOSIS — E66.01 CLASS 3 SEVERE OBESITY DUE TO EXCESS CALORIES WITH SERIOUS COMORBIDITY AND BODY MASS INDEX (BMI) OF 40.0 TO 44.9 IN ADULT (HCC): ICD-10-CM

## 2024-03-12 DIAGNOSIS — Z51.81 ENCOUNTER FOR THERAPEUTIC DRUG MONITORING: Primary | ICD-10-CM

## 2024-03-12 DIAGNOSIS — E11.9 CONTROLLED TYPE 2 DIABETES MELLITUS WITHOUT COMPLICATION, WITHOUT LONG-TERM CURRENT USE OF INSULIN (HCC): ICD-10-CM

## 2024-03-12 DIAGNOSIS — M25.552 LEFT HIP PAIN: ICD-10-CM

## 2024-03-12 DIAGNOSIS — N17.9 AKI (ACUTE KIDNEY INJURY) (HCC): ICD-10-CM

## 2024-03-12 DIAGNOSIS — F43.9 STRESS: ICD-10-CM

## 2024-03-12 DIAGNOSIS — C81.04 NODULAR LYMPHOCYTE PREDOMINANT HODGKIN LYMPHOMA OF LYMPH NODES OF AXILLA (HCC): ICD-10-CM

## 2024-03-12 DIAGNOSIS — R79.89 LOW VITAMIN D LEVEL: ICD-10-CM

## 2024-03-12 DIAGNOSIS — G47.33 OSA ON CPAP: ICD-10-CM

## 2024-03-12 PROCEDURE — 3078F DIAST BP <80 MM HG: CPT | Performed by: INTERNAL MEDICINE

## 2024-03-12 PROCEDURE — 3008F BODY MASS INDEX DOCD: CPT | Performed by: NURSE PRACTITIONER

## 2024-03-12 PROCEDURE — 3074F SYST BP LT 130 MM HG: CPT | Performed by: NURSE PRACTITIONER

## 2024-03-12 PROCEDURE — 3008F BODY MASS INDEX DOCD: CPT | Performed by: INTERNAL MEDICINE

## 2024-03-12 PROCEDURE — 3074F SYST BP LT 130 MM HG: CPT | Performed by: INTERNAL MEDICINE

## 2024-03-12 PROCEDURE — 99214 OFFICE O/P EST MOD 30 MIN: CPT | Performed by: INTERNAL MEDICINE

## 2024-03-12 PROCEDURE — 3078F DIAST BP <80 MM HG: CPT | Performed by: NURSE PRACTITIONER

## 2024-03-12 PROCEDURE — 99214 OFFICE O/P EST MOD 30 MIN: CPT | Performed by: NURSE PRACTITIONER

## 2024-03-12 RX ORDER — TIRZEPATIDE 10 MG/.5ML
10 INJECTION, SOLUTION SUBCUTANEOUS WEEKLY
Qty: 2 ML | Refills: 0 | Status: SHIPPED | OUTPATIENT
Start: 2024-03-12

## 2024-03-12 NOTE — PATIENT INSTRUCTIONS
Next steps:  1.  Fill your prescribed medication and take as discussed and prescribed: stop ozempic 2mg  Will restart mounjaro 10mg weekly   2.  Schedule a personal nutrition consultation with one of our registered dieticians     Please try to work on the following dietary changes:  Daily protein recommendation to start:  grams  Daily carbohydrate: <130g  Daily calories: 1,600  1.  Drink water with meals and throughout the day, cut down on soda and/or juice if consumed. Consider flavored water options like Bubbly, Spindrift, Hint and Trent.  2.  Eat breakfast daily and focus on having protein with each meal, examples include: greek yogurt, cottage cheese, hard boiled egg, whole grain toast with peanut butter.   3.  Reduce refined carbohydrates and sugars which includes items such as sweets, as well as rice, pasta, and bread and make sure to choose whole grain options when having them with just 1 serving per meal about the size of your inner palm.  4.  Consume non starchy veggies daily working towards making them a good 50% of your daily food intake. Add them to lunch and dinner consistently.  5.  Start a daily probiotic: VSL#3 is recommended, (order on line at www.vsl3.com). Take 1 capsule daily with water for 30 days, then reduce to 1 every other day (this will reduce the cost). Capsules can be left out for 2 weeks, but then must be refrigerated.      Please download vinh My Fitness Pal, LoseIt! Or Net Diary to monitor daily dietary intake and you will be able to see if you are eating the right amount of calories or too much or too little which would hinder weight loss. Additionally this will help to see your daily carbohydrate and protein intake. When you set the vinh up choose 1-2 lbs/week as a goal.  Keeping a paper food journal is an option as well to remain accountable for your choices- this is the start to mindful eating! A low calorie diet has been consistently shown to support weight loss.      Continue or start exercising to help establish a routine. If not already exercising begin with 1 day and progress as able with long-term goal of 30 minutes 5 days a week at a minimum.     Meditation daily can help manage and control stress. Chronic stress can make weight loss difficult.  Exercising is one way to help with stress, but meditation using the CALM Keo or another comparable alternative can be done in your home or place of work with little time commitment. This Keo can also help work on behavior change and improve sleep. Check out the segment under Calm Masterclass and listen to The 4 Pillars of Health. A great way to begin learning about the foundation of lifestyle with practical tips to use in your every day.     Check out www.yourweightmatters.org blog for continued daily support and education along this weight loss journey!    Patient Resources:     Personal Training/Fitness Classes/Health Coaching     [facility] @ [email or web address]/healthy-[email or web address]/[email or web address]-center Full fitness center with group fitness and personal training. Discount available as client of [employer] [facility].  Health Coaching and Personal Training with Yvette Burns at our [facility]- individual weekly coaching with option to add personal training and small group fitness classes targeted at weight loss- [phone] and/or email @ [email or web address]  360FIT [place] http://[email or web address]. Group Fitness [phone] and/or email Eli at [email or web address]  [facility] Fitness Centers with multiple locations: Vaavud (www.ZeaVision), Eat The Frog Fitness (www.APPEK Mobile Apps.DotSpots), Fit Body Bootcamp (www.OptiWi-fibodybootcamp.DotSpots), Devshop Fitness (www.Snootlab.DotSpots), The Exercise  (www.exercisecoach.DotSpots)     Online Fitness  Fitness  on Jascha  Fit in 10 DVD series- www.bntgp71BBU.com  Sit and Be Fit - Chair  exercise series Www.sitandbefit.org  Hip Hop Fit with Alen Ferraro at www.hiphopfit.net     Apps for on the Go Fitness  [place] 7 Minute Workout (orange box with white 7) - free on the go HIIT training keo  Peloton Keo @ www.onepeloton.com     Nutrition Trackers and Tools  LoseIT! And My Fitness Pal apps and on line for tracking nutrition  NOOM - virtual health coaching  FitFoundation (healthy meals on the go) in [place] @ www.zmrdlgrwqodhl0kAverail  Hazel ADEN @ wwwNightprobistromd.com and Bkbtbk31 (keto and low carb plans recommended) @ wwwNightpromfnmbz41.com, Metabolic Meals @ www.MyMetabolicMeals.Docalytics - individual prepared meals to go  Gobble, Blue Apron, Home , Every Plate, Sunbasket- on line meal delivery programs for preparation at home  Meal Village in [place] for homemade meals to go @ www.mealParudiage.Docalytics  Diet Doctor @ www.dietdoctor.com - low carb swaps  [employer] Dynamics - meal prep and planning keo (www.yummly.com)     Stress Management/Behavior/Mindful Eating  CALM meditation keo (www.calm.com)  Headspace  Am I Hungry? Mindful eating virtual  keo  Www.yourweightmatters.org - Obesity Action Coalition sponsored Blog posts daily  Motivation keo (black box with white \")- daily supportive messages sent to your phone     Books/Video Education/Podcasts  Mindless Eating by Arie Valiente  Why We Get Sick by Jose Morales (a book about insulin resistance)  Atomic Habits by Geoffrey Berg (a book about taking small steps to promote greater behavior change)   Can't Hurt Me by Vu Solis (a book exploring the power of discipline in achieving your goals)  The End of Dieting: How to Live for Life by Dr. Adeel Hunter M.D. or listen to The Bravoavia Podcast Episode 63: Understanding \"Nutritarian\" Eating w/Dr. Adeel Huntre  Your Body in Balance: The New Science of Food, Hormones, and Health by Dr. Jayden Marcelo  The Menopause Diet Plan by Mary Babrour and Morelia Ndiaye  The Complete Guide to fasting by Dr. Mcnulty  Sugar, Salt &  Fat by Lola Wilkes, Ph.D, R.D.  Weight Loss Surgery Will Not Treat Food Addiction by Johanny Guo Ph.D  The Game Changers- PatientKeeperix Documentary on plant based nutrition  Fed Up - documentary about obesity (Free on Utube)  The Truth About Sugar - documentary on sugar (Free on Utube, https://youtu.be/4S3xlexBR4a)  The Dr. Suarez T5 Wellness Plan by Dr. Ryley Suarez MD  Fitlosophy Fitspiration - journal to better health (found at [employer] in fitness aisle)  What Happened to You?- a look at the impact trauma has on behavior written by Abdi Mtz and Dr. Mauricio Anna  Whole Again by Umesh Larry - discovering your true self after trauma  Mitch Vallecillo talk on [employer], The Call to Courage  Podcasts: The Exam Room by the Physician's Committee, Nutrition Facts by Dr. Guerra    We are here to support you with weight loss, but please remember that you still need your primary care provider for your routine health maintenance.

## 2024-03-12 NOTE — PROGRESS NOTES
Patient Office Visit    ASSESSMENT AND PLAN:   1. Controlled type 2 diabetes mellitus without complication, without long-term current use of insulin ([facility])  Note: Continue Ozempic.  She will be switching to Mounjaro.  Also referred her to the fitness center  - Hemoglobin A1C; Future  - Lipid Panel; Future  - Basic Metabolic Panel (8) [E]; Future  - OP [facility] [place] Referral - Internal    2. Class 3 severe obesity due to excess calories with serious comorbidity and body mass index (BMI) of 40.0 to 44.9 in adult ([facility])  Note: Will be switching to Mounjaro.  Has been following up with the weight loss clinic.  She was recently very stressed and was emotional eating still she is trying to work on diet and lifestyle changes.  Referral to the fitness center provided  - OP [facility] [place] Referral - Internal    3. 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 Essential hypertension, benign  Note: Stable off medicine    5. Gastroesophageal reflux disease with esophagitis, unspecified whether hemorrhage  Note: Continue omeprazole    6. ANAHY on CPAP  Note: Continue compliance    7. MARIA D (acute kidney injury) ([facility])  Note: Mild MARIA D noted in previous labs.  Will continue to monitor.  Recommended to drink lots of water and avoid NSAIDs on a regular basis.  No imaging could be found where kidney stones were seen    8. Left hip pain  Note: Likely MSK strain.  Continue to work on weight loss and continue exercises    Return to clinic in 6 months for physical        Patient/Caregiver Education: Patient/Caregiver Education: There are no barriers to learning. Medical education done. Outcome: Patient verbalizes understanding. Patient is notified to call with any questions, complications, allergies, or worsening or changing symptoms.  Patient is to call with any side effects or complications from the treatments as a result of today.      Reviewed Past  Medical History and   Patient Active Problem List   Diagnosis    Diverticulosis of colon (without mention of hemorrhage)    Reflux esophagitis    Hodgkin's disease (HCC)    Eczema    Essential hypertension, benign    Diabetes mellitus type 2, controlled, without complications (HCC)    Chronic allergic rhinitis    ANAHY on CPAP    Class 3 severe obesity due to excess calories with serious comorbidity and body mass index (BMI) of 40.0 to 44.9 in adult (HCC)    Encounter for therapeutic drug monitoring    Low vitamin D level    Radial scar of breast right    Postmenopausal bleeding       Orders Placed This Encounter   Procedures    Hemoglobin A1C     Standing Status:   Future     Standing Expiration Date:   3/12/2025     Order Specific Question:   Release to patient     Answer:   Immediate    Lipid Panel     Standing Status:   Future     Standing Expiration Date:   3/12/2025    Basic Metabolic Panel (8) [E]     Standing Status:   Future     Standing Expiration Date:   3/12/2025     Order Specific Question:   Release to patient     Answer:   Immediate     Requested Prescriptions      No prescriptions requested or ordered in this encounter         Ibeth Crawford DO  CC:  Chief Complaint   Patient presents with    Follow - Up         HPI:   Lizette Camp is a 54-year-old female who presents for a 6-month follow-up    Sinusitis: Has improved since starting antibiotics  Obesity: Recently went to the weight loss clinic and is hoping to switch Ozempic to Mounjaro  Kidney problem: She was seen by the rheumatologist recently for possible psoriatic arthritis.  He does not think she has psoriatic arthritis but was recommended to follow-up for possible kidney stones.  She denies any flank pain and could not find any imaging that mentioned kidney stones.  Diabetes/hypertension/lymphoma: Stable  Left hip pain: Has been going on for the past few months and sometimes it gets better while other days it gets worse.  She has been  alternating between Tylenol and ibuprofen.  She had seen a spine surgeon for the back but has not seen anyone for the hip.  No recent falls or trauma.    Past Medical History:   Diagnosis Date    Abnormal uterine bleeding     Allergic rhinitis     Amenorrhea     Anemia     Anxiety     Arthritis 05/2015    Cancer (HCC) 2010    Predominant Hodgkin's Lymphoma, REDIAGNOSED THIS YEAR 2023    COVID 12/27/2021    Had ferver, cough, congestion, SOB x 6 weeks. No hospitalization or lingering S/S.    Depression     Diabetes (HCC)     Diabetes mellitus (HCC)     Diverticulosis of large intestine     Dysmenorrhea     Esophageal reflux     Essential hypertension     Exposure to medical diagnostic radiation     last dose 8/2012    Fibroids     Genital herpes simplex     High blood pressure     Obesity     ANAHY on CPAP     Osteoarthritis     knees and neck    Sexually transmitted disease     Sleep apnea     CPAP    Visual impairment     glasses       Past Surgical History:   Procedure Laterality Date    ADENOIDECTOMY  1998    BENIGN BIOPSY LEFT      COLONOSCOPY  2010    COLONOSCOPY  03/2021    EGD N/A 03/01/2021    Procedure: ESOPHAGOGASTRODUODENOSCOPY, COLONOSCOPY, POSSIBLE BIOPSY, POSSIBLE POLYPECTOMY 59606, [id number];  Surgeon: Candice Chan MD;  Location: [facility], [facility]    HAND/FINGER SURGERY UNLISTED      hand incision tendon sheath of a finger    ROGELIO BIOPSY STEREO NODULE 1 SITE LEFT (CPT=19081)  10yrs  back    ciara    ROGELIO BIOPSY STEREO NODULE 1 SITE RIGHT (CPT=19081)  04/2018    3 SITE, RADIAL SCAR    ROGELIO BIOPSY STEREO NODULE 2 SITE BILAT (CPT=19081/56176)  2009    NON HODGKINS LYMPHOMA/RADIATION THERAPY 8/10    ROGELIO LOCALIZATION WIRE 1 SITE RIGHT (CPT=19281)  01/2020    papilloma    NEEDLE BIOPSY RIGHT  11/2019    papilloma    OTHER SURGICAL HISTORY  2008    neuroplasty w/ transposition of ulnar nerve at elbow left                         OTHER SURGICAL HISTORY  2009    uterine myomectomy for uterine fibroids    OTHER  SURGICAL HISTORY  2018    R breast bx (x3)    REVISE MEDIAN N/CARPAL TUNNEL SURG      laterality and bilateral    TONSILLECTOMY  2000    w/ adenoidectomy    UPPER GI ENDOSCOPY,EXAM  2010       Social History:  Social History     Socioeconomic History    Marital status: Single   Occupational History    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   Tobacco Use    Smoking status: Never     Passive exposure: Never    Smokeless tobacco: Never   Vaping Use    Vaping Use: Never used   Substance and Sexual Activity    Alcohol use: Not Currently     Comment: Occasionally    Drug use: No    Sexual activity: Not Currently     Partners: Male   Other Topics Concern    Caffeine Concern Yes    Stress Concern No    Weight Concern No    Special Diet Yes    Exercise Yes    Seat Belt Yes     Family History:  Family History   Problem Relation Age of Onset    Anemia Mother     Diabetes Mother     Other (Other) Mother     Hypertension Father     Cancer Father         lung and prostate    Heart Attack Father         transient Ischemic Attack    Cancer Maternal Grandfather         brain    Cancer Maternal Aunt         Breast CA hx, and colon    Breast Cancer Maternal Aunt 53        IN HER 50'S  NOT SURE OF EXACT AGE    Breast Cancer Maternal Aunt 55    Cancer Paternal Aunt         Lung CA    Cancer Self 41        Predominant lymphocyte hodgkins lymphoma    Breast Cancer Paternal Cousin Female 50    BRCA gene + Niece      Allergies:  Allergies   Allergen Reactions    Guaifenesin Er JITTERY    Mucinex JITTERY    Sudafed JITTERY     Current Meds:  Current Outpatient Medications on File Prior to Visit   Medication Sig Dispense Refill    amoxicillin clavulanate 875-125 MG Oral Tab Take 1 tablet by mouth 2 (two) times daily for 7 days. 14 tablet 0    semaglutide (OZEMPIC, 2 MG/DOSE,) 8 MG/3ML Subcutaneous Solution Pen-injector Inject 2 mg into the skin once a week. 3 mL 0    Polyethylene Glycol 3350 (MIRALAX) 17 g Oral Powd Pack Take 17 g  by mouth daily.      valACYclovir 500 MG Oral Tab Take 1 tablet (500 mg total) by mouth 2 (two) times daily. (Patient taking differently: Take 1 tablet (500 mg total) by mouth 2 (two) times daily as needed.) 30 tablet 0    TURMERIC OR Take by mouth.      Fluocinolone Acetonide Scalp 0.01 % External Oil APPLY TOPICALLY TO THE SCALP 1 TIME A WEEK      magnesium 250 MG Oral Tab Take 1 tablet (250 mg total) by mouth As Directed.      Omeprazole 40 MG Oral Capsule Delayed Release Take 1 capsule (40 mg total) by mouth 2 (two) times daily before meals.      Multiple Vitamins-Minerals (MULTI-VITAMIN/MINERALS) Oral Tab Take 1 tablet by mouth daily.      Probiotic Product (PROBIOTIC DAILY) Oral Cap Take 1 capsule by mouth daily.      Azelastine HCl 0.1 % Nasal Solution 2 sprays by Nasal route 2 (two) times daily as needed for Rhinitis. 1 each 1    EVENING PRIMROSE OIL OR Take by mouth daily.      Cholecalciferol (VITAMIN D) 2000 units Oral Cap Take 1 capsule (2,000 Units total) by mouth daily.      Fluticasone Propionate (FLONASE) 50 MCG/ACT Nasal Suspension 2 sprays in each nostril daily as needed 1 Bottle 0    cetirizine 10 MG Oral Tab Take 0.5 tablets (5 mg total) by mouth daily.      [DISCONTINUED] semaglutide (OZEMPIC, 2 MG/DOSE,) 8 MG/3ML Subcutaneous Solution Pen-injector Inject 2 mg into the skin once a week. 9 mL 0     No current facility-administered medications on file prior to visit.         REVIEW OF SYSTEMS   Constitutional: no fatigue normal energy no weight changes   HENT: normal sinuses and no mouth issues   Eyes: . normal vision no eye pain   Respiratory: normal respirations no cough   Cardiovascular: no CP, or palpitations   Gastrointestinal: normal bowels and no abd pains   Genitourinary:  normal urination no hematuria, no frequency   Musculoskeletal: no pains in arms/legs, normal range of motion   Skin: no rashes or skin lesions that are new   Neurological:  no weakness, no numbness, normal gait    Hematological:  no bruises or bleeding   Psychiatric/Behavioral: normal mood no anxiety normal behavior     /60   Pulse 85   Temp 97.7 °F (36.5 °C) (Temporal)   Resp 18   Ht 5' 2\" (1.575 m)   Wt 252 lb 9.6 oz (114.6 kg)   LMP 04/06/2023   SpO2 98%   BMI 46.20 kg/m²     PHYSICAL EXAM:   Constitutional: Vital signs reviewed as noted, well developed, in no acute distress.   HENT: NCAT  Eyes: pupils reactive bilaterally  Cardiovascular: nl s1 s2 no m/r/g  Pulmonary/Chest: CTA bilaterally with no wheezes  Extremities: no pedal edema   Neurological:  no weakness in UE and LE, reflexes are normal  Skin: no rashes or bruises on visualized skin, not undressed   Psychiatric:normal mood

## 2024-03-12 NOTE — PATIENT INSTRUCTIONS
I have referred you to the fitness center    Continue your medication    I have ordered blood work to be done in 6 months    See me back in 6 months for a physical

## 2024-03-12 NOTE — PROGRESS NOTES
HISTORY OF PRESENT ILLNESS  Chief Complaint   Patient presents with    Weight Check     +8     Lizette Camp is a 54 year old female here for follow up with medical weight loss program for the treatment of overweight, obesity, or morbid obesity.     Up #8 lbs  Compliant on ozempic 2mg weekly   Tolerating well, doesn't feel like it's helping with decreasing appetite and no side effects     Success: not over eating  Challenging: still snacking  Basketball coaching   High stress, is hopeful for June (since school will be over)  Is hoping to get back to yoga again   Still drinking sweet tea  Exercise/Activity: 5x/ week, via walking, not doing anything routine as far as exercise  Nutrition: eating regular meals, +protein, minimal veggies. not tracking reports  Meals out per week on average: 2  Stress is manageable- 10/10   Sleep: 5 hours/night, waking up feeling tired (menopause, depression/anxiety)    Denies chest pain, shortness of breath, dizziness, blurred vision, headache, paresthesia, nausea/vomiting.     Breakfast Lunch Dinner Snacks Fluids   Reviewed              Wt Readings from Last 6 Encounters:   03/12/24 252 lb 9.6 oz (114.6 kg)   03/12/24 252 lb (114.3 kg)   02/29/24 251 lb (113.9 kg)   01/17/24 256 lb 2 oz (116.2 kg)   12/29/23 254 lb 1.6 oz (115.3 kg)   12/20/23 253 lb 12.8 oz (115.1 kg)          REVIEW OF SYSTEMS  GENERAL: feels well otherwise, denied any fevers chills or night sweats   LUNGS: denies shortness of breath  CARDIOVASCULAR: denies chest pain  GI: denies abdominal pain  MUSCULOSKELETAL: denies back pain, joint pains   PSYCH: denies change in behavior or mood, denies feeling sad or depressed    EXAM  /62   Pulse 82   Resp 16   Ht 5' 2\" (1.575 m)   Wt 252 lb (114.3 kg)   LMP 04/06/2023   BMI 46.09 kg/m²       GENERAL: well developed, well nourished, in no apparent distress, A/O x3  SKIN: no rashes, no suspicious lesions  HEENT: atraumatic, normocephalic, OP-clear, PERRLA  NECK:  supple, no adenopathy  LUNGS: CTA in all fields, breathing non labored  CARDIO: RRR without murmur  GI: +BS, NT/ND, no masses or HSM  EXTREMITIES: no cyanosis, no clubbing, no edema    Lab Results   Component Value Date    GLU 88 12/12/2023    BUN 13 12/12/2023    BUNCREA 11.7 06/28/2023    CREATSERUM 1.28 (H) 12/12/2023    ANIONGAP 2 12/12/2023    GFR 78 08/15/2017    GFRNAA 61 07/21/2022    GFRAA 70 07/21/2022    CA 8.9 12/12/2023    OSMOCALC 296 (H) 12/12/2023    ALKPHO 114 (H) 12/12/2023    AST 16 12/12/2023    ALT 24 12/12/2023    BILT 0.4 12/12/2023    TP 7.0 12/12/2023    ALB 3.6 12/12/2023    GLOBULIN 3.4 12/12/2023    AGRATIO 1.5 03/07/2020     12/12/2023    K 3.9 12/12/2023     12/12/2023    CO2 30.0 12/12/2023     Lab Results   Component Value Date     03/18/2023    A1C 5.6 08/15/2023     Lab Results   Component Value Date    CHOLEST 160 09/30/2023    TRIG 70 09/30/2023    HDL 79 (H) 09/30/2023    LDL 67 09/30/2023    VLDL 11 09/30/2023    TCHDLRATIO 2.4 03/07/2020    NONHDLC 81 09/30/2023     Lab Results   Component Value Date    B12 435 05/25/2017    VITB12 325 12/28/2011     Lab Results   Component Value Date    VITD 51.6 02/12/2021       Current Outpatient Medications on File Prior to Visit   Medication Sig Dispense Refill    amoxicillin clavulanate 875-125 MG Oral Tab Take 1 tablet by mouth 2 (two) times daily for 7 days. 14 tablet 0    Polyethylene Glycol 3350 (MIRALAX) 17 g Oral Powd Pack Take 17 g by mouth daily.      valACYclovir 500 MG Oral Tab Take 1 tablet (500 mg total) by mouth 2 (two) times daily. (Patient taking differently: Take 1 tablet (500 mg total) by mouth 2 (two) times daily as needed.) 30 tablet 0    TURMERIC OR Take by mouth.      Fluocinolone Acetonide Scalp 0.01 % External Oil APPLY TOPICALLY TO THE SCALP 1 TIME A WEEK      magnesium 250 MG Oral Tab Take 1 tablet (250 mg total) by mouth As Directed.      Omeprazole 40 MG Oral Capsule Delayed Release Take 1  capsule (40 mg total) by mouth 2 (two) times daily before meals.      Multiple Vitamins-Minerals (MULTI-VITAMIN/MINERALS) Oral Tab Take 1 tablet by mouth daily.      Probiotic Product (PROBIOTIC DAILY) Oral Cap Take 1 capsule by mouth daily.      Azelastine HCl 0.1 % Nasal Solution 2 sprays by Nasal route 2 (two) times daily as needed for Rhinitis. 1 each 1    EVENING PRIMROSE OIL OR Take by mouth daily.      Cholecalciferol (VITAMIN D) 2000 units Oral Cap Take 1 capsule (2,000 Units total) by mouth daily.      Fluticasone Propionate (FLONASE) 50 MCG/ACT Nasal Suspension 2 sprays in each nostril daily as needed 1 Bottle 0    cetirizine 10 MG Oral Tab Take 0.5 tablets (5 mg total) by mouth daily.       No current facility-administered medications on file prior to visit.       ASSESSMENT/PLAN    ICD-10-CM    1. Encounter for therapeutic drug monitoring  Z51.81 Tirzepatide (MOUNJARO) 10 MG/0.5ML Subcutaneous Solution Pen-injector      2. Class 3 severe obesity due to excess calories with serious comorbidity and body mass index (BMI) of 40.0 to 44.9 in adult ([facility])  E66.01 Tirzepatide (MOUNJARO) 10 MG/0.5ML Subcutaneous Solution Pen-injector    Z68.41       3. Essential hypertension, benign  I10 Tirzepatide (MOUNJARO) 10 MG/0.5ML Subcutaneous Solution Pen-injector      4. Controlled type 2 diabetes mellitus without complication, without long-term current use of insulin ([facility])  E11.9 Tirzepatide (MOUNJARO) 10 MG/0.5ML Subcutaneous Solution Pen-injector      5. ANAHY on CPAP  G47.33       6. Stress  F43.9       7. Low vitamin D level  R79.89       8. Vitamin D deficiency  E55.9           PLAN   Initial Weight Data and Goal Weight Loss:  Initial consult: # 254lbs on 5/2017  Weight Calculations  Initial Weight: 254 lbs  Initial Weight Date: 05/01/17  Today's Weight: 252 lbs  5% Goal: 12.7  10% Goal: 25.4  Total Weight Loss: 2 lbs  Total weight loss: up #8 lbs total, Net loss 2 lbs  Will stop medications: ozempic 2mg  weekly  Will restart medications: mounjaro 10mg weekly   --advised of side effects and adverse effects of this medication  Contradictions:  topamax (insomnia), stop  zonisamide (wasn't working anymore), stop jardance (wanted to get off), stopped metformin, has done phentermine and diethylpropion in the past, ozempic, mounjaro- can't get supply    Reviewed labs  Continue with vitamin d OTC  HTN  stable, follows with PCP  Hx of DM type 2, reviewed last a1c 5.6% on 7/2023- will trial mounjaro  ANAHY- continue cpap machine nightly   Try to get back to yoga and increase cardio   Discussed trying out new meals (once school is over)   Nutrition: Low carb diet, recommended to eat breakfast daily/ regular protein intake  Follow up with dietitian and psychologist as recommended.  Discussed the role of sleep and stress in weight management.  Counseled on comprehensive weight loss plan including attention to nutrition, exercise and behavior/stress management for success. See patient instruction below for more details.  Discussed strategies to overcome barriers to successful weight loss and weight maintenance  FITTE: ACSM recommendations (150-300 minutes/ week in active weight loss)   Weight Loss Consent to treat reviewed and signed.    Total time spent on chart review, pre-charting, obtaining history, counseling, and educating, reviewing labs was 30 minutes.       NOTE TO PATIENT: The 21st Century Cures Act makes clinical notes like these available to patients in the interest of transparency. Clinical notes are medical documents used by physicians and care providers to communicate with each other. These documents include medical language and terminology, abbreviations, and treatment information that may sound technical and at times possibly unfamiliar. In addition, at times, the verbiage may appear blunt or direct. These documents are one tool providers use to communicate relevant information and clinical opinions of the care  providers in a way that allows common understanding of the clinical context.     There are no Patient Instructions on file for this visit.    No follow-ups on file.    Patient verbalizes understanding.    Kay Cooper APRN

## 2024-03-21 ENCOUNTER — TELEPHONE (OUTPATIENT)
Dept: INTERNAL MEDICINE CLINIC | Facility: CLINIC | Age: 54
End: 2024-03-21

## 2024-03-21 NOTE — TELEPHONE ENCOUNTER
Please initiate PA via CMM for pt. See [facility] dated 3/20/24. Thank you.

## 2024-03-25 NOTE — TELEPHONE ENCOUNTER
I called [facility]    [id number]     Entered PA on CMM for Mounjaro 10 mg  Awaiting decision  GEM SNOW (Sung: [id number])

## 2024-04-06 ENCOUNTER — PATIENT MESSAGE (OUTPATIENT)
Dept: INTERNAL MEDICINE CLINIC | Facility: CLINIC | Age: 54
End: 2024-04-06

## 2024-04-06 DIAGNOSIS — Z51.81 ENCOUNTER FOR THERAPEUTIC DRUG MONITORING: ICD-10-CM

## 2024-04-06 DIAGNOSIS — I10 ESSENTIAL HYPERTENSION, BENIGN: ICD-10-CM

## 2024-04-06 DIAGNOSIS — E66.01 CLASS 3 SEVERE OBESITY DUE TO EXCESS CALORIES WITH SERIOUS COMORBIDITY AND BODY MASS INDEX (BMI) OF 40.0 TO 44.9 IN ADULT (HCC): ICD-10-CM

## 2024-04-06 DIAGNOSIS — E11.9 CONTROLLED TYPE 2 DIABETES MELLITUS WITHOUT COMPLICATION, WITHOUT LONG-TERM CURRENT USE OF INSULIN (HCC): ICD-10-CM

## 2024-04-22 RX ORDER — TIRZEPATIDE 10 MG/.5ML
10 INJECTION, SOLUTION SUBCUTANEOUS WEEKLY
Qty: 2 ML | Refills: 1 | Status: SHIPPED | OUTPATIENT
Start: 2024-04-22

## 2024-04-22 NOTE — TELEPHONE ENCOUNTER
Requesting   Requested Prescriptions     Pending Prescriptions Disp Refills    Tirzepatide (MOUNJARO) 10 MG/0.5ML Subcutaneous Solution Pen-injector 2 mL 0     Sig: Inject 10 mg into the skin once a week.       LOV: 03/12/2024  RTC: in about 3 months  Filled: 03/12/2024 #2ml with 0 refills    Future Appointments   Date Time Provider Department Center   5/11/2024  7:30 AM  MR RM2 (1.5T WIDE)  [facility]   6/10/2024 10:15 AM Nik Pratt MD  [facility]   12/6/2024  1:00 PM Silvia Small MD [facility]     Hello,      Can you send another prescription for the Mounjaro 10 mg to [facility] in [place]? Thank you.

## 2024-04-22 NOTE — TELEPHONE ENCOUNTER
From: Lizette Camp  Sent: 4/20/2024 3:04 PM CDT  To: [facility]  Subject: Medication     Hello,     Can you send another prescription for the Mounjaro 10 mg to [facility] in [place]? Thank you.

## 2024-05-11 ENCOUNTER — HOSPITAL ENCOUNTER (OUTPATIENT)
Dept: MRI IMAGING | Facility: HOSPITAL | Age: 54
Discharge: HOME | End: 2024-05-11

## 2024-05-11 DIAGNOSIS — N64.89 RADIAL SCAR OF BREAST: ICD-10-CM

## 2024-05-11 DIAGNOSIS — R92.8 ABNORMAL MRI, BREAST: ICD-10-CM

## 2024-05-11 DIAGNOSIS — Z91.89 AT HIGH RISK FOR BREAST CANCER: ICD-10-CM

## 2024-05-11 PROCEDURE — 77049 MRI BREAST C-+ W/CAD BI: CPT

## 2024-05-11 PROCEDURE — A9575 INJ GADOTERATE MEGLUMI 0.1ML: HCPCS

## 2024-05-11 RX ORDER — GADOTERATE MEGLUMINE 376.9 MG/ML
20 INJECTION INTRAVENOUS
Status: COMPLETED | OUTPATIENT
Start: 2024-05-11 | End: 2024-05-11

## 2024-05-11 RX ADMIN — GADOTERATE MEGLUMINE 20 ML: 376.9 INJECTION INTRAVENOUS at 08:45:00

## 2024-05-22 ENCOUNTER — PATIENT MESSAGE (OUTPATIENT)
Dept: INTERNAL MEDICINE CLINIC | Facility: CLINIC | Age: 54
End: 2024-05-22

## 2024-05-22 DIAGNOSIS — E11.9 CONTROLLED TYPE 2 DIABETES MELLITUS WITHOUT COMPLICATION, WITHOUT LONG-TERM CURRENT USE OF INSULIN (HCC): Primary | ICD-10-CM

## 2024-05-22 RX ORDER — TIRZEPATIDE 12.5 MG/.5ML
12.5 INJECTION, SOLUTION SUBCUTANEOUS WEEKLY
Qty: 2 ML | Refills: 1 | Status: SHIPPED | OUTPATIENT
Start: 2024-05-22

## 2024-05-22 NOTE — TELEPHONE ENCOUNTER
Requesting Mounjaro increase  LOV: 3/12/24  RTC: 3 months  Last Relevant Labs: 8/15/23  Filled: 4/22/24 #2ml with 1 refills    9/6/2024  9:00 AM Kay Cooper APRN [facility]   12/6/2024  1:00 PM Silvia Small MD [facility]

## 2024-05-22 NOTE — TELEPHONE ENCOUNTER
From: Lizette Camp  To: Kay Cooper  Sent: 5/22/2024 9:48 AM CDT  Subject: Mounjaro     Good morning,   Will you please increase my dosage to 12.5. And send it to [facility]? Thank you.   Lizette

## 2024-06-09 ENCOUNTER — PATIENT MESSAGE (OUTPATIENT)
Dept: INTERNAL MEDICINE CLINIC | Facility: CLINIC | Age: 54
End: 2024-06-09

## 2024-06-09 NOTE — PROGRESS NOTES
[facility] Progress Note      Patient Name: Lizette Camp   YOB: 1970  Medical Record Number: [id number]  Attending Physician: Nik Pratt M.D.     The 21st Century Cures Act makes medical notes like these available to patients in the interest of transparency. Please be advised this is a medical document. Medical documents are intended to carry relevant information, facts as evident, and the clinical opinion of the practitioner. The medical note is intended as peer to peer communication and may appear blunt or direct. It is written in medical language and may contain abbreviations or verbiage that are unfamiliar.     Date of Visit: 6/10/2024     Chief Complaint  Recurrent Hodgkin's lymphoma, nodular lymphocyte predominant - follow up.    Oncologic History   Lizette Camp is a 54 year old emale who underwent diagnostic mammography as follow up for a biopsy-proven benign lesion in the left breast in 12/2010 and was found to have an enlarged left axillary lymph node.  Follow ultrasonography of the left axilla showed to enlarged lymph nodes.  Patient underwent excisional biopsy which revealed CD20+ nodular lymphocyte predominant Hodgkin's lymphoma.  Staging with PET/CT showed large left axillary, subpectoral and supraclavicular lymph nodes are identified with peak SUV 13.8 and she was staged IIA. At diagnosis ESR was elevated at 34 mm/hr.    Patient completed definitive radiotherapy to 3600 cGy in 09/2011.  She reports therapy was complicated by mild fatigue and mild desquamation.      PET/CT in 07/2014 was negative for relapsed disease.    Routine follow up in 08/2021 revealed a possible new lymph node in the right neck. Laboratory studies showed mildly elevated ESR. Ultrasound of the neck was ordered but not scheduled until 02/2022 by patient. It showed no significant change in the right neck as compared to previous but did show enlarged left neck lymph nodes. PET/CT was  performed on 03/08/2022 and showed uptake with Deauville scores of 4-5 involving inguinal and pelvis lymph nodes. Ultrasound core biopsies of 2 right groin lymph nodes were performed on 03/21/2022 and was remarkable for a single atypical large cell with multilobulated nuclei suspicious for a lymphoproliferative cell.    On 06/07/2022 she underwent a wire localized deep excisional right inguinal lymph node biopsy. Pathology was reviewed at the [facility]; pathology showed focal nodular lymphocyte predominant Hodgkin lymphoma arising in a background of follicular hyperplasia with progressive transformation of germinal centers.     History of Present Illness  Patient returns for scheduled follow up. She denies fevers, night sweats, weight loss. She denies any shortness of breath or cough.  She denies any palpable adenopathy.     Performance Status  Karnofsky 100% - Normal, no complaints.    Past Medical History (historical data, reviewed by physician)  History of genital herpes, uterine fibroids, GERD, diverticulosis.    Past Surgical History (historical data, reviewed by physician)  Tonsillectomy, myomectomy.    Family History (historical data, reviewed by physician)  Father with lung and prostate cancer.  Paternal grandfather with brain tumor.  Maternal aunt with breast cancer.  Paternal cousin with breast cancer.  No family history of hematologic malignancies.    Social History (historical data, reviewed by physician)  Denies tobacco, alcohol, and illicit drug use.      Current Medications    Tirzepatide (MOUNJARO) 12.5 MG/0.5ML Subcutaneous Solution Pen-injector Inject 12.5 mg into the skin once a week. 2 mL 1    Polyethylene Glycol 3350 (MIRALAX) 17 g Oral Powd Pack Take 17 g by mouth daily.      valACYclovir 500 MG Oral Tab Take 1 tablet (500 mg total) by mouth 2 (two) times daily. (Patient taking differently: Take 1 tablet (500 mg total) by mouth 2 (two) times daily as needed.) 30 tablet 0    TURMERIC OR Take  by mouth.      Fluocinolone Acetonide Scalp 0.01 % External Oil APPLY TOPICALLY TO THE SCALP 1 TIME A WEEK      magnesium 250 MG Oral Tab Take 1 tablet (250 mg total) by mouth As Directed.      Omeprazole 40 MG Oral Capsule Delayed Release Take 1 capsule (40 mg total) by mouth 2 (two) times daily before meals.      Multiple Vitamins-Minerals (MULTI-VITAMIN/MINERALS) Oral Tab Take 1 tablet by mouth daily.      Probiotic Product (PROBIOTIC DAILY) Oral Cap Take 1 capsule by mouth daily.      Azelastine HCl 0.1 % Nasal Solution 2 sprays by Nasal route 2 (two) times daily as needed for Rhinitis. 1 each 1    EVENING PRIMROSE OIL OR Take by mouth daily.      Cholecalciferol (VITAMIN D) 2000 units Oral Cap Take 1 capsule (2,000 Units total) by mouth daily.      Fluticasone Propionate (FLONASE) 50 MCG/ACT Nasal Suspension 2 sprays in each nostril daily as needed 1 Bottle 0    cetirizine 10 MG Oral Tab Take 0.5 tablets (5 mg total) by mouth daily.        Allergies   Ms. Camp is allergic to guaifenesin er, mucinex, and sudafed.    Vital Signs   /77 (BP Location: Left arm, Patient Position: Sitting, Cuff Size: large)   Pulse 78   Temp (!) 96.3 °F (35.7 °C) (Tympanic)   Resp 16   Ht 1.575 m (5' 2.01\")   Wt 113.4 kg (250 lb)   LMP 04/06/2023   SpO2 99%   BMI 45.71 kg/m²     Physical Examination   Constitutional  Well developed and well nourished; in no apparent distress; appears close to chronological age.  Head   Normocephalic and atraumatic.  Eyes   Conjunctiva clear; sclera anicteric.  ENMT   External ears normal; external ears normal.  Neck   Supple, no masses.  Lymphatics  No cervical, supraclavicular, axillary, inguinal lymphadenopathy.  Respiratory  Normal effort; no respiratory distress; lungs clear to auscultation bilaterally.  Cardiovascular  Regular rate and rhythm; normal S1S2.  Abdomen  Non-tender; non-distended; no masses,no fluid wave; no hepatosplenomegaly.  Extremities  No lower extremity  edema.  Neurologic  Motor and sensory grossly intact.  Psychiatric  Mood and affect appropriate.    Laboratory   Recent Results (from the past 336 hour(s))   COMP METABOLIC PANEL [E]    Collection Time: 06/10/24 10:09 AM   Result Value Ref Range    Glucose 88 70 - 99 mg/dL    Sodium 142 136 - 145 mmol/L    Potassium 3.9 3.5 - 5.1 mmol/L    Chloride 110 98 - 112 mmol/L    CO2 27.0 21.0 - 32.0 mmol/L    Anion Gap 5 0 - 18 mmol/L    BUN 15 9 - 23 mg/dL    Creatinine 1.00 0.55 - 1.02 mg/dL    Calcium, Total 8.9 8.5 - 10.1 mg/dL    Calculated Osmolality 294 275 - 295 mOsm/kg    eGFR-Cr 66 >=60 mL/min/1.73m2    AST 19 15 - 37 U/L    ALT 20 13 - 56 U/L    Alkaline Phosphatase 111 46 - 118 U/L    Bilirubin, Total 0.3 0.1 - 2.0 mg/dL    Total Protein 7.1 6.4 - 8.2 g/dL    Albumin 3.4 3.4 - 5.0 g/dL    Globulin  3.7 2.8 - 4.4 g/dL    A/G Ratio 0.9 (L) 1.0 - 2.0    Patient Fasting for CMP? Patient not present    LDH [E]    Collection Time: 06/10/24 10:09 AM   Result Value Ref Range     84 - 246 U/L   CBC W/ DIFFERENTIAL    Collection Time: 06/10/24 10:09 AM   Result Value Ref Range    WBC 5.9 4.0 - 11.0 x10(3) uL    RBC 4.64 3.80 - 5.30 x10(6)uL    HGB 13.9 12.0 - 16.0 g/dL    HCT 42.0 35.0 - 48.0 %    .0 150.0 - 450.0 10(3)uL    MCV 90.5 80.0 - 100.0 fL    MCH 30.0 26.0 - 34.0 pg    MCHC 33.1 31.0 - 37.0 g/dL    RDW 13.9 %    Neutrophil Absolute Prelim 3.30 1.50 - 7.70 x10 (3) uL    Neutrophil Absolute 3.30 1.50 - 7.70 x10(3) uL    Lymphocyte Absolute 1.90 1.00 - 4.00 x10(3) uL    Monocyte Absolute 0.49 0.10 - 1.00 x10(3) uL    Eosinophil Absolute 0.18 0.00 - 0.70 x10(3) uL    Basophil Absolute 0.02 0.00 - 0.20 x10(3) uL    Immature Granulocyte Absolute 0.02 0.00 - 1.00 x10(3) uL    Neutrophil % 56.0 %    Lymphocyte % 32.1 %    Monocyte % 8.3 %    Eosinophil % 3.0 %    Basophil % 0.3 %    Immature Granulocyte % 0.3 %   Scan slide    Collection Time: 06/10/24 10:09 AM   Result Value Ref Range    Slide Review        Slide reviewed, normal WBC, RBC, and platelet morphology.     Impression and Plan  Relapsed nodular lymphocyte predominant Hodgkin Lymphoma - This should be considered a chronic systemic disease. There is no evidence of significant disease progression. Continue active surveillance.     Patient will continue to receive longitudinal care by me for the complex care required for the cancer diagnosis.    Planned Follow Up  Patient will return for follow up in 6 months.    Electronically signed by:    Nik Pratt M.D.  System Medical Director of Oncology Services  [facility]

## 2024-06-10 ENCOUNTER — OFFICE VISIT (OUTPATIENT)
Dept: HEMATOLOGY/ONCOLOGY | Facility: HOSPITAL | Age: 54
End: 2024-06-10
Attending: SPECIALIST
Payer: COMMERCIAL

## 2024-06-10 VITALS
BODY MASS INDEX: 45.43 KG/M2 | TEMPERATURE: 96 F | SYSTOLIC BLOOD PRESSURE: 143 MMHG | HEART RATE: 78 BPM | HEIGHT: 62.01 IN | OXYGEN SATURATION: 99 % | WEIGHT: 250 LBS | RESPIRATION RATE: 16 BRPM | DIASTOLIC BLOOD PRESSURE: 77 MMHG

## 2024-06-10 DIAGNOSIS — C81.05 NODULAR LYMPHOCYTE PREDOMINANT HODGKIN LYMPHOMA OF LYMPH NODES OF INGUINAL REGION (HCC): ICD-10-CM

## 2024-06-10 DIAGNOSIS — C81.04 NODULAR LYMPHOCYTE PREDOMINANT HODGKIN LYMPHOMA OF LYMPH NODES OF AXILLA (HCC): ICD-10-CM

## 2024-06-10 LAB
ALBUMIN SERPL-MCNC: 3.4 G/DL (ref 3.4–5)
ALBUMIN/GLOB SERPL: 0.9 {RATIO} (ref 1–2)
ALP LIVER SERPL-CCNC: 111 U/L
ALT SERPL-CCNC: 20 U/L
ANION GAP SERPL CALC-SCNC: 5 MMOL/L (ref 0–18)
AST SERPL-CCNC: 19 U/L (ref 15–37)
BASOPHILS # BLD AUTO: 0.02 X10(3) UL (ref 0–0.2)
BASOPHILS NFR BLD AUTO: 0.3 %
BILIRUB SERPL-MCNC: 0.3 MG/DL (ref 0.1–2)
BUN BLD-MCNC: 15 MG/DL (ref 9–23)
CALCIUM BLD-MCNC: 8.9 MG/DL (ref 8.5–10.1)
CHLORIDE SERPL-SCNC: 110 MMOL/L (ref 98–112)
CO2 SERPL-SCNC: 27 MMOL/L (ref 21–32)
CREAT BLD-MCNC: 1 MG/DL
EGFRCR SERPLBLD CKD-EPI 2021: 66 ML/MIN/1.73M2 (ref 60–?)
EOSINOPHIL # BLD AUTO: 0.18 X10(3) UL (ref 0–0.7)
EOSINOPHIL NFR BLD AUTO: 3 %
ERYTHROCYTE [DISTWIDTH] IN BLOOD BY AUTOMATED COUNT: 13.9 %
GLOBULIN PLAS-MCNC: 3.7 G/DL (ref 2.8–4.4)
GLUCOSE BLD-MCNC: 88 MG/DL (ref 70–99)
HCT VFR BLD AUTO: 42 %
HGB BLD-MCNC: 13.9 G/DL
IMM GRANULOCYTES # BLD AUTO: 0.02 X10(3) UL (ref 0–1)
IMM GRANULOCYTES NFR BLD: 0.3 %
LDH SERPL L TO P-CCNC: 175 U/L
LYMPHOCYTES # BLD AUTO: 1.9 X10(3) UL (ref 1–4)
LYMPHOCYTES NFR BLD AUTO: 32.1 %
MCH RBC QN AUTO: 30 PG (ref 26–34)
MCHC RBC AUTO-ENTMCNC: 33.1 G/DL (ref 31–37)
MCV RBC AUTO: 90.5 FL
MONOCYTES # BLD AUTO: 0.49 X10(3) UL (ref 0.1–1)
MONOCYTES NFR BLD AUTO: 8.3 %
NEUTROPHILS # BLD AUTO: 3.3 X10 (3) UL (ref 1.5–7.7)
NEUTROPHILS # BLD AUTO: 3.3 X10(3) UL (ref 1.5–7.7)
NEUTROPHILS NFR BLD AUTO: 56 %
OSMOLALITY SERPL CALC.SUM OF ELEC: 294 MOSM/KG (ref 275–295)
PLATELET # BLD AUTO: 175 10(3)UL (ref 150–450)
POTASSIUM SERPL-SCNC: 3.9 MMOL/L (ref 3.5–5.1)
PROT SERPL-MCNC: 7.1 G/DL (ref 6.4–8.2)
RBC # BLD AUTO: 4.64 X10(6)UL
SODIUM SERPL-SCNC: 142 MMOL/L (ref 136–145)
WBC # BLD AUTO: 5.9 X10(3) UL (ref 4–11)

## 2024-06-10 PROCEDURE — 99214 OFFICE O/P EST MOD 30 MIN: CPT | Performed by: SPECIALIST

## 2024-06-10 PROCEDURE — G2211 COMPLEX E/M VISIT ADD ON: HCPCS | Performed by: SPECIALIST

## 2024-06-10 NOTE — TELEPHONE ENCOUNTER
According to office note 3/12 you want labs in 6 months, is that correct?    Current active labs not future dated

## 2024-06-10 NOTE — TELEPHONE ENCOUNTER
From: Lizette Camp  To: Ibeth Crawford  Sent: 6/9/2024 2:20 PM CDT  Subject: Appointment     Good afternoon, I wasn’t sure when I was supposed to schedule another appointment with you to have my A1c checked and other things. When would you like me to schedule this?     Lizette

## 2024-06-10 NOTE — PROGRESS NOTES
Patient is here for MD f/u for Hodgkin's disease. Labs drawn today. Feeling well. Patient does have chills and night sweats related to menopause but no other concerns.       Education Record    Learner:  Patient    Disease / Diagnosis:  Hodgkin's disease     Barriers / Limitations:  None   Comments:    Method:  Discussion   Comments:    General Topics:  Plan of care reviewed   Comments:    Outcome:  Shows understanding   Comments:

## 2024-07-24 DIAGNOSIS — E11.9 CONTROLLED TYPE 2 DIABETES MELLITUS WITHOUT COMPLICATION, WITHOUT LONG-TERM CURRENT USE OF INSULIN (HCC): ICD-10-CM

## 2024-07-24 RX ORDER — TIRZEPATIDE 12.5 MG/.5ML
12.5 INJECTION, SOLUTION SUBCUTANEOUS WEEKLY
Qty: 2 ML | Refills: 1 | Status: SHIPPED | OUTPATIENT
Start: 2024-07-24

## 2024-07-24 NOTE — TELEPHONE ENCOUNTER
Requesting   Requested Prescriptions     Pending Prescriptions Disp Refills    Tirzepatide (MOUNJARO) 12.5 MG/0.5ML Subcutaneous Solution Pen-injector 2 mL 1     Sig: Inject 12.5 mg into the skin once a week.       LOV: 03/12/2024  RTC: in about 3 months  Filled: 05/22/2024 #2ml with 0 refills    Future Appointments   Date Time Provider Department Center   7/26/2024  8:40 AM Kay Cooper APRN [facility] [address]   8/26/2024  5:00 PM Ibeth Crawford DO [facility] 8 [facility] [place]   10/1/2024  4:40 PM [facility] [place]   11/1/2024 10:00 AM Kay Cooper APRN [facility] [address]   12/6/2024  1:00 PM Silvia Small MD [facility]   12/10/2024  2:00 PM Nik Pratt MD [facility]     My weight has been up and down all summer. I’m currently 249. My appetite hasn’t changed much. I’m not hungry for a few days and then it returns. I see her via video call tomorrow.   She will talking to you if she want to move up on stay on the same dose on Friday visit.

## 2024-07-26 ENCOUNTER — TELEMEDICINE (OUTPATIENT)
Dept: INTERNAL MEDICINE CLINIC | Facility: CLINIC | Age: 54
End: 2024-07-26
Payer: COMMERCIAL

## 2024-07-26 DIAGNOSIS — F43.9 STRESS: ICD-10-CM

## 2024-07-26 DIAGNOSIS — I10 ESSENTIAL HYPERTENSION, BENIGN: ICD-10-CM

## 2024-07-26 DIAGNOSIS — G47.33 OSA ON CPAP: ICD-10-CM

## 2024-07-26 DIAGNOSIS — Z51.81 ENCOUNTER FOR THERAPEUTIC DRUG MONITORING: Primary | ICD-10-CM

## 2024-07-26 DIAGNOSIS — R79.89 LOW VITAMIN D LEVEL: ICD-10-CM

## 2024-07-26 DIAGNOSIS — E11.9 CONTROLLED TYPE 2 DIABETES MELLITUS WITHOUT COMPLICATION, WITHOUT LONG-TERM CURRENT USE OF INSULIN (HCC): ICD-10-CM

## 2024-07-26 DIAGNOSIS — E66.01 CLASS 3 SEVERE OBESITY DUE TO EXCESS CALORIES WITH SERIOUS COMORBIDITY AND BODY MASS INDEX (BMI) OF 40.0 TO 44.9 IN ADULT (HCC): ICD-10-CM

## 2024-07-26 PROCEDURE — 99213 OFFICE O/P EST LOW 20 MIN: CPT | Performed by: NURSE PRACTITIONER

## 2024-07-26 NOTE — PROGRESS NOTES
[facility] WEIGHT MANAGEMENT VIRTUAL ENCOUNTER     Lizette Camp verbally consents to a Virtual/Telephone Check-In service on 07/26/24   Patient understands and accepts financial responsibility for any deductible, co-insurance and/or co-pays associated with this service.    HISTORY OF PRESENT ILLNESS  Chief Complaint   Patient presents with    Other     F/u on weight management      Lizette Camp is a 54 year old female is being evaluated as a video visit using Telemedicine with live, interactive video and audio    Weight gain/loss since LOV based on home monitoring:   Home scale: 249  Has lost  #3 lbs since LOV 4 months ago     Compliance with medication: mounjaro 12.5mg weekly   Tolerating well, helping with decreasing appetite and no side effects     Feels like mounjaro makes her feel mcneill for longer period of time vs. Ozempic   Is off working for the summer, feels like she is off her regular schedule..   Struggled with depression the past couple of months (increased seeing therapist) and feels like she is doing better now  Admits that when she feels like when she is focused- she will lose weight and when not--> she will gain weight   Restarted doing the [employer] program (with [employer])   Feels like she is still snacking on popcorn (large portions)  Success: not eating when I am not hungry  Challenging: trying not to eat when I am not hungry   Exercise/Activity: 3-4x/ week, via walking, riding bike 1 day per week  Nutrition: eating regular meals, +protein, minimal veggies. not tracking reports  Stress is manageable  Sleep: 7 hours/night, waking up feeling tired (hot flashes)     Denies chest pain, shortness of breath, dizziness, blurred vision, headache, paresthesia, nausea/vomiting.     Wt Readings from Last 6 Encounters:   06/10/24 250 lb (113.4 kg)   03/12/24 252 lb 9.6 oz (114.6 kg)   03/12/24 252 lb (114.3 kg)   02/29/24 251 lb (113.9 kg)   01/17/24 256 lb 2 oz (116.2 kg)   12/29/23 254 lb 1.6 oz (115.3  kg)          Subjective  REVIEW OF SYSTEMS  GENERAL HEALTH: feels well otherwise, denied any fevers chills or night sweats   RESPIRATORY: denies shortness of breath   CARDIOVASCULAR: denies chest pain  GI: denies abdominal pain  PSYCH: denies any mood changes    Objective  EXAM  Reviewed most recent set of vitals   Physical Exam:  GENERAL: well developed, well nourished, in no apparent distress, speaking in full sentences comfortably   SKIN: warm, pink, dry without rashes to exposed area   EYES: conjunctiva pink  HEENT: atraumatic, normocephalic  LUNGS: normal work of breathing, non labored  CARDIO: normal work, no exertion  EXTREMITIES: no cyanosis, no clubbing, no edema  NEURO: Oriented times three  PSYCH: pleasant, cooperative, normal mood and affect    Lab Results   Component Value Date    WBC 5.9 06/10/2024    RBC 4.64 06/10/2024    HGB 13.9 06/10/2024    HCT 42.0 06/10/2024    MCV 90.5 06/10/2024    MCH 30.0 06/10/2024    MCHC 33.1 06/10/2024    RDW 13.9 06/10/2024    .0 06/10/2024    MPV 11.0 02/01/2013     Lab Results   Component Value Date    GLU 88 06/10/2024    BUN 15 06/10/2024    BUNCREA 11.7 06/28/2023    CREATSERUM 1.00 06/10/2024    ANIONGAP 5 06/10/2024    GFR 78 08/15/2017    GFRNAA 61 07/21/2022    GFRAA 70 07/21/2022    CA 8.9 06/10/2024    OSMOCALC 294 06/10/2024    ALKPHO 111 06/10/2024    AST 19 06/10/2024    ALT 20 06/10/2024    BILT 0.3 06/10/2024    TP 7.1 06/10/2024    ALB 3.4 06/10/2024    GLOBULIN 3.7 06/10/2024    AGRATIO 1.5 03/07/2020     06/10/2024    K 3.9 06/10/2024     06/10/2024    CO2 27.0 06/10/2024     Lab Results   Component Value Date     03/18/2023    A1C 5.6 08/15/2023     Lab Results   Component Value Date    CHOLEST 160 09/30/2023    TRIG 70 09/30/2023    HDL 79 (H) 09/30/2023    LDL 67 09/30/2023    VLDL 11 09/30/2023    TCHDLRATIO 2.4 03/07/2020    NONHDLC 81 09/30/2023     Lab Results   Component Value Date    TSH 0.646 08/20/2021     Lab  Results   Component Value Date    B12 435 05/25/2017    VITB12 325 12/28/2011     Lab Results   Component Value Date    VITD 51.6 02/12/2021       Current Outpatient Medications on File Prior to Visit   Medication Sig Dispense Refill    Tirzepatide (MOUNJARO) 12.5 MG/0.5ML Subcutaneous Solution Pen-injector Inject 12.5 mg into the skin once a week. 2 mL 1    Polyethylene Glycol 3350 (MIRALAX) 17 g Oral Powd Pack Take 17 g by mouth daily.      valACYclovir 500 MG Oral Tab Take 1 tablet (500 mg total) by mouth 2 (two) times daily. (Patient taking differently: Take 1 tablet (500 mg total) by mouth 2 (two) times daily as needed.) 30 tablet 0    TURMERIC OR Take by mouth.      Fluocinolone Acetonide Scalp 0.01 % External Oil APPLY TOPICALLY TO THE SCALP 1 TIME A WEEK      magnesium 250 MG Oral Tab Take 1 tablet (250 mg total) by mouth As Directed.      Omeprazole 40 MG Oral Capsule Delayed Release Take 1 capsule (40 mg total) by mouth 2 (two) times daily before meals.      Multiple Vitamins-Minerals (MULTI-VITAMIN/MINERALS) Oral Tab Take 1 tablet by mouth daily.      Probiotic Product (PROBIOTIC DAILY) Oral Cap Take 1 capsule by mouth daily.      Azelastine HCl 0.1 % Nasal Solution 2 sprays by Nasal route 2 (two) times daily as needed for Rhinitis. 1 each 1    EVENING PRIMROSE OIL OR Take by mouth daily.      Cholecalciferol (VITAMIN D) 2000 units Oral Cap Take 1 capsule (2,000 Units total) by mouth daily.      Fluticasone Propionate (FLONASE) 50 MCG/ACT Nasal Suspension 2 sprays in each nostril daily as needed 1 Bottle 0    cetirizine 10 MG Oral Tab Take 0.5 tablets (5 mg total) by mouth daily.       No current facility-administered medications on file prior to visit.       ASSESSMENT  Analyzed weight data:       Diagnoses and all orders for this visit:    Encounter for therapeutic drug monitoring    Class 3 severe obesity due to excess calories with serious comorbidity and body mass index (BMI) of 40.0 to 44.9 in adult  (HCC)    Controlled type 2 diabetes mellitus without complication, without long-term current use of insulin (HCC)    ANAHY on CPAP    Stress    Low vitamin D level    Essential hypertension, benign        PLAN  Continue with medications: mounjaro 12.5mg weekly   --advised of side effects and adverse effects of this medication  Contradictions: topamax (insomnia), stop  zonisamide (wasn't working anymore), stop jardance (wanted to get off), stopped metformin, has done phentermine and diethylpropion in the past, ozempic, mounjaro- can't get supply   Reviewed labs  Continue with vitamin d OTC  HTN  stable, follows with PCP  Hx of DM type 2, reviewed last a1c 5.6% on 7/2023- will continue with mounjaro  ANAHY- continue cpap machine nightly   Try to get back to yoga and increase cardio   Referral giving for jump start your health (help with accountability)   Advised to monitor blood pressure and pulse at home/ given parameters to review and contact provider.  Nutrition: low carb diet/ recommended to eat breakfast daily/ regular protein intake  Follow up with dietitian and psychologist as recommended.  Discussed the role of sleep and stress in weight management.  Counseled on comprehensive weight loss plan including attention to nutrition, exercise and behavior/stress management for success. See patient instruction below for more details.  Discussed strategies to overcome barriers to successful weight loss and weight maintenance  FITTE: ACSM recommendations (150-300 minutes/ week in active weight loss)       There are no Patient Instructions on file for this visit.    No follow-ups on file.    Patient verbalizes understanding.    Total time spent on chart review, pre-charting, obtaining history, counseling, and educating, reviewing labs was 22 minutes.       Pt understands phone/video evaluation is not a substitute for face to face examination or emergency care. Pt advised to go to the ER or call 911 for worsening symptoms or  acute distress.       Please note that the following visit was completed using two-way, real-time interactive audio and/or video communication.  This has been done in good arin to provide continuity of care in the best interest of the provider-patient relationship, due to the ongoing public health crisis/national emergency and because of restrictions of visitation.  There are limitations of this visit as no physical exam could be performed.  Every conscious effort was taken to allow for sufficient and adequate time.  This billing was spent on reviewing labs, medications, radiology tests and decision making.  Appropriate medical decision-making and tests are ordered as detailed in the plan of care above.     NOTE TO PATIENT: The 21st Century Cures Act makes clinical notes like these available to patients in the interest of transparency. Clinical notes are medical documents used by physicians and care providers to communicate with each other. These documents include medical language and terminology, abbreviations, and treatment information that may sound technical and at times possibly unfamiliar. In addition, at times, the verbiage may appear blunt or direct. These documents are one tool providers use to communicate relevant information and clinical opinions of the care providers in a way that allows common understanding of the clinical context.     Kay Cooper, APRN  7/26/2024

## 2024-07-26 NOTE — PATIENT INSTRUCTIONS
Next steps:  1.  Fill your prescribed medication and take as discussed and prescribed: mounjaro 12.5mg weekly   2.  Schedule a personal nutrition consultation with one of our registered dieticians  3. Referral to jump start your health      Please try to work on the following dietary changes:    1.  Drink water with meals and throughout the day, cut down on soda and/or juice if consumed. Consider flavored water options like Bubbly, Spindrift, Hint and Trent.  2.  Eat breakfast daily and focus on having protein with each meal, examples include: greek yogurt, cottage cheese, hard boiled egg, whole grain toast with peanut butter.   3.  Reduce refined carbohydrates and sugars which includes items such as sweets, as well as rice, pasta, and bread and make sure to choose whole grain options when having them with just 1 serving per meal about the size of your inner palm.  4.  Consume non starchy veggies daily working towards making them a good 50% of your daily food intake. Add them to lunch and dinner consistently.  5.  Start a daily probiotic: VSL#3 is recommended, (order on line at www.vsl3.com). Take 1 capsule daily with water for 30 days, then reduce to 1 every other day (this will reduce the cost). Capsules can be left out for 2 weeks, but then must be refrigerated.      Please download vinh My Fitness Pal, LoseIt! Or Net Diary to monitor daily dietary intake and you will be able to see if you are eating the right amount of calories or too much or too little which would hinder weight loss. Additionally this will help to see your daily carbohydrate and protein intake. When you set the vinh up choose 1-2 lbs/week as a goal.  Keeping a paper food journal is an option as well to remain accountable for your choices- this is the start to mindful eating! A low calorie diet has been consistently shown to support weight loss.     Continue or start exercising to help establish a routine. If not already exercising begin with 1  day and progress as able with long-term goal of 30 minutes 5 days a week at a minimum.     Meditation daily can help manage and control stress. Chronic stress can make weight loss difficult.  Exercising is one way to help with stress, but meditation using the CALM Keo or another comparable alternative can be done in your home or place of work with little time commitment. This Keo can also help work on behavior change and improve sleep. Check out the segment under Calm Masterclass and listen to The 4 Pillars of Health. A great way to begin learning about the foundation of lifestyle with practical tips to use in your every day.     Check out www.yourweightmatters.org blog for continued daily support and education along this weight loss journey!    Patient Resources:     Personal Training/Fitness Classes/Health Coaching     [facility] @ [email or web address] Full fitness center with group fitness and personal training. Discount available as client of [employer] [facility].  Health Coaching and Personal Training with Yvette Burns at our [facility]- individual weekly coaching with option to add personal training and small group fitness classes targeted at weight loss- [phone] and/or email @ [email or web address]  360[employer] [email or web address]. Group Fitness [phone] and/or email Eli at [email or web address]  [facility] Fitness Centers with multiple locations: [employer] (www.WiWide), Eat The Peela Fitness (www.Titansan.Gangkr), Fit Body Bootcamp (www.NYCareerElitebodybootcamp.Gangkr), ParaShoot Fitness (www.KA[employer] Hospitality.Gangkr), The Exercise  (www.exercisecoach.Gangkr)     Online Fitness  Fitness  on Utube  Fit in 10 DVD series- www.ydhao06BYB.com  Sit and Be Fit - Chair exercise series Www.sitandbefit.org  Hip Hop Fit with Alen Ferraro at www.hiphopfit.net     Apps for on the  Go Fitness  Months Of Me 7 Minute Workout (orange box with white 7) - free on the go HIIT training keo  Peloton Keo @ www.onepeloton.com     Nutrition Trackers and Tools  LoseIT! And My Fitness Pal apps and on line for tracking nutrition  NOOM - virtual health coaching  FitFoundation (healthy meals on the go) in [place] @ www.hbxsmnwjcwixw9j.Shared Spectrum  Bistro MD @ www.bistromd.com and Tbrwrd25 (keto and low carb plans recommended) @ www.viggad68.com, Metabolic Meals @ www.MyMetabolicMeals.Shared Spectrum - individual prepared meals to go  Gobble, Blue Apron, Home , Every Plate, Sunbasket- on line meal delivery programs for preparation at home  Meal Village in [place] for homemade meals to go @ wwwWoraPaymealvillage.[email or web address] Spectrum  Diet Doctor @ www.dietdoctor.Shared Spectrum - low carb swaps  [employer] - meal prep and planning keo (www.yummly.com)     Stress Management/Behavior/Mindful Eating  CALM meditation keo (www.calm.com)  Headspace  Am I Hungry? Mindful eating virtual  keo  Www.yourweightmatters.org - Obesity Action Coalition sponsored Blog posts daily  Motivation keo (black box with white \")- daily supportive messages sent to your phone     Books/Video Education/Podcasts  Mindless Eating by Arie Valiente  Why We Get Sick by Jose Morales (a book about insulin resistance)  Atomic Habits by Geoffrye Begr (a book about taking small steps to promote greater behavior change)   Can't Hurt Me by Vu Solis (a book exploring the power of discipline in achieving your goals)  The End of Dieting: How to Live for Life by Dr. Adeel Hunter M.D. or listen to The Alai Podcast Episode 63: Understanding \"Nutritarian\" Eating w/Dr. Adeel Hunter  Your Body in Balance: The New Science of Food, Hormones, and Health by Dr. Jayden Marcelo  The Menopause Diet Plan by Mary Barbour and Morelia Ndiaye  The Complete Guide to fasting by Dr. Mcnulty  Sugar, Salt & Fat by Lola Wilkes, Ph.D, R.D.  Weight Loss Surgery Will Not Treat Food Addiction by Johanny Guo  Ph.D  The Game Changers- SocialVest Documentary on plant based nutrition  Fed Up - documentary about obesity (Free on Utube)  The Truth About Sugar - documentary on sugar (Free on Utube, https://youtu.be/8Z3qgojOC7c)  The Dr. Suarez T5 Wellness Plan by Dr. Ryley Suarez MD  Fitlosophy Fitspiration - journal to better health (found at [employer] in fitness aisle)  What Happened to You?- a look at the impact trauma has on behavior written by Abdi Mtz and Dr. Mauricio Anna  Whole Again by Umesh Larry - discovering your true self after trauma  Mitch Vallecillo talk on SocialVest, The Call to Courage  Podcasts: The Exam Room by the Physician's Committee, Nutrition Facts by Dr. Guerra    We are here to support you with weight loss, but please remember that you still need your primary care provider for your routine health maintenance.

## 2024-07-29 RX ORDER — VALACYCLOVIR HYDROCHLORIDE 500 MG/1
500 TABLET, FILM COATED ORAL 2 TIMES DAILY PRN
Qty: 30 TABLET | Refills: 0 | Status: SHIPPED | OUTPATIENT
Start: 2024-07-29

## 2024-07-29 RX ORDER — VALACYCLOVIR HYDROCHLORIDE 500 MG/1
500 TABLET, FILM COATED ORAL 2 TIMES DAILY
Qty: 30 TABLET | Refills: 0 | OUTPATIENT
Start: 2024-07-29

## 2024-07-29 NOTE — TELEPHONE ENCOUNTER
Last OV: 1/17/2024 for post-op check, last annual exam 6/5/2023  Last refill date:   Medication Quantity Refills Start End   valACYclovir 500 MG Oral Tab 30 tablet 0 11/30/2023 --   Sig:   Take 1 tablet (500 mg total) by mouth 2 (two) times daily.       Patient taking differently:   Take 1 tablet (500 mg total) by mouth 2 (two) times daily as needed.          Follow-up: RTC for annual exam (due 6/5/2024)  Next appt.: 8/10/2024  Refill sent per protocol.

## 2024-07-29 NOTE — TELEPHONE ENCOUNTER
Last OV: 1/17/2024 for post-op check, last annual exam 6/5/2023  Last refill date:   Medication Quantity Refills Start End   valACYclovir 500 MG Oral Tab 30 tablet 0 11/30/2023 --   Sig:   Take 1 tablet (500 mg total) by mouth 2 (two) times daily.     Patient taking differently:   Take 1 tablet (500 mg total) by mouth 2 (two) times daily as needed.       Follow-up: RTC for annual exam (due 6/5/2024)  Next appt.: none scheduled  Refill denied per protocol.  [facility].

## 2024-08-10 ENCOUNTER — OFFICE VISIT (OUTPATIENT)
Dept: OBGYN CLINIC | Facility: CLINIC | Age: 54
End: 2024-08-10
Payer: COMMERCIAL

## 2024-08-10 VITALS
BODY MASS INDEX: 47 KG/M2 | SYSTOLIC BLOOD PRESSURE: 122 MMHG | HEART RATE: 86 BPM | WEIGHT: 255.25 LBS | DIASTOLIC BLOOD PRESSURE: 78 MMHG

## 2024-08-10 DIAGNOSIS — Z01.419 WELL WOMAN EXAM WITH ROUTINE GYNECOLOGICAL EXAM: Primary | ICD-10-CM

## 2024-08-10 DIAGNOSIS — B00.9 HSV INFECTION: ICD-10-CM

## 2024-08-10 PROCEDURE — 3078F DIAST BP <80 MM HG: CPT | Performed by: NURSE PRACTITIONER

## 2024-08-10 PROCEDURE — 99396 PREV VISIT EST AGE 40-64: CPT | Performed by: NURSE PRACTITIONER

## 2024-08-10 PROCEDURE — 3074F SYST BP LT 130 MM HG: CPT | Performed by: NURSE PRACTITIONER

## 2024-08-10 RX ORDER — CLOBETASOL PROPIONATE 0.5 MG/G
OINTMENT TOPICAL
COMMUNITY
Start: 2024-06-24

## 2024-08-10 RX ORDER — VALACYCLOVIR HYDROCHLORIDE 500 MG/1
TABLET, FILM COATED ORAL
Qty: 30 TABLET | Refills: 1 | Status: SHIPPED | OUTPATIENT
Start: 2024-08-10

## 2024-08-10 NOTE — PROGRESS NOTES
Here for Routine Annual Exam  Has had a cyst to the vaginal opening about twice a year in the same spot. It seems to happen around an HSV outbreak.  Menses are absent, no bleeding since her Hysteroscopy 12/2023.    ROS: No Cardiac, Respiratory, GI,  or Neurological symptoms.    PE:  GENERAL: well developed, well nourished, in no apparent distress  SKIN: no rashes, no suspicious lesions  HEENT: normal  NECK: supple; no thyroidmegaly, no adenopathy  LUNGS: clear to auscultation  CARDIOVASCULAR: normal S1, S2, RRR  BREASTS: firm, nontendder, no palpable masses or nodes, no nipple discharge, no skin changes, no axillary adenopathy,    ABDOMEN: Soft, non distended; non tender, no masses  GYNE/: External Genitalia: Normal without lesions or erythema                      Vagina: normal without lesions, scant discharge                      Uterus: mid, mobile, non tender, normal size                     Cervix: no lesions or CMT                     Adnexa: non tender, no masses, normal size  EXTREMITIES:  non tender without edema    A/P:   1. Well woman exam with routine gynecological exam  Regular self breast exams recommended  Pap deferred  Breast Imaging with breast surgery    2. HSV infection  - valACYclovir 500 MG Oral Tab; 1 pill PO BID For 3 days as needed  Dispense: 30 tablet; Refill: 1    Return if vaginal cyst seems persistent or acutely tender, suspicious for Bartholin's Gland Cyst     Return to clinic 1 year for routine exam, or as needed with any concerns or question

## 2024-08-15 ENCOUNTER — PATIENT MESSAGE (OUTPATIENT)
Dept: INTERNAL MEDICINE CLINIC | Facility: CLINIC | Age: 54
End: 2024-08-15

## 2024-08-15 DIAGNOSIS — E11.9 CONTROLLED TYPE 2 DIABETES MELLITUS WITHOUT COMPLICATION, WITHOUT LONG-TERM CURRENT USE OF INSULIN (HCC): Primary | ICD-10-CM

## 2024-08-15 RX ORDER — TIRZEPATIDE 15 MG/.5ML
15 INJECTION, SOLUTION SUBCUTANEOUS WEEKLY
Qty: 2 ML | Refills: 2 | Status: SHIPPED | OUTPATIENT
Start: 2024-08-15 | End: 2024-09-06

## 2024-08-15 NOTE — TELEPHONE ENCOUNTER
From: Lizette Camp  To: Kay Cooper  Sent: 8/15/2024 9:33 AM CDT  Subject: Medication     Good morning, I would like to try the 15mg for the Mounjaro. Thank you. I need a refill. Please send to [facility].     Lizette

## 2024-08-15 NOTE — TELEPHONE ENCOUNTER
Requesting Mounjaro  LOV: 7/26/24  RTC: 4 months  Last Relevant Labs: 8/15/23  Filled: 7/24/24 #2ml with 1 refills  12.5 mg    11/25/2024  3:00 PM Kay Cooper, [facility]

## 2024-08-19 ENCOUNTER — PATIENT MESSAGE (OUTPATIENT)
Dept: OBGYN CLINIC | Facility: CLINIC | Age: 54
End: 2024-08-19

## 2024-08-19 ENCOUNTER — PATIENT MESSAGE (OUTPATIENT)
Dept: INTERNAL MEDICINE CLINIC | Facility: CLINIC | Age: 54
End: 2024-08-19

## 2024-08-19 DIAGNOSIS — N95.0 POSTMENOPAUSAL BLEEDING: Primary | ICD-10-CM

## 2024-08-19 NOTE — TELEPHONE ENCOUNTER
EMERGENCY DEPARTMENT ENCOUNTER    Pt Name: Lety Andrews  MRN: [id number]  [place] 1972  Date of evaluation: 12/12/20  CHIEF COMPLAINT       Chief Complaint   Patient presents with    Back Pain     HISTORY OF PRESENT ILLNESS   HPI  73-year-old female history of GERD and hyperlipidemia presents with chief complaint of of back pain. Symptoms of been ongoing for the past 5 days or so. Began gradually. Patient has been seen by chiropractor and primary care been treating for musculoskeletal type pain. Has had some improvement with muscle relaxers and massage but now comes in tonight with worsening pain. Localizes her pain to right paraspinal thoracic back. No neurologic symptoms. No nausea or vomiting. No chest pain or abdominal pain. No shortness of breath. No cough or fever. No lower extremity swelling or pain. No other recent illness. No history of similar symptoms. Symptom's are moderate and intermittent. REVIEW OF SYSTEMS     Review of Systems   Constitutional: Negative for chills and fatigue. HENT: Negative for facial swelling, postnasal drip and rhinorrhea. Eyes: Negative for photophobia and itching. Respiratory: Negative for cough and shortness of breath. Cardiovascular: Negative for chest pain and leg swelling. Gastrointestinal: Negative for abdominal pain, diarrhea, nausea and vomiting. Genitourinary: Negative for dysuria, flank pain and hematuria. Musculoskeletal: Positive for back pain. Negative for arthralgias and joint swelling. Skin: Negative for color change and rash. Neurological: Negative for dizziness, numbness and headaches.      PASTMEDICAL HISTORY     Past Medical History:   Diagnosis Date    Gastric polyps 04/22/2017    GERD (gastroesophageal reflux disease) 4/5/2017    Incompetent lower esophageal sphincter 04/2017    per egd    Pure hypercholesterolemia 2/11/2019    Varicose veins of legs      Past Problem List  Patient Active Problem List Usually I have them see an MD with any recurrent post- menopausal bleeding but I would run it by Janie first just to check to see if she would recommend a pelvic ultrasound first.   Diagnosis Code    GERD (gastroesophageal reflux disease) K21.9    Dysphagia R13.10    Incompetent lower esophageal sphincter K22.8    Gastric polyps K31.7    Fasting hyperglycemia R73.01    Pure hypercholesterolemia E78.00     SURGICAL HISTORY       Past Surgical History:   Procedure Laterality Date    CARPAL TUNNEL RELEASE Right     CYST REMOVAL Left     thumb     OTHER SURGICAL HISTORY Left     heel spurs    AL EGD TRANSORAL BIOPSY SINGLE/MULTIPLE N/A 4/22/2017    EGD BIOPSY performed by Monalisa Suarez MD at [address] [place] [address]  04/22/2017     incompetent LES and gastric polyps, pathology-fundic gland polyp     CURRENT MEDICATIONS       Discharge Medication List as of 12/13/2020  1:02 AM      CONTINUE these medications which have NOT CHANGED    Details   chlorzoxazone (PARAFON FORTE DSC) 500 MG tablet Take 1 tablet by mouth 4 times daily as needed for Muscle spasms, Disp-40 tablet, R-0Normal      azithromycin (ZITHROMAX) 250 MG tablet Take 2 tabs (500 mg) on Day 1, and take 1 tab (250 mg) on days 2 through 5., Disp-1 packet, R-0Normal      fluticasone (FLONASE) 50 MCG/ACT nasal spray 2 sprays by Each Nare route 2 times daily 1 Spray in each nostril, Disp-1 Bottle, R-0Normal      Elastic Bandages & Supports (ACE TENNIS ELBOW BRACE) MISC CONTINUOUS Starting Mon 2/12/2018, Disp-1 each, R-0, Print      ibuprofen (ADVIL;MOTRIN) 600 MG tablet Take 1 tablet by mouth every 8 hours as needed for Pain, Disp-120 tablet, R-3Normal      omeprazole (PRILOSEC) 20 MG delayed release capsule Take 1 capsule by mouth 2 times daily, Disp-60 capsule, R-3Normal      Fexofenadine HCl (ALLEGRA PO) Take by mouth dailyHistorical Med           ALLERGIES     is allergic to pcn [penicillins] and vicodin [hydrocodone-acetaminophen]. FAMILY HISTORY     is adopted.       SOCIAL HISTORY       Social History     Tobacco Use    Smoking status: Never Smoker    Smokeless tobacco: Never Used   Substance Use Topics    Alcohol use: Yes     Alcohol/week: 0.0 standard drinks     Comment: occ    Drug use: No     PHYSICAL EXAM     INITIAL VITALS: BP (!) 144/88   Pulse 93   Temp 97 °F (36.1 °C) (Temporal)   Resp 16   Ht 5' 3\" (1.6 m)   Wt 220 lb (99.8 kg)   LMP 2018 (Exact Date)   SpO2 99%   BMI 38.97 kg/m²    Physical Exam  Constitutional:       Appearance: She is normal weight. HENT:      Head: Normocephalic and atraumatic. Eyes:      Extraocular Movements: Extraocular movements intact. Pupils: Pupils are equal, round, and reactive to light. Neck:      Musculoskeletal: Normal range of motion and neck supple. Cardiovascular:      Rate and Rhythm: Normal rate and regular rhythm. Pulmonary:      Effort: Pulmonary effort is normal.      Breath sounds: Normal breath sounds. Comments: Equal breath sounds  Abdominal:      General: Abdomen is flat. There is no distension. Palpations: There is no mass. Musculoskeletal: Normal range of motion. General: No swelling. Comments: Focal reproducible tenderness along the right mid thoracic paraspinal back. Skin:     General: Skin is warm and dry. Comments: No rashes   Neurological:      General: No focal deficit present. Mental Status: She is alert. Mental status is at baseline. Comments: No focal deficits         MEDICAL DECISION MAKIN-year-old female presents for evaluation of right midthoracic back pain. With ongoing symptoms we will get a chest x-ray just to evaluate for any underlying lesion. Suspect this is musculoskeletal in etiology as the patient has reproducible point tenderness on exam.  We will try to treat symptomatically with Toradol and Valium. CXR clear. Pain improved on reassessment. Point tenderness better with MSK relaxers I doubt PE, dissection, MI. No rash to suggest zoster. Will discharge home with PCP follow up.           CRITICAL CARE:       PROCEDURES:    Procedures    DIAGNOSTIC RESULTS   EKG:All EKG's are interpreted by the Emergency Department Physician who either signs or Co-signs this chart in the absence of a cardiologist.        RADIOLOGY:All plain film, CT, MRI, and formal ultrasound images (except ED bedside ultrasound) are read by the radiologist, see reports below, unless otherwisenoted in MDM or here. XR CHEST (2 VW)   Final Result   No acute cardiopulmonary abnormality. LABS: All lab results were reviewed by myself, and all abnormals are listed below. Labs Reviewed - No data to display    EMERGENCY DEPARTMENTCOURSE:         Vitals:    Vitals:    12/12/20 2339 12/12/20 2340   BP:  (!) 144/88   Pulse: 93    Resp: 16    Temp: 97 °F (36.1 °C)    TempSrc: Temporal    SpO2: 99%    Weight: 220 lb (99.8 kg)    Height: 5' 3\" (1.6 m)        The patient was given the following medications while in the emergency department:  Orders Placed This Encounter   Medications    ketorolac (TORADOL) injection 30 mg    diazePAM (VALIUM) tablet 5 mg    cyclobenzaprine (FLEXERIL) 10 MG tablet     Sig: Take 1 tablet by mouth 3 times daily as needed for Muscle spasms     Dispense:  21 tablet     Refill:  0     CONSULTS:  None    FINAL IMPRESSION      1. Back strain, initial encounter    2.  Spasm of muscle          DISPOSITION/PLAN   DISPOSITION Decision To Discharge 12/13/2020 01:01:41 AM      PATIENT REFERRED TO:  ERIKA Roberson - CNP  [address]  [address] 922 31 [id number]    Call   As needed    DISCHARGE MEDICATIONS:  Discharge Medication List as of 12/13/2020  1:02 AM      START taking these medications    Details   cyclobenzaprine (FLEXERIL) 10 MG tablet Take 1 tablet by mouth 3 times daily as needed for Muscle spasms, Disp-21 tablet, R-0Print           Vicenta Mai MD  Attending Emergency Physician                    Vicenta Mai MD  12/13/20 1605       Vicenta Mai MD  12/17/20 7652

## 2024-08-19 NOTE — TELEPHONE ENCOUNTER
From: Lizette Camp  To: Pravin Lima  Sent: 8/19/2024 8:04 AM CDT  Subject: Concern    Hello, sadly I started spotting this past weekend with a little cramping. Just an FYI, what’s next?     Have a great day, Lizette

## 2024-08-20 NOTE — TELEPHONE ENCOUNTER
From: Lizette Camp  To: Ibeth Crawford  Sent: 8/19/2024 11:13 AM CDT  Subject: Test    Hello, do I need blood work before I come see you? If so can you put an order in. Thank you.

## 2024-08-22 NOTE — TELEPHONE ENCOUNTER
Spoke to pt regarding scheduling ultrasound and f/u visit. 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

## 2024-08-24 ENCOUNTER — LAB ENCOUNTER (OUTPATIENT)
Dept: LAB | Age: 54
End: 2024-08-24
Attending: INTERNAL MEDICINE
Payer: COMMERCIAL

## 2024-08-24 DIAGNOSIS — E11.9 CONTROLLED TYPE 2 DIABETES MELLITUS WITHOUT COMPLICATION, WITHOUT LONG-TERM CURRENT USE OF INSULIN (HCC): ICD-10-CM

## 2024-08-24 LAB
ANION GAP SERPL CALC-SCNC: 3 MMOL/L (ref 0–18)
BUN BLD-MCNC: 16 MG/DL (ref 9–23)
CALCIUM BLD-MCNC: 10.3 MG/DL (ref 8.7–10.4)
CHLORIDE SERPL-SCNC: 107 MMOL/L (ref 98–112)
CHOLEST SERPL-MCNC: 166 MG/DL (ref ?–200)
CO2 SERPL-SCNC: 31 MMOL/L (ref 21–32)
CREAT BLD-MCNC: 1.07 MG/DL
EGFRCR SERPLBLD CKD-EPI 2021: 61 ML/MIN/1.73M2 (ref 60–?)
EST. AVERAGE GLUCOSE BLD GHB EST-MCNC: 111 MG/DL (ref 68–126)
FASTING PATIENT LIPID ANSWER: YES
FASTING STATUS PATIENT QL REPORTED: YES
GLUCOSE BLD-MCNC: 87 MG/DL (ref 70–99)
HBA1C MFR BLD: 5.5 % (ref ?–5.7)
HDLC SERPL-MCNC: 67 MG/DL (ref 40–59)
LDLC SERPL CALC-MCNC: 79 MG/DL (ref ?–100)
NONHDLC SERPL-MCNC: 99 MG/DL (ref ?–130)
OSMOLALITY SERPL CALC.SUM OF ELEC: 293 MOSM/KG (ref 275–295)
POTASSIUM SERPL-SCNC: 4 MMOL/L (ref 3.5–5.1)
SODIUM SERPL-SCNC: 141 MMOL/L (ref 136–145)
TRIGL SERPL-MCNC: 114 MG/DL (ref 30–149)
VLDLC SERPL CALC-MCNC: 18 MG/DL (ref 0–30)

## 2024-08-24 PROCEDURE — 83036 HEMOGLOBIN GLYCOSYLATED A1C: CPT | Performed by: INTERNAL MEDICINE

## 2024-08-24 PROCEDURE — 80048 BASIC METABOLIC PNL TOTAL CA: CPT | Performed by: INTERNAL MEDICINE

## 2024-08-24 PROCEDURE — 80061 LIPID PANEL: CPT | Performed by: INTERNAL MEDICINE

## 2024-08-26 ENCOUNTER — OFFICE VISIT (OUTPATIENT)
Dept: INTERNAL MEDICINE CLINIC | Facility: CLINIC | Age: 54
End: 2024-08-26
Payer: COMMERCIAL

## 2024-08-26 VITALS
HEART RATE: 68 BPM | DIASTOLIC BLOOD PRESSURE: 76 MMHG | WEIGHT: 249.63 LBS | RESPIRATION RATE: 16 BRPM | SYSTOLIC BLOOD PRESSURE: 132 MMHG | TEMPERATURE: 98 F | BODY MASS INDEX: 45.36 KG/M2 | OXYGEN SATURATION: 97 % | HEIGHT: 62.01 IN

## 2024-08-26 DIAGNOSIS — L30.9 ECZEMA, UNSPECIFIED TYPE: ICD-10-CM

## 2024-08-26 DIAGNOSIS — I10 ESSENTIAL HYPERTENSION, BENIGN: ICD-10-CM

## 2024-08-26 DIAGNOSIS — C81.04 NODULAR LYMPHOCYTE PREDOMINANT HODGKIN LYMPHOMA OF LYMPH NODES OF AXILLA (HCC): ICD-10-CM

## 2024-08-26 DIAGNOSIS — G47.33 OSA ON CPAP: ICD-10-CM

## 2024-08-26 DIAGNOSIS — E66.01 CLASS 3 SEVERE OBESITY DUE TO EXCESS CALORIES WITH SERIOUS COMORBIDITY AND BODY MASS INDEX (BMI) OF 40.0 TO 44.9 IN ADULT (HCC): ICD-10-CM

## 2024-08-26 DIAGNOSIS — Z00.00 ROUTINE PHYSICAL EXAMINATION: Primary | ICD-10-CM

## 2024-08-26 DIAGNOSIS — E11.9 CONTROLLED TYPE 2 DIABETES MELLITUS WITHOUT COMPLICATION, WITHOUT LONG-TERM CURRENT USE OF INSULIN (HCC): ICD-10-CM

## 2024-08-26 DIAGNOSIS — J30.9 CHRONIC ALLERGIC RHINITIS: ICD-10-CM

## 2024-08-26 DIAGNOSIS — K21.00 GASTROESOPHAGEAL REFLUX DISEASE WITH ESOPHAGITIS, UNSPECIFIED WHETHER HEMORRHAGE: ICD-10-CM

## 2024-08-26 PROBLEM — Z51.81 ENCOUNTER FOR THERAPEUTIC DRUG MONITORING: Status: RESOLVED | Noted: 2017-05-16 | Resolved: 2024-08-26

## 2024-08-26 PROBLEM — R79.89 LOW VITAMIN D LEVEL: Status: RESOLVED | Noted: 2017-06-13 | Resolved: 2024-08-26

## 2024-08-26 PROBLEM — E55.9 VITAMIN D DEFICIENCY: Status: ACTIVE | Noted: 2024-08-26

## 2024-08-26 LAB
CREAT UR-SCNC: 248.3 MG/DL
MICROALBUMIN UR-MCNC: 0.7 MG/DL
MICROALBUMIN/CREAT 24H UR-RTO: 2.8 UG/MG (ref ?–30)

## 2024-08-26 PROCEDURE — 82043 UR ALBUMIN QUANTITATIVE: CPT | Performed by: INTERNAL MEDICINE

## 2024-08-26 PROCEDURE — 82570 ASSAY OF URINE CREATININE: CPT | Performed by: INTERNAL MEDICINE

## 2024-08-26 RX ORDER — AZELASTINE 1 MG/ML
2 SPRAY, METERED NASAL 2 TIMES DAILY PRN
Qty: 1 EACH | Refills: 1 | Status: SHIPPED | OUTPATIENT
Start: 2024-08-26

## 2024-08-26 NOTE — PATIENT INSTRUCTIONS
Continue to exercise at least 150 minutes a week and Eat a plant based diet     Please take 2000 IU of vitamin D daily for life to keep your bones strong    Keep the fat content in the foods low    Lets repeat your blood tests again in 6 months    Please follow up with your eye doctor    You received the pneumonia vaccine today    Please get the flu vaccine sometime end of October

## 2024-08-26 NOTE — PROGRESS NOTES
Patient Office Visit    ASSESSMENT AND PLAN:   1. Routine physical examination  Note: Continue to exercise at least 150 minutes a week and Eat a plant based diet. Please take 2000 IU of vitamin D daily for life to keep your bones strong. Please see your dentist every 6 months. Continue with regular eye exams. Prevnar given today  - Lipid Panel; Future  - Hemoglobin A1C [E]; Future  - Basic Metabolic Panel (8) [E]; Future    2. 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 Continue follow up with hematology     3. Controlled type 2 diabetes mellitus without complication, without long-term current use of insulin ([facility])  Note: continue mounjaro. LDL has been consistently near 70. Will hold off on statin for now (we did discuss that diabetics have an increased risk for heart disease). Will repeat labs in 6 months   - Microalb/Creat Ratio, Random Urine [E]; Future  - Lipid Panel; Future  - Hemoglobin A1C [E]; Future  - Basic Metabolic Panel (8) [E]; Future    4. Class 3 severe obesity due to excess calories with serious comorbidity and body mass index (BMI) of 40.0 to 44.9 in adult ([facility])  Note: Continue to exercise at least 150 minutes a week and Eat a plant based diet    5. Essential hypertension, benign  Note: Stable off medication  - Basic Metabolic Panel (8) [E]; Future    6. ANAHY on CPAP  Note: continue compliance     7. Gastroesophageal reflux disease with esophagitis, unspecified whether hemorrhage  Note: continue omeprazole     8. Eczema, unspecified type  Note: continue cream     9. Chronic allergic rhinitis  - azelastine 0.1 % Nasal Solution; 2 sprays by Nasal route 2 (two) times daily as needed for Rhinitis.  Dispense: 1 each; Refill: 1    RTC in 6 months       Patient/Caregiver Education: Patient/Caregiver Education: There are no barriers to learning. Medical education done. Outcome: Patient verbalizes understanding. Patient is notified to call with  any questions, complications, allergies, or worsening or changing symptoms.  Patient is to call with any side effects or complications from the treatments as a result of today.      Reviewed Past Medical History and   Patient Active Problem List   Diagnosis    Reflux esophagitis    Hodgkin's disease (HCC)    Eczema    Essential hypertension, benign    Diabetes mellitus type 2, controlled, without complications (HCC)    Chronic allergic rhinitis    ANAHY on CPAP    Class 3 severe obesity due to excess calories with serious comorbidity and body mass index (BMI) of 40.0 to 44.9 in adult (HCC)    Radial scar of breast right    Postmenopausal bleeding    Vitamin D deficiency       Orders Placed This Encounter   Procedures    Microalb/Creat Ratio, Random Urine [E]     Standing Status:   Future     Standing Expiration Date:   2025     Order Specific Question:   Release to patient     Answer:   Immediate    Lipid Panel     Standing Status:   Future     Standing Expiration Date:   2025    Hemoglobin A1C [E]     Standing Status:   Future     Standing Expiration Date:   2025     Order Specific Question:   Release to patient     Answer:   Immediate    Basic Metabolic Panel (8) [E]     Standing Status:   Future     Standing Expiration Date:   2025     Order Specific Question:   Release to patient     Answer:   Immediate     Requested Prescriptions     Signed Prescriptions Disp Refills    azelastine 0.1 % Nasal Solution 1 each 1     Si sprays by Nasal route 2 (two) times daily as needed for Rhinitis.         Ibeth Crawford DO  CC:  Physical         HPI:   Lizette Camp is a 54 year old female who presents for a physical    Obesity: recently increased mounjaro to 15 mg. She is watching her fat content  Diabetes/GERD: stable    Past Medical History:    Abnormal uterine bleeding    Allergic rhinitis    Amenorrhea    Anemia    Anxiety    Arthritis    Cancer (HCC)    Predominant Hodgkin's Lymphoma,  REDIAGNOSED THIS YEAR 2023    COVID    Had ferver, cough, congestion, SOB x 6 weeks. No hospitalization or lingering S/S.    Depression    Diabetes (HCC)    Diabetes mellitus (HCC)    Diverticulosis of large intestine    Dysmenorrhea    Esophageal reflux    Essential hypertension    Exposure to medical diagnostic radiation    last dose 8/2012    Fibroids    Genital herpes simplex    High blood pressure    Obesity    ANAHY on CPAP    Osteoarthritis    knees and neck    Sexually transmitted disease    Sleep apnea    CPAP    Visual impairment    glasses       Past Surgical History:   Procedure Laterality Date    Adenoidectomy  1998    Benign biopsy left      Colonoscopy  2010    Colonoscopy  03/2021    Egd N/A 03/01/2021    Procedure: ESOPHAGOGASTRODUODENOSCOPY, COLONOSCOPY, POSSIBLE BIOPSY, POSSIBLE POLYPECTOMY 85714, 13429;  Surgeon: Candice Chan MD;  Location: [facility], [facility]    Hand/finger surgery unlisted      hand incision tendon sheath of a finger    Bautista biopsy stereo nodule 1 site left (cpt=19081)  10yrs  back    ciara    Bautista biopsy stereo nodule 1 site right (cpt=19081)  04/2018    3 SITE, RADIAL SCAR    Bautista biopsy stereo nodule 2 site bilat (cpt=19081/99447)  2009    NON HODGKINS LYMPHOMA/RADIATION THERAPY 8/10    Bautista localization wire 1 site right (cpt=19281)  01/2020    papilloma    Needle biopsy right  11/2019    papilloma    Other surgical history  2008    neuroplasty w/ transposition of ulnar nerve at elbow left                         Other surgical history  2009    uterine myomectomy for uterine fibroids    Other surgical history  2018    R breast bx (x3)    Revise median n/carpal tunnel surg      laterality and bilateral    Tonsillectomy  2000    w/ adenoidectomy    Upper gi endoscopy,exam  2010       Social History:  Social History     Socioeconomic History    Marital status: Single   Occupational History    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   Tobacco Use    Smoking status: Never      Passive exposure: Never    Smokeless tobacco: Never   Vaping Use    Vaping status: Never Used   Substance and Sexual Activity    Alcohol use: Not Currently     Comment: Occasionally    Drug use: No    Sexual activity: Not Currently     Partners: Male   Other Topics Concern    Caffeine Concern Yes    Stress Concern Yes    Weight Concern Yes    Special Diet No    Exercise Yes    Seat Belt Yes     Family History:  Family History   Problem Relation Age of Onset    Anemia Mother     Diabetes Mother     Other (Other) Mother     Hypertension Father     Cancer Father         lung and prostate    Heart Attack Father         transient Ischemic Attack    Cancer Maternal Grandfather         brain    Cancer Maternal Aunt         Breast CA hx, and colon    Breast Cancer Maternal Aunt 53        IN HER 50'S  NOT SURE OF EXACT AGE    Breast Cancer Maternal Aunt 55    Cancer Paternal Aunt         Lung CA    Cancer Self 41        Predominant lymphocyte hodgkins lymphoma    Breast Cancer Paternal Cousin Female 50    BRCA gene + Niece      Allergies:  Allergies   Allergen Reactions    Guaifenesin Er JITTERY    Mucinex JITTERY    Sudafed JITTERY     Current Meds:  Current Outpatient Medications on File Prior to Visit   Medication Sig Dispense Refill    Tirzepatide (MOUNJARO) 15 MG/0.5ML Subcutaneous Solution Pen-injector Inject 15 mg into the skin once a week for 4 doses. 2 mL 2    clobetasol 0.05 % External Ointment       valACYclovir 500 MG Oral Tab 1 pill PO BID For 3 days as needed 30 tablet 1    Polyethylene Glycol 3350 (MIRALAX) 17 g Oral Powd Pack Take 17 g by mouth daily.      TURMERIC OR Take by mouth.      Omeprazole 40 MG Oral Capsule Delayed Release Take 1 capsule (40 mg total) by mouth 2 (two) times daily before meals.      Multiple Vitamins-Minerals (MULTI-VITAMIN/MINERALS) Oral Tab Take 1 tablet by mouth daily.      Probiotic Product (PROBIOTIC DAILY) Oral Cap Take 1 capsule by mouth daily.      EVENING PRIMROSE OIL OR  Take by mouth daily.      Cholecalciferol (VITAMIN D) 2000 units Oral Cap Take 1 capsule (2,000 Units total) by mouth daily.      Fluticasone Propionate (FLONASE) 50 MCG/ACT Nasal Suspension 2 sprays in each nostril daily as needed 1 Bottle 0    cetirizine 10 MG Oral Tab Take 0.5 tablets (5 mg total) by mouth daily.       No current facility-administered medications on file prior to visit.         REVIEW OF SYSTEMS   Constitutional: no fatigue normal energy no weight changes   HENT: normal sinuses and no mouth issues   Eyes: . normal vision no eye pain   Respiratory: normal respirations no cough   Cardiovascular: no CP, or palpitations   Gastrointestinal: normal bowels and no abd pains   Genitourinary:  normal urination no hematuria, no frequency   Musculoskeletal: no pains in arms/legs, normal range of motion   Skin: no rashes or skin lesions that are new   Neurological:  no weakness, no numbness, normal gait   Hematological:  no bruises or bleeding   Psychiatric/Behavioral: normal mood no anxiety normal behavior     /76 (BP Location: Left arm, Patient Position: Sitting, Cuff Size: adult)   Pulse 68   Temp 98.2 °F (36.8 °C) (Temporal)   Resp 16   Ht 5' 2.01\" (1.575 m)   Wt 249 lb 9.6 oz (113.2 kg)   LMP 04/06/2023   SpO2 97%   BMI 45.64 kg/m²     PHYSICAL EXAM:   Constitutional: Vital signs reviewed as noted, well developed, in no acute distress.   HENT: NCAT, bilateral ear canal and tympanic membrane appear normal  Eyes: pupils reactive bilaterally  Neck: No thyroidmegaly  Cardiovascular: nl s1 s2 no m/r/g  Pulmonary/Chest: CTA bilaterally with no wheezes  Abdominal: Soft NT normal Bowel sounds  Extremities: no pedal edema   Neurological:  no weakness in UE and LE, reflexes are normal. Bilateral barefoot skin diabetic exam is normal, visualized feet and the appearance is normal.  Bilateral monofilament/sensation of both feet is normal.  Pulsation pedal pulse exam of both lower legs/feet is normal as  well.  Skin: no rashes or bruises on visualized skin, not undressed   Psychiatric:normal mood

## 2024-08-28 ENCOUNTER — APPOINTMENT (OUTPATIENT)
Dept: OBGYN CLINIC | Facility: CLINIC | Age: 54
End: 2024-08-28
Payer: COMMERCIAL

## 2024-08-28 DIAGNOSIS — N95.0 POSTMENOPAUSAL BLEEDING: ICD-10-CM

## 2024-08-28 PROCEDURE — 76830 TRANSVAGINAL US NON-OB: CPT | Performed by: STUDENT IN AN ORGANIZED HEALTH CARE EDUCATION/TRAINING PROGRAM

## 2024-09-05 ENCOUNTER — OFFICE VISIT (OUTPATIENT)
Dept: OBGYN CLINIC | Facility: CLINIC | Age: 54
End: 2024-09-05
Payer: COMMERCIAL

## 2024-09-05 VITALS
BODY MASS INDEX: 44.54 KG/M2 | HEIGHT: 63 IN | DIASTOLIC BLOOD PRESSURE: 70 MMHG | SYSTOLIC BLOOD PRESSURE: 136 MMHG | WEIGHT: 251.38 LBS

## 2024-09-05 DIAGNOSIS — N95.0 POSTMENOPAUSAL BLEEDING: Primary | ICD-10-CM

## 2024-09-05 PROCEDURE — 3008F BODY MASS INDEX DOCD: CPT | Performed by: STUDENT IN AN ORGANIZED HEALTH CARE EDUCATION/TRAINING PROGRAM

## 2024-09-05 PROCEDURE — 3075F SYST BP GE 130 - 139MM HG: CPT | Performed by: STUDENT IN AN ORGANIZED HEALTH CARE EDUCATION/TRAINING PROGRAM

## 2024-09-05 PROCEDURE — 99213 OFFICE O/P EST LOW 20 MIN: CPT | Performed by: STUDENT IN AN ORGANIZED HEALTH CARE EDUCATION/TRAINING PROGRAM

## 2024-09-05 PROCEDURE — 3078F DIAST BP <80 MM HG: CPT | Performed by: STUDENT IN AN ORGANIZED HEALTH CARE EDUCATION/TRAINING PROGRAM

## 2024-09-05 NOTE — PROGRESS NOTES
GYN PROBLEM VISIT    Subjective:   This is a 54 year old  presenting for GYN visit. She has persistent postmenopausal bleeding now. She bled for 3 days in August. Blood was mixed with mucus. Was present only with wiping. Did not need a liner or pad.    She first had postmenopausal bleeding 3/2023 and again in 2023.     Ultrasound was performed 2023 which revealed:     The endometrial stripe is heterogeneous, measured up to 1.4 cm.        Pt had EMB in office 2023 which was insufficient for diagnosis.     She then had a hysteroscopy 2023 with path below:  A.  Endometrium, sampling:  -Fragments of benign inactive endometrium.     B.  Endometrium, curettage:  -Fragments of benign inactive endometrium and cervical squamous epithelium.    We repeated an ultrasound 2024 and endometrial thickness was 4.23mm    She is here to discuss next steps.    Review of Systems   Constitutional: Negative.    HENT: Negative.    Respiratory: Negative.    Gastrointestinal: Negative.    Endocrine: Negative.    Genitourinary: Negative.    Musculoskeletal: Negative.    Skin: Negative.    Allergic/Immunologic: Negative.    Neurological: Negative.      Past Medical History:    Abnormal uterine bleeding    Allergic rhinitis    Amenorrhea    Anemia    Anxiety    Arthritis    Cancer (HCC)    Predominant Hodgkin's Lymphoma, REDIAGNOSED THIS YEAR     COVID    Had ferver, cough, congestion, SOB x 6 weeks. No hospitalization or lingering S/S.    Depression    Diabetes (HCC)    Diabetes mellitus (HCC)    Diverticulosis of large intestine    Dysmenorrhea    Esophageal reflux    Essential hypertension    Exposure to medical diagnostic radiation    last dose 2012    Fibroids    Genital herpes simplex    High blood pressure    Obesity    ANAHY on CPAP    Osteoarthritis    knees and neck    Sexually transmitted disease    Sleep apnea    CPAP    Visual impairment    glasses       Past Surgical History:   Procedure Laterality Date     Adenoidectomy  1998    Benign biopsy left      Colonoscopy  2010    Colonoscopy  2021    Egd N/A 2021    Procedure: ESOPHAGOGASTRODUODENOSCOPY, COLONOSCOPY, POSSIBLE BIOPSY, POSSIBLE POLYPECTOMY 92659, 48694;  Surgeon: Candice Chan MD;  Location: [facility], [facility]    Hand/finger surgery unlisted      hand incision tendon sheath of a finger    Bautista biopsy stereo nodule 1 site left (cpt=19081)  10yrs  back    ciara    Bautista biopsy stereo nodule 1 site right (cpt=19081)  2018    3 SITE, RADIAL SCAR    Bautista biopsy stereo nodule 2 site bilat (cpt=19081/41827)  2009    NON HODGKINS LYMPHOMA/RADIATION THERAPY 8/10    Bautista localization wire 1 site right (cpt=19281)  2020    papilloma    Needle biopsy right  2019    papilloma    Other surgical history      neuroplasty w/ transposition of ulnar nerve at elbow left                         Other surgical history      uterine myomectomy for uterine fibroids    Other surgical history      R breast bx (x3)    Revise median n/carpal tunnel surg      laterality and bilateral    Tonsillectomy      w/ adenoidectomy    Upper gi endoscopy,exam         Allergies   Allergen Reactions    Guaifenesin Er JITTERY    Mucinex JITTERY    Sudafed JITTERY       No outpatient medications have been marked as taking for the 24 encounter (Office Visit) with Pravin Lima MD.         Objective:    Physical Exam     Vitals:    24 1349   BP: 136/70        Constitutional: She is oriented to person, place, and time. She appears well-developed and well-nourished.     Assessment/Plan:  This is a 54 year old  presenting with persistent postmenopausal bleeding. On hysteroscopy 2023, endometrial lining was very thickened, but pathology was benign. Now after another episode of bleeding, her uterine lining was only slightly greater than 4mm. I would still recommend further endometrial sampling. However, I discussed that she may be best suited seeing  GYN ONC for a hyst consult. She is at greater than average risk for uterine cancer given BMI.     Pravin Lima MD

## 2024-09-18 ENCOUNTER — OFFICE VISIT (OUTPATIENT)
Dept: HEMATOLOGY/ONCOLOGY | Facility: HOSPITAL | Age: 54
End: 2024-09-18
Attending: OBSTETRICS & GYNECOLOGY
Payer: COMMERCIAL

## 2024-09-18 VITALS
SYSTOLIC BLOOD PRESSURE: 125 MMHG | OXYGEN SATURATION: 96 % | HEIGHT: 62.99 IN | BODY MASS INDEX: 43.59 KG/M2 | WEIGHT: 246 LBS | TEMPERATURE: 98 F | RESPIRATION RATE: 18 BRPM | HEART RATE: 82 BPM | DIASTOLIC BLOOD PRESSURE: 75 MMHG

## 2024-09-18 DIAGNOSIS — N95.0 POSTMENOPAUSAL BLEEDING: Primary | ICD-10-CM

## 2024-09-18 PROCEDURE — 99211 OFF/OP EST MAY X REQ PHY/QHP: CPT

## 2024-09-18 RX ORDER — ESTRADIOL 0.1 MG/G
1 CREAM VAGINAL DAILY
Qty: 1 EACH | Refills: 5 | Status: SHIPPED | OUTPATIENT
Start: 2024-09-18

## 2024-09-18 NOTE — CONSULTS
INITIAL GYNECOLOGY ONCOLOGY EVALUATION      Patient: Lizette Camp  MR#: [id number]  Date: 9/18/2024    Referring Physician:  Pravin Lima MD     CHIEF COMPLAINT: Postmenopausal bleeding    HISTORY OF PRESENT ILLNESS:    Lizette Camp is a 54 year old female who presents today with history of postmenopausal bleeding.  This happened last year in December.  At that time her ultrasound showed an endometrial stripe of 1.2 cm.  She was taken for hysteroscopy which was benign.  She now again had a second episode of bleeding.  Ultrasound at this time shows the endometrial thickness to be 4.3 mm.      PAST MEDICAL HISTORY:  Past Medical History:    Abnormal uterine bleeding    Allergic rhinitis    Amenorrhea    Anemia    Anxiety    Arthritis    Cancer (HCC)    Predominant Hodgkin's Lymphoma, REDIAGNOSED THIS YEAR 2023    COVID    Had ferver, cough, congestion, SOB x 6 weeks. No hospitalization or lingering S/S.    Depression    Diabetes (HCC)    Diabetes mellitus (HCC)    Diverticulosis of large intestine    Dysmenorrhea    Esophageal reflux    Essential hypertension    Exposure to medical diagnostic radiation    last dose 8/2012    Fibroids    Genital herpes simplex    High blood pressure    Obesity    ANAHY on CPAP    Osteoarthritis    knees and neck    Sexually transmitted disease    Sleep apnea    CPAP    Visual impairment    glasses        PAST SURGICAL HISTORY:  Past Surgical History:   Procedure Laterality Date    Adenoidectomy  1998    Benign biopsy left      Colonoscopy  2010    Colonoscopy  03/2021    Egd N/A 03/01/2021    Procedure: ESOPHAGOGASTRODUODENOSCOPY, COLONOSCOPY, POSSIBLE BIOPSY, POSSIBLE POLYPECTOMY 67549, 89490;  Surgeon: Candice Chan MD;  [facility] [facility]    Hand/finger surgery unlisted      hand incision tendon sheath of a finger    Bautista biopsy stereo nodule 1 site left (cpt=19081)  10yrs  back    ciara    Bautista biopsy stereo nodule 1 site right (cpt=19081)  04/2018    3 SITE, RADIAL  SCAR    Bautista biopsy stereo nodule 2 site bilat (cpt=19081/44753)  2009    NON HODGKINS LYMPHOMA/RADIATION THERAPY 8/10    Bautista localization wire 1 site right (cpt=19281)  01/2020    papilloma    Needle biopsy right  11/2019    papilloma    Other surgical history  2008    neuroplasty w/ transposition of ulnar nerve at elbow left                         Other surgical history  2009    uterine myomectomy for uterine fibroids    Other surgical history  2018    R breast bx (x3)    Revise median n/carpal tunnel surg      laterality and bilateral    Tonsillectomy  2000    w/ adenoidectomy    Upper gi endoscopy,exam  2010        MEDICATIONS:  Current Outpatient Medications   Medication Sig Dispense Refill    azelastine 0.1 % Nasal Solution 2 sprays by Nasal route 2 (two) times daily as needed for Rhinitis. 1 each 1    clobetasol 0.05 % External Ointment       valACYclovir 500 MG Oral Tab 1 pill PO BID For 3 days as needed 30 tablet 1    Polyethylene Glycol 3350 (MIRALAX) 17 g Oral Powd Pack Take 17 g by mouth daily.      TURMERIC OR Take by mouth.      Omeprazole 40 MG Oral Capsule Delayed Release Take 1 capsule (40 mg total) by mouth 2 (two) times daily before meals.      Multiple Vitamins-Minerals (MULTI-VITAMIN/MINERALS) Oral Tab Take 1 tablet by mouth daily.      Probiotic Product (PROBIOTIC DAILY) Oral Cap Take 1 capsule by mouth daily.      EVENING PRIMROSE OIL OR Take by mouth daily.      Cholecalciferol (VITAMIN D) 2000 units Oral Cap Take 1 capsule (2,000 Units total) by mouth daily.      Fluticasone Propionate (FLONASE) 50 MCG/ACT Nasal Suspension 2 sprays in each nostril daily as needed 1 Bottle 0    cetirizine 10 MG Oral Tab Take 0.5 tablets (5 mg total) by mouth daily.         ALLERGIES:  Guaifenesin er, Mucinex, and Sudafed    FAMILY HISTORY:  Family History   Problem Relation Age of Onset    Anemia Mother     Diabetes Mother     Other (Other) Mother     Hypertension Father     Cancer Father         lung and  prostate    Heart Attack Father         transient Ischemic Attack    Cancer Maternal Grandfather         brain    Cancer Maternal Aunt         Breast CA hx, and colon    Breast Cancer Maternal Aunt 53        IN HER 50'S  NOT SURE OF EXACT AGE    Breast Cancer Maternal Aunt 55    Cancer Paternal Aunt         Lung CA    Cancer Self 41        Predominant lymphocyte hodgkins lymphoma    Breast Cancer Paternal Cousin Female 50    BRCA gene + Niece        SOCIAL HISTORY:  Social History     Socioeconomic History    Marital status: Single   Occupational History    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  Most Recent Mammogram:  uptodate on MRI amd mammograms  Most Recent Pap Smear:        PHYSICAL EXAMINATION:  /75 (BP Location: Left arm, Patient Position: Sitting, Cuff Size: large)   Pulse 82   Temp 97.9 °F (36.6 °C) (Oral)   Resp 18   Ht 1.6 m (5' 2.99\")   Wt 111.6 kg (246 lb)   LMP 2023   SpO2 96%   BMI 43.59 kg/m²    GENERAL : in no apparent distress and well developed and well nourished  LYMPH: No cervical, inguinal, axillary, or supraclavicular adenopathy.    HEENT: Negative  NECK: No adenopathy, no thyroimegaly  ABDOMEN: No masses or tendeness  EXTREMITIES: No edema  NEUROLOGIC: Negative  Pelvic: exam chaperoned by nurse,EGBUS within normal limits,normal cervix without lesions, polyps or tenderness,uterus normal  size, shape, consistency, no mass or tenderness,adnexa normal in size without mass or tenderness   RECTAL: deferred    DATABASE:  Her ultrasounds were reviewed along with the pathology reports.    ASSESSMENT:  Lizette Camp is a 54 year old female with Non-Hodgkin's lymphoma.  She has had some postmenopausal bleeding amitriptyline gabapentin.  There is also just over 4 mm now.  The risk of this being malignant is exceedingly small.  I do feel the most likely cause of the postmenopausal spotting his atrophic vaginitis  I have asked her to follow-up with me in a few months time for repeat examinations.    PLAN:  Topical estrogen  [unfilled]    @[email or web address]@

## 2024-09-18 NOTE — PROGRESS NOTES
Patient is here for MD consult for intermittent post menopausal bleeding for over one year. Hysteroscopy and D&C was completed in December. Patient had a pelvic ultrasound on 8/28.     Education Record    Learner:  Patient    Disease / Diagnosis:  consult     Barriers / Limitations:  None   Comments:    Method:  Discussion   Comments:    General Topics:  Plan of care reviewed   Comments:    Outcome:  Shows understanding   Comments:

## 2024-10-08 ENCOUNTER — TELEPHONE (OUTPATIENT)
Dept: INTERNAL MEDICINE CLINIC | Facility: CLINIC | Age: 54
End: 2024-10-08

## 2024-10-08 ENCOUNTER — HOSPITAL ENCOUNTER (OUTPATIENT)
Dept: MAMMOGRAPHY | Age: 54
Discharge: HOME | End: 2024-10-08
Payer: COMMERCIAL

## 2024-10-08 DIAGNOSIS — Z12.31 ENCOUNTER FOR SCREENING MAMMOGRAM FOR HIGH-RISK PATIENT: ICD-10-CM

## 2024-10-08 PROCEDURE — 77063 BREAST TOMOSYNTHESIS BI: CPT

## 2024-10-08 PROCEDURE — 77067 SCR MAMMO BI INCL CAD: CPT

## 2024-10-31 ENCOUNTER — TELEMEDICINE (OUTPATIENT)
Dept: INTERNAL MEDICINE CLINIC | Facility: CLINIC | Age: 54
End: 2024-10-31
Payer: COMMERCIAL

## 2024-10-31 DIAGNOSIS — J01.01 ACUTE RECURRENT MAXILLARY SINUSITIS: Primary | ICD-10-CM

## 2024-10-31 PROCEDURE — G2211 COMPLEX E/M VISIT ADD ON: HCPCS | Performed by: INTERNAL MEDICINE

## 2024-10-31 PROCEDURE — 99213 OFFICE O/P EST LOW 20 MIN: CPT | Performed by: INTERNAL MEDICINE

## 2024-10-31 NOTE — PROGRESS NOTES
Virtual Telephone Check-In    This visit is conducted using Telemedicine with live, interactive video and audio. Patient resides in [place]   Patient understands and accepts financial responsibility for any deductible, co-insurance and/or co-pays associated with this service.    Telehealth outside of [facility]  Telehealth Verbal Consent   I conducted a telehealth visit with ARIANE on 10/31/2024   which was completed using two-way, real-time interactive audio and video  communication. This has been done in good arin to provide continuity of care in the best interest of the provider-patient relationship, due to the COVID -19 public health crisis/national emergency where restrictions of face-to-face office visits are ongoing. Every conscious effort was taken to allow for sufficient and adequate time to complete the visit.  The patient was made aware of the limitations of the telehealth visit, including treatment limitations as no physical exam could be performed.  The patient was advised to call 911 or to go to the ER in case there was an emergency.  The patient was also advised of the potential privacy & security concerns related to the telehealth platform.   The patient was made aware of where to find [facility] at the [facility]'s notice of privacy practices, telehealth consent form and other related consent forms and documents.  which are located on the [facility] the [facility] website. The patient verbally agreed to telehealth consent form, related consents and the risks discussed.    Lastly, the patient confirmed that they were in [place].   Included in this visit, time may have been spent reviewing labs, medications, radiology tests and decision making. Appropriate medical decision-making and tests are ordered as detailed in the plan of care above.  Coding/billing information is submitted for this visit based on complexity of care and/or time spent for the visit.  Time spent: 6 minutes   HPI: Lizette Camp is a 54-year-old female who is calling about  sinus congestion and a cough.  It has been going on for the past 3 days.  She did not check for COVID.  She was having slight chills and subjective fevers.  She has been coughing which is causing back pain but no other symptoms.  She has not received the flu vaccine.  She is not having the chills anymore but just sinus congestion and cough  ROS:  General: Feels well overall  Skin: Denies any unusual skin lesions  Eyes: Denies blurred vision or double vision  All systems negative, except for above  Physical:  GENERAL: Alert and oriented, well developed, well nourished,in no apparent distress  SKIN: no rashes,no suspicious lesions on face  LUNGS: no audible wheezing  PSYCH: pleasant, appropriate mood and affect    Assessment and Plan  1. Acute recurrent maxillary sinusitis  Note: Will start treatment below.  Will hold off on Tamiflu as it has been more than 3 days since her symptoms and she is no longer having the fevers and chills.  Recommended to get the flu vaccine when she is feeling better. Follow up in 7 to 10 days or sooner if symptoms do not improve or worsen   - amoxicillin clavulanate 875-125 MG Oral Tab; Take 1 tablet by mouth 2 (two) times daily for 7 days.  Dispense: 14 tablet; Refill: 0    Follow up in 7 to 10 days or sooner if symptoms do not improve or worsen   Ibeth Crawford DO

## 2024-11-11 DIAGNOSIS — E11.9 CONTROLLED TYPE 2 DIABETES MELLITUS WITHOUT COMPLICATION, WITHOUT LONG-TERM CURRENT USE OF INSULIN (HCC): ICD-10-CM

## 2024-11-11 RX ORDER — TIRZEPATIDE 15 MG/.5ML
INJECTION, SOLUTION SUBCUTANEOUS
Qty: 2 ML | Refills: 2 | Status: SHIPPED | OUTPATIENT
Start: 2024-11-11

## 2024-11-11 NOTE — TELEPHONE ENCOUNTER
Requesting   Requested Prescriptions     Pending Prescriptions Disp Refills    MOUNJARO 15 MG/0.5ML Subcutaneous Solution Auto-injector [Pharmacy Med Name: Mounjaro Subcutaneous Solution Auto-injector 15 MG/0.5ML] 2 mL 0     Sig: INJECT 15 MG INTO THE SKIN ONCE A WEEK FOR 4 DOSES       LOV: 07/26/2024  RTC: in about 4 months  Filled: 08/15/2024 #2ml with 2 refills    Future Appointments   Date Time Provider Department Center   11/25/2024  3:00 PM Kay Cooper APRN [facility] [address]   12/6/2024  1:00 PM Silvia Small MD [facility]   12/10/2024  2:00 PM Nik Pratt MD  [facility]

## 2024-11-14 ENCOUNTER — OFFICE VISIT (OUTPATIENT)
Dept: FAMILY MEDICINE CLINIC | Facility: CLINIC | Age: 54
End: 2024-11-14
Payer: COMMERCIAL

## 2024-11-14 VITALS
HEIGHT: 62 IN | HEART RATE: 74 BPM | TEMPERATURE: 98 F | WEIGHT: 242 LBS | BODY MASS INDEX: 44.53 KG/M2 | SYSTOLIC BLOOD PRESSURE: 128 MMHG | RESPIRATION RATE: 16 BRPM | OXYGEN SATURATION: 98 % | DIASTOLIC BLOOD PRESSURE: 76 MMHG

## 2024-11-14 DIAGNOSIS — R05.8 COUGH PRESENT FOR GREATER THAN 3 WEEKS: Primary | ICD-10-CM

## 2024-11-14 DIAGNOSIS — J04.0 LARYNGITIS: ICD-10-CM

## 2024-11-14 DIAGNOSIS — B37.0 ORAL THRUSH: ICD-10-CM

## 2024-11-14 PROCEDURE — 3078F DIAST BP <80 MM HG: CPT | Performed by: NURSE PRACTITIONER

## 2024-11-14 PROCEDURE — 99213 OFFICE O/P EST LOW 20 MIN: CPT | Performed by: NURSE PRACTITIONER

## 2024-11-14 PROCEDURE — 3008F BODY MASS INDEX DOCD: CPT | Performed by: NURSE PRACTITIONER

## 2024-11-14 PROCEDURE — 3061F NEG MICROALBUMINURIA REV: CPT | Performed by: NURSE PRACTITIONER

## 2024-11-14 PROCEDURE — 3074F SYST BP LT 130 MM HG: CPT | Performed by: NURSE PRACTITIONER

## 2024-11-14 RX ORDER — AZITHROMYCIN 250 MG/1
TABLET, FILM COATED ORAL
Qty: 6 TABLET | Refills: 0 | Status: SHIPPED | OUTPATIENT
Start: 2024-11-14 | End: 2024-11-19

## 2024-11-14 RX ORDER — CLOTRIMAZOLE 10 MG/1
10 LOZENGE ORAL 4 TIMES DAILY
Qty: 56 LOZENGE | Refills: 0 | Status: SHIPPED | OUTPATIENT
Start: 2024-11-14 | End: 2024-11-28

## 2024-11-14 RX ORDER — ALBUTEROL SULFATE 90 UG/1
1-2 INHALANT RESPIRATORY (INHALATION) EVERY 8 HOURS PRN
Qty: 1 EACH | Refills: 0 | Status: SHIPPED | OUTPATIENT
Start: 2024-11-14 | End: 2024-11-24

## 2024-11-14 NOTE — PATIENT INSTRUCTIONS
PLAN: Start Zithromax, take as directed. Finish all the medication even if you feel better.   Probiotics or yogurt daily during antibiotic use will help decrease stomach upset and restore good bacteria to the gut. May take 1/2 dose of Miralax daily if needed for overly soft stool.  Rinse mouth after use for Flonase. Start Clotrimazole mouth lozenges three times daily with meals and at bedtime for two weeks.  Laryngitis should improve with oral thrush. Good oral care/teeth brushing and brush tongue during treatment.  Use Albuterol inhaler 1-2 puffs every 8 hours as needed for cough/wheezing. Rinse mouth after use. See below for use.  Hydrate! (cold or hot based on comfort). Drink lots of water or other non dehydrating liquids to help with illness.   Hand washing-use hand  or wash hands frequently, cover your cough or sneeze, do not share towels or drinks with others.  May use Tylenol or Ibuprofen over the counter for pain/comfort if not contraindicated.  Follow up in 2 weeks with Dr. Crawford if symptoms persist,  or seek immediate care if inability to swallow or breathe or symptoms worsen despite treatment.

## 2024-11-14 NOTE — PROGRESS NOTES
HPI:   Lizette Camp is a 54 year old female who presents with ill symptoms for   17   days. Patient reports cough, congestion, laryngitis, headache. Has tried antibiotics and multi symptom medication with not much  relief. Works as teacher with multiple people sick.    Current Outpatient Medications   Medication Sig Dispense Refill    MOUNJARO 15 MG/0.5ML Subcutaneous Solution Auto-injector INJECT 15 MG INTO THE SKIN ONCE A WEEK FOR 4 DOSES 2 mL 2    estradiol 0.1 MG/GM Vaginal Cream Place 1 g vaginally daily. Daily for 14 days, then twice a week 1 each 5    azelastine 0.1 % Nasal Solution 2 sprays by Nasal route 2 (two) times daily as needed for Rhinitis. 1 each 1    clobetasol 0.05 % External Ointment       valACYclovir 500 MG Oral Tab 1 pill PO BID For 3 days as needed 30 tablet 1    Polyethylene Glycol 3350 (MIRALAX) 17 g Oral Powd Pack Take 17 g by mouth daily.      TURMERIC OR Take by mouth.      Omeprazole 40 MG Oral Capsule Delayed Release Take 1 capsule (40 mg total) by mouth 2 (two) times daily before meals.      Multiple Vitamins-Minerals (MULTI-VITAMIN/MINERALS) Oral Tab Take 1 tablet by mouth daily.      Probiotic Product (PROBIOTIC DAILY) Oral Cap Take 1 capsule by mouth daily.      Cholecalciferol (VITAMIN D) 2000 units Oral Cap Take 1 capsule (2,000 Units total) by mouth daily.      Fluticasone Propionate (FLONASE) 50 MCG/ACT Nasal Suspension 2 sprays in each nostril daily as needed 1 Bottle 0    cetirizine 10 MG Oral Tab Take 0.5 tablets (5 mg total) by mouth daily.       No current facility-administered medications for this visit.      Past Medical History:    Abnormal uterine bleeding    Allergic rhinitis    Amenorrhea    Anemia    Anxiety    Arthritis    Cancer (HCC)    Predominant Hodgkin's Lymphoma, REDIAGNOSED THIS YEAR 2023    COVID    Had ferver, cough, congestion, SOB x 6 weeks. No hospitalization or lingering S/S.    Depression    Diabetes (HCC)    Diabetes mellitus ([facility])     Diverticulosis of large intestine    Dysmenorrhea    Esophageal reflux    Essential hypertension    Exposure to medical diagnostic radiation    last dose 8/2012    Fibroids    Genital herpes simplex    High blood pressure    Obesity    ANAHY on CPAP    Osteoarthritis    knees and neck    Sexually transmitted disease    Sleep apnea    CPAP    Visual impairment    glasses      Past Surgical History:   Procedure Laterality Date    Adenoidectomy  1998    Benign biopsy left      Colonoscopy  2010    Colonoscopy  03/2021    Egd N/A 03/01/2021    Procedure: ESOPHAGOGASTRODUODENOSCOPY, COLONOSCOPY, POSSIBLE BIOPSY, POSSIBLE POLYPECTOMY 22739, 22732;  Surgeon: Candice Chan MD;  [facility]    Hand/finger surgery unlisted      hand incision tendon sheath of a finger    Bautista biopsy stereo nodule 1 site left (cpt=19081)  10yrs  back    ciara    Bautista biopsy stereo nodule 1 site right (cpt=19081)  04/2018    3 SITE, RADIAL SCAR    Bautista biopsy stereo nodule 2 site bilat (cpt=19081/48585)  2009    NON HODGKINS LYMPHOMA/RADIATION THERAPY 8/10    Bautista localization wire 1 site right (cpt=19281)  01/2020    papilloma    Needle biopsy right  11/2019    papilloma    Other surgical history  2008    neuroplasty w/ transposition of ulnar nerve at elbow left                         Other surgical history  2009    uterine myomectomy for uterine fibroids    Other surgical history  2018    R breast bx (x3)    Revise median n/carpal tunnel surg      laterality and bilateral    Tonsillectomy  2000    w/ adenoidectomy    Upper gi endoscopy,exam  2010      Family History   Problem Relation Age of Onset    Anemia Mother     Diabetes Mother     Other (Other) Mother     Hypertension Father     Cancer Father         lung and prostate    Heart Attack Father         transient Ischemic Attack    Cancer Maternal Grandfather         brain    Cancer Maternal Aunt         Breast CA hx, and colon    Breast Cancer Maternal Aunt 53        IN HER  50'S  NOT SURE OF EXACT AGE    Breast Cancer Maternal Aunt 55    Cancer Paternal Aunt         Lung CA    Cancer Self 41        Predominant lymphocyte hodgkins lymphoma    Breast Cancer Paternal Cousin Female 50    BRCA gene + Niece       Social History     Socioeconomic History    Marital status: Single   Occupational History    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   Tobacco Use    Smoking status: Never     Passive exposure: Never    Smokeless tobacco: Never   Vaping Use    Vaping status: Never Used   Substance and Sexual Activity    Alcohol use: Not Currently     Comment: Occasionally    Drug use: No    Sexual activity: Not Currently     Partners: Male   Other Topics Concern    Caffeine Concern Yes    Stress Concern Yes    Weight Concern Yes    Special Diet No    Exercise Yes    Seat Belt Yes         REVIEW OF SYSTEMS:   GENERAL: feels well otherwise, hoarse as above  HEENT: congested, as above in HPI  LUNGS: denies shortness of breath with exertion, notes productive cough, keeping awake  CARDIOVASCULAR: denies chest pain on exertion  GI: no nausea or abdominal pain, appetite normal  NEURO: admits to some headaches    EXAM:   /76   Pulse 74   Temp 97.7 °F (36.5 °C)   Resp 16   Ht 5' 2\" (1.575 m)   Wt 242 lb (109.8 kg)   LMP 04/06/2023   SpO2 98%   BMI 44.26 kg/m²   GENERAL: well developed, well nourished,in no apparent distress, voice hoarse, cough present  HEENT: atraumatic, normocephalic,ears full and clear, nares with right nare edema, mild erythema, throat with white/yellow patches on tongue, throat. Uvuvla midline.  No sinus tenderness with palpation.  NECK: supple,no adenopathy  LUNGS: clear to auscultation all lobes EXCEPT for right lower lobe with mild expiratory wheeze  CARDIO: RRR without murmur      ASSESSMENT AND PLAN:    PLAN:Lizette was seen today for sinus problem.    Diagnoses and all orders for this visit:    Cough present for greater than 3 weeks  -     azithromycin  (ZITHROMAX Z-CHLOE) 250 MG Oral Tab; Take 2 tablets (500 mg total) by mouth daily for 1 day, THEN 1 tablet (250 mg total) daily for 4 days.  -     albuterol 108 (90 Base) MCG/ACT Inhalation Aero Soln; Inhale 1-2 puffs into the lungs every 8 (eight) hours as needed for Wheezing.    Laryngitis    Oral thrush  -     clotrimazole 10 MG Mouth/Throat Roger; Take 1 lozenge (10 mg total) by mouth 4 (four) times daily for 14 days.      Meds as above. Self care discussed. Medication use and risk/benefit discussed. Patient is advised to follow up with PCP if not improving with treatment plan or seek immediate care if symptoms worsen.  The patient indicates understanding of these issues and agrees to the plan.  Patient Instructions    PLAN: Start Zithromax, take as directed. Finish all the medication even if you feel better.   Probiotics or yogurt daily during antibiotic use will help decrease stomach upset and restore good bacteria to the gut. May take 1/2 dose of Miralax daily if needed for overly soft stool.  Rinse mouth after use for Flonase. Start Clotrimazole mouth lozenges three times daily with meals and at bedtime for two weeks.  Laryngitis should improve with oral thrush. Good oral care/teeth brushing and brush tongue during treatment.  Use Albuterol inhaler 1-2 puffs every 8 hours as needed for cough/wheezing. Rinse mouth after use. See below for use.  Hydrate! (cold or hot based on comfort). Drink lots of water or other non dehydrating liquids to help with illness.   Hand washing-use hand  or wash hands frequently, cover your cough or sneeze, do not share towels or drinks with others.  May use Tylenol or Ibuprofen over the counter for pain/comfort if not contraindicated.  Follow up in 2 weeks with Dr. Crawford if symptoms persist,  or seek immediate care if inability to swallow or breathe or symptoms worsen despite treatment.

## 2024-11-25 ENCOUNTER — OFFICE VISIT (OUTPATIENT)
Dept: INTERNAL MEDICINE CLINIC | Facility: CLINIC | Age: 54
End: 2024-11-25
Payer: COMMERCIAL

## 2024-11-25 VITALS
HEIGHT: 62.5 IN | WEIGHT: 242 LBS | BODY MASS INDEX: 43.42 KG/M2 | DIASTOLIC BLOOD PRESSURE: 70 MMHG | HEART RATE: 85 BPM | OXYGEN SATURATION: 96 % | SYSTOLIC BLOOD PRESSURE: 128 MMHG

## 2024-11-25 DIAGNOSIS — E66.813 CLASS 3 SEVERE OBESITY DUE TO EXCESS CALORIES WITH SERIOUS COMORBIDITY AND BODY MASS INDEX (BMI) OF 40.0 TO 44.9 IN ADULT (HCC): ICD-10-CM

## 2024-11-25 DIAGNOSIS — I10 ESSENTIAL HYPERTENSION, BENIGN: ICD-10-CM

## 2024-11-25 DIAGNOSIS — Z51.81 ENCOUNTER FOR THERAPEUTIC DRUG MONITORING: Primary | ICD-10-CM

## 2024-11-25 DIAGNOSIS — E55.9 VITAMIN D DEFICIENCY: ICD-10-CM

## 2024-11-25 DIAGNOSIS — E66.01 CLASS 3 SEVERE OBESITY DUE TO EXCESS CALORIES WITH SERIOUS COMORBIDITY AND BODY MASS INDEX (BMI) OF 40.0 TO 44.9 IN ADULT (HCC): ICD-10-CM

## 2024-11-25 DIAGNOSIS — E11.9 CONTROLLED TYPE 2 DIABETES MELLITUS WITHOUT COMPLICATION, WITHOUT LONG-TERM CURRENT USE OF INSULIN (HCC): ICD-10-CM

## 2024-11-25 DIAGNOSIS — G47.33 OSA ON CPAP: ICD-10-CM

## 2024-11-25 DIAGNOSIS — F43.9 STRESS: ICD-10-CM

## 2024-11-25 DIAGNOSIS — R79.89 LOW VITAMIN D LEVEL: ICD-10-CM

## 2024-11-25 PROCEDURE — 3074F SYST BP LT 130 MM HG: CPT | Performed by: NURSE PRACTITIONER

## 2024-11-25 PROCEDURE — 99213 OFFICE O/P EST LOW 20 MIN: CPT | Performed by: NURSE PRACTITIONER

## 2024-11-25 PROCEDURE — 3078F DIAST BP <80 MM HG: CPT | Performed by: NURSE PRACTITIONER

## 2024-11-25 PROCEDURE — 3008F BODY MASS INDEX DOCD: CPT | Performed by: NURSE PRACTITIONER

## 2024-11-25 RX ORDER — ALBUTEROL SULFATE 90 UG/1
1 INHALANT RESPIRATORY (INHALATION) EVERY 8 HOURS PRN
COMMUNITY
Start: 2024-11-14

## 2024-11-25 NOTE — PROGRESS NOTES
HISTORY OF PRESENT ILLNESS  Chief Complaint   Patient presents with    Weight Check     Down 7     Lizette Camp is a 54 year old female here for follow up with medical weight loss program for the treatment of overweight, obesity, or morbid obesity.     Down 7 lbs (f/u from 7/2024 via telemedicine)   Compliant on mounjaro 15mg weekly  Tolerating well, helping with decreasing appetite and no side effects     Started doing Jump start your health program, feels like it helps with accountability  Had been sick the past couple of weeks...   Exercise/Activity: 5x/ week, via walking (8,000-10,000), riding bike 1 day per week  Nutrition: eating regular meals, +protein, minimal veggies. not tracking reports  Meals out per week on average: 2  Stress is manageable-8/10   Sleep: 8 hours/night, waking up feeling tired (ill, menopause)     Denies chest pain, shortness of breath, dizziness, blurred vision, headache, paresthesia, nausea/vomiting.     Breakfast Lunch Dinner Snacks Fluids   Reviewed              Wt Readings from Last 6 Encounters:   11/25/24 242 lb (109.8 kg)   11/14/24 242 lb (109.8 kg)   09/18/24 246 lb (111.6 kg)   09/05/24 251 lb 6.4 oz (114 kg)   08/26/24 249 lb 9.6 oz (113.2 kg)   08/10/24 255 lb 4 oz (115.8 kg)          REVIEW OF SYSTEMS  GENERAL: feels well otherwise, denied any fevers chills or night sweats   LUNGS: denies shortness of breath  CARDIOVASCULAR: denies chest pain  GI: denies abdominal pain  MUSCULOSKELETAL: denies back pain, joint pains   PSYCH: denies change in behavior or mood, denies feeling sad or depressed    EXAM  /70   Pulse 85   Ht 5' 2.5\" (1.588 m)   Wt 242 lb (109.8 kg)   LMP 04/06/2023   SpO2 96%   BMI 43.56 kg/m²       GENERAL: well developed, well nourished, in no apparent distress, A/O x3  SKIN: no rashes, no suspicious lesions  HEENT: atraumatic, normocephalic, OP-clear, PERRLA  NECK: supple, no adenopathy  LUNGS: CTA in all fields, breathing non labored  CARDIO: RRR  without murmur  GI: +BS, NT/ND, no masses or HSM  EXTREMITIES: no cyanosis, no clubbing, no edema    Lab Results   Component Value Date    GLU 87 08/24/2024    BUN 16 08/24/2024    BUNCREA 11.7 06/28/2023    CREATSERUM 1.07 (H) 08/24/2024    ANIONGAP 3 08/24/2024    GFR 78 08/15/2017    GFRNAA 61 07/21/2022    GFRAA 70 07/21/2022    CA 10.3 08/24/2024    OSMOCALC 293 08/24/2024    ALKPHO 111 06/10/2024    AST 19 06/10/2024    ALT 20 06/10/2024    BILT 0.3 06/10/2024    TP 7.1 06/10/2024    ALB 3.4 06/10/2024    GLOBULIN 3.7 06/10/2024    AGRATIO 1.5 03/07/2020     08/24/2024    K 4.0 08/24/2024     08/24/2024    CO2 31.0 08/24/2024     Lab Results   Component Value Date     08/24/2024    A1C 5.5 08/24/2024     Lab Results   Component Value Date    CHOLEST 166 08/24/2024    TRIG 114 08/24/2024    HDL 67 (H) 08/24/2024    LDL 79 08/24/2024    VLDL 18 08/24/2024    TCHDLRATIO 2.4 03/07/2020    NONHDLC 99 08/24/2024     Lab Results   Component Value Date    B12 435 05/25/2017    VITB12 325 12/28/2011     Lab Results   Component Value Date    VITD 51.6 02/12/2021       Medications Ordered Prior to Encounter[1]    ASSESSMENT/PLAN    ICD-10-CM    1. Encounter for therapeutic drug monitoring  Z51.81       2. Class 3 severe obesity due to excess calories with serious comorbidity and body mass index (BMI) of 40.0 to 44.9 in adult ([facility])  E66.813     E66.01     Z68.41       3. Controlled type 2 diabetes mellitus without complication, without long-term current use of insulin ([facility])  E11.9       4. ANAHY on CPAP  G47.33       5. Essential hypertension, benign  I10       6. Vitamin D deficiency  E55.9       7. Low vitamin D level  R79.89       8. Stress  F43.9           PLAN   Initial Weight Data and Goal Weight Loss:  Initial consult: # 254lbs on 5/2017  Weight Calculations  Initial Weight: 254 lbs  Initial Weight Date: 05/01/17  Today's Weight: 242 lbs  5% Goal: 12.7  10% Goal: 25.4  Total Weight Loss: 12  lbs  Total weight loss: Down 7 lbs total, Net loss 12 lbs  Continue with medications: mounjaro 15mg weekly   --advised of side effects and adverse effects of this medication  Contradictions: topamax (insomnia), stop  zonisamide (wasn't working anymore), stop jardance (wanted to get off), stopped metformin, has done phentermine and diethylpropion in the past, ozempic, mounjaro- can't get supply   Reviewed labs  Continue with vitamin d OTC  HTN  stable, follows with PCP  Hx of DM type 2, reviewed last a1c 5.5% on 8/2024- was previously 5.6% on 7/2023- will continue with mounjaro  ANAHY- continue cpap machine nightly   Try to get back to yoga and increase cardio   Continue with jump start your health program- (help with accountability)   Nutrition: Low carb diet, recommended to eat breakfast daily/ regular protein intake  Follow up with dietitian and psychologist as recommended.  Discussed the role of sleep and stress in weight management.  Counseled on comprehensive weight loss plan including attention to nutrition, exercise and behavior/stress management for success. See patient instruction below for more details.  Discussed strategies to overcome barriers to successful weight loss and weight maintenance  FITTE: ACSM recommendations (150-300 minutes/ week in active weight loss)   Weight Loss Consent to treat reviewed and signed.    Total time spent on chart review, pre-charting, obtaining history, counseling, and educating, reviewing labs was 26 minutes.       NOTE TO PATIENT: The 21st Century Cures Act makes clinical notes like these available to patients in the interest of transparency. Clinical notes are medical documents used by physicians and care providers to communicate with each other. These documents include medical language and terminology, abbreviations, and treatment information that may sound technical and at times possibly unfamiliar. In addition, at times, the verbiage may appear blunt or direct. These  documents are one tool providers use to communicate relevant information and clinical opinions of the care providers in a way that allows common understanding of the clinical context.     There are no Patient Instructions on file for this visit.    No follow-ups on file.    Patient verbalizes understanding.    DOREEN Webster             [1]   Current Outpatient Medications on File Prior to Visit   Medication Sig Dispense Refill    albuterol 108 (90 Base) MCG/ACT Inhalation Aero Soln Inhale 1 puff into the lungs every 8 (eight) hours as needed for Wheezing.      Bioflavonoid Products (QUERCETIN COMPLEX IMMUNE OR) Take by mouth.      clotrimazole 10 MG Mouth/Throat Roger Take 1 lozenge (10 mg total) by mouth 4 (four) times daily for 14 days. 56 lozenge 0    MOUNJARO 15 MG/0.5ML Subcutaneous Solution Auto-injector INJECT 15 MG INTO THE SKIN ONCE A WEEK FOR 4 DOSES 2 mL 2    estradiol 0.1 MG/GM Vaginal Cream Place 1 g vaginally daily. Daily for 14 days, then twice a week 1 each 5    azelastine 0.1 % Nasal Solution 2 sprays by Nasal route 2 (two) times daily as needed for Rhinitis. 1 each 1    clobetasol 0.05 % External Ointment       valACYclovir 500 MG Oral Tab 1 pill PO BID For 3 days as needed 30 tablet 1    Polyethylene Glycol 3350 (MIRALAX) 17 g Oral Powd Pack Take 17 g by mouth daily.      Omeprazole 40 MG Oral Capsule Delayed Release Take 1 capsule (40 mg total) by mouth 2 (two) times daily before meals.      Multiple Vitamins-Minerals (MULTI-VITAMIN/MINERALS) Oral Tab Take 1 tablet by mouth daily.      Probiotic Product (PROBIOTIC DAILY) Oral Cap Take 1 capsule by mouth daily.      Cholecalciferol (VITAMIN D) 2000 units Oral Cap Take 1 capsule (2,000 Units total) by mouth daily.      Fluticasone Propionate (FLONASE) 50 MCG/ACT Nasal Suspension 2 sprays in each nostril daily as needed 1 Bottle 0    cetirizine 10 MG Oral Tab Take 0.5 tablets (5 mg total) by mouth daily.      TURMERIC OR Take by mouth.  (Patient not taking: Reported on 11/25/2024)       No current facility-administered medications on file prior to visit.

## 2024-11-26 RX ORDER — TIRZEPATIDE 15 MG/.5ML
2 INJECTION, SOLUTION SUBCUTANEOUS WEEKLY
Qty: 2 ML | Refills: 2 | Status: SHIPPED | OUTPATIENT
Start: 2024-11-26

## 2024-11-26 NOTE — PATIENT INSTRUCTIONS
Next steps:  1.  Fill your prescribed medication and take as discussed and prescribed: mounjaro 15mg weekly   2.  Schedule a personal nutrition consultation with one of our registered dieticians     Please try to work on the following dietary changes:  Daily protein recommendation to start:  grams  Daily carbohydrate: <130g  Daily calories: 1,600  1.  Drink water with meals and throughout the day, cut down on soda and/or juice if consumed. Consider flavored water options like Bubbly, Spindrift, Hint and Trent.  2.  Eat breakfast daily and focus on having protein with each meal, examples include: greek yogurt, cottage cheese, hard boiled egg, whole grain toast with peanut butter.   3.  Reduce refined carbohydrates and sugars which includes items such as sweets, as well as rice, pasta, and bread and make sure to choose whole grain options when having them with just 1 serving per meal about the size of your inner palm.  4.  Consume non starchy veggies daily working towards making them a good 50% of your daily food intake. Add them to lunch and dinner consistently.  5.  Start a daily probiotic: VSL#3 is recommended, (order on line at www.vsl3.com). Take 1 capsule daily with water for 30 days, then reduce to 1 every other day (this will reduce the cost). Capsules can be left out for 2 weeks, but then must be refrigerated.      Please download vinh My Fitness Pal, LoseIt! Or Net Diary to monitor daily dietary intake and you will be able to see if you are eating the right amount of calories or too much or too little which would hinder weight loss. Additionally this will help to see your daily carbohydrate and protein intake. When you set the vinh up choose 1-2 lbs/week as a goal.  Keeping a paper food journal is an option as well to remain accountable for your choices- this is the start to mindful eating! A low calorie diet has been consistently shown to support weight loss.     Continue or start exercising to help  establish a routine. If not already exercising begin with 1 day and progress as able with long-term goal of 30 minutes 5 days a week at a minimum.     Meditation daily can help manage and control stress. Chronic stress can make weight loss difficult.  Exercising is one way to help with stress, but meditation using the CALM Keo or another comparable alternative can be done in your home or place of work with little time commitment. This Keo can also help work on behavior change and improve sleep. Check out the segment under Calm Masterclass and listen to The 4 Pillars of Health. A great way to begin learning about the foundation of lifestyle with practical tips to use in your every day.     Check out www.yourweightmatters.org blog for continued daily support and education along this weight loss journey!    Patient Resources:     Personal Training/Fitness Classes/Health Coaching     [facility] @ [email or web address] Full fitness center with group fitness and personal training. Discount available as client of [facility].  Health Coaching and Personal Training with Yvette Burns at our [facility]- individual weekly coaching with option to add personal training and small group fitness classes targeted at weight loss- [phone] and/or email @ [email or web address]  360FIT [place] [email or web address].[email or web address] In [email or web address]. Group Fitness [phone] and/or email Eli at [email or web address]  [facility] Fitness Centers with multiple locations: SellMyJersey.com (www.-R- Ranch and Mine), Eat The RecruitLoop Fitness (www.Edaixi.Runivermag), Fit Body Bootcamp (www.TribaLearningbodybootLifeblobp.Runivermag), Hokey Pokey (www.Eventyard.Runivermag), The Exercise  (www.exercisecoach.Runivermag)     Online Fitness  Fitness  on Utube  Fit in 10 DVD series- www.scqpo35TVE.com  Sit and Be Fit - Chair exercise series Www.sitandbefit.org  Hip Hop  Fit with Gene Ferraro at www.hiphopfit.net     Apps for on the Go Fitness  Kinney 7 Minute Workout (orange box with white 7) - free on the go HIIT training keo  Peloton Keo @ www.onepeloton.com     Nutrition Trackers and Tools  LoseIT! And My Fitness Pal apps and on line for tracking nutrition  NOOM - virtual health coaching  FitFoundation (healthy meals on the go) in [place] @ www.lzsgzgvtrauqd7h.My Team Zone  Bistro MD @ BandPagebistromd.com and Fvauzs53 (keto and low carb plans recommended) @ www.nzkbbf68.com, Metabolic Meals @ www.MyMetabolicMeals.com - individual prepared meals to go  Gobble, Blue Apron, Home , Every Plate, Sunbasket- on line meal delivery programs for preparation at home  Meal Village in [place] for homemade meals to go @ www.mealvillage.My Team Zone  Diet Doctor @ www.dietdoctor.My Team Zone - low carb swaps  YuLifestyle Air - meal prep and planning keo (www.yummly.com)     Stress Management/Behavior/Mindful Eating  CALM meditation keo (www.calm.com)  Headspace  Am I Hungry? Mindful eating virtual  keo  Www.yourweightmatters.org - Obesity Action Coalition sponsored Blog posts daily  Motivation keo (black box with white \")- daily supportive messages sent to your phone     Books/Video Education/Podcasts  Mindless Eating by Arie Valiente  Why We Get Sick by Jose Morales (a book about insulin resistance)  Atomic Habits by Geoffrey Berg (a book about taking small steps to promote greater behavior change)   Can't Hurt Me by Vu Solis (a book exploring the power of discipline in achieving your goals)  The End of Dieting: How to Live for Life by Dr. Adeel Hunter M.D. or listen to The kidthing Podcast Episode 63: Understanding \"Nutritarian\" Eating w/Dr. Adeel Hunter  Your Body in Balance: The New Science of Food, Hormones, and Health by Dr. Jayden Marcelo  The Menopause Diet Plan by Mary Barbour and Morelia Ndiaye  The Complete Guide to fasting by Dr. Mcnulty  Sugar, Salt & Fat by Lola Wilkes, Ph.D, R.D.  Weight Loss  Surgery Will Not Treat Food Addiction by Johanny Guo Ph.D  The Game Changers- Valued Relationshipsix Documentary on plant based nutrition  Fed Up - documentary about obesity (Free on Utube)  The Truth About Sugar - documentary on sugar (Free on Utube, https://youNeoEdge Networksu.be/1U4psfsVX0k)  The Dr. Suarez T5 Wellness Plan by Dr. Ryley Suarez MD  Fitlosophy Fitspiration - journal to better health (found at [employer] in fitness aisle)  What Happened to You?- a look at the impact trauma has on behavior written by Abdi Mtz and Dr. Mauricio Anna  Whole Again by Umesh Larry - discovering your true self after trauma  Mitch Vallecillo talk on [employer], The Call to Courage  Podcasts: The Exam Room by the Physician's Committee, Nutrition Facts by Dr. Guerra    We are here to support you with weight loss, but please remember that you still need your primary care provider for your routine health maintenance.

## 2024-12-01 NOTE — PROGRESS NOTES
[facility] Hematology Oncology Group Progress Note       Patient Name: Lizette Camp   YOB: 1970  Medical Record Number: [id number]  Attending Physician: Nik Pratt M.D.     The 21st Century Cures Act makes medical notes like these available to patients in the interest of transparency. Please be advised this is a medical document. Medical documents are intended to carry relevant information, facts as evident, and the clinical opinion of the practitioner. The medical note is intended as peer to peer communication and may appear blunt or direct. It is written in medical language and may contain abbreviations or verbiage that are unfamiliar.     Date of Visit: 12/10/2024     Chief Complaint  Recurrent Hodgkin's lymphoma, nodular lymphocyte predominant - follow up.    Oncologic History   Lizette Camp is a 54 year old emale who underwent diagnostic mammography as follow up for a biopsy-proven benign lesion in the left breast in 12/2010 and was found to have an enlarged left axillary lymph node.  Follow ultrasonography of the left axilla showed to enlarged lymph nodes.  Patient underwent excisional biopsy which revealed CD20+ nodular lymphocyte predominant Hodgkin's lymphoma.  Staging with PET/CT showed large left axillary, subpectoral and supraclavicular lymph nodes are identified with peak SUV 13.8 and she was staged IIA. At diagnosis ESR was elevated at 34 mm/hr.    Patient completed definitive radiotherapy to 3600 cGy in 09/2011.  She reports therapy was complicated by mild fatigue and mild desquamation.      PET/CT in 07/2014 was negative for relapsed disease.    Routine follow up in 08/2021 revealed a possible new lymph node in the right neck. Laboratory studies showed mildly elevated ESR. Ultrasound of the neck was ordered but not scheduled until 02/2022 by patient. It showed no significant change in the right neck as compared to previous but did show enlarged left neck lymph nodes.  PET/CT was performed on 03/08/2022 and showed uptake with Deauville scores of 4-5 involving inguinal and pelvis lymph nodes. Ultrasound core biopsies of 2 right groin lymph nodes were performed on 03/21/2022 and was remarkable for a single atypical large cell with multilobulated nuclei suspicious for a lymphoproliferative cell.    On 06/07/2022 she underwent a wire localized deep excisional right inguinal lymph node biopsy. Pathology was reviewed at the [facility]; pathology showed focal nodular lymphocyte predominant Hodgkin lymphoma arising in a background of follicular hyperplasia with progressive transformation of germinal centers.     History of Present Illness  Patient returns for scheduled follow up. She denies fevers, night sweats, weight loss. She denies any shortness of breath or cough.  She denies any palpable adenopathy.     Performance Status  Karnofsky 100% - Normal, no complaints.    Past Medical History (historical data, reviewed by physician)  History of genital herpes, uterine fibroids, GERD, diverticulosis.    Past Surgical History (historical data, reviewed by physician)  Tonsillectomy, myomectomy.    Family History (historical data, reviewed by physician)  Father with lung and prostate cancer.  Paternal grandfather with brain tumor.  Maternal aunt with breast cancer.  Paternal cousin with breast cancer.  No family history of hematologic malignancies.    Social History (historical data, reviewed by physician)  Denies tobacco, alcohol, and illicit drug use.      Current Medications    Tirzepatide (MOUNJARO) 15 MG/0.5ML Subcutaneous Solution Auto-injector Inject 2 mL into the skin once a week. INJECT 15 MG INTO THE SKIN ONCE A WEEK FOR 4 DOSES 2 mL 2    albuterol 108 (90 Base) MCG/ACT Inhalation Aero Soln Inhale 1 puff into the lungs every 8 (eight) hours as needed for Wheezing.      Bioflavonoid Products (QUERCETIN COMPLEX IMMUNE OR) Take by mouth.      estradiol 0.1 MG/GM Vaginal Cream Place 1 g  vaginally daily. Daily for 14 days, then twice a week 1 each 5    azelastine 0.1 % Nasal Solution 2 sprays by Nasal route 2 (two) times daily as needed for Rhinitis. 1 each 1    clobetasol 0.05 % External Ointment       valACYclovir 500 MG Oral Tab 1 pill PO BID For 3 days as needed 30 tablet 1    Polyethylene Glycol 3350 (MIRALAX) 17 g Oral Powd Pack Take 17 g by mouth daily.      TURMERIC OR Take by mouth.      Omeprazole 40 MG Oral Capsule Delayed Release Take 1 capsule (40 mg total) by mouth 2 (two) times daily before meals.      Multiple Vitamins-Minerals (MULTI-VITAMIN/MINERALS) Oral Tab Take 1 tablet by mouth daily.      Probiotic Product (PROBIOTIC DAILY) Oral Cap Take 1 capsule by mouth daily.      Cholecalciferol (VITAMIN D) 2000 units Oral Cap Take 1 capsule (2,000 Units total) by mouth daily.      Fluticasone Propionate (FLONASE) 50 MCG/ACT Nasal Suspension 2 sprays in each nostril daily as needed 1 Bottle 0    cetirizine 10 MG Oral Tab Take 0.5 tablets (5 mg total) by mouth daily.        Allergies   Ms. Camp is allergic to guaifenesin er, mucinex, and sudafed.    Vital Signs   /79 (BP Location: Left arm, Patient Position: Sitting, Cuff Size: large)   Pulse 83   Temp 97.2 °F (36.2 °C) (Temporal)   Resp 16   Ht 1.6 m (5' 2.99\")   Wt 108.9 kg (240 lb)   LMP 04/06/2023   SpO2 99%   BMI 42.52 kg/m²     Physical Examination   Constitutional  Well developed and well nourished; in no apparent distress; appears close to chronological age.  Head   Normocephalic and atraumatic.  Eyes   Conjunctiva clear; sclera anicteric.  ENMT   External ears normal; external ears normal.  Neck   Supple, no masses.  Lymphatics  No cervical, supraclavicular, axillary, inguinal lymphadenopathy.  Respiratory  Normal effort; no respiratory distress; lungs clear to auscultation bilaterally.  Cardiovascular  Regular rate and rhythm; normal S1S2.  Abdomen  Non-tender; non-distended; no masses,no fluid wave; no  hepatosplenomegaly.  Extremities  No lower extremity edema.  Neurologic  Motor and sensory grossly intact.  Psychiatric  Mood and affect appropriate.    Laboratory   Recent Results (from the past 2 weeks)   LDH [E]    Collection Time: 12/10/24  1:50 PM   Result Value Ref Range     120 - 246 U/L   COMP METABOLIC PANEL [E]    Collection Time: 12/10/24  1:50 PM   Result Value Ref Range    Glucose 71 70 - 99 mg/dL    Sodium 144 136 - 145 mmol/L    Potassium 3.8 3.5 - 5.1 mmol/L    Chloride 110 98 - 112 mmol/L    CO2 26.0 21.0 - 32.0 mmol/L    Anion Gap 8 0 - 18 mmol/L    BUN 12 9 - 23 mg/dL    Creatinine 1.07 (H) 0.55 - 1.02 mg/dL    Calcium, Total 9.8 8.7 - 10.4 mg/dL    Calculated Osmolality 296 (H) 275 - 295 mOsm/kg    eGFR-Cr 61 >=60 mL/min/1.73m2    AST 23 <34 U/L    ALT 18 10 - 49 U/L    Alkaline Phosphatase 105 46 - 118 U/L    Bilirubin, Total 0.4 0.3 - 1.2 mg/dL    Total Protein 6.9 5.7 - 8.2 g/dL    Albumin 4.3 3.2 - 4.8 g/dL    Globulin  2.6 2.0 - 3.5 g/dL    A/G Ratio 1.7 1.0 - 2.0    Patient Fasting for CMP? Patient not present    CBC W/DIFF [E]    Collection Time: 12/10/24  1:50 PM   Result Value Ref Range    WBC 5.6 4.0 - 11.0 x10(3) uL    RBC 4.58 3.80 - 5.30 x10(6)uL    HGB 13.8 12.0 - 16.0 g/dL    HCT 41.2 35.0 - 48.0 %    .0 150.0 - 450.0 10(3)uL    MCV 90.0 80.0 - 100.0 fL    MCH 30.1 26.0 - 34.0 pg    MCHC 33.5 31.0 - 37.0 g/dL    RDW 13.7 %    Neutrophil Absolute Prelim 2.88 1.50 - 7.70 x10 (3) uL    Neutrophil Absolute 2.88 1.50 - 7.70 x10(3) uL    Lymphocyte Absolute 2.10 1.00 - 4.00 x10(3) uL    Monocyte Absolute 0.43 0.10 - 1.00 x10(3) uL    Eosinophil Absolute 0.12 0.00 - 0.70 x10(3) uL    Basophil Absolute 0.02 0.00 - 0.20 x10(3) uL    Immature Granulocyte Absolute 0.01 0.00 - 1.00 x10(3) uL    Neutrophil % 51.7 %    Lymphocyte % 37.8 %    Monocyte % 7.7 %    Eosinophil % 2.2 %    Basophil % 0.4 %    Immature Granulocyte % 0.2 %     Impression and Plan  Relapsed nodular  lymphocyte predominant Hodgkin Lymphoma - This should be considered a chronic systemic disease. There is no evidence of significant disease progression. Continue active surveillance.     Planned Follow Up  Patient will return for follow up in 6 months.    Electronically Signed by:     Nik Pratt M.D.  System Medical Director, Oncology Services  [place] [facility]

## 2024-12-06 ENCOUNTER — OFFICE VISIT (OUTPATIENT)
Dept: SURGERY | Facility: CLINIC | Age: 54
End: 2024-12-06
Payer: COMMERCIAL

## 2024-12-06 VITALS
RESPIRATION RATE: 18 BRPM | DIASTOLIC BLOOD PRESSURE: 74 MMHG | HEART RATE: 78 BPM | TEMPERATURE: 97 F | WEIGHT: 244 LBS | SYSTOLIC BLOOD PRESSURE: 134 MMHG | BODY MASS INDEX: 44 KG/M2 | OXYGEN SATURATION: 97 %

## 2024-12-06 DIAGNOSIS — Z91.89 AT HIGH RISK FOR BREAST CANCER: Primary | ICD-10-CM

## 2024-12-06 DIAGNOSIS — Z12.31 SCREENING MAMMOGRAM, ENCOUNTER FOR: ICD-10-CM

## 2024-12-06 DIAGNOSIS — N64.89 RADIAL SCAR OF BREAST: ICD-10-CM

## 2024-12-06 PROCEDURE — 3075F SYST BP GE 130 - 139MM HG: CPT | Performed by: SURGERY

## 2024-12-06 PROCEDURE — 3078F DIAST BP <80 MM HG: CPT | Performed by: SURGERY

## 2024-12-06 PROCEDURE — 99213 OFFICE O/P EST LOW 20 MIN: CPT | Performed by: SURGERY

## 2024-12-10 ENCOUNTER — OFFICE VISIT (OUTPATIENT)
Dept: HEMATOLOGY/ONCOLOGY | Facility: HOSPITAL | Age: 54
End: 2024-12-10
Attending: OBSTETRICS & GYNECOLOGY
Payer: COMMERCIAL

## 2024-12-10 VITALS
HEIGHT: 62.99 IN | HEART RATE: 83 BPM | SYSTOLIC BLOOD PRESSURE: 156 MMHG | DIASTOLIC BLOOD PRESSURE: 79 MMHG | TEMPERATURE: 97 F | BODY MASS INDEX: 42.52 KG/M2 | WEIGHT: 240 LBS | OXYGEN SATURATION: 99 % | RESPIRATION RATE: 16 BRPM

## 2024-12-10 DIAGNOSIS — C81.04 NODULAR LYMPHOCYTE PREDOMINANT HODGKIN LYMPHOMA OF LYMPH NODES OF AXILLA (HCC): ICD-10-CM

## 2024-12-10 DIAGNOSIS — C81.05 NODULAR LYMPHOCYTE PREDOMINANT HODGKIN LYMPHOMA OF LYMPH NODES OF INGUINAL REGION (HCC): ICD-10-CM

## 2024-12-10 LAB
ALBUMIN SERPL-MCNC: 4.3 G/DL (ref 3.2–4.8)
ALBUMIN/GLOB SERPL: 1.7 {RATIO} (ref 1–2)
ALP LIVER SERPL-CCNC: 105 U/L
ALT SERPL-CCNC: 18 U/L
ANION GAP SERPL CALC-SCNC: 8 MMOL/L (ref 0–18)
AST SERPL-CCNC: 23 U/L (ref ?–34)
BASOPHILS # BLD AUTO: 0.02 X10(3) UL (ref 0–0.2)
BASOPHILS NFR BLD AUTO: 0.4 %
BILIRUB SERPL-MCNC: 0.4 MG/DL (ref 0.3–1.2)
BUN BLD-MCNC: 12 MG/DL (ref 9–23)
CALCIUM BLD-MCNC: 9.8 MG/DL (ref 8.7–10.4)
CHLORIDE SERPL-SCNC: 110 MMOL/L (ref 98–112)
CO2 SERPL-SCNC: 26 MMOL/L (ref 21–32)
CREAT BLD-MCNC: 1.07 MG/DL
EGFRCR SERPLBLD CKD-EPI 2021: 61 ML/MIN/1.73M2 (ref 60–?)
EOSINOPHIL # BLD AUTO: 0.12 X10(3) UL (ref 0–0.7)
EOSINOPHIL NFR BLD AUTO: 2.2 %
ERYTHROCYTE [DISTWIDTH] IN BLOOD BY AUTOMATED COUNT: 13.7 %
GLOBULIN PLAS-MCNC: 2.6 G/DL (ref 2–3.5)
GLUCOSE BLD-MCNC: 71 MG/DL (ref 70–99)
HCT VFR BLD AUTO: 41.2 %
HGB BLD-MCNC: 13.8 G/DL
IMM GRANULOCYTES # BLD AUTO: 0.01 X10(3) UL (ref 0–1)
IMM GRANULOCYTES NFR BLD: 0.2 %
LDH SERPL L TO P-CCNC: 208 U/L
LYMPHOCYTES # BLD AUTO: 2.1 X10(3) UL (ref 1–4)
LYMPHOCYTES NFR BLD AUTO: 37.8 %
MCH RBC QN AUTO: 30.1 PG (ref 26–34)
MCHC RBC AUTO-ENTMCNC: 33.5 G/DL (ref 31–37)
MCV RBC AUTO: 90 FL
MONOCYTES # BLD AUTO: 0.43 X10(3) UL (ref 0.1–1)
MONOCYTES NFR BLD AUTO: 7.7 %
NEUTROPHILS # BLD AUTO: 2.88 X10 (3) UL (ref 1.5–7.7)
NEUTROPHILS # BLD AUTO: 2.88 X10(3) UL (ref 1.5–7.7)
NEUTROPHILS NFR BLD AUTO: 51.7 %
OSMOLALITY SERPL CALC.SUM OF ELEC: 296 MOSM/KG (ref 275–295)
PLATELET # BLD AUTO: 172 10(3)UL (ref 150–450)
POTASSIUM SERPL-SCNC: 3.8 MMOL/L (ref 3.5–5.1)
PROT SERPL-MCNC: 6.9 G/DL (ref 5.7–8.2)
RBC # BLD AUTO: 4.58 X10(6)UL
SODIUM SERPL-SCNC: 144 MMOL/L (ref 136–145)
WBC # BLD AUTO: 5.6 X10(3) UL (ref 4–11)

## 2024-12-10 PROCEDURE — 99214 OFFICE O/P EST MOD 30 MIN: CPT | Performed by: SPECIALIST

## 2024-12-10 PROCEDURE — G2211 COMPLEX E/M VISIT ADD ON: HCPCS | Performed by: SPECIALIST

## 2024-12-10 NOTE — PROGRESS NOTES
Patient is here for 6 month MD follow up for Hodgkin Lymphoma. Feeling well. No concerns at this time.      Education Record    Learner:  Patient    Disease / Diagnosis:  Hodgkin Lymphoma     Barriers / Limitations:  None   Comments:    Method:  Discussion   Comments:    General Topics:  Plan of care reviewed   Comments:    Outcome:  Shows understanding   Comments:

## 2024-12-27 NOTE — PROGRESS NOTES
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    Stage:  Cancer Staging   Hodgkin's disease (HCC)  Staging form: Lymphoid Neoplasms, AJCC V6  - Clinical stage from 8/8/2016: Stage II - Signed by Nik Pratt MD on 8/8/2016    Disease Status:  Surgical treatment complete, no further treatment pending.    History:   This 54 year old woman presented with a high risk screening MRI on November 11, 2019 and was found to have a 6 mm enhancing focus in the retroareolar area at the 3 o'clock position of the right breast for which a biopsy was recommended.  She underwent the MRI guided biopsy on November 22, 2019 and pathology confirms a intraductal papilloma.  Surgical excision was recommended and took place without complication.  She denies any concerns related to her breast since that time.  She continues to follow medical oncology for her history of her Hodgkin's disease.  She denies any new clinical concerns related to her breast.  She had a bilateral screening mammogram on October 8, 2024 that was unremarkable.  She is here today for surveillance evaluation and recommendations for further therapy.        Past Medical History:    Abnormal uterine bleeding    Allergic rhinitis    Amenorrhea    Anemia    Anxiety    Arthritis    Cancer (HCC)    Predominant Hodgkin's Lymphoma, REDIAGNOSED THIS YEAR 2023    COVID    Had ferver, cough, congestion, SOB x 6 weeks. No hospitalization or lingering S/S.    Depression    Diabetes (HCC)    Diabetes mellitus (HCC)    Diverticulosis of large intestine    Dysmenorrhea    Esophageal reflux    Essential hypertension    Exposure to medical diagnostic radiation    last dose 8/2012    Fibroids    Genital herpes simplex    High blood pressure    Obesity    ANAHY on CPAP    Osteoarthritis    knees and neck    Sexually transmitted disease    Sleep apnea    CPAP    Visual impairment    glasses       Past Surgical  History:   Procedure Laterality Date    Adenoidectomy  1998    Benign biopsy left      Colonoscopy  2010    Colonoscopy  03/2021    Egd N/A 03/01/2021    Procedure: ESOPHAGOGASTRODUODENOSCOPY, COLONOSCOPY, POSSIBLE BIOPSY, POSSIBLE POLYPECTOMY 36316, 86936;  Surgeon: Candice Chan MD;  Location: [facility] [place] [place] [facility], [facility]    Hand/finger surgery unlisted      hand incision tendon sheath of a finger    Bautista biopsy stereo nodule 1 site left (cpt=19081)  10yrs  back    ciara    Bautista biopsy stereo nodule 1 site right (cpt=19081)  04/2018    3 SITE, RADIAL SCAR    Bautista biopsy stereo nodule 2 site bilat (cpt=19081/20121)  2009    NON HODGKINS LYMPHOMA/RADIATION THERAPY 8/10    Bautista localization wire 1 site right (cpt=19281)  01/2020    papilloma    Needle biopsy right  11/2019    papilloma    Other surgical history  2008    neuroplasty w/ transposition of ulnar nerve at elbow left                         Other surgical history  2009    uterine myomectomy for uterine fibroids    Other surgical history  2018    R breast bx (x3)    Revise median n/carpal tunnel surg      laterality and bilateral    Tonsillectomy  2000    w/ adenoidectomy    Upper gi endoscopy,exam  2010       Gynecological History:  Pt is nulliparous.  She achieved menarche at age 8 and LMP     She has history of oral contraceptive use for unknown years, currently using.  She denies infertility treatment to achieve pregnancy.    Medications:    Current Outpatient Medications on File Prior to Visit   Medication Sig Dispense Refill    Tirzepatide (MOUNJARO) 15 MG/0.5ML Subcutaneous Solution Auto-injector Inject 2 mL into the skin once a week. INJECT 15 MG INTO THE SKIN ONCE A WEEK FOR 4 DOSES 2 mL 2    albuterol 108 (90 Base) MCG/ACT Inhalation Aero Soln Inhale 1 puff into the lungs every 8 (eight) hours as needed for Wheezing.      Bioflavonoid Products (QUERCETIN COMPLEX IMMUNE OR) Take by mouth.      estradiol 0.1 MG/GM Vaginal Cream Place 1 g vaginally daily. Daily  for 14 days, then twice a week 1 each 5    azelastine 0.1 % Nasal Solution 2 sprays by Nasal route 2 (two) times daily as needed for Rhinitis. 1 each 1    clobetasol 0.05 % External Ointment       valACYclovir 500 MG Oral Tab 1 pill PO BID For 3 days as needed 30 tablet 1    Polyethylene Glycol 3350 (MIRALAX) 17 g Oral Powd Pack Take 17 g by mouth daily.      Omeprazole 40 MG Oral Capsule Delayed Release Take 1 capsule (40 mg total) by mouth 2 (two) times daily before meals.      Multiple Vitamins-Minerals (MULTI-VITAMIN/MINERALS) Oral Tab Take 1 tablet by mouth daily.      Probiotic Product (PROBIOTIC DAILY) Oral Cap Take 1 capsule by mouth daily.      Cholecalciferol (VITAMIN D) 2000 units Oral Cap Take 1 capsule (2,000 Units total) by mouth daily.      Fluticasone Propionate (FLONASE) 50 MCG/ACT Nasal Suspension 2 sprays in each nostril daily as needed 1 Bottle 0    cetirizine 10 MG Oral Tab Take 0.5 tablets (5 mg total) by mouth daily.      TURMERIC OR Take by mouth.       No current facility-administered medications on file prior to visit.       Allergies:    Allergies   Allergen Reactions    Guaifenesin Er JITTERY    Mucinex JITTERY    Sudafed JITTERY       Family History:   Family History   Problem Relation Age of Onset    Anemia Mother     Diabetes Mother     Other (Other) Mother     Hypertension Father     Cancer Father         lung and prostate    Heart Attack Father         transient Ischemic Attack    Cancer Maternal Grandfather         brain    Cancer Maternal Aunt         Breast CA hx, and colon    Breast Cancer Maternal Aunt 53        IN HER 50'S  NOT SURE OF EXACT AGE    Breast Cancer Maternal Aunt 55    Cancer Paternal Aunt         Lung CA    Cancer Self 41        Predominant lymphocyte hodgkins lymphoma    Breast Cancer Paternal Cousin Female 50    BRCA gene + Niece        She is not of Ashkenazi Worship ancestry.    Social History:  History   Alcohol Use Not Currently     Comment: Occasionally        History   Smoking Status    Never   Smokeless Tobacco    Never   The patient is single.  She has no children.  She is employed full-time.    Review of Systems:  General:   The patient denies, fever, chills, night sweats, fatigue, generalized weakness, change in appetite or weight loss.    HEENT:     The patient denies eye irritation, cataracts, redness, glaucoma, yellowing of the eyes, change in vision or color blindness. The patient denies hearing loss, ringing in the ears, ear drainage, earaches, +nasal congestion, nose bleeds, +snoring, pain in mouth/throat, hoarseness, change in voice, facial trauma.    Respiratory:  The patient denies chronic cough, phlegm, hemoptysis, pleurisy/chest pain, pneumonia, asthma, wheezing, difficulty in breathing with exertion, emphysema, chronic bronchitis, shortness of breath or abnormal sound when breathing.     Cardiovascular:  There is no history of chest pain, chest pressure/discomfort, palpitations, irregular heartbeat, fainting or near-fainting, difficulty breathing when lying flat, SOB/Coughing at night, swelling of the legs or chest pain while walking.    Breasts:  See history of present illness    Gastrointestinal:     There is no history of difficulty or pain with swallowing, +reflux symptoms, vomiting, dark or bloody stools,+ constipation, yellowing of the skin, indigestion, nausea, change in bowel habits, diarrhea, abdominal pain or vomiting blood.     Genitourinary:  The patient denies frequent urination, needing to get up at night to urinate, urinary hesitancy or retaining urine, painful urination, urinary incontinence, decreased urine stream, blood in the urine or vaginal/penile discharge.    Skin:    The patient denies rash, itching, skin lesions, dry skin, change in skin color or change in moles.     Hematologic/Lymphatic:  The patient denies easily bruising or bleeding or persistent swollen glands or lymph nodes.     Musculoskeletal:  The patient denies  muscle aches/pain, +joint pain, +stiff joints, neck pain, +back pain or bone pain.    Neuropsychiatric:  There is no history of migraines or severe headaches, seizure/epilepsy, speech problems, coordination problems, trembling/tremors, fainting/black outs, dizziness, memory problems, loss of sensation/numbness, problems walking, weakness, +tingling or burning in hands/feet. There is no history of abusive relationship, bipolar disorder, sleep disturbance,+ anxiety, +depression or feeling of despair.    Endocrine:    There is no history of poor/slow wound healing, weight loss/gain, fertility or hormone problems, cold intolerance, thyroid disease.     Allergic/Immunologic:  There is no history of hives, hay fever, angioedema or anaphylaxis.    /74 (BP Location: Left arm, Patient Position: Sitting, Cuff Size: large)   Pulse 78   Temp 97 °F (36.1 °C) (Temporal)   Resp 18   Wt 110.7 kg (244 lb)   LMP 04/06/2023   SpO2 97%   BMI 43.92 kg/m²     Physical Examination:  This is a well-nourished, alert and oriented woman. There is not palpable cervical, supraclavicular or axillary adenopathy.  The neck is supple with a midline trachea and no thyromegaly. Range of motion is good at both shoulders. Breasts are symmetrical. The tumorectomy site is in the upper outer right  breast and shows no evidence of local recurrence. Both nipples are everted with no discharge. There is not dominant masses, focal nodularity or skin changes on either side. The abdomen is soft, flat and nontender, with no palpable masses or organomegaly.    Impression:   Ms. Lizette Camp is a 54 year old woman presents with a personal history of Hodgkin's disease status post left axillary surgery and radiation with new right breast imaging detected calcifications ×3 clusters s/p benign biopsy with incidental radial scar and now MRI detected right breast papilloma status post surgical excision.    Discussion and Plan:  I had a discussion with the  Patient regarding her breast exam. On exam today I found her to have no signs of new or recurrent disease.  I personally reviewed her prior imaging which was unremarkable. We discussed in light of her personal history of radiation for her Hodgkin's disease, she is at high risk for development of breast cancer in the future.  In light of this, we discussed that MRI has been found to increase cancer detection rates in this population therefore I recommended this be alternated with her mammogram at six-month intervals for high risk surveillance.  We will plan for a mammogram October 2025.  We will also plan for high risk surveillance MRI in May 2025.  She will follow up annually for a clinical exam. She was encouraged to contact the office with any questions or concerns prior to her next scheduled appointment.     This encounter lasted a total of 25 minutes, more than 50% of which was dedicated to the discussion of management options.

## 2025-01-06 ENCOUNTER — OFFICE VISIT (OUTPATIENT)
Dept: INTERNAL MEDICINE CLINIC | Facility: CLINIC | Age: 55
End: 2025-01-06
Payer: COMMERCIAL

## 2025-01-06 VITALS
SYSTOLIC BLOOD PRESSURE: 130 MMHG | HEIGHT: 62.99 IN | OXYGEN SATURATION: 96 % | DIASTOLIC BLOOD PRESSURE: 70 MMHG | BODY MASS INDEX: 45.5 KG/M2 | TEMPERATURE: 98 F | HEART RATE: 80 BPM | WEIGHT: 256.81 LBS

## 2025-01-06 DIAGNOSIS — L60.9 NAIL PROBLEM: Primary | ICD-10-CM

## 2025-01-06 DIAGNOSIS — J30.9 CHRONIC ALLERGIC RHINITIS: ICD-10-CM

## 2025-01-06 DIAGNOSIS — I10 ESSENTIAL HYPERTENSION, BENIGN: ICD-10-CM

## 2025-01-06 DIAGNOSIS — E11.9 CONTROLLED TYPE 2 DIABETES MELLITUS WITHOUT COMPLICATION, WITHOUT LONG-TERM CURRENT USE OF INSULIN (HCC): ICD-10-CM

## 2025-01-06 DIAGNOSIS — E66.813 CLASS 3 SEVERE OBESITY DUE TO EXCESS CALORIES WITH SERIOUS COMORBIDITY AND BODY MASS INDEX (BMI) OF 40.0 TO 44.9 IN ADULT (HCC): ICD-10-CM

## 2025-01-06 DIAGNOSIS — G47.33 OSA ON CPAP: ICD-10-CM

## 2025-01-06 DIAGNOSIS — K21.00 GASTROESOPHAGEAL REFLUX DISEASE WITH ESOPHAGITIS, UNSPECIFIED WHETHER HEMORRHAGE: ICD-10-CM

## 2025-01-06 DIAGNOSIS — E66.01 CLASS 3 SEVERE OBESITY DUE TO EXCESS CALORIES WITH SERIOUS COMORBIDITY AND BODY MASS INDEX (BMI) OF 40.0 TO 44.9 IN ADULT (HCC): ICD-10-CM

## 2025-01-06 PROCEDURE — 90656 IIV3 VACC NO PRSV 0.5 ML IM: CPT | Performed by: INTERNAL MEDICINE

## 2025-01-06 PROCEDURE — G2211 COMPLEX E/M VISIT ADD ON: HCPCS | Performed by: INTERNAL MEDICINE

## 2025-01-06 PROCEDURE — 99214 OFFICE O/P EST MOD 30 MIN: CPT | Performed by: INTERNAL MEDICINE

## 2025-01-06 PROCEDURE — 90471 IMMUNIZATION ADMIN: CPT | Performed by: INTERNAL MEDICINE

## 2025-01-06 PROCEDURE — 3075F SYST BP GE 130 - 139MM HG: CPT | Performed by: INTERNAL MEDICINE

## 2025-01-06 PROCEDURE — 3008F BODY MASS INDEX DOCD: CPT | Performed by: INTERNAL MEDICINE

## 2025-01-06 PROCEDURE — 3078F DIAST BP <80 MM HG: CPT | Performed by: INTERNAL MEDICINE

## 2025-01-06 NOTE — PROGRESS NOTES
Patient Office Visit    ASSESSMENT AND PLAN:   1. Nail problem  Note: Recommended to let the nail grow as the blood blister will improve with time.  The nail is no longer painful.    2. Controlled type 2 diabetes mellitus without complication, without long-term current use of insulin ([facility])  Note: Continue Mounjaro and will obtain a urine test as well as an A1c  - Microalb/Creat Ratio, Random Urine; Future    3. Class 3 severe obesity due to excess calories with serious comorbidity and body mass index (BMI) of 40.0 to 44.9 in adult ([facility])  Note: Continue Mounjaro and recommended to add diet and lifestyle modifications    4. Essential hypertension, benign  Note: Controlled off medicine    5. Chronic allergic rhinitis  Note: Recommended to restart Flonase and she can also try Vaseline and saline rinses to see if that helps prevent further sinus infections    6. ANAHY on CPAP  Note: Continue compliance    7. Gastroesophageal reflux disease with esophagitis, unspecified whether hemorrhage  Note: Continue omeprazole    Return to clinic in 6 months  Flu vaccine provided      Patient/Caregiver Education: Patient/Caregiver Education: There are no barriers to learning. Medical education done. Outcome: Patient verbalizes understanding. Patient is notified to call with any questions, complications, allergies, or worsening or changing symptoms.  Patient is to call with any side effects or complications from the treatments as a result of today.      Reviewed Past Medical History and   Patient Active Problem List   Diagnosis    Reflux esophagitis    Hodgkin's disease ([facility])    Eczema    Essential hypertension, benign    Diabetes mellitus type 2, controlled, without complications ([facility])    Chronic allergic rhinitis    ANAHY on CPAP    Class 3 severe obesity due to excess calories with serious comorbidity and body mass index (BMI) of 40.0 to 44.9 in adult ([facility])    Radial scar of breast right    Postmenopausal bleeding    Vitamin D  deficiency       Orders Placed This Encounter   Procedures    Microalb/Creat Ratio, Random Urine     Standing Status:   Future     Standing Expiration Date:   1/6/2026    Fluzone trivalent vaccine, PF 0.5mL, 6mo+ (53949)     Requested Prescriptions      No prescriptions requested or ordered in this encounter         Ibeth Crawford DO  CC:  Chief Complaint   Patient presents with    Follow - Up         HPI:   Lizette Camp is a 54-year-old female who presents for her 6-month follow-up as well as problem with the nail    Blood blister: She was trying to remove a gel manicure about 3 months ago and noticed blood blister.  It has been moving towards the top of the nail and it is hurting less.  She was just concerned about it and wanted to know if there is anything else that needs to be done  Diabetes/obesity: Stable.  Sinusitis: She gets frequent sinus infections.  She is not taking the Flonase nasal spray and she does have a deviated septum.    Past Medical History:    Abnormal uterine bleeding    Allergic rhinitis    Amenorrhea    Anemia    Anxiety    Arthritis    Cancer (HCC)    Predominant Hodgkin's Lymphoma, REDIAGNOSED THIS YEAR 2023    COVID    Had ferver, cough, congestion, SOB x 6 weeks. No hospitalization or lingering S/S.    Depression    Diabetes (HCC)    Diabetes mellitus (HCC)    Diverticulosis of large intestine    Dysmenorrhea    Esophageal reflux    Essential hypertension    Exposure to medical diagnostic radiation    last dose 8/2012    Fibroids    Genital herpes simplex    High blood pressure    Obesity    ANAHY on CPAP    Osteoarthritis    knees and neck    Sexually transmitted disease    Sleep apnea    CPAP    Visual impairment    glasses       Past Surgical History:   Procedure Laterality Date    Adenoidectomy  1998    Benign biopsy left      Colonoscopy  2010    Colonoscopy  03/2021    Egd N/A 03/01/2021    Procedure: ESOPHAGOGASTRODUODENOSCOPY, COLONOSCOPY, POSSIBLE BIOPSY, POSSIBLE  POLYPECTOMY 11346, 08070;  Surgeon: Candice Chan MD;  [place] [facility]    Hand/finger surgery unlisted      hand incision tendon sheath of a finger    Bautista biopsy stereo nodule 1 site left (cpt=19081)  10yrs  back    ciara    Bautista biopsy stereo nodule 1 site right (cpt=19081)  04/2018    3 SITE, RADIAL SCAR    Bautista biopsy stereo nodule 2 site bilat (cpt=19081/54002)  2009    NON HODGKINS LYMPHOMA/RADIATION THERAPY 8/10    Bautista localization wire 1 site right (cpt=19281)  01/2020    papilloma    Needle biopsy right  11/2019    papilloma    Other surgical history  2008    neuroplasty w/ transposition of ulnar nerve at elbow left                         Other surgical history  2009    uterine myomectomy for uterine fibroids    Other surgical history  2018    R breast bx (x3)    Revise median n/carpal tunnel surg      laterality and bilateral    Tonsillectomy  2000    w/ adenoidectomy    Upper gi endoscopy,exam  2010       Social History:  Social History     Socioeconomic History    Marital status: Single   Occupational History    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   Tobacco Use    Smoking status: Never     Passive exposure: Never    Smokeless tobacco: Never   Vaping Use    Vaping status: Never Used   Substance and Sexual Activity    Alcohol use: Not Currently     Comment: Occasionally    Drug use: No    Sexual activity: Not Currently     Partners: Male   Other Topics Concern    Caffeine Concern Yes    Stress Concern Yes    Weight Concern Yes    Special Diet No    Exercise Yes    Seat Belt Yes     Family History:  Family History   Problem Relation Age of Onset    Anemia Mother     Diabetes Mother     Other (Other) Mother     Hypertension Father     Cancer Father         lung and prostate    Heart Attack Father         transient Ischemic Attack    Cancer Maternal Grandfather         brain    Cancer Maternal Aunt         Breast CA hx, and colon    Breast Cancer Maternal Aunt 53        IN HER 50'S   NOT SURE OF EXACT AGE    Breast Cancer Maternal Aunt 55    Cancer Paternal Aunt         Lung CA    Cancer Self 41        Predominant lymphocyte hodgkins lymphoma    Breast Cancer Paternal Cousin Female 50    BRCA gene + Niece      Allergies:  Allergies[1]  Current Meds:  Medications Ordered Prior to Encounter[2]      REVIEW OF SYSTEMS   Constitutional: no fatigue normal energy no weight changes   HENT: normal sinuses and no mouth issues   Eyes: . normal vision no eye pain   Respiratory: normal respirations no cough   Cardiovascular: no CP, or palpitations   Gastrointestinal: normal bowels and no abd pains   Genitourinary:  normal urination no hematuria, no frequency   Musculoskeletal: no pains in arms/legs, normal range of motion   Skin: no rashes or skin lesions that are new   Neurological:  no weakness, no numbness, normal gait   Hematological:  no bruises or bleeding   Psychiatric/Behavioral: normal mood no anxiety normal behavior     /70 (BP Location: Right arm, Patient Position: Sitting, Cuff Size: large)   Pulse 80   Temp 98.1 °F (36.7 °C) (Temporal)   Ht 5' 2.99\" (1.6 m)   Wt 256 lb 12.8 oz (116.5 kg)   LMP 04/06/2023   SpO2 96%   BMI 45.50 kg/m²     PHYSICAL EXAM:   Constitutional: Vital signs reviewed as noted, well developed, in no acute distress.   HENT: NCAT  Eyes: pupils reactive bilaterally  Cardiovascular: nl s1 s2 no m/r/g  Pulmonary/Chest: CTA bilaterally with no wheezes  Extremities: no pedal edema   Neurological:  no weakness in UE and LE, reflexes are normal  Skin: Healing blood blister noted on the left big toe nail  Psychiatric:normal mood              [1]   Allergies  Allergen Reactions    Guaifenesin Er JITTERY    Mucinex JITTERY    Sudafed JITTERY   [2]   Current Outpatient Medications on File Prior to Visit   Medication Sig Dispense Refill    Tirzepatide (MOUNJARO) 15 MG/0.5ML Subcutaneous Solution Auto-injector Inject 2 mL into the skin once a week. INJECT 15 MG INTO THE SKIN  ONCE A WEEK FOR 4 DOSES 2 mL 2    albuterol 108 (90 Base) MCG/ACT Inhalation Aero Soln Inhale 1 puff into the lungs every 8 (eight) hours as needed for Wheezing.      Bioflavonoid Products (QUERCETIN COMPLEX IMMUNE OR) Take by mouth.      azelastine 0.1 % Nasal Solution 2 sprays by Nasal route 2 (two) times daily as needed for Rhinitis. 1 each 1    clobetasol 0.05 % External Ointment       valACYclovir 500 MG Oral Tab 1 pill PO BID For 3 days as needed 30 tablet 1    Polyethylene Glycol 3350 (MIRALAX) 17 g Oral Powd Pack Take 17 g by mouth daily.      TURMERIC OR Take by mouth.      Omeprazole 40 MG Oral Capsule Delayed Release Take 1 capsule (40 mg total) by mouth 2 (two) times daily before meals.      Multiple Vitamins-Minerals (MULTI-VITAMIN/MINERALS) Oral Tab Take 1 tablet by mouth daily.      Probiotic Product (PROBIOTIC DAILY) Oral Cap Take 1 capsule by mouth daily.      Cholecalciferol (VITAMIN D) 2000 units Oral Cap Take 1 capsule (2,000 Units total) by mouth daily.      Fluticasone Propionate (FLONASE) 50 MCG/ACT Nasal Suspension 2 sprays in each nostril daily as needed 1 Bottle 0    cetirizine 10 MG Oral Tab Take 0.5 tablets (5 mg total) by mouth daily.       No current facility-administered medications on file prior to visit.

## 2025-01-06 NOTE — PATIENT INSTRUCTIONS
The nail will continue to grow and the blood blister will resolve.  Let me know if the pain gets worse    I will see you back in 6 months and blood work has already been ordered    You received the flu vaccine today and it may cause soreness of the arm for 24 hours.  You may take some Tylenol for pain relief

## 2025-01-26 ENCOUNTER — HOSPITAL ENCOUNTER (OUTPATIENT)
Age: 55
Discharge: HOME | End: 2025-01-26
Attending: EMERGENCY MEDICINE
Payer: COMMERCIAL

## 2025-01-26 VITALS
HEART RATE: 86 BPM | HEIGHT: 62 IN | WEIGHT: 238 LBS | RESPIRATION RATE: 18 BRPM | TEMPERATURE: 99 F | DIASTOLIC BLOOD PRESSURE: 67 MMHG | SYSTOLIC BLOOD PRESSURE: 160 MMHG | BODY MASS INDEX: 43.79 KG/M2 | OXYGEN SATURATION: 99 %

## 2025-01-26 DIAGNOSIS — B34.9 VIRAL SYNDROME: Primary | ICD-10-CM

## 2025-01-26 LAB
POCT INFLUENZA A: NEGATIVE
POCT INFLUENZA B: NEGATIVE
SARS-COV-2 RNA RESP QL NAA+PROBE: NOT DETECTED

## 2025-01-26 PROCEDURE — 87502 INFLUENZA DNA AMP PROBE: CPT | Performed by: EMERGENCY MEDICINE

## 2025-01-26 PROCEDURE — 99213 OFFICE O/P EST LOW 20 MIN: CPT

## 2025-01-26 RX ORDER — ALBUTEROL SULFATE 90 UG/1
2 INHALANT RESPIRATORY (INHALATION) EVERY 4 HOURS PRN
Qty: 1 EACH | Refills: 0 | Status: SHIPPED | OUTPATIENT
Start: 2025-01-26 | End: 2025-02-25

## 2025-01-26 NOTE — DISCHARGE INSTRUCTIONS
Push fluids  Rest  Tylenol alternating with ibuprofen every 3-4 hours as needed for fever/chills/body ache  Over-the-counter daytime/nighttime cold and flu medications may help  Albuterol inhaler with spacer-2 puffs every 4-6 hours may help with cough    Contact your primary care doctor today to arrange close follow-up

## 2025-01-26 NOTE — ED PROVIDER NOTES
Patient Seen in: [facility]      History   No chief complaint on file.    Stated Complaint: Cold Symp    Subjective:   HPI      Patient with a history of hypertension, obesity, diabetes, sleep apnea among others.   Patient reports symptoms started just less than 2 days ago.  She has had stuffy nose, congestion, fevers feeling, chills, ache, and cough.  No vomiting but there has been some diarrhea.    Objective:     Past Medical History:    Abnormal uterine bleeding    Allergic rhinitis    Amenorrhea    Anemia    Anxiety    Arthritis    Cancer (HCC)    Predominant Hodgkin's Lymphoma, REDIAGNOSED THIS YEAR 2023    COVID    Had ferver, cough, congestion, SOB x 6 weeks. No hospitalization or lingering S/S.    Depression    Diabetes (HCC)    Diabetes mellitus (HCC)    Diverticulosis of large intestine    Dysmenorrhea    Esophageal reflux    Essential hypertension    Exposure to medical diagnostic radiation    last dose 8/2012    Fibroids    Genital herpes simplex    High blood pressure    Obesity    ANAHY on CPAP    Osteoarthritis    knees and neck    Sexually transmitted disease    Sleep apnea    CPAP    Visual impairment    glasses              Past Surgical History:   Procedure Laterality Date    Adenoidectomy  1998    Benign biopsy left      Colonoscopy  2010    Colonoscopy  03/2021    Egd N/A 03/01/2021    Procedure: ESOPHAGOGASTRODUODENOSCOPY, COLONOSCOPY, POSSIBLE BIOPSY, POSSIBLE POLYPECTOMY 94451, 75813;  Surgeon: Candice Chan MD;  [place] [facility] [place] [facility] [place]    Hand/finger surgery unlisted      hand incision tendon sheath of a finger    Bautista biopsy stereo nodule 1 site left (cpt=19081)  10yrs  back    ciara    Bautista biopsy stereo nodule 1 site right (cpt=19081)  04/2018    3 SITE, RADIAL SCAR    Bautista biopsy stereo nodule 2 site bilat (cpt=19081/98654)  2009    NON HODGKINS LYMPHOMA/RADIATION THERAPY 8/10    Bautista localization wire 1 site right (cpt=19281)  01/2020    papilloma    Needle biopsy  right  11/2019    papilloma    Other surgical history  2008    neuroplasty w/ transposition of ulnar nerve at elbow left                         Other surgical history  2009    uterine myomectomy for uterine fibroids    Other surgical history  2018    R breast bx (x3)    Revise median n/carpal tunnel surg      laterality and bilateral    Tonsillectomy  2000    w/ adenoidectomy    Upper gi endoscopy,exam  2010                Social History     Socioeconomic History    Marital status: Single   Occupational History    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  Constitutional and vital signs reviewed.      All other systems reviewed and negative except as noted above.    Physical Exam     ED Triage Vitals [01/26/25 0830]   /67   Pulse 86   Resp 18   Temp 98.9 °F (37.2 °C)   Temp src Oral   SpO2 99 %   O2 Device None (Room air)       Current Vitals:   Vital Signs  BP: 160/67  Pulse: 86  Resp: 18  Temp: 98.9 °F (37.2 °C)  Temp src: Oral    Oxygen Therapy  SpO2: 99 %  O2 Device: None (Room air)        Physical Exam  General: The patient is awake, alert, conversant.  She has an occasional bronchospastic sounding cough  Eyes: sclera white, conjunctiva pink and moist.  Lids and lashes are normal.  Ears: TMs are normal bilaterally  Throat: Posterior pharynx is mildly erythematous without exudate  Lungs: Clear to auscultation bilaterally.  No rhonchi or rales.  Skin: Not  particularly pale  Neurologic:  Mental status as above.  Patient moves all extremities with good strength and coordination.        ED Course     Labs Reviewed   POCT FLU TEST - Normal    Narrative:     This assay is a rapid molecular in vitro test utilizing nucleic acid amplification of influenza A and B viral RNA.   RAPID SARS-COV-2 BY PCR - Normal                   MDM     Patient symptoms suggest viral process.  Flu and COVID include the differential  Patient does have some bronchospasm on exam.  She has needed an inhaler in the past.  She may benefit from bronchodilator albuterol  In addition, I recommended supportive care.    Flu swab negative  COVID test negative    Push fluids  Rest  Tylenol alternating with ibuprofen every 3-4 hours as needed for fever/chills/body ache  Over-the-counter daytime/nighttime cold and flu medications may help  Albuterol inhaler with spacer-2 puffs every 4-6 hours may help with cough    Contact your primary care doctor today to arrange close follow-up      Medical Decision Making      Disposition and Plan     Clinical Impression:  1. Viral syndrome         Disposition:  Discharge  1/26/2025  9:14 am    Follow-up:  Ibeth Crawford DO  [address] [facility] [facility] [address]  [facility] [id number] [phone]    Call in 1 day            Medications Prescribed:  Current Discharge Medication List        START taking these medications    Details   !! albuterol 108 (90 Base) MCG/ACT Inhalation Aero Soln Inhale 2 puffs into the lungs every 4 (four) hours as needed for Wheezing.  Qty: 1 each, Refills: 0       !! - Potential duplicate medications found. Please discuss with provider.              Supplementary Documentation:

## 2025-02-12 ENCOUNTER — TELEMEDICINE (OUTPATIENT)
Dept: INTERNAL MEDICINE CLINIC | Facility: CLINIC | Age: 55
End: 2025-02-12
Payer: COMMERCIAL

## 2025-02-12 DIAGNOSIS — J01.01 ACUTE RECURRENT MAXILLARY SINUSITIS: Primary | ICD-10-CM

## 2025-02-12 PROCEDURE — 98004 SYNCH AUDIO-VIDEO EST SF 10: CPT | Performed by: INTERNAL MEDICINE

## 2025-02-12 NOTE — PROGRESS NOTES
Virtual Telephone Check-In    This visit is conducted using Telemedicine with live, interactive video and audio. Patient resides in [place]   Patient understands and accepts financial responsibility for any deductible, co-insurance and/or co-pays associated with this service.    [facility]  Telehealth Verbal Consent   I conducted a telehealth visit with ARIANE on 2/12/2025   which was completed using two-way, real-time interactive audio and video  communication. This has been done in good arin to provide continuity of care in the best interest of the provider-patient relationship, due to the COVID -19 public health crisis/national emergency where restrictions of face-to-face office visits are ongoing. Every conscious effort was taken to allow for sufficient and adequate time to complete the visit.  The patient was made aware of the limitations of the telehealth visit, including treatment limitations as no physical exam could be performed.  The patient was advised to call 911 or to go to the ER in case there was an emergency.  The patient was also advised of the potential privacy & security concerns related to the telehealth platform.   The patient was made aware of where to find [facility]'s notice of privacy practices, telehealth consent form and other related consent forms and documents.  which are located on the [facility] website. The patient verbally agreed to telehealth consent form, related consents and the risks discussed.    Lastly, the patient confirmed that they were in [place].   Included in this visit, time may have been spent reviewing labs, medications, radiology tests and decision making. Appropriate medical decision-making and tests are ordered as detailed in the plan of care above.  Coding/billing information is submitted for this visit based on complexity of care and/or time spent for the visit.  Time spent: 10 minutes   HPI: Lizette Camp is a 55-year-old female who is calling about  a sinus infection.  She was recently seen in the urgent care about 2 weeks ago and flu/COVID was negative.  She was given an inhaler which helped for a little bit but now her symptoms are getting worse and she is getting a lot of sinus tenderness.    ROS:  General: Feels well overall  Skin: Denies any unusual skin lesions  Eyes: Denies blurred vision or double vision  All systems negative, except for above  Physical:  GENERAL: Alert and oriented, well developed, well nourished,in no apparent distress  SKIN: no rashes,no suspicious lesions on face  LUNGS: no audible wheezing  PSYCH: pleasant, appropriate mood and affect    Assessment and Plan  1. Acute recurrent maxillary sinusitis  Note: As symptoms have been persisting for more than 2 weeks will start antibiotics.  Patient is aware of side effects and if no improvement will consider prednisone in the future  - amoxicillin clavulanate 875-125 MG Oral Tab; Take 1 tablet by mouth 2 (two) times daily for 7 days.  Dispense: 14 tablet; Refill: 0    Follow up in 7 to 10 days or sooner if symptoms do not improve or worsen   Ibeth Crawford DO

## 2025-02-28 DIAGNOSIS — E11.9 CONTROLLED TYPE 2 DIABETES MELLITUS WITHOUT COMPLICATION, WITHOUT LONG-TERM CURRENT USE OF INSULIN (HCC): ICD-10-CM

## 2025-03-03 NOTE — TELEPHONE ENCOUNTER
Requesting   Requested Prescriptions     Pending Prescriptions Disp Refills    MOUNJARO 15 MG/0.5ML Subcutaneous Solution Auto-injector [Pharmacy Med Name: Mounjaro Subcutaneous Solution Auto-injector 15 MG/0.5ML] 2 mL 0     Sig: INJECT 15 MG INTO THE SKIN ONCE A WEEK FOR 4 DOSES      LOV: 11/25/24  RTC: 3-6  Last Relevant Labs:   Filled: 11/26/24 #2ml with 2 refills    Future Appointments   Date Time Provider Department Center   4/25/2025  5:00 PM  MR RM2 (1.5T WIDE)  [facility]   6/10/2025 11:30 AM Nik Pratt MD NP [facility]   6/11/2025 11:40 AM Kay Cooper APRN [facility] [address]   10/10/2025  1:40 PM BB[facility] RM1 [facility]   12/5/2025  8:15 AM Silvia Small MD [facility] Surg[facility]Onc   12/9/2025  9:00 AM Kay Cooper APRN [facility]

## 2025-03-04 RX ORDER — TIRZEPATIDE 15 MG/.5ML
INJECTION, SOLUTION SUBCUTANEOUS
Qty: 2 ML | Refills: 0 | Status: SHIPPED | OUTPATIENT
Start: 2025-03-04

## 2025-03-08 ENCOUNTER — LAB ENCOUNTER (OUTPATIENT)
Dept: LAB | Age: 55
End: 2025-03-08
Attending: INTERNAL MEDICINE
Payer: COMMERCIAL

## 2025-03-08 DIAGNOSIS — E11.9 CONTROLLED TYPE 2 DIABETES MELLITUS WITHOUT COMPLICATION, WITHOUT LONG-TERM CURRENT USE OF INSULIN (HCC): ICD-10-CM

## 2025-03-08 DIAGNOSIS — I10 ESSENTIAL HYPERTENSION, BENIGN: ICD-10-CM

## 2025-03-08 DIAGNOSIS — Z00.00 ROUTINE PHYSICAL EXAMINATION: ICD-10-CM

## 2025-03-08 LAB
ANION GAP SERPL CALC-SCNC: 7 MMOL/L (ref 0–18)
BUN BLD-MCNC: 12 MG/DL (ref 9–23)
CALCIUM BLD-MCNC: 9.8 MG/DL (ref 8.7–10.6)
CHLORIDE SERPL-SCNC: 107 MMOL/L (ref 98–112)
CHOLEST SERPL-MCNC: 164 MG/DL (ref ?–200)
CO2 SERPL-SCNC: 32 MMOL/L (ref 21–32)
CREAT BLD-MCNC: 1.16 MG/DL
CREAT UR-SCNC: 204.4 MG/DL
EGFRCR SERPLBLD CKD-EPI 2021: 55 ML/MIN/1.73M2 (ref 60–?)
EST. AVERAGE GLUCOSE BLD GHB EST-MCNC: 114 MG/DL (ref 68–126)
FASTING PATIENT LIPID ANSWER: YES
FASTING STATUS PATIENT QL REPORTED: YES
GLUCOSE BLD-MCNC: 92 MG/DL (ref 70–99)
HBA1C MFR BLD: 5.6 % (ref ?–5.7)
HDLC SERPL-MCNC: 64 MG/DL (ref 40–59)
LDLC SERPL CALC-MCNC: 82 MG/DL (ref ?–100)
MICROALBUMIN UR-MCNC: 0.4 MG/DL
MICROALBUMIN/CREAT 24H UR-RTO: 2 UG/MG (ref ?–30)
NONHDLC SERPL-MCNC: 100 MG/DL (ref ?–130)
OSMOLALITY SERPL CALC.SUM OF ELEC: 301 MOSM/KG (ref 275–295)
POTASSIUM SERPL-SCNC: 4.2 MMOL/L (ref 3.5–5.1)
SODIUM SERPL-SCNC: 146 MMOL/L (ref 136–145)
TRIGL SERPL-MCNC: 102 MG/DL (ref 30–149)
VLDLC SERPL CALC-MCNC: 16 MG/DL (ref 0–30)

## 2025-03-08 PROCEDURE — 82043 UR ALBUMIN QUANTITATIVE: CPT

## 2025-03-08 PROCEDURE — 80048 BASIC METABOLIC PNL TOTAL CA: CPT

## 2025-03-08 PROCEDURE — 83036 HEMOGLOBIN GLYCOSYLATED A1C: CPT

## 2025-03-08 PROCEDURE — 80061 LIPID PANEL: CPT

## 2025-03-08 PROCEDURE — 82570 ASSAY OF URINE CREATININE: CPT

## 2025-03-08 PROCEDURE — 36415 COLL VENOUS BLD VENIPUNCTURE: CPT

## 2025-03-10 ENCOUNTER — PATIENT MESSAGE (OUTPATIENT)
Dept: INTERNAL MEDICINE CLINIC | Facility: CLINIC | Age: 55
End: 2025-03-10

## 2025-04-01 ENCOUNTER — OFFICE VISIT (OUTPATIENT)
Facility: LOCATION | Age: 55
End: 2025-04-01
Payer: COMMERCIAL

## 2025-04-01 VITALS — HEIGHT: 62 IN | WEIGHT: 238 LBS | BODY MASS INDEX: 43.79 KG/M2

## 2025-04-01 DIAGNOSIS — J37.0 CHRONIC FUNGAL LARYNGITIS: ICD-10-CM

## 2025-04-01 DIAGNOSIS — B49 CHRONIC FUNGAL LARYNGITIS: ICD-10-CM

## 2025-04-01 DIAGNOSIS — J30.1 SEASONAL ALLERGIC RHINITIS DUE TO POLLEN: ICD-10-CM

## 2025-04-01 DIAGNOSIS — R49.0 DYSPHONIA: Primary | ICD-10-CM

## 2025-04-01 PROCEDURE — 31575 DIAGNOSTIC LARYNGOSCOPY: CPT | Performed by: STUDENT IN AN ORGANIZED HEALTH CARE EDUCATION/TRAINING PROGRAM

## 2025-04-01 PROCEDURE — 99204 OFFICE O/P NEW MOD 45 MIN: CPT | Performed by: STUDENT IN AN ORGANIZED HEALTH CARE EDUCATION/TRAINING PROGRAM

## 2025-04-01 PROCEDURE — 3008F BODY MASS INDEX DOCD: CPT | Performed by: STUDENT IN AN ORGANIZED HEALTH CARE EDUCATION/TRAINING PROGRAM

## 2025-04-01 RX ORDER — FLUCONAZOLE 200 MG/1
200 TABLET ORAL DAILY
Qty: 14 TABLET | Refills: 0 | Status: SHIPPED | OUTPATIENT
Start: 2025-04-01 | End: 2025-04-15

## 2025-04-01 RX ORDER — MONTELUKAST SODIUM 10 MG/1
10 TABLET ORAL NIGHTLY
Qty: 30 TABLET | Refills: 3 | Status: SHIPPED | OUTPATIENT
Start: 2025-04-01

## 2025-04-01 NOTE — PROGRESS NOTES
[facility]  OTOLARYNGOLOGY - HEAD & NECK SURGERY    4/1/2025     Reason for Consultation:   Dysphonia    History of Present Illness:   Patient is a pleasant 55 year old female who is being seen for dysphonia which she has had over the past 2 months.  The patient states that previously when she has gotten sick she does develop laryngitis but usually goes away fairly quickly.  The patient states that over the past 2 months she has had laryngitis which started after some allergy symptoms.  She feels that when she for starts talking throughout the day her voice is not bad but it does fatigue and then starts to break.  She states that when she talks for long periods of time she starts to get pain, but otherwise does not have any pain with swallowing or drinking.  She is a non-smoker.  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  She does notice some improvement over the weekends when she is not utilizing her voice constantly.    Past Medical History  Past Medical History:    Abnormal uterine bleeding    Allergic rhinitis    Amenorrhea    Anemia    Anxiety    Arthritis    Cancer (HCC)    Predominant Hodgkin's Lymphoma, REDIAGNOSED THIS YEAR 2023    COVID    Had ferver, cough, congestion, SOB x 6 weeks. No hospitalization or lingering S/S.    Depression    Diabetes (HCC)    Diabetes mellitus (HCC)    Diverticulosis of large intestine    Dysmenorrhea    Esophageal reflux    Essential hypertension    Exposure to medical diagnostic radiation    last dose 8/2012    Fibroids    Genital herpes simplex    High blood pressure    Obesity    ANAHY on CPAP    Osteoarthritis    knees and neck    Sexually transmitted disease    Sleep apnea    CPAP    Visual impairment    glasses       Past Surgical History  Past Surgical History:   Procedure Laterality Date    Adenoidectomy  1998    Benign biopsy left      Colonoscopy  2010    Colonoscopy  03/2021    Egd N/A 03/01/2021    Procedure: ESOPHAGOGASTRODUODENOSCOPY,  COLONOSCOPY, POSSIBLE BIOPSY, POSSIBLE POLYPECTOMY 18084, 44008;  Surgeon: Candice Chan MD;  [facility] [facility] [place] [facility], [facility]    Hand/finger surgery unlisted      hand incision tendon sheath of a finger    Bautista biopsy stereo nodule 1 site left (cpt=19081)  10yrs  back    ciara    Bautista biopsy stereo nodule 1 site right (cpt=19081)  04/2018    3 SITE, RADIAL SCAR    Bautista biopsy stereo nodule 2 site bilat (cpt=19081/74217)  2009    NON HODGKINS LYMPHOMA/RADIATION THERAPY 8/10    Bautista localization wire 1 site right (cpt=19281)  01/2020    papilloma    Needle biopsy right  11/2019    papilloma    Other surgical history  2008    neuroplasty w/ transposition of ulnar nerve at elbow left                         Other surgical history  2009    uterine myomectomy for uterine fibroids    Other surgical history  2018    R breast bx (x3)    Revise median n/carpal tunnel surg      laterality and bilateral    Tonsillectomy  2000    w/ adenoidectomy    Upper gi endoscopy,exam  2010       Family History  Family History   Problem Relation Age of Onset    Anemia Mother     Diabetes Mother     Other (Other) Mother     Hypertension Father     Cancer Father         lung and prostate    Heart Attack Father         transient Ischemic Attack    Cancer Maternal Grandfather         brain    Cancer Maternal Aunt         Breast CA hx, and colon    Breast Cancer Maternal Aunt 53        IN HER 50'S  NOT SURE OF EXACT AGE    Breast Cancer Maternal Aunt 55    Cancer Paternal Aunt         Lung CA    Cancer Self 41        Predominant lymphocyte hodgkins lymphoma    Breast Cancer Paternal Cousin Female 50    BRCA gene + Niece        Social History  Pediatric History   Patient Parents    Not on file     Other Topics Concern     Service Not Asked    Blood Transfusions Not Asked    Caffeine Concern Yes    Occupational Exposure Not Asked    Hobby Hazards Not Asked    Sleep Concern Not Asked    Stress Concern Yes    Weight Concern Yes    Special Diet  No    Back Care Not Asked    Exercise Yes    Bike Helmet Not Asked    Seat Belt Yes    Self-Exams Not Asked   Social History Narrative    Not on file           Current Medications:  Current Outpatient Medications   Medication Sig Dispense Refill    MOUNJARO 15 MG/0.5ML Subcutaneous Solution Auto-injector INJECT 15 MG INTO THE SKIN ONCE A WEEK FOR 4 DOSES 2 mL 0    albuterol 108 (90 Base) MCG/ACT Inhalation Aero Soln Inhale 1 puff into the lungs every 8 (eight) hours as needed for Wheezing.      Bioflavonoid Products (QUERCETIN COMPLEX IMMUNE OR) Take by mouth.      azelastine 0.1 % Nasal Solution 2 sprays by Nasal route 2 (two) times daily as needed for Rhinitis. 1 each 1    clobetasol 0.05 % External Ointment       valACYclovir 500 MG Oral Tab 1 pill PO BID For 3 days as needed 30 tablet 1    Polyethylene Glycol 3350 (MIRALAX) 17 g Oral Powd Pack Take 17 g by mouth daily.      TURMERIC OR Take by mouth.      Omeprazole 40 MG Oral Capsule Delayed Release Take 1 capsule (40 mg total) by mouth 2 (two) times daily before meals.      Multiple Vitamins-Minerals (MULTI-VITAMIN/MINERALS) Oral Tab Take 1 tablet by mouth daily.      Probiotic Product (PROBIOTIC DAILY) Oral Cap Take 1 capsule by mouth daily.      Cholecalciferol (VITAMIN D) 2000 units Oral Cap Take 1 capsule (2,000 Units total) by mouth daily.      Fluticasone Propionate (FLONASE) 50 MCG/ACT Nasal Suspension 2 sprays in each nostril daily as needed 1 Bottle 0    cetirizine 10 MG Oral Tab Take 0.5 tablets (5 mg total) by mouth daily.         Allergies  Allergies[1]    Review of Systems:   A comprehensive 10 point review of systems was completed.  Pertinent positives and negatives noted in the the HPI.    Physical Exam:   Height 5' 2\" (1.575 m), weight 238 lb (108 kg), last menstrual period 04/06/2023, not currently breastfeeding.    GENERAL: No acute distress, Comfortable appearing  FACE: HB 1/6, Normal Animation  HEAD: Normocephalic  EYES: EOMI, pupils  equil  EARS: Bilateral Auricles Symmetric, bilateral tympanic membranes appear normal  NOSE: Nares patent bilaterally  ORAL CAVITY: Tongue mobile with some thrush noted on the dorsal surface, Oropharynx clear with surgically absent tonsils , Floor of mouth clear, Posterior oropharynx normal  NECK: No palpable lymphadenopathy, thyroid not palpable, nontender    PROCEDURE: FLEXIBLE FIBEROPTIC LARYNGOSCOPY (07578)    Due to inability for adequate examination of the larynx and need for magnification to perform the examination, endoscopy was performed.  Risks and benefits were discussed with patient/family and they have given verbal consent to proceed.    Procedure Detail & Findings:     After placement of topical anesthetic intranasally the flexible laryngoscope was inserted into the nare and driven through the nasal cavity with no significant abnormal findings noted in the nasal cavities or nasopharynx. The oropharynx, hypopharynx and larynx were subsequently examined and the following findings were noted:    Nasal cavities: There is pale edema of the turbinates, but they are not significantly enlarged.  There is also pale edema of the middle meatal mucosa.  In the nasopharynx there is scarring in the adenoid bed from previous adenoidectomy    Base of Tongue: Normal    Valeculla: Normal    Arytenoids: Normal    Introitus of the esophagus: Normal    Epiglottis: Normal    False vocal cords: Normal    True vocal cords: There is a very small right anterior true vocal cord callus versus fungal debris    Subglottic space: Normal    Mobility of True vocal cords: Normal    Condition: Stable    Complications: Patient tolerated the procedure well with no immediate complication.      Results:     Laboratory Data:  Lab Results   Component Value Date    WBC 5.6 12/10/2024    HGB 13.8 12/10/2024    HCT 41.2 12/10/2024    .0 12/10/2024    CREATSERUM 1.16 (H) 03/08/2025    BUN 12 03/08/2025     (H) 03/08/2025    K 4.2  03/08/2025     03/08/2025    CO2 32.0 03/08/2025    GLU 92 03/08/2025    CA 9.8 03/08/2025    ALB 4.3 12/10/2024    ALKPHO 105 12/10/2024    TP 6.9 12/10/2024    AST 23 12/10/2024    ALT 18 12/10/2024    TSH 0.646 08/20/2021    ESRML 36 (H) 12/12/2023    MG 1.70 03/08/2022    B12 435 05/25/2017    POCGLU 92 07/24/2014         Imaging:  No results found.      Impression:       ICD-10-CM    1. Dysphonia  R49.0       2. Chronic fungal laryngitis  J37.0     B49       3. Seasonal allergic rhinitis due to pollen  J30.1            Recommendations:  The patient has both allergic rhinitis and what I believed to be some fungal laryngitis.  I would like to treat her with fluconazole for 2 weeks.  Additionally would like to start her on Singulair to add to her allergy regiment.  She will return to see me in 4 weeks.  If not improved we may send her to see somebody from the [facility] [place].    Thank you for allowing me to participate in the care of your patient.    Carlo Man, DO   Otolaryngology/Rhinology, Sinus, and Endoscopic Skull Base Surgery  [facility]   [address] [place] [address]  Phone [phone]  Fax [phone]  4/1/2025  8:19 AM  4/1/2025          [1]   Allergies  Allergen Reactions    Guaifenesin Er JITTERY    Mucinex JITTERY    Sudafed JITTERY

## 2025-04-25 DIAGNOSIS — E11.9 CONTROLLED TYPE 2 DIABETES MELLITUS WITHOUT COMPLICATION, WITHOUT LONG-TERM CURRENT USE OF INSULIN (HCC): ICD-10-CM

## 2025-04-25 NOTE — TELEPHONE ENCOUNTER
Requesting   Requested Prescriptions     Pending Prescriptions Disp Refills    Tirzepatide (MOUNJARO) 15 MG/0.5ML Subcutaneous Solution Auto-injector 2 mL 0       LOV: 11/25/2024  RTC: 6/11/2025  Last Relevant Labs: na  Filled: 3/04/2025 #2 ml with 0 refills    Future Appointments   Date Time Provider Department Center   4/29/2025 10:15 AM Carlo Man,  [facility]   5/13/2025  7:00 PM EH MR RM2 (1.5T WIDE)  [facility]   6/10/2025 11:30 AM Nik Pratt MD NP  [facility] [place] [facility]   6/11/2025 11:40 AM Kay Cooper APRN [facility] [address]   10/10/2025  1:40 PM BBK [facility]EL[facility] RM1 [facility] [place]   12/5/2025  8:15 AM Silvia Small MD [facility] Surg/Onc   12/9/2025  9:00 AM Kay Cooper APRN [facility] [address]

## 2025-04-27 RX ORDER — TIRZEPATIDE 15 MG/.5ML
15 INJECTION, SOLUTION SUBCUTANEOUS WEEKLY
Qty: 2 ML | Refills: 1 | Status: SHIPPED | OUTPATIENT
Start: 2025-04-27

## 2025-04-29 ENCOUNTER — OFFICE VISIT (OUTPATIENT)
Facility: LOCATION | Age: 55
End: 2025-04-29
Payer: COMMERCIAL

## 2025-04-29 DIAGNOSIS — J38.2 VOCAL CORD NODULE: Primary | ICD-10-CM

## 2025-04-29 PROCEDURE — 31575 DIAGNOSTIC LARYNGOSCOPY: CPT | Performed by: STUDENT IN AN ORGANIZED HEALTH CARE EDUCATION/TRAINING PROGRAM

## 2025-04-29 PROCEDURE — 99213 OFFICE O/P EST LOW 20 MIN: CPT | Performed by: STUDENT IN AN ORGANIZED HEALTH CARE EDUCATION/TRAINING PROGRAM

## 2025-04-29 NOTE — PROGRESS NOTES
[place]  OTOLARYNGOLOGY - HEAD & NECK SURGERY    4/29/2025     Reason for Consultation:   Dysphonia    History of Present Illness:   Patient is a pleasant 55 year old female who is being seen for dysphonia which she has had over the past 2 months.  The patient states that previously when she has gotten sick she does develop laryngitis but usually goes away fairly quickly.  The patient states that over the past 2 months she has had laryngitis which started after some allergy symptoms.  She feels that when she for starts talking throughout the day her voice is not bad but it does fatigue and then starts to break.  She states that when she talks for long periods of time she starts to get pain, but otherwise does not have any pain with swallowing or drinking.  She is a non-smoker.  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  She does notice some improvement over the weekends when she is not utilizing her voice constantly.    INTERVAL HISTORY  4/29/2025: The patient returns today with persistence of her dysphonia and voice breaking throughout the day.  The patient has finished her antifungal, has been on PPI, and has tried voice rest without any alleviation.  Has not done any speech therapy yet.    Past Medical History  Past Medical History:    Abnormal uterine bleeding    Allergic rhinitis    Amenorrhea    Anemia    Anxiety    Arthritis    Cancer (HCC)    Predominant Hodgkin's Lymphoma, REDIAGNOSED THIS YEAR 2023    COVID    Had ferver, cough, congestion, SOB x 6 weeks. No hospitalization or lingering S/S.    Depression    Diabetes (HCC)    Diabetes mellitus (HCC)    Diverticulosis of large intestine    Dysmenorrhea    Esophageal reflux    Essential hypertension    Exposure to medical diagnostic radiation    last dose 8/2012    Fibroids    Genital herpes simplex    High blood pressure    Obesity    ANAHY on CPAP    Osteoarthritis    knees and neck    Sexually transmitted disease    Sleep apnea     CPAP    Visual impairment    glasses       Past Surgical History  Past Surgical History:   Procedure Laterality Date    Adenoidectomy  1998    Benign biopsy left      Colonoscopy  2010    Colonoscopy  03/2021    Egd N/A 03/01/2021    Procedure: ESOPHAGOGASTRODUODENOSCOPY, COLONOSCOPY, POSSIBLE BIOPSY, POSSIBLE POLYPECTOMY 13556, 17625;  Surgeon: Candice Chan MD;  [facility] [facility]    Hand/finger surgery unlisted      hand incision tendon sheath of a finger    Bautista biopsy stereo nodule 1 site left (cpt=19081)  10yrs  back    ciara    Bautista biopsy stereo nodule 1 site right (cpt=19081)  04/2018    3 SITE, RADIAL SCAR    Bautista biopsy stereo nodule 2 site bilat (cpt=19081/37983)  2009    NON HODGKINS LYMPHOMA/RADIATION THERAPY 8/10    Bautista localization wire 1 site right (cpt=19281)  01/2020    papilloma    Needle biopsy right  11/2019    papilloma    Other surgical history  2008    neuroplasty w/ transposition of ulnar nerve at elbow left                         Other surgical history  2009    uterine myomectomy for uterine fibroids    Other surgical history  2018    R breast bx (x3)    Revise median n/carpal tunnel surg      laterality and bilateral    Tonsillectomy  2000    w/ adenoidectomy    Upper gi endoscopy,exam  2010       Family History  Family History   Problem Relation Age of Onset    Anemia Mother     Diabetes Mother     Other (Other) Mother     Hypertension Father     Cancer Father         lung and prostate    Heart Attack Father         transient Ischemic Attack    Cancer Maternal Grandfather         brain    Cancer Maternal Aunt         Breast CA hx, and colon    Breast Cancer Maternal Aunt 53        IN HER 50'S  NOT SURE OF EXACT AGE    Breast Cancer Maternal Aunt 55    Cancer Paternal Aunt         Lung CA    Cancer Self 41        Predominant lymphocyte hodgkins lymphoma    Breast Cancer Paternal Cousin Female 50    BRCA gene + Niece        Social History  Pediatric History   Patient Parents     Not on file     Other Topics Concern     Service Not Asked    Blood Transfusions Not Asked    Caffeine Concern Yes    Occupational Exposure Not Asked    Hobby Hazards Not Asked    Sleep Concern Not Asked    Stress Concern Yes    Weight Concern Yes    Special Diet No    Back Care Not Asked    Exercise Yes    Bike Helmet Not Asked    Seat Belt Yes    Self-Exams Not Asked   Social History Narrative    Not on file           Current Medications:  Current Outpatient Medications   Medication Sig Dispense Refill    Tirzepatide (MOUNJARO) 15 MG/0.5ML Subcutaneous Solution Auto-injector Inject 15 mg into the skin once a week. 2 mL 1    montelukast 10 MG Oral Tab Take 1 tablet (10 mg total) by mouth nightly. 30 tablet 3    albuterol 108 (90 Base) MCG/ACT Inhalation Aero Soln Inhale 1 puff into the lungs every 8 (eight) hours as needed for Wheezing.      Bioflavonoid Products (QUERCETIN COMPLEX IMMUNE OR) Take by mouth.      azelastine 0.1 % Nasal Solution 2 sprays by Nasal route 2 (two) times daily as needed for Rhinitis. 1 each 1    clobetasol 0.05 % External Ointment       valACYclovir 500 MG Oral Tab 1 pill PO BID For 3 days as needed 30 tablet 1    Polyethylene Glycol 3350 (MIRALAX) 17 g Oral Powd Pack Take 17 g by mouth daily.      TURMERIC OR Take by mouth.      Omeprazole 40 MG Oral Capsule Delayed Release Take 1 capsule (40 mg total) by mouth 2 (two) times daily before meals.      Multiple Vitamins-Minerals (MULTI-VITAMIN/MINERALS) Oral Tab Take 1 tablet by mouth daily.      Probiotic Product (PROBIOTIC DAILY) Oral Cap Take 1 capsule by mouth daily.      Cholecalciferol (VITAMIN D) 2000 units Oral Cap Take 1 capsule (2,000 Units total) by mouth daily.      Fluticasone Propionate (FLONASE) 50 MCG/ACT Nasal Suspension 2 sprays in each nostril daily as needed 1 Bottle 0    cetirizine 10 MG Oral Tab Take 0.5 tablets (5 mg total) by mouth daily.         Allergies  Allergies[1]    Review of Systems:   A  comprehensive 10 point review of systems was completed.  Pertinent positives and negatives noted in the the HPI.    Physical Exam:   Last menstrual period 04/06/2023, not currently breastfeeding.    GENERAL: No acute distress, Comfortable appearing  FACE: HB 1/6, Normal Animation  HEAD: Normocephalic  EYES: EOMI, pupils equil  EARS: Bilateral Auricles Symmetric, bilateral tympanic membranes appear normal  NOSE: Nares patent bilaterally  ORAL CAVITY: Tongue mobile with some thrush noted on the dorsal surface, Oropharynx clear with surgically absent tonsils , Floor of mouth clear, Posterior oropharynx normal  NECK: No palpable lymphadenopathy, thyroid not palpable, nontender    PROCEDURE: FLEXIBLE FIBEROPTIC LARYNGOSCOPY (14471)    Due to inability for adequate examination of the larynx and need for magnification to perform the examination, endoscopy was performed.  Risks and benefits were discussed with patient/family and they have given verbal consent to proceed.    Procedure Detail & Findings:     After placement of topical anesthetic intranasally the flexible laryngoscope was inserted into the nare and driven through the nasal cavity with no significant abnormal findings noted in the nasal cavities or nasopharynx. The oropharynx, hypopharynx and larynx were subsequently examined and the following findings were noted:    Nasal cavities: There is pale edema of the turbinates, but they are not significantly enlarged.  There is also pale edema of the middle meatal mucosa.  In the nasopharynx there is scarring in the adenoid bed from previous adenoidectomy    Base of Tongue: Normal    Valeculla: Normal    Arytenoids: Normal    Introitus of the esophagus: Normal    Epiglottis: Normal    False vocal cords: Normal    True vocal cords: There is a very small left anterior true vocal cord callus    Subglottic space: Normal    Mobility of True vocal cords: Normal    Condition: Stable    Complications: Patient tolerated the  procedure well with no immediate complication.      Results:     Laboratory Data:  Lab Results   Component Value Date    WBC 5.6 12/10/2024    HGB 13.8 12/10/2024    HCT 41.2 12/10/2024    .0 12/10/2024    CREATSERUM 1.16 (H) 03/08/2025    BUN 12 03/08/2025     (H) 03/08/2025    K 4.2 03/08/2025     03/08/2025    CO2 32.0 03/08/2025    GLU 92 03/08/2025    CA 9.8 03/08/2025    ALB 4.3 12/10/2024    ALKPHO 105 12/10/2024    TP 6.9 12/10/2024    AST 23 12/10/2024    ALT 18 12/10/2024    TSH 0.646 08/20/2021    ESRML 36 (H) 12/12/2023    MG 1.70 03/08/2022    B12 435 05/25/2017    POCGLU 92 07/24/2014         Imaging:  No results found.      Impression:       ICD-10-CM    1. Dysphonia  R49.0       2. Chronic fungal laryngitis  J37.0     B49       3. Seasonal allergic rhinitis due to pollen  J30.1            Recommendations:  The patient has not improved with the antifungal.  I do think she has a small callus of the left anterior true vocal cord.  I would like to send her to the [facility] voice [place] for evaluation as she may need microlaryngoscopy with removal of the lesion.    Thank you for allowing me to participate in the care of your patient.    Carlo Man,    Otolaryngology/Rhinology, Sinus, and Endoscopic Skull Base Surgery  [facility] [address] [place] [id number]  Phone [phone]  Fax [phone]  4/1/2025  8:19 AM  4/29/2025          [1]   Allergies  Allergen Reactions    Guaifenesin Er JITTERY    Mucinex JITTERY    Sudafed JITTERY

## 2025-05-13 ENCOUNTER — HOSPITAL ENCOUNTER (OUTPATIENT)
Dept: MRI IMAGING | Facility: HOSPITAL | Age: 55
Discharge: HOME | End: 2025-05-13
Attending: SURGERY
Payer: COMMERCIAL

## 2025-05-13 DIAGNOSIS — Z91.89 AT HIGH RISK FOR BREAST CANCER: ICD-10-CM

## 2025-05-13 PROCEDURE — A9575 INJ GADOTERATE MEGLUMI 0.1ML: HCPCS | Performed by: SURGERY

## 2025-05-13 PROCEDURE — 77049 MRI BREAST C-+ W/CAD BI: CPT | Performed by: SURGERY

## 2025-05-13 RX ORDER — GADOTERATE MEGLUMINE 376.9 MG/ML
20 INJECTION INTRAVENOUS
Status: COMPLETED | OUTPATIENT
Start: 2025-05-13 | End: 2025-05-13

## 2025-05-13 RX ADMIN — GADOTERATE MEGLUMINE 20 ML: 376.9 INJECTION INTRAVENOUS at 19:41:00

## 2025-06-04 ENCOUNTER — PATIENT MESSAGE (OUTPATIENT)
Dept: OBGYN CLINIC | Facility: CLINIC | Age: 55
End: 2025-06-04

## 2025-06-05 NOTE — PROGRESS NOTES
[facility] WEIGHT MANAGEMENT VIRTUAL ENCOUNTER     Lizette Camp verbally consents to a Virtual/Telephone Check-In service on 06/06/25   Patient understands and accepts financial responsibility for any deductible, co-insurance and/or co-pays associated with this service.    HISTORY OF PRESENT ILLNESS  Chief Complaint   Patient presents with    Other     F/u on weight management      Lizette Camp is a 55 year old female is being evaluated as a video visit using Telemedicine with live, interactive video and audio    Weight gain/loss since LOV based on home monitoring:   Home scale: #242lbs  Has lost  # lbs since LOV 7 months ago     Compliance with medication: mounjaro 15mg weekly   Tolerating well, helping with decreasing appetite and no side effects     Feels like she derailed a little, just finished school year (a couple of days ago)   Success: being close to 7%  Challenging: being home for summer   Still doing Jump start your health program   Exercise/Activity: 5-7x/ week, via walking (8,000-9,000)   Nutrition: eating regular meals, +protein, minimal veggies. not tracking reports  Stress is manageable   Sleep: 8 hours/night, waking up feeling rested    Denies chest pain, shortness of breath, dizziness, blurred vision, headache, paresthesia, nausea/vomiting.     Wt Readings from Last 6 Encounters:   04/01/25 238 lb (108 kg)   01/26/25 238 lb (108 kg)   01/06/25 256 lb 12.8 oz (116.5 kg)   12/10/24 240 lb (108.9 kg)   12/06/24 244 lb (110.7 kg)   11/25/24 242 lb (109.8 kg)          Subjective  REVIEW OF SYSTEMS  GENERAL HEALTH: feels well otherwise, denied any fevers chills or night sweats   RESPIRATORY: denies shortness of breath   CARDIOVASCULAR: denies chest pain  GI: denies abdominal pain  PSYCH: denies any mood changes    Objective  EXAM  Reviewed most recent set of vitals   Physical Exam:  GENERAL: well developed, well nourished, in no apparent distress, speaking in full sentences comfortably   SKIN:  warm, pink, dry without rashes to exposed area   EYES: conjunctiva pink  HEENT: atraumatic, normocephalic  LUNGS: normal work of breathing, non labored  CARDIO: normal work, no exertion  EXTREMITIES: no cyanosis, no clubbing, no edema  NEURO: Oriented times three  PSYCH: pleasant, cooperative, normal mood and affect    Lab Results   Component Value Date    WBC 5.6 12/10/2024    RBC 4.58 12/10/2024    HGB 13.8 12/10/2024    HCT 41.2 12/10/2024    MCV 90.0 12/10/2024    MCH 30.1 12/10/2024    MCHC 33.5 12/10/2024    RDW 13.7 12/10/2024    .0 12/10/2024    MPV 11.0 02/01/2013     Lab Results   Component Value Date    GLU 92 03/08/2025    BUN 12 03/08/2025    BUNCREA 11.7 06/28/2023    CREATSERUM 1.16 (H) 03/08/2025    ANIONGAP 7 03/08/2025    GFR 78 08/15/2017    GFRNAA 61 07/21/2022    GFRAA 70 07/21/2022    CA 9.8 03/08/2025    OSMOCALC 301 (H) 03/08/2025    ALKPHO 105 12/10/2024    AST 23 12/10/2024    ALT 18 12/10/2024    BILT 0.4 12/10/2024    TP 6.9 12/10/2024    ALB 4.3 12/10/2024    GLOBULIN 2.6 12/10/2024    AGRATIO 1.5 03/07/2020     (H) 03/08/2025    K 4.2 03/08/2025     03/08/2025    CO2 32.0 03/08/2025     Lab Results   Component Value Date     03/08/2025    A1C 5.6 03/08/2025     Lab Results   Component Value Date    CHOLEST 164 03/08/2025    TRIG 102 03/08/2025    HDL 64 (H) 03/08/2025    LDL 82 03/08/2025    VLDL 16 03/08/2025    TCHDLRATIO 2.4 03/07/2020    NONHDLC 100 03/08/2025     Lab Results   Component Value Date    TSH 0.646 08/20/2021     Lab Results   Component Value Date    B12 435 05/25/2017    VITB12 325 12/28/2011     Lab Results   Component Value Date    VITD 51.6 02/12/2021       Medications Ordered Prior to Encounter[1]    ASSESSMENT  Analyzed weight data:       Diagnoses and all orders for this visit:    Encounter for therapeutic drug monitoring    Class 3 severe obesity due to excess calories with serious comorbidity and body mass index (BMI) of 40.0 to 44.9  in adult    Essential hypertension, benign    NAAHY on CPAP    Controlled type 2 diabetes mellitus without complication, without long-term current use of insulin (HCC)    Low vitamin D level    Vitamin D deficiency    Stress    Lack of exercise        PLAN  Continue with medications: mounjaro 15mg weekly   --advised of side effects and adverse effects of this medication  Contradictions:  topamax (insomnia), stop zonisamide (wasn't working anymore), stop jardance (wanted to get off), stopped metformin, has done phentermine and diethylpropion in the past, ozempic  Reviewed labs  Continue with vitamin d OTC  HTN  stable, follows with PCP  Hx of DM type 2, reviewed last a1c 5.6% on 3/2025- will continue with mounjaro  ANAHY- continue cpap machine nightly   Try to get back to yoga and increase cardio   Continue with jump start your health program- (help with accountability)   Advised to monitor blood pressure and pulse at home/ given parameters to review and contact provider.  Nutrition: low carb diet/ recommended to eat breakfast daily/ regular protein intake  Follow up with dietitian and psychologist as recommended.  Discussed the role of sleep and stress in weight management.  Counseled on comprehensive weight loss plan including attention to nutrition, exercise and behavior/stress management for success. See patient instruction below for more details.  Discussed strategies to overcome barriers to successful weight loss and weight maintenance  FITTE: ACSM recommendations (150-300 minutes/ week in active weight loss)       There are no Patient Instructions on file for this visit.    No follow-ups on file.    Patient verbalizes understanding.    Total time spent on chart review, pre-charting, obtaining history, counseling, and educating, reviewing labs was 23 minutes.       Pt understands phone/video evaluation is not a substitute for face to face examination or emergency care. Pt advised to go to the ER or call 911 for  worsening symptoms or acute distress.       Please note that the following visit was completed using two-way, real-time interactive audio and/or video communication.  This has been done in good arin to provide continuity of care in the best interest of the provider-patient relationship, due to the ongoing public health crisis/national emergency and because of restrictions of visitation.  There are limitations of this visit as no physical exam could be performed.  Every conscious effort was taken to allow for sufficient and adequate time.  This billing was spent on reviewing labs, medications, radiology tests and decision making.  Appropriate medical decision-making and tests are ordered as detailed in the plan of care above.     NOTE TO PATIENT: The 21st Century Cures Act makes clinical notes like these available to patients in the interest of transparency. Clinical notes are medical documents used by physicians and care providers to communicate with each other. These documents include medical language and terminology, abbreviations, and treatment information that may sound technical and at times possibly unfamiliar. In addition, at times, the verbiage may appear blunt or direct. These documents are one tool providers use to communicate relevant information and clinical opinions of the care providers in a way that allows common understanding of the clinical context.     Kay Cooper, APRN  6/5/2025          [1]   Current Outpatient Medications on File Prior to Visit   Medication Sig Dispense Refill    Tirzepatide (MOUNJARO) 15 MG/0.5ML Subcutaneous Solution Auto-injector Inject 15 mg into the skin once a week. 2 mL 1    montelukast 10 MG Oral Tab Take 1 tablet (10 mg total) by mouth nightly. 30 tablet 3    albuterol 108 (90 Base) MCG/ACT Inhalation Aero Soln Inhale 1 puff into the lungs every 8 (eight) hours as needed for Wheezing.      Bioflavonoid Products (QUERCETIN COMPLEX IMMUNE OR) Take by mouth.       azelastine 0.1 % Nasal Solution 2 sprays by Nasal route 2 (two) times daily as needed for Rhinitis. 1 each 1    clobetasol 0.05 % External Ointment       valACYclovir 500 MG Oral Tab 1 pill PO BID For 3 days as needed 30 tablet 1    Polyethylene Glycol 3350 (MIRALAX) 17 g Oral Powd Pack Take 17 g by mouth daily.      TURMERIC OR Take by mouth.      Omeprazole 40 MG Oral Capsule Delayed Release Take 1 capsule (40 mg total) by mouth 2 (two) times daily before meals.      Multiple Vitamins-Minerals (MULTI-VITAMIN/MINERALS) Oral Tab Take 1 tablet by mouth daily.      Probiotic Product (PROBIOTIC DAILY) Oral Cap Take 1 capsule by mouth daily.      Cholecalciferol (VITAMIN D) 2000 units Oral Cap Take 1 capsule (2,000 Units total) by mouth daily.      Fluticasone Propionate (FLONASE) 50 MCG/ACT Nasal Suspension 2 sprays in each nostril daily as needed 1 Bottle 0    cetirizine 10 MG Oral Tab Take 0.5 tablets (5 mg total) by mouth daily.       No current facility-administered medications on file prior to visit.

## 2025-06-06 ENCOUNTER — TELEMEDICINE (OUTPATIENT)
Dept: INTERNAL MEDICINE CLINIC | Facility: CLINIC | Age: 55
End: 2025-06-06
Payer: COMMERCIAL

## 2025-06-06 DIAGNOSIS — E11.9 CONTROLLED TYPE 2 DIABETES MELLITUS WITHOUT COMPLICATION, WITHOUT LONG-TERM CURRENT USE OF INSULIN (HCC): ICD-10-CM

## 2025-06-06 DIAGNOSIS — I10 ESSENTIAL HYPERTENSION, BENIGN: ICD-10-CM

## 2025-06-06 DIAGNOSIS — E55.9 VITAMIN D DEFICIENCY: ICD-10-CM

## 2025-06-06 DIAGNOSIS — E66.813 CLASS 3 SEVERE OBESITY DUE TO EXCESS CALORIES WITH SERIOUS COMORBIDITY AND BODY MASS INDEX (BMI) OF 40.0 TO 44.9 IN ADULT: ICD-10-CM

## 2025-06-06 DIAGNOSIS — F43.9 STRESS: ICD-10-CM

## 2025-06-06 DIAGNOSIS — R79.89 LOW VITAMIN D LEVEL: ICD-10-CM

## 2025-06-06 DIAGNOSIS — Z51.81 ENCOUNTER FOR THERAPEUTIC DRUG MONITORING: Primary | ICD-10-CM

## 2025-06-06 DIAGNOSIS — G47.33 OSA ON CPAP: ICD-10-CM

## 2025-06-06 DIAGNOSIS — Z72.3 LACK OF EXERCISE: ICD-10-CM

## 2025-06-06 PROCEDURE — 98005 SYNCH AUDIO-VIDEO EST LOW 20: CPT | Performed by: NURSE PRACTITIONER

## 2025-06-06 NOTE — PATIENT INSTRUCTIONS
Next steps:  1.  Fill your prescribed medication and take as discussed and prescribed: mounjaro  2.  Schedule a personal nutrition consultation with one of our registered dieticians     Please try to work on the following dietary changes:    1.  Drink water with meals and throughout the day, cut down on soda and/or juice if consumed. Consider flavored water options like Bubbly, Spindrift, Hint and Trent.  2.  Eat breakfast daily and focus on having protein with each meal, examples include: greek yogurt, cottage cheese, hard boiled egg, whole grain toast with peanut butter.   3.  Reduce refined carbohydrates and sugars which includes items such as sweets, as well as rice, pasta, and bread and make sure to choose whole grain options when having them with just 1 serving per meal about the size of your inner palm.  4.  Consume non starchy veggies daily working towards making them a good 50% of your daily food intake. Add them to lunch and dinner consistently.  5.  Start a daily probiotic: VSL#3 is recommended, (order on line at www.vsl3.com). Take 1 capsule daily with water for 30 days, then reduce to 1 every other day (this will reduce the cost). Capsules can be left out for 2 weeks, but then must be refrigerated.      Please download vinh My Fitness Pal, LoseIt! Or Net Diary to monitor daily dietary intake and you will be able to see if you are eating the right amount of calories or too much or too little which would hinder weight loss. Additionally this will help to see your daily carbohydrate and protein intake. When you set the vinh up choose 1-2 lbs/week as a goal.  Keeping a paper food journal is an option as well to remain accountable for your choices- this is the start to mindful eating! A low calorie diet has been consistently shown to support weight loss.     Continue or start exercising to help establish a routine. If not already exercising begin with 1 day and progress as able with long-term goal of 30  minutes 5 days a week at a minimum.     Meditation daily can help manage and control stress. Chronic stress can make weight loss difficult.  Exercising is one way to help with stress, but meditation using the CALM Keo or another comparable alternative can be done in your home or place of work with little time commitment. This Keo can also help work on behavior change and improve sleep. Check out the segment under Calm Masterclass and listen to The 4 Pillars of Health. A great way to begin learning about the foundation of lifestyle with practical tips to use in your every day.     Check out www.yourweightmatters.org blog for continued daily support and education along this weight loss journey!    Patient Resources:     Personal Training/Fitness Classes/Health Coaching     [facility] @ [email or web address] Full fitness center with group fitness and personal training. Discount available as client of [facility].  Health Coaching and Personal Training with Yvette Burns at our [place] [facility]- individual weekly coaching with option to add personal training and small group fitness classes targeted at weight loss- [phone] and/or email @ [email or web address]  360FIT [place] [email or web address] Group Fitness [phone] and/or email Eli at [email or web address]  [facility] Fitness Centers with multiple locations: Uromedica (www.PayDivvy), Eat The InvisibleCRM Fitness (www.Trimel Pharmaceuticals.Catalyst Repository Systems), Fit Body Bootcamp (www.Glookop.Catalyst Repository Systems), theBench (www.obopay), The Exercise  (www.exercisecoach.Catalyst Repository Systems)     Online Fitness  Fitness  on Utube  Fit in 10 DVD series- www.dqrce08SBN.com  Sit and Be Fit - Chair exercise series Www.sitandbefit.org  Hip Hop Fit with Alen Ferraro at www.hiphopfit.net     Apps for on the Go Fitness  Donnelly 7 Minute Workout (orange box  with white 7) - free on the go HIIT training keo  Peloton Keo @ www.onepeloton.com     Nutrition Trackers and Tools  LoseIT! And My Fitness Pal apps and on line for tracking nutrition  NOOM - virtual health coaching  FitFoundation (healthy meals on the go) in [place] @ www.txedzrhammwey8f.Loom Decor  Hazel MD @ www.bistromd.com and Srmota62 (keto and low carb plans recommended) @ www.jhugeb48.com, Metabolic Meals @ www.MyMetabolicMeals.com - individual prepared meals to go  Gobble, Blue Apron, Home , Every Plate, Sunbasket- on line meal delivery programs for preparation at home  Meal Village in [place] for homemade meals to go @ www.mealAmerican Hometown Mediallage.Loom Decor  Diet Doctor @ www.dietdoctor.Loom Decor - low carb swaps  Yummly - meal prep and planning keo (www.yummly.com)     Stress Management/Behavior/Mindful Eating  CALM meditation keo (www.calm.com)  Headspace  Am I Hungry? Mindful eating virtual  keo  Www.yourweightmatters.org - Obesity Action Coalition sponsored Blog posts daily  Motivation keo (black box with white \")- daily supportive messages sent to your phone     Books/Video Education/Podcasts  Mindless Eating by Arie Valiente  Why We Get Sick by Jose Morales (a book about insulin resistance)  Atomic Habits by Geoffrey Berg (a book about taking small steps to promote greater behavior change)   Can't Hurt Me by Vu Solis (a book exploring the power of discipline in achieving your goals)  The End of Dieting: How to Live for Life by Dr. Adeel Hunter M.D. or listen to The Parametric Podcast Episode 63: Understanding \"Nutritarian\" Eating w/Dr. Adeel Hunter  Your Body in Balance: The New Science of Food, Hormones, and Health by Dr. Jayden Marcelo  The Menopause Diet Plan by Mary Barbour and Morelia Ndiaye  The Complete Guide to fasting by Dr. Mcnulty  Sugar, Salt & Fat by Lola Wilkes, Ph.D, R.D.  Weight Loss Surgery Will Not Treat Food Addiction by Johanny Guo Ph.D  The Game Changers- Netflix Documentary on  plant based nutrition  Fed Up - documentary about obesity (Free on Utube)  The Truth About Sugar - documentary on sugar (Free on Utube, https://youtu.be/2A7qwtaPC8v)  The Dr. Suarez T5 Wellness Plan by Dr. Ryley Suarez MD  Fitlosophy Fitspiration - journal to better health (found at [employer] in fitness aisle)  What Happened to You?- a look at the impact trauma has on behavior written by Abdi Mtz and Dr. Mauricio Anna  Whole Again by Umesh Larry - discovering your true self after trauma  Mitch Vallecillo talk on AirPOS, The Call to [employer]  Podcasts: The Exam Room by the Physician's Committee, Nutrition Facts by Dr. Guerra    We are here to support you with weight loss, but please remember that you still need your primary care provider for your routine health maintenance.

## 2025-06-08 ENCOUNTER — HOSPITAL ENCOUNTER (OUTPATIENT)
Age: 55
Discharge: HOME | End: 2025-06-08
Attending: EMERGENCY MEDICINE
Payer: COMMERCIAL

## 2025-06-08 ENCOUNTER — APPOINTMENT (OUTPATIENT)
Dept: CT IMAGING | Age: 55
End: 2025-06-08
Attending: EMERGENCY MEDICINE
Payer: COMMERCIAL

## 2025-06-08 VITALS
OXYGEN SATURATION: 99 % | HEIGHT: 63 IN | HEART RATE: 73 BPM | RESPIRATION RATE: 20 BRPM | SYSTOLIC BLOOD PRESSURE: 127 MMHG | DIASTOLIC BLOOD PRESSURE: 57 MMHG | WEIGHT: 242 LBS | BODY MASS INDEX: 42.88 KG/M2 | TEMPERATURE: 98 F

## 2025-06-08 DIAGNOSIS — R51.9 NONINTRACTABLE HEADACHE, UNSPECIFIED CHRONICITY PATTERN, UNSPECIFIED HEADACHE TYPE: ICD-10-CM

## 2025-06-08 DIAGNOSIS — H81.10 BENIGN PAROXYSMAL POSITIONAL VERTIGO, UNSPECIFIED LATERALITY: Primary | ICD-10-CM

## 2025-06-08 LAB
BUN BLD-MCNC: 14 MG/DL (ref 7–18)
CHLORIDE BLD-SCNC: 106 MMOL/L (ref 98–112)
CO2 BLD-SCNC: 26 MMOL/L (ref 21–32)
CREAT BLD-MCNC: 1 MG/DL (ref 0.55–1.02)
EGFRCR SERPLBLD CKD-EPI 2021: 66 ML/MIN/1.73M2 (ref 60–?)
GLUCOSE BLD-MCNC: 100 MG/DL (ref 70–99)
HCT VFR BLD CALC: 42 % (ref 34–50)
ISTAT IONIZED CALCIUM FOR CHEM 8: 1.08 MMOL/L (ref 1.12–1.32)
POTASSIUM BLD-SCNC: 4.3 MMOL/L (ref 3.6–5.1)
SODIUM BLD-SCNC: 140 MMOL/L (ref 136–145)

## 2025-06-08 PROCEDURE — 70498 CT ANGIOGRAPHY NECK: CPT | Performed by: EMERGENCY MEDICINE

## 2025-06-08 PROCEDURE — 96374 THER/PROPH/DIAG INJ IV PUSH: CPT

## 2025-06-08 PROCEDURE — 99215 OFFICE O/P EST HI 40 MIN: CPT

## 2025-06-08 PROCEDURE — 93005 ELECTROCARDIOGRAM TRACING: CPT

## 2025-06-08 PROCEDURE — 93010 ELECTROCARDIOGRAM REPORT: CPT

## 2025-06-08 PROCEDURE — 80047 BASIC METABLC PNL IONIZED CA: CPT

## 2025-06-08 PROCEDURE — 70496 CT ANGIOGRAPHY HEAD: CPT | Performed by: EMERGENCY MEDICINE

## 2025-06-08 PROCEDURE — 96361 HYDRATE IV INFUSION ADD-ON: CPT

## 2025-06-08 RX ORDER — SODIUM CHLORIDE 9 MG/ML
1000 INJECTION, SOLUTION INTRAVENOUS ONCE
Status: COMPLETED | OUTPATIENT
Start: 2025-06-08 | End: 2025-06-08

## 2025-06-08 RX ORDER — MECLIZINE HYDROCHLORIDE 25 MG/1
25 TABLET ORAL 3 TIMES DAILY PRN
Qty: 30 TABLET | Refills: 0 | Status: SHIPPED | OUTPATIENT
Start: 2025-06-08

## 2025-06-08 RX ORDER — DIAZEPAM 5 MG/1
5 TABLET ORAL ONCE
Status: COMPLETED | OUTPATIENT
Start: 2025-06-08 | End: 2025-06-08

## 2025-06-08 RX ORDER — MECLIZINE HCL 12.5 MG 12.5 MG/1
25 TABLET ORAL ONCE
Status: COMPLETED | OUTPATIENT
Start: 2025-06-08 | End: 2025-06-08

## 2025-06-08 RX ORDER — ONDANSETRON 4 MG/1
4 TABLET, ORALLY DISINTEGRATING ORAL EVERY 4 HOURS PRN
Qty: 10 TABLET | Refills: 0 | Status: SHIPPED | OUTPATIENT
Start: 2025-06-08 | End: 2025-06-15

## 2025-06-08 RX ORDER — ONDANSETRON 2 MG/ML
4 INJECTION INTRAMUSCULAR; INTRAVENOUS ONCE
Status: COMPLETED | OUTPATIENT
Start: 2025-06-08 | End: 2025-06-08

## 2025-06-08 RX ORDER — ACETAMINOPHEN 500 MG
1000 TABLET ORAL ONCE
Status: COMPLETED | OUTPATIENT
Start: 2025-06-08 | End: 2025-06-08

## 2025-06-08 NOTE — ED PROVIDER NOTES
Patient Seen in: [facility]       The following individual(s) verbally consented to be recorded using ambient AI listening technology and understand that they can each withdraw their consent to this listening technology at any point by asking the clinician to turn off or pause the recording:    Patient name: Lizette Camp        History  Chief Complaint   Patient presents with    Dizziness     Stated Complaint: Dizziness    Subjective:   HPI     Lizette Camp is a 55 year old female who presents with sinus pressure for a week, dull headache for the last 5 days, dizziness with turning her head and change of positions.    She has been experiencing dizziness since this morning, which worsens with changes in head position such as looking up, to the side, or lying down. No fever, head trauma, or visual disturbances other than the spinning sensation.  She states she has had this before when she had sinus congestion she got the dizziness with turning her head.    For the past week, she has been experiencing headaches that differ from her usual sinus and allergy-related headaches. These headaches are located in the frontal region and have persisted for several hours. She typically experiences headaches four to five times a week but notes that these recent headaches have been more persistent. She has not taken Tylenol for these headaches, which she normally takes that she attributed to being related to her allergies.  She states sometimes takes 10 mg of Zyrtec instead of her usual 5 mg dose, and tried taking this.    She reports sinus congestion for about a week, with symptoms including a stuffy nose and bloody nasal discharge earlier in the week. The nasal discharge is described as yellow, and she notes a sensation of pressure in her sinuses, particularly when looking down. She has not used Sudafed due to adverse effects but has used nasal saline flushes, although not in the past few days.          Objective:     Past Medical History:    Abnormal uterine bleeding    Allergic rhinitis    Amenorrhea    Anemia    Anxiety    Arthritis    Cancer (HCC)    Predominant Hodgkin's Lymphoma, REDIAGNOSED THIS YEAR 2023    COVID    Had ferver, cough, congestion, SOB x 6 weeks. No hospitalization or lingering S/S.    Depression    Diabetes (HCC)    Diabetes mellitus (HCC)    Diverticulosis of large intestine    Dysmenorrhea    Esophageal reflux    Essential hypertension    Exposure to medical diagnostic radiation    last dose 8/2012    Fibroids    Genital herpes simplex    High blood pressure    Obesity    ANAHY on CPAP    Osteoarthritis    knees and neck    Sexually transmitted disease    Sleep apnea    CPAP    Visual impairment    glasses              Past Surgical History:   Procedure Laterality Date    Adenoidectomy  1998    Benign biopsy left      Colonoscopy  2010    Colonoscopy  03/2021    Egd N/A 03/01/2021    Procedure: ESOPHAGOGASTRODUODENOSCOPY, COLONOSCOPY, POSSIBLE BIOPSY, POSSIBLE POLYPECTOMY 77323, 76812;  Surgeon: Candice Chan MD;  [facility]    Hand/finger surgery unlisted      hand incision tendon sheath of a finger    Bautista biopsy stereo nodule 1 site left (cpt=19081)  10yrs  back    ciara    Bautista biopsy stereo nodule 1 site right (cpt=19081)  04/2018    3 SITE, RADIAL SCAR    Bautista biopsy stereo nodule 2 site bilat (cpt=19081/46401)  2009    NON HODGKINS LYMPHOMA/RADIATION THERAPY 8/10    Bautista localization wire 1 site right (cpt=19281)  01/2020    papilloma    Needle biopsy right  11/2019    papilloma    Other surgical history  2008    neuroplasty w/ transposition of ulnar nerve at elbow left                         Other surgical history  2009    uterine myomectomy for uterine fibroids    Other surgical history  2018    R breast bx (x3)    Revise median n/carpal tunnel surg      laterality and bilateral    Tonsillectomy  2000    w/ adenoidectomy    Upper gi endoscopy,exam  2010                 Social History     Socioeconomic History    Marital status: Single   Occupational History    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  Constitutional and vital signs reviewed.      All other systems reviewed and negative except as noted above.                  Physical Exam    ED Triage Vitals [06/08/25 0832]   /74   Pulse 72   Resp 20   Temp 98.2 °F (36.8 °C)   Temp src Oral   SpO2 98 %   O2 Device None (Room air)       Current Vitals:   Vital Signs  BP: 154/74  Pulse: 72  Resp: 20  Temp: 98.2 °F (36.8 °C)  Temp src: Oral    Oxygen Therapy  SpO2: 98 %  O2 Device: None (Room air)            Physical Exam  Vital signs reviewed.  Nursing note reviewed.  Constitutional: Alert, well-appearing  Head: Normocephalic, atraumatic.  Tender to palpation over frontal sinus.  Mouth: Moist  Eyes: Extraocular muscles intact, pupils equal  Cardiovascular: Regular rate and rhythm  Pulmonary: Effort normal, breath sounds normal  Abdomen: Soft, nontender nondistended  Skin: Warm and dry  Musculoskeletal range of motion grossly normal all extremities  Neuro: Alert, at baseline, no focal neuro deficit.  Moves all extremities against gravity.  Face symmetric, speech clear.  Change position provokes her dizziness.  Gait normal.  Psych: Mood normal              ED Course  Labs Reviewed   POCT ISTAT CHEM8 CARTRIDGE - Abnormal; Notable for the following components:        Result Value    ISTAT Ionized Calcium 1.08 (*)     ISTAT Glucose 100 (*)     All other components within normal limits     EKG    Rate, intervals and axes as noted on EKG Report.  Rate: 79  Rhythm: Sinus Rhythm  Reading: No new ST-T wave abnormality compared with old EKG              CTA BRAIN + CTA CAROTIDS (CPT=70496/55743)  Result Date: 6/8/2025  PROCEDURE:  CTA BRAIN + CTA CAROTIDS (CPT=70496/24853)  COMPARISON:  None.  INDICATIONS:  Dizziness  TECHNIQUE:  CT angiography of the head and neck were obtained with non-ionic contrast.  3D volume rendering images were obtained by the technologist as well as the radiologist on an independent workstation.  Multiplanar 3D reformatted imaging including multiplanar MIP images were obtained.  Curved planar reformats were performed through the carotid and vertebral arteries. All measurements obtained in this exam were performed using NASCET criteria.  Dose reduction techniques were used. Dose information is transmitted to the ACR (American College of Radiology) NRDR (National Radiology Data Registry) which includes the Dose Index Registry.  PATIENT STATED HISTORY:(As transcribed by Technologist)  Patient presents with severe dizziness and nausea and headache since morning.   CONTRAST USED:  75cc of Isovue 370  FINDINGS:  VASCULATURE:  2 mm outpouching left supraclinoid ICA series 6, image 517 is most likely an infundibulum although could represent a small aneurysm. VENTRICLES:  Normal for age.  No enlargement or displacement. CEREBRUM:  Normal for age.  No excessive atrophy, mass, or hemorrhage, or abnormal enhancement. CEREBELLUM:  Normal for age.  No excessive atrophy, mass, or hemorrhage, or abnormal enhancement. BRAINSTEM:  Normal for age.  No excessive atrophy, mass, or hemorrhage, or abnormal enhancement. BASAL CISTERNS:  No subarachnoid hemorrhage or effacement.  SKULL:  Negative.   LEFT INTERNAL CAROTID:  No hemodynamically significant stenosis or dissection.  EXTERNAL CAROTID:  No hemodynamically significant stenosis or dissection COMMON CAROTID:  No hemodynamically significant stenosis or dissection VERTEBRAL:  No hemodynamically significant stenosis or dissection  RIGHT INTERNAL CAROTID:   No hemodynamically significant stenosis or dissection EXTERNAL CAROTID:    No hemodynamically significant stenosis or dissection COMMON CAROTID:   No hemodynamically significant stenosis or dissection VERTEBRAL:   No hemodynamically significant stenosis or dissection  OTHER:  There is mosaic attenuation in the visualized lung apices.  There is a calcified granuloma in left upper lobe.             CONCLUSION:  1. Intracranial CT angiogram demonstrates small outpouching from the supraclinoid left ICA which is probably an infundibulum of the posterior communicating artery.  Small aneurysm could have this appearance. 2. Intracranial CT angiogram otherwise demonstrates no large vessel occlusion. 3. CT angiogram of neck demonstrates no significant atherosclerotic disease, stenosis or dissection. 4. Mosaic attenuation is noted in visualized lung apices.  This most likely indicates air trapping related to bronchitis or small airway disease.    LOCATION:  [facility]   Dictated by (CST): Arie Webb MD on 6/08/2025 at 11:33 AM     Finalized by (CST): Arie Webb MD on 6/08/2025 at 11:41 AM       MRI BREAST (W+WO) W/CAD BILAT (CPT=77049)  Result Date: 5/14/2025  PROCEDURE:  MRI BREAST (W+WO) W/CAD BILAT (CPT=77049)  COMPARISON:  MR EVANGELIST, MRI BREAST (W+WO) W/CAD BILAT (CPT=77049), 6/30/2022, 7:15 AM.  MR EVANGELIST, MRI BREAST (W+WO) W/CAD BILAT (CPT=77049), 5/11/2024, 8:08 AM.  MG DAVID, [facility] YENNIFER 2D+3D SCREENING BILAT (CPT=77067/70176), 10/08/2024, 2:03 PM.  INDICATIONS:  Z91.89 At high risk for breast cancer  TECHNIQUE:  The breasts were imaged using dynamic intravenous gadolinium infusion, thin sections, and a dedicated breast coil.  Bilateral pre-contrast axial 2D/3D T2 weighted and  3D T1 weighted VIBRANT sequences were obtained with and without fat suppression. Post contrast VIBRANT 3D T1 weighted images after IV gadolinium were obtained. 1.6 mm slice reconstruction of 3D data sets with additional maximum intensity projects, subtraction, graphic, and kinetic analysis were performed from source data.  PATIENT STATED HISTORY:(As transcribed by Technologist)  Patient has a family history of breast cancer. Patient has no symptoms.   CONTRAST USED:  20 mL of Dotarem  FINDINGS:  There are scattered areas of fibroglandular tissue.  There is mild to moderate background parenchymal enhancement.  Scattered areas of enhancement are redemonstrated in both breasts, similar to prior MRIs.  There are no new dominant/suspicious areas of enhancement in either breast.  There is no internal mammary or axillary adenopathy.            CONCLUSION:  No MRI findings suspicious for malignancy in either breast.  Given high risk status, recommend continued supplemental screening breast MRI, due in 1 year.  If there is no change in the clinical breast exam, recommend annual screening mammography in 6 months.  BI-RADS CATEGORY: DIAGNOSTIC CATEGORY 2 - BENIGN FINDING:   RECOMMENDATIONS:  SCREENING MRI OF THE BREAST IN ONE YEAR   PLEASE NOTE:  A NORMAL MRI DOES NOT EXCLUDE THE POSSIBILITY OF BREAST CANCER.  A CLINICALLY SUSPICIOUS PALPABLE LUMP SHOULD BE BIOPSIED.  CORRELATION WITH CLINICAL EXAM AND OTHER IMAGING STUDIES IS RECOMMENDED.   LOCATION:  [facility]   Dictated by (CST): Sylwia Lan MD on 5/14/2025 at 3:12 PM     Finalized by (CST): Sylwia Lan MD on 5/14/2025 at 3:25 PM                         [facility]     Medical Decision Making:    Differential diagnosis before testing includes BPPV, intracranial space occupying lesion, dehydration potential life threatening diagnosis which is a medical condition that poses a threat to life/function.     Comorbidities that add complexity to management: See above    I reviewed  prior external ED notes including reviewed note from 2/25 regarding sinusitis was on Augmentin    Discussions of management with: Discussed with Dr. Dalton, neurosurgery regarding infundibulum versus mall aneurysm, he states to have her follow-up with Dr. Montemayor.      I personally reviewed the CTA head neck and my independent interpretation includes negative for acute abnormality.  Finding of possible infundibulum versus small aneurysm.    Shared decision making:    Given IV fluids, meclizine, Zofran.  We do not have CBC capability here in immediate care today.  Will check Chem-8, CTA head neck.  Will monitor patient for improvement.  Patient unable to lay flat, given Valium 5 mg p.o.  CTA head neck negative for acute abnormality.  Finding of infundibulum versus small aneurysm,Discussed with Dr. Dalton, neurosurgery regarding infundibulum versus mall aneurysm, he states to have her follow-up with Dr. Montemayor.  Will send with prescription for Zofran, meclizine and Augmentin to cover for sinusitis.  Follow-up with PCP next 2 days to ensure she is feeling better.  Medical Decision Making      Disposition and Plan     Clinical Impression:  1. Benign paroxysmal positional vertigo, unspecified laterality    2. Nonintractable headache, unspecified chronicity pattern, unspecified headache type         Disposition:  Discharge  6/8/2025 12:11 pm    Follow-up:  Ibeth Crawford DO  [address]  [address]  [facility] [address]  [phone]    In 2 days      Noel Chavarria MD  [address], [address]  [facility] [id number] [phone]    In 2 days  neurosurgery for follow up of CT result          Medications Prescribed:  Current Discharge Medication List        START taking these medications    Details   meclizine 25 MG Oral Tab Take 1 tablet (25 mg total) by mouth 3 (three) times daily as needed.  Qty: 30 tablet, Refills: 0      ondansetron 4 MG Oral Tablet Dispersible Take 1 tablet (4 mg total) by mouth every 4  (four) hours as needed for Nausea.  Qty: 10 tablet, Refills: 0      amoxicillin clavulanate 875-125 MG Oral Tab Take 1 tablet by mouth 2 (two) times daily.  Qty: 20 tablet, Refills: 0                   Supplementary Documentation:

## 2025-06-08 NOTE — ED INITIAL ASSESSMENT (HPI)
Patient reports dizziness since this morning.  Patient reports when she awakened this morning she had dizziness and diarrhea.  Patient reports she has had a headache for about 5 days as well as a couple of bloody noses.  Patient reports she still feels dizzy.

## 2025-06-09 LAB
ATRIAL RATE: 79 BPM
P AXIS: 67 DEGREES
P-R INTERVAL: 158 MS
Q-T INTERVAL: 382 MS
QRS DURATION: 86 MS
QTC CALCULATION (BEZET): 438 MS
R AXIS: 21 DEGREES
T AXIS: 17 DEGREES
VENTRICULAR RATE: 79 BPM

## 2025-06-09 NOTE — PROGRESS NOTES
[facility] Progress Note       Patient Name: Lizette Camp   YOB: 1970  Medical Record Number: [id number]  Attending Physician: Nik Pratt M.D.     The 21st Century Cures Act makes medical notes like these available to patients in the interest of transparency. Please be advised this is a medical document. Medical documents are intended to carry relevant information, facts as evident, and the clinical opinion of the practitioner. The medical note is intended as peer to peer communication and may appear blunt or direct. It is written in medical language and may contain abbreviations or verbiage that are unfamiliar.     Date of Visit: 6/10/2025     Chief Complaint  Recurrent Hodgkin's lymphoma, nodular lymphocyte predominant - follow up.    Oncologic History   Lizette Camp is a 55 year old emale who underwent diagnostic mammography as follow up for a biopsy-proven benign lesion in the left breast in 12/2010 and was found to have an enlarged left axillary lymph node.  Follow ultrasonography of the left axilla showed to enlarged lymph nodes.  Patient underwent excisional biopsy which revealed CD20+ nodular lymphocyte predominant Hodgkin's lymphoma.  Staging with PET/CT showed large left axillary, subpectoral and supraclavicular lymph nodes are identified with peak SUV 13.8 and she was staged IIA. At diagnosis ESR was elevated at 34 mm/hr.    Patient completed definitive radiotherapy to 3600 cGy in 09/2011.  She reports therapy was complicated by mild fatigue and mild desquamation.      PET/CT in 07/2014 was negative for relapsed disease.    Routine follow up in 08/2021 revealed a possible new lymph node in the right neck. Laboratory studies showed mildly elevated ESR. Ultrasound of the neck was ordered but not scheduled until 02/2022 by patient. It showed no significant change in the right neck as compared to previous but did show enlarged left neck lymph nodes.  PET/CT was performed on 03/08/2022 and showed uptake with Deauville scores of 4-5 involving inguinal and pelvis lymph nodes. Ultrasound core biopsies of 2 right groin lymph nodes were performed on 03/21/2022 and was remarkable for a single atypical large cell with multilobulated nuclei suspicious for a lymphoproliferative cell.    On 06/07/2022 she underwent a wire localized deep excisional right inguinal lymph node biopsy. Pathology was reviewed at the [facility]; pathology showed focal nodular lymphocyte predominant Hodgkin lymphoma arising in a background of follicular hyperplasia with progressive transformation of germinal centers. A course of observation without therapy was chosen.    History of Present Illness  Patient returns for scheduled follow up. She denies fevers, night sweats, weight loss. She denies any shortness of breath or cough.  She denies any palpable adenopathy. She was seen in urgent care earlier this week and diagnosed with an ear infection for which she is taking antibiotics.     Performance Status  Karnofsky 100% - Normal, no complaints.    Past Medical History (historical data, reviewed by physician)  History of genital herpes, uterine fibroids, GERD, diverticulosis.    Past Surgical History (historical data, reviewed by physician)  Tonsillectomy, myomectomy.    Family History (historical data, reviewed by physician)  Father with lung and prostate cancer.  Paternal grandfather with brain tumor.  Maternal aunt with breast cancer.  Paternal cousin with breast cancer.  No family history of hematologic malignancies.    Social History (historical data, reviewed by physician)  Denies tobacco, alcohol, and illicit drug use.      Current Medications    EVENING PRIMROSE OIL OR Take by mouth.      meclizine 25 MG Oral Tab Take 1 tablet (25 mg total) by mouth 3 (three) times daily as needed. 30 tablet 0    ondansetron 4 MG Oral Tablet Dispersible Take 1 tablet (4 mg total) by mouth every 4 (four)  hours as needed for Nausea. 10 tablet 0    amoxicillin clavulanate 875-125 MG Oral Tab Take 1 tablet by mouth 2 (two) times daily. 20 tablet 0    Tirzepatide (MOUNJARO) 15 MG/0.5ML Subcutaneous Solution Auto-injector Inject 15 mg into the skin once a week. 2 mL 1    montelukast 10 MG Oral Tab Take 1 tablet (10 mg total) by mouth nightly. 30 tablet 3    albuterol 108 (90 Base) MCG/ACT Inhalation Aero Soln Inhale 1 puff into the lungs every 8 (eight) hours as needed for Wheezing.      Bioflavonoid Products (QUERCETIN COMPLEX IMMUNE OR) Take by mouth.      azelastine 0.1 % Nasal Solution 2 sprays by Nasal route 2 (two) times daily as needed for Rhinitis. 1 each 1    clobetasol 0.05 % External Ointment       valACYclovir 500 MG Oral Tab 1 pill PO BID For 3 days as needed 30 tablet 1    Polyethylene Glycol 3350 (MIRALAX) 17 g Oral Powd Pack Take 17 g by mouth in the morning.      TURMERIC OR Take by mouth.      Omeprazole 40 MG Oral Capsule Delayed Release Take 1 capsule (40 mg total) by mouth in the morning and 1 capsule (40 mg total) in the evening. Take before meals.      Multiple Vitamins-Minerals (MULTI-VITAMIN/MINERALS) Oral Tab Take 1 tablet by mouth in the morning.      Probiotic Product (PROBIOTIC DAILY) Oral Cap Take 1 capsule by mouth in the morning.      Cholecalciferol (VITAMIN D) 2000 units Oral Cap Take 1 capsule (2,000 Units total) by mouth in the morning.      Fluticasone Propionate (FLONASE) 50 MCG/ACT Nasal Suspension 2 sprays in each nostril daily as needed 1 Bottle 0    cetirizine 10 MG Oral Tab Take 0.5 tablets (5 mg total) by mouth in the morning.        Allergies   Ms. Camp is allergic to guaifenesin er, mucinex, and sudafed.    Vital Signs   /82 (BP Location: Left arm, Patient Position: Sitting, Cuff Size: large)   Pulse 84   Temp 98 °F (36.7 °C) (Temporal)   Resp 16   Ht 1.6 m (5' 2.99\")   Wt 111 kg (244 lb 12.8 oz)   LMP 04/06/2023   SpO2 100%   BMI 43.38 kg/m²     Physical  Examination   Constitutional  Well developed and well nourished; in no apparent distress.  Head   Normocephalic and atraumatic.  Eyes   Conjunctiva clear; sclera anicteric.  ENMT   External ears normal; external ears normal.  Neck   Supple, no masses.  Lymphatics  No cervical, supraclavicular, axillary, inguinal lymphadenopathy.  Respiratory  Normal effort; no respiratory distress; lungs clear to auscultation bilaterally.  Cardiovascular  Regular rate and rhythm; normal S1S2.  Abdomen  Non-tender; non-distended; no masses,no fluid wave; no hepatosplenomegaly.  Extremities  No lower extremity edema.  Neurologic  Motor and sensory grossly intact.  Psychiatric  Mood and affect appropriate.    Laboratory   Recent Results (from the past 24 hours)   LDH [E]    Collection Time: 06/10/25 11:19 AM   Result Value Ref Range     120 - 246 U/L   COMP METABOLIC PANEL [E]    Collection Time: 06/10/25 11:19 AM   Result Value Ref Range    Glucose 79 70 - 99 mg/dL    Sodium 143 136 - 145 mmol/L    Potassium 4.0 3.5 - 5.1 mmol/L    Chloride 108 98 - 112 mmol/L    CO2 27.0 21.0 - 32.0 mmol/L    Anion Gap 8 0 - 18 mmol/L    BUN 10 9 - 23 mg/dL    Creatinine 1.06 (H) 0.55 - 1.02 mg/dL    Calcium, Total 9.1 8.7 - 10.6 mg/dL    Calculated Osmolality 294 275 - 295 mOsm/kg    eGFR-Cr 61 >=60 mL/min/1.73m2    AST 19 <34 U/L    ALT 16 10 - 49 U/L    Alkaline Phosphatase 102 46 - 118 U/L    Bilirubin, Total 0.5 0.3 - 1.2 mg/dL    Total Protein 7.0 5.7 - 8.2 g/dL    Albumin 4.5 3.2 - 4.8 g/dL    Globulin  2.5 2.0 - 3.5 g/dL    A/G Ratio 1.8 1.0 - 2.0    Patient Fasting for CMP? Patient not present    CBC W/DIFF [E]    Collection Time: 06/10/25 11:19 AM   Result Value Ref Range    WBC 5.4 4.0 - 11.0 x10(3) uL    RBC 4.55 3.80 - 5.30 x10(6)uL    HGB 13.7 12.0 - 16.0 g/dL    HCT 40.6 35.0 - 48.0 %    .0 150.0 - 450.0 10(3)uL    MCV 89.2 80.0 - 100.0 fL    MCH 30.1 26.0 - 34.0 pg    MCHC 33.7 31.0 - 37.0 g/dL    RDW 13.5 %     Neutrophil Absolute Prelim 2.74 1.50 - 7.70 x10 (3) uL    Neutrophil Absolute 2.74 1.50 - 7.70 x10(3) uL    Lymphocyte Absolute 2.10 1.00 - 4.00 x10(3) uL    Monocyte Absolute 0.43 0.10 - 1.00 x10(3) uL    Eosinophil Absolute 0.11 0.00 - 0.70 x10(3) uL    Basophil Absolute 0.02 0.00 - 0.20 x10(3) uL    Immature Granulocyte Absolute 0.01 0.00 - 1.00 x10(3) uL    Neutrophil % 50.7 %    Lymphocyte % 38.8 %    Monocyte % 7.9 %    Eosinophil % 2.0 %    Basophil % 0.4 %    Immature Granulocyte % 0.2 %     Radiology  [facility]  Department of Radiology  [address] [place] [address] [address]  [place] [address] [phone]      Name: Lizette Camp Dr: Silvia Small MD  : 1970    Age/Sex: 55 year old Female  Pt. Phone: [phone]  Exam Date: 2025  Procedure: MRI BREAST (W+WO) W/CAD BILAT (CPT=77049)   -----------------------------------------------------------------------------------------------------------------------------------------------                  Silvia Small MD  [address] [place] [address]      Interpretation   PROCEDURE:  MRI BREAST (W+WO) W/CAD BILAT (CPT=77049)     COMPARISON:  MR EVANGELIST, MRI BREAST (W+WO) W/CAD BILAT (CPT=77049), 2022, 7:15 AM.  MR EVANGELIST, MRI BREAST (W+WO) W/CAD BILAT (CPT=77049), 2024, 8:08 AM.  DAVID, MG, [facility] YENNIFER 2D+3D SCREENING BILAT (CPT=77067/00849), 10/08/2024, 2:03 PM.     INDICATIONS:  Z91.89 At high risk for breast cancer     TECHNIQUE:  The breasts were imaged using dynamic intravenous gadolinium infusion, thin sections, and a dedicated breast coil.  Bilateral pre-contrast axial 2D/3D T2 weighted and 3D T1 weighted VIBRANT sequences were obtained with and without fat   suppression. Post contrast VIBRANT 3D T1 weighted images after IV gadolinium were obtained. 1.6 mm slice reconstruction of 3D data sets with additional maximum intensity projects, subtraction, graphic, and kinetic  analysis were performed from source   data.     PATIENT STATED HISTORY:(As transcribed by Technologist)  Patient has a family history of breast cancer. Patient has no symptoms.      CONTRAST USED:  20 mL of Dotarem     FINDINGS:    There are scattered areas of fibroglandular tissue.  There is mild to moderate background parenchymal enhancement.     Scattered areas of enhancement are redemonstrated in both breasts, similar to prior MRIs.  There are no new dominant/suspicious areas of enhancement in either breast.  There is no internal mammary or axillary adenopathy.                   =====  CONCLUSION:    No MRI findings suspicious for malignancy in either breast.     Given high risk status, recommend continued supplemental screening breast MRI, due in 1 year.     If there is no change in the clinical breast exam, recommend annual screening mammography in 6 months.     BI-RADS CATEGORY:  DIAGNOSTIC CATEGORY 2 - BENIGN FINDING:       RECOMMENDATIONS:    SCREENING MRI OF THE BREAST IN ONE YEAR       PLEASE NOTE:  A NORMAL MRI DOES NOT EXCLUDE THE POSSIBILITY OF BREAST CANCER.  A CLINICALLY SUSPICIOUS PALPABLE LUMP SHOULD BE BIOPSIED.  CORRELATION WITH CLINICAL EXAM AND OTHER IMAGING STUDIES IS RECOMMENDED.        LOCATION:  [place]        Dictated by (CST): Sylwia Lan MD on 5/14/2025 at 3:12 PM       Finalized by (CST): Sylwia Lan MD on 5/14/2025 at 3:25 PM        Impression and Plan  Relapsed nodular lymphocyte predominant Hodgkin Lymphoma - This should be considered a chronic systemic disease. There is no evidence of significant disease progression. Continue active surveillance.     Planned Follow Up  Patient will return for follow up in 6 months.    Electronically Signed by:     Nik Pratt M.D.  System Medical Director, Oncology Services  [place] [facility]

## 2025-06-10 ENCOUNTER — OFFICE VISIT (OUTPATIENT)
Age: 55
End: 2025-06-10
Attending: OBSTETRICS & GYNECOLOGY
Payer: COMMERCIAL

## 2025-06-10 VITALS
OXYGEN SATURATION: 100 % | BODY MASS INDEX: 43.37 KG/M2 | DIASTOLIC BLOOD PRESSURE: 82 MMHG | TEMPERATURE: 98 F | SYSTOLIC BLOOD PRESSURE: 133 MMHG | WEIGHT: 244.81 LBS | HEIGHT: 62.99 IN | RESPIRATION RATE: 16 BRPM | HEART RATE: 84 BPM

## 2025-06-10 DIAGNOSIS — C81.05 NODULAR LYMPHOCYTE PREDOMINANT HODGKIN LYMPHOMA OF LYMPH NODES OF INGUINAL REGION (HCC): ICD-10-CM

## 2025-06-10 DIAGNOSIS — C81.04 NODULAR LYMPHOCYTE PREDOMINANT HODGKIN LYMPHOMA OF LYMPH NODES OF AXILLA (HCC): ICD-10-CM

## 2025-06-10 LAB
ALBUMIN SERPL-MCNC: 4.5 G/DL (ref 3.2–4.8)
ALBUMIN/GLOB SERPL: 1.8 {RATIO} (ref 1–2)
ALP LIVER SERPL-CCNC: 102 U/L (ref 46–118)
ALT SERPL-CCNC: 16 U/L (ref 10–49)
ANION GAP SERPL CALC-SCNC: 8 MMOL/L (ref 0–18)
AST SERPL-CCNC: 19 U/L (ref ?–34)
BASOPHILS # BLD AUTO: 0.02 X10(3) UL (ref 0–0.2)
BASOPHILS NFR BLD AUTO: 0.4 %
BILIRUB SERPL-MCNC: 0.5 MG/DL (ref 0.3–1.2)
BUN BLD-MCNC: 10 MG/DL (ref 9–23)
CALCIUM BLD-MCNC: 9.1 MG/DL (ref 8.7–10.6)
CHLORIDE SERPL-SCNC: 108 MMOL/L (ref 98–112)
CO2 SERPL-SCNC: 27 MMOL/L (ref 21–32)
CREAT BLD-MCNC: 1.06 MG/DL (ref 0.55–1.02)
EGFRCR SERPLBLD CKD-EPI 2021: 61 ML/MIN/1.73M2 (ref 60–?)
EOSINOPHIL # BLD AUTO: 0.11 X10(3) UL (ref 0–0.7)
EOSINOPHIL NFR BLD AUTO: 2 %
ERYTHROCYTE [DISTWIDTH] IN BLOOD BY AUTOMATED COUNT: 13.5 %
GLOBULIN PLAS-MCNC: 2.5 G/DL (ref 2–3.5)
GLUCOSE BLD-MCNC: 79 MG/DL (ref 70–99)
HCT VFR BLD AUTO: 40.6 % (ref 35–48)
HGB BLD-MCNC: 13.7 G/DL (ref 12–16)
IMM GRANULOCYTES # BLD AUTO: 0.01 X10(3) UL (ref 0–1)
IMM GRANULOCYTES NFR BLD: 0.2 %
LDH SERPL L TO P-CCNC: 176 U/L (ref 120–246)
LYMPHOCYTES # BLD AUTO: 2.1 X10(3) UL (ref 1–4)
LYMPHOCYTES NFR BLD AUTO: 38.8 %
MCH RBC QN AUTO: 30.1 PG (ref 26–34)
MCHC RBC AUTO-ENTMCNC: 33.7 G/DL (ref 31–37)
MCV RBC AUTO: 89.2 FL (ref 80–100)
MONOCYTES # BLD AUTO: 0.43 X10(3) UL (ref 0.1–1)
MONOCYTES NFR BLD AUTO: 7.9 %
NEUTROPHILS # BLD AUTO: 2.74 X10 (3) UL (ref 1.5–7.7)
NEUTROPHILS # BLD AUTO: 2.74 X10(3) UL (ref 1.5–7.7)
NEUTROPHILS NFR BLD AUTO: 50.7 %
OSMOLALITY SERPL CALC.SUM OF ELEC: 294 MOSM/KG (ref 275–295)
PLATELET # BLD AUTO: 176 10(3)UL (ref 150–450)
POTASSIUM SERPL-SCNC: 4 MMOL/L (ref 3.5–5.1)
PROT SERPL-MCNC: 7 G/DL (ref 5.7–8.2)
RBC # BLD AUTO: 4.55 X10(6)UL (ref 3.8–5.3)
SODIUM SERPL-SCNC: 143 MMOL/L (ref 136–145)
WBC # BLD AUTO: 5.4 X10(3) UL (ref 4–11)

## 2025-06-10 NOTE — PROGRESS NOTES
Patient is here for 6 month MD follow up for Hodgkin Lymphoma. Feeling well. No fevers, night sweats or chills. No cough or SOB. Currently on antibiotics for an ear infection.       Education Record    Learner:  Patient    Disease / Diagnosis:  Hodgkin Lymphoma    Barriers / Limitations:  None   Comments:    Method:  Discussion   Comments:    General Topics:  Plan of care reviewed   Comments:    Outcome:  Shows understanding   Comments:

## 2025-06-13 ENCOUNTER — OFFICE VISIT (OUTPATIENT)
Dept: INTERNAL MEDICINE CLINIC | Facility: CLINIC | Age: 55
End: 2025-06-13
Payer: COMMERCIAL

## 2025-06-13 VITALS
BODY MASS INDEX: 43.3 KG/M2 | OXYGEN SATURATION: 98 % | HEART RATE: 76 BPM | SYSTOLIC BLOOD PRESSURE: 130 MMHG | DIASTOLIC BLOOD PRESSURE: 68 MMHG | TEMPERATURE: 98 F | HEIGHT: 62.99 IN | WEIGHT: 244.38 LBS

## 2025-06-13 DIAGNOSIS — H81.12 BENIGN PAROXYSMAL POSITIONAL VERTIGO OF LEFT EAR: Primary | ICD-10-CM

## 2025-06-13 PROCEDURE — 3008F BODY MASS INDEX DOCD: CPT | Performed by: INTERNAL MEDICINE

## 2025-06-13 PROCEDURE — 99213 OFFICE O/P EST LOW 20 MIN: CPT | Performed by: INTERNAL MEDICINE

## 2025-06-13 PROCEDURE — 3078F DIAST BP <80 MM HG: CPT | Performed by: INTERNAL MEDICINE

## 2025-06-13 PROCEDURE — 3075F SYST BP GE 130 - 139MM HG: CPT | Performed by: INTERNAL MEDICINE

## 2025-06-13 PROCEDURE — 3061F NEG MICROALBUMINURIA REV: CPT | Performed by: INTERNAL MEDICINE

## 2025-06-13 PROCEDURE — 3044F HG A1C LEVEL LT 7.0%: CPT | Performed by: INTERNAL MEDICINE

## 2025-06-13 NOTE — PROGRESS NOTES
Subjective:   Lizette Camp is a 55 year old female who presents for Follow - Up   The patient went to the urgent care with headache and vertigo.  CT angiogram of the brain was done there.  It revealed a small outpouching from the supraclinoid left ICA-aneurysm was not ruled out.  The patient will follow-up with the neurosurgeon in coming weeks.    The patient's vertigo has improved but not resolved yet.    History/Other:    Chief Complaint Reviewed and Verified  No Further Nursing Notes to   Review  Tobacco Reviewed  Allergies Reviewed  Medications Reviewed    Medical History Reviewed  Surgical History Reviewed  OB Status Reviewed    Family History Reviewed  Social History Reviewed         Tobacco:  She has never smoked tobacco.    Current Medications[1]      Review of Systems:  Pertinent items are noted in HPI.  A comprehensive review of systems was negative.      Objective:   /68 (BP Location: Right arm, Patient Position: Sitting, Cuff Size: large)   Pulse 76   Temp 97.5 °F (36.4 °C) (Temporal)   Ht 5' 2.99\" (1.6 m)   Wt 244 lb 6.4 oz (110.9 kg)   LMP 04/06/2023   SpO2 98%   BMI 43.31 kg/m²  Estimated body mass index is 43.31 kg/m² as calculated from the following:    Height as of this encounter: 5' 2.99\" (1.6 m).    Weight as of this encounter: 244 lb 6.4 oz (110.9 kg).  General condition: Not in distress.    HEENT: Atraumatic normocephalic  No cervical or submandibular lymphadenopathy  Chest: Clear to auscultate  Heart: S1-S2 ,no murmur  Abdomen: Soft non tender, no palpable organomegaly  Neurological examination: No facial asymmetry.  No lateralizing neurological signs.  Mental state examination: Good eye to eye contact.  Normal mood and behavior.  Engaged in meaningful conversations.  Extremities:  Normal upper extremities  Legs: No edema  [place]-Hallpike maneuver was positive.      Assessment & Plan:     #1: BPPV-Epley's maneuver was discussed with the patient.  Printout was given about  it.    #2: Tension type of headaches-as needed acetaminophen plus minus ibuprofen.    #3: Class III obesity-the patient is already on Mounjaro.  The patient i follows the weight loss clinic.  Possible left ICA small aneurysm - following up with neurosurgery.    #4: Diabetes mellitus-continue current management.  Lab Results   Component Value Date    A1C 5.6 03/08/2025    A1C 5.5 08/24/2024    A1C 5.6 08/15/2023    A1C 5.7 (H) 03/18/2023    A1C 5.8 (H) 09/06/2022            No follow-ups on file.    Josué Pride MD, 6/13/2025, 2:31 PM       [1]   Current Outpatient Medications   Medication Sig Dispense Refill    EVENING PRIMROSE OIL OR Take by mouth.      meclizine 25 MG Oral Tab Take 1 tablet (25 mg total) by mouth 3 (three) times daily as needed. (Patient not taking: Reported on 6/13/2025) 30 tablet 0    ondansetron 4 MG Oral Tablet Dispersible Take 1 tablet (4 mg total) by mouth every 4 (four) hours as needed for Nausea. (Patient not taking: Reported on 6/13/2025) 10 tablet 0    amoxicillin clavulanate 875-125 MG Oral Tab Take 1 tablet by mouth 2 (two) times daily. 20 tablet 0    Tirzepatide (MOUNJARO) 15 MG/0.5ML Subcutaneous Solution Auto-injector Inject 15 mg into the skin once a week. 2 mL 1    montelukast 10 MG Oral Tab Take 1 tablet (10 mg total) by mouth nightly. 30 tablet 3    albuterol 108 (90 Base) MCG/ACT Inhalation Aero Soln Inhale 1 puff into the lungs every 8 (eight) hours as needed for Wheezing.      Bioflavonoid Products (QUERCETIN COMPLEX IMMUNE OR) Take by mouth.      azelastine 0.1 % Nasal Solution 2 sprays by Nasal route 2 (two) times daily as needed for Rhinitis. 1 each 1    clobetasol 0.05 % External Ointment       valACYclovir 500 MG Oral Tab 1 pill PO BID For 3 days as needed 30 tablet 1    Polyethylene Glycol 3350 (MIRALAX) 17 g Oral Powd Pack Take 17 g by mouth in the morning.      TURMERIC OR Take by mouth. (Patient not taking: Reported on 6/13/2025)      Omeprazole 40 MG Oral Capsule  Delayed Release Take 1 capsule (40 mg total) by mouth in the morning and 1 capsule (40 mg total) in the evening. Take before meals.      Multiple Vitamins-Minerals (MULTI-VITAMIN/MINERALS) Oral Tab Take 1 tablet by mouth in the morning.      Probiotic Product (PROBIOTIC DAILY) Oral Cap Take 1 capsule by mouth in the morning.      Cholecalciferol (VITAMIN D) 2000 units Oral Cap Take 1 capsule (2,000 Units total) by mouth in the morning.      Fluticasone Propionate (FLONASE) 50 MCG/ACT Nasal Suspension 2 sprays in each nostril daily as needed 1 Bottle 0    cetirizine 10 MG Oral Tab Take 0.5 tablets (5 mg total) by mouth in the morning.

## 2025-06-13 NOTE — PATIENT INSTRUCTIONS
As discussed, do Epley's maneuver for your vertigo.      Weight Loss Advice :  A) Eat plant based diet and avoid ultra processed and fast foods.  B) Your portions should be a dinner plate. 1/2 should be vegetables, 1/4 lean protein (chicken, fish, turkey. Tofu), and 1/4 can be carbohydrates.   C)  Your main drink should be water rather than soda or alcohol or sweet coffees  D). Please try to exercise ( brisk walking or jogging) at least 30 minutes five times/week; and do muscle strengthening exercises ( lifting the weight, doing push ups or yoga)  twice a week    E). It is very important to get 7-9 hours of sleep per night

## 2025-06-23 DIAGNOSIS — E11.9 CONTROLLED TYPE 2 DIABETES MELLITUS WITHOUT COMPLICATION, WITHOUT LONG-TERM CURRENT USE OF INSULIN (HCC): ICD-10-CM

## 2025-06-24 RX ORDER — TIRZEPATIDE 15 MG/.5ML
15 INJECTION, SOLUTION SUBCUTANEOUS WEEKLY
Qty: 2 ML | Refills: 1 | Status: SHIPPED | OUTPATIENT
Start: 2025-06-24

## 2025-06-24 NOTE — TELEPHONE ENCOUNTER
Refill request  Requesting   Requested Prescriptions     Pending Prescriptions Disp Refills    Tirzepatide (MOUNJARO) 15 MG/0.5ML Subcutaneous Solution Auto-injector 2 mL 1     Sig: Inject 15 mg into the skin once a week.       LOV: 6/06/2025 tele  RTC: 12/09/2025  Last Relevant Labs: na  Filled: 4/27/2025 #2 ml with 1 refills    Future Appointments   Date Time Provider Department Center   7/10/2025  3:00 PM Noel Chavarria MD ENINAPER2 [facility] [place]   8/18/2025  8:00 AM Morelia Rousseau APN [facility] [place]   10/10/2025  1:40 PM BBK ROGELIO RM1 [facility] [place]   12/9/2025  8:15 AM Silvia Small MD [facility]   12/9/2025  9:00 AM Kay Cooper APRN [facility] [address]   12/9/2025  1:45 PM Nik Pratt MD NP [facility]

## 2025-07-07 ENCOUNTER — OFFICE VISIT (OUTPATIENT)
Dept: SURGERY | Facility: CLINIC | Age: 55
End: 2025-07-07
Payer: COMMERCIAL

## 2025-07-07 ENCOUNTER — TELEPHONE (OUTPATIENT)
Dept: SURGERY | Facility: CLINIC | Age: 55
End: 2025-07-07

## 2025-07-07 VITALS
SYSTOLIC BLOOD PRESSURE: 126 MMHG | DIASTOLIC BLOOD PRESSURE: 78 MMHG | HEIGHT: 62.2 IN | BODY MASS INDEX: 43.97 KG/M2 | WEIGHT: 242 LBS | HEART RATE: 89 BPM | OXYGEN SATURATION: 95 %

## 2025-07-07 DIAGNOSIS — R93.89 ABNORMAL COMPUTED TOMOGRAPHY ANGIOGRAPHY (CTA): Primary | ICD-10-CM

## 2025-07-07 PROCEDURE — 3074F SYST BP LT 130 MM HG: CPT | Performed by: NEUROLOGICAL SURGERY

## 2025-07-07 PROCEDURE — 3078F DIAST BP <80 MM HG: CPT | Performed by: NEUROLOGICAL SURGERY

## 2025-07-07 PROCEDURE — 3008F BODY MASS INDEX DOCD: CPT | Performed by: NEUROLOGICAL SURGERY

## 2025-07-07 PROCEDURE — 99203 OFFICE O/P NEW LOW 30 MIN: CPT | Performed by: NEUROLOGICAL SURGERY

## 2025-07-07 NOTE — H&P
[facility] ([place], [place]) [place]  Neurological Surgery Clinic Note    Lizette Camp  1/14/1970  [id number]  PCP: Ibeth Crawford DO    REASON FOR VISIT:  Possible left communicating segment internal carotid artery aneurysm versus infundibulum    HISTORY OF PRESENT ILLNESS:  Lizette Camp is a 55 year old female who presents to clinic for evaluation of a possible left communicating segment internal carotid artery aneurysm versus infundibulum found during workup of an episode of vertigo, nausea, and diarrhea the beginning of June 2025.  She reports that the symptoms have completely resolved.  She denies a history of smoking or illicit drug use. Denies a history of thunderclap headache, or family history of aneurysm, intracranial hemorrhage, or sudden unexpected death.  CTA head and neck 6/8/2025 demonstrates a small left communicating segment internal carotid artery aneurysm versus infundibulum.    PAST MEDICAL HISTORY:  Past Medical History[1]    PAST SURGICAL HISTORY:  Past Surgical History[2]    FAMILY HISTORY:  family history includes Anemia in her mother; BRCA gene + in her niece; Breast Cancer (age of onset: 50) in her paternal cousin female; Breast Cancer (age of onset: 53) in her maternal aunt; Breast Cancer (age of onset: 55) in her maternal aunt; Cancer in her father, maternal aunt, maternal grandfather, and paternal aunt; Cancer (age of onset: 43) in her self; Diabetes in her mother; Heart Attack in her father; Hypertension in her father; Other in her mother.    SOCIAL HISTORY:   reports that she has never smoked. She has never been exposed to tobacco smoke. She has never used smokeless tobacco. She reports that she does not currently use alcohol. She reports that she does not use drugs.    ALLERGIES:  Allergies[3]    MEDICATIONS:  Medications Ordered Prior to Encounter[4]    REVIEW OF SYSTEMS:  A 10-point system was reviewed.  Pertinent positives and negatives are noted in  HPI.      PHYSICAL EXAMINATION:  VITAL SIGNS: /78   Pulse 89   Ht 62.2\"   Wt 242 lb (109.8 kg)   LMP 04/06/2023   SpO2 95%   BMI 43.98 kg/m²     A&Ox3, no acute distress  PERRL, EOMi, FS  Full strength x 4, no drift    ASSESSMENT:  56yo female with a small left communicating segment internal carotid artery aneurysm versus infundibulum    We discussed the difference between an aneurysm and an infundibulum, the natural history of intracranial aneurysms, and the risks of further workup and treatment.  We reviewed the imaging together.  We discussed modifiable risk factors.  We discussed the signs and symptoms of subarachnoid hemorrhage and that emergency medical attention should be sought for these symptoms.  After this detailed discussion and answering all questions, the patient wished to consider further observation with serial imaging.    Plan:  Follow-up in 1 year with an MRA brain without contrast    Noel Chavarria MD  Neurological Surgery  [facility] Time: 30 min including face to face time, chart review, imaging interpretation, and coordination of care         [1]   Past Medical History:   Abnormal uterine bleeding    Allergic rhinitis    Amenorrhea    Anemia    Anxiety    Arthritis    Cancer (HCC)    Predominant Hodgkin's Lymphoma, REDIAGNOSED THIS YEAR 2023    COVID    Had ferver, cough, congestion, SOB x 6 weeks. No hospitalization or lingering S/S.    Depression    Diabetes (HCC)    Diabetes mellitus (HCC)    Diverticulosis of large intestine    Dysmenorrhea    Esophageal reflux    Essential hypertension    Exposure to medical diagnostic radiation    last dose 8/2012    Fibroids    Genital herpes simplex    High blood pressure    Obesity    ANAHY on CPAP    Osteoarthritis    knees and neck    Sexually transmitted disease    Sleep apnea    CPAP    Visual impairment    glasses   [2]   Past Surgical History:  Procedure Laterality Date     Adenoidectomy  1998    Benign biopsy left      Colonoscopy  2010    Colonoscopy  03/2021    Egd N/A 03/01/2021    Procedure: ESOPHAGOGASTRODUODENOSCOPY, COLONOSCOPY, POSSIBLE BIOPSY, POSSIBLE POLYPECTOMY 88656, 83678;  Surgeon: Candice Chan MD;  Location: [facility], [facility]    Hand/finger surgery unlisted      hand incision tendon sheath of a finger    Bautista biopsy stereo nodule 1 site left (cpt=19081)  10yrs  back    ciara    Bautista biopsy stereo nodule 1 site right (cpt=19081)  04/2018    3 SITE, RADIAL SCAR    Bautista biopsy stereo nodule 2 site bilat (cpt=19081/28929)  2009    NON HODGKINS LYMPHOMA/RADIATION THERAPY 8/10    Bautista localization wire 1 site right (cpt=19281)  01/2020    papilloma    Needle biopsy right  11/2019    papilloma    Other surgical history  2008    neuroplasty w/ transposition of ulnar nerve at elbow left                         Other surgical history  2009    uterine myomectomy for uterine fibroids    Other surgical history  2018    R breast bx (x3)    Revise median n/carpal tunnel surg      laterality and bilateral    Tonsillectomy  2000    w/ adenoidectomy    Upper gi endoscopy,exam  2010   [3]   Allergies  Allergen Reactions    Guaifenesin Er JITTERY    Mucinex JITTERY    Sudafed JITTERY    Dog Epithelium OTHER (SEE COMMENTS)    Gramineae Pollens OTHER (SEE COMMENTS)    Short Ragweed Pollen Ext OTHER (SEE COMMENTS)   [4]   Current Outpatient Medications on File Prior to Visit   Medication Sig Dispense Refill    Tirzepatide (MOUNJARO) 15 MG/0.5ML Subcutaneous Solution Auto-injector Inject 15 mg into the skin once a week. 2 mL 1    EVENING PRIMROSE OIL OR Take by mouth.      montelukast 10 MG Oral Tab Take 1 tablet (10 mg total) by mouth nightly. 30 tablet 3    albuterol 108 (90 Base) MCG/ACT Inhalation Aero Soln Inhale 1 puff into the lungs every 8 (eight) hours as needed for Wheezing.      Bioflavonoid Products (QUERCETIN COMPLEX IMMUNE OR) Take by mouth.      azelastine 0.1 % Nasal Solution  2 sprays by Nasal route 2 (two) times daily as needed for Rhinitis. 1 each 1    clobetasol 0.05 % External Ointment       valACYclovir 500 MG Oral Tab 1 pill PO BID For 3 days as needed 30 tablet 1    Polyethylene Glycol 3350 (MIRALAX) 17 g Oral Powd Pack Take 17 g by mouth in the morning.      Omeprazole 40 MG Oral Capsule Delayed Release Take 1 capsule (40 mg total) by mouth in the morning and 1 capsule (40 mg total) in the evening. Take before meals.      Multiple Vitamins-Minerals (MULTI-VITAMIN/MINERALS) Oral Tab Take 1 tablet by mouth in the morning.      Probiotic Product (PROBIOTIC DAILY) Oral Cap Take 1 capsule by mouth in the morning.      Cholecalciferol (VITAMIN D) 2000 units Oral Cap Take 1 capsule (2,000 Units total) by mouth in the morning.      Fluticasone Propionate (FLONASE) 50 MCG/ACT Nasal Suspension 2 sprays in each nostril daily as needed 1 Bottle 0    cetirizine 10 MG Oral Tab Take 0.5 tablets (5 mg total) by mouth in the morning.      meclizine 25 MG Oral Tab Take 1 tablet (25 mg total) by mouth 3 (three) times daily as needed. (Patient not taking: Reported on 7/7/2025) 30 tablet 0    amoxicillin clavulanate 875-125 MG Oral Tab Take 1 tablet by mouth 2 (two) times daily. (Patient not taking: Reported on 7/7/2025) 20 tablet 0    TURMERIC OR Take by mouth. (Patient not taking: Reported on 7/7/2025)       No current facility-administered medications on file prior to visit.

## 2025-07-07 NOTE — PROGRESS NOTES
The following individual(s) verbally consented to be recorded using ambient AI listening technology and understand that they can each withdraw their consent to this listening technology at any point by asking the clinician to turn off or pause the recording:    Patient name: Lizette Camp  Additional names:     given to family

## 2025-07-07 NOTE — PATIENT INSTRUCTIONS
Refill policies:    Allow 2-3 business days for refills; controlled substances may take longer.  Contact your pharmacy at least 5 days prior to running out of medication and have them send an electronic request or submit request through the “request refill” option in your [employer] account.  Refills are not addressed on weekends; covering physicians do not authorize routine medications on weekends.  No narcotics or controlled substances are refilled after noon on Fridays or by on call physicians.  By law, narcotics must be electronically prescribed.  A 30 day supply with no refills is the maximum allowed.  If your prescription is due for a refill, you may be due for a follow up appointment.  To best provide you care, patients receiving routine medications need to be seen at least once a year.  Patients receiving narcotic/controlled substance medications need to be seen at least once every 3 months.  In the event that your preferred pharmacy does not have the requested medication in stock (e.g. Backordered), it is your responsibility to find another pharmacy that has the requested medication available.  We will gladly send a new prescription to that pharmacy at your request.    Scheduling Tests:    If your physician has ordered radiology tests such as MRI or CT scans, please contact Central Scheduling at [phone] right away to schedule the test.  Once scheduled, the [facility] Centralized Referral Team will work with your insurance carrier to obtain pre-certification or prior authorization.  Depending on your insurance carrier, approval may take 3-10 days.  It is highly recommended patients assure they have received an authorization before having a test performed.  If test is done without insurance authorization, patient may be responsible for the entire amount billed.      Precertification and Prior Authorizations:  If your physician has recommended that you have a procedure or additional testing performed the [facility]  Centralized Referral Team will contact your insurance carrier to obtain pre-certification or prior authorization.    You are strongly encouraged to contact your insurance carrier to verify that your procedure/test has been approved and is a COVERED benefit.  Although the [facility] Centralized Referral Team does its due diligence, the insurance carrier gives the disclaimer that \"Although the procedure is authorized, this does not guarantee payment.\"    Ultimately the patient is responsible for payment.   Thank you for your understanding in this matter.  Paperwork Completion:  If you require FMLA or disability paperwork for your recovery, please make sure to either drop it off or have it faxed to our office at [phone]. Be sure the form has your name and date of birth on it.  The form will be faxed to our Forms Department and they will complete it for you.  There is a 25$ fee for all forms that need to be filled out.  Please be aware there is a 10-14 day turnaround time.  You will need to sign a release of information (NIDIA) form if your paperwork does not come with one.  You may call the Forms Department with any questions at [phone].  Their fax number is [phone].

## 2025-07-07 NOTE — TELEPHONE ENCOUNTER
Message received from Provider.    \"Follow up in 1 year with Elke with an MRA brain without contrast.\"    Patient reminder placed to appear June 2026.

## 2025-08-05 ENCOUNTER — OFFICE VISIT (OUTPATIENT)
Facility: LOCATION | Age: 55
End: 2025-08-05

## 2025-08-05 VITALS — HEIGHT: 62 IN | BODY MASS INDEX: 44.53 KG/M2 | WEIGHT: 242 LBS

## 2025-08-05 DIAGNOSIS — R42 DIZZINESS: ICD-10-CM

## 2025-08-05 DIAGNOSIS — R42 VERTIGO: Primary | ICD-10-CM

## 2025-08-05 PROCEDURE — 3008F BODY MASS INDEX DOCD: CPT | Performed by: STUDENT IN AN ORGANIZED HEALTH CARE EDUCATION/TRAINING PROGRAM

## 2025-08-05 PROCEDURE — 99213 OFFICE O/P EST LOW 20 MIN: CPT | Performed by: STUDENT IN AN ORGANIZED HEALTH CARE EDUCATION/TRAINING PROGRAM

## 2025-08-05 RX ORDER — PREDNISONE 5 MG/1
TABLET ORAL
Qty: 35 TABLET | Refills: 0 | Status: SHIPPED | OUTPATIENT
Start: 2025-08-05 | End: 2025-08-20

## 2025-08-11 ENCOUNTER — TELEPHONE (OUTPATIENT)
Dept: OTOLARYNGOLOGY | Facility: CLINIC | Age: 55
End: 2025-08-11

## 2025-08-12 RX ORDER — MONTELUKAST SODIUM 10 MG/1
10 TABLET ORAL NIGHTLY
Qty: 30 TABLET | Refills: 2 | Status: SHIPPED | OUTPATIENT
Start: 2025-08-12

## 2025-08-17 DIAGNOSIS — E11.9 CONTROLLED TYPE 2 DIABETES MELLITUS WITHOUT COMPLICATION, WITHOUT LONG-TERM CURRENT USE OF INSULIN (HCC): ICD-10-CM

## 2025-08-18 ENCOUNTER — OFFICE VISIT (OUTPATIENT)
Dept: OBGYN CLINIC | Facility: CLINIC | Age: 55
End: 2025-08-18

## 2025-08-18 VITALS
HEIGHT: 62 IN | WEIGHT: 247 LBS | DIASTOLIC BLOOD PRESSURE: 70 MMHG | HEART RATE: 77 BPM | BODY MASS INDEX: 45.45 KG/M2 | SYSTOLIC BLOOD PRESSURE: 124 MMHG

## 2025-08-18 DIAGNOSIS — N76.0 VAGINITIS AND VULVOVAGINITIS: Primary | ICD-10-CM

## 2025-08-18 DIAGNOSIS — Z01.419 WELL WOMAN EXAM WITH ROUTINE GYNECOLOGICAL EXAM: ICD-10-CM

## 2025-08-18 DIAGNOSIS — N95.1 VASOMOTOR SYMPTOMS DUE TO MENOPAUSE: ICD-10-CM

## 2025-08-18 RX ORDER — FLUCONAZOLE 150 MG/1
TABLET ORAL
Qty: 2 TABLET | Refills: 0 | Status: SHIPPED | OUTPATIENT
Start: 2025-08-18

## 2025-08-18 RX ORDER — TRIAMCINOLONE ACETONIDE 1 MG/G
1 OINTMENT TOPICAL 2 TIMES DAILY
Qty: 30 G | Refills: 0 | Status: SHIPPED | OUTPATIENT
Start: 2025-08-18 | End: 2025-08-28

## 2025-08-18 RX ORDER — TIRZEPATIDE 15 MG/.5ML
15 INJECTION, SOLUTION SUBCUTANEOUS WEEKLY
Qty: 2 ML | Refills: 3 | Status: SHIPPED | OUTPATIENT
Start: 2025-08-18

## (undated) DIAGNOSIS — C81.02 NODULAR LYMPHOCYTE PREDOMINANT HODGKIN LYMPHOMA OF INTRATHORACIC LYMPH NODES (HCC): Primary | ICD-10-CM

## (undated) DIAGNOSIS — Z85.71 PERSONAL HISTORY OF HODGKIN LYMPHOMA: Primary | ICD-10-CM

## (undated) DIAGNOSIS — R94.8 ABNORMAL POSITRON EMISSION TOMOGRAPHY (PET) SCAN: ICD-10-CM

## (undated) DIAGNOSIS — R70.0 ELEVATED ERYTHROCYTE SEDIMENTATION RATE: ICD-10-CM

## (undated) DIAGNOSIS — R92.8 ABNORMAL MRI, BREAST: Primary | ICD-10-CM

## (undated) DEVICE — STERILE POLYISOPRENE POWDER-FREE SURGICAL GLOVES: Brand: PROTEXIS

## (undated) DEVICE — SLEEVE COMPR M KNEE LEN SGL USE KENDALL SCD

## (undated) DEVICE — HEAD AND NECK CDS-LF: Brand: MEDLINE INDUSTRIES, INC.

## (undated) DEVICE — 3M™ TEGADERM™ TRANSPARENT FILM DRESSING, 1626W, 4 IN X 4-3/4 IN (10 CM X 12 CM), 50 EACH/CARTON, 4 CARTON/CASE: Brand: 3M™ TEGADERM™

## (undated) DEVICE — SUTURE VICRYL 3-0 SH

## (undated) DEVICE — HEMOCLIP HORIZON SM 001200

## (undated) DEVICE — 2000CC GUARDIAN II: Brand: GUARDIAN

## (undated) DEVICE — HEMOCLIP HORIZON MED 002200

## (undated) DEVICE — SET TB INFLO FOR TRUCLEAR SYS HYSTEROLUX

## (undated) DEVICE — GOWN,SIRUS,FABRIC-REINFORCED,LARGE: Brand: MEDLINE

## (undated) DEVICE — SHEET,DRAPE,40X58,STERILE: Brand: MEDLINE

## (undated) DEVICE — KENDALL SCD EXPRESS SLEEVES, KNEE LENGTH, MEDIUM: Brand: KENDALL SCD

## (undated) DEVICE — 3M™ STERI-DRAPE™ INSTRUMENT POUCH 1018: Brand: STERI-DRAPE™

## (undated) DEVICE — GYN CDS: Brand: MEDLINE INDUSTRIES, INC.

## (undated) DEVICE — MEDI-VAC SUCTION HANDLE REGULAR CAPACITY: Brand: CARDINAL HEALTH

## (undated) DEVICE — #15 STERILE STAINLESS BLADE: Brand: STERILE STAINLESS BLADES

## (undated) DEVICE — SUTURE SILK 2-0 FS

## (undated) DEVICE — SUTURE MONOCRYL 4-0 PS-2

## (undated) DEVICE — UNDYED BRAIDED (POLYGLACTIN 910), SYNTHETIC ABSORBABLE SUTURE: Brand: COATED VICRYL

## (undated) DEVICE — CONT SPEC SURG FAXITRON WEDGE

## (undated) DEVICE — SPECIMEN SOCK - STANDARD: Brand: MEDI-VAC

## (undated) DEVICE — HYSTEROSCOPIC OUTFLOW TUBE SET

## (undated) DEVICE — SLEEVE KENDALL SCD EXPRESS MED

## (undated) DEVICE — ELITE HYSTEROSCOPE SEAL: Brand: TRUCLEAR

## (undated) DEVICE — UNDERPAD INCNT 30X30IN PP HVY

## (undated) DEVICE — TOWEL OR BLU 16X26 STRL

## (undated) DEVICE — PROXIMATE SKIN STAPLERS (35 WIDE) CONTAINS 35 STAINLESS STEEL STAPLES (FIXED HEAD): Brand: PROXIMATE

## (undated) DEVICE — LIGACLIP MCA MULTIPLE CLIP APPLIERS, 30 MEDIUM CLIPS: Brand: LIGACLIP

## (undated) DEVICE — DRAPE PACK CHEST & U BAR

## (undated) DEVICE — SHEET, DRAPE, SPLIT, STERILE: Brand: MEDLINE

## (undated) DEVICE — NON-ADHERENT PAD PREPACK: Brand: TELFA

## (undated) DEVICE — BREAST-HERNIA-PORT CDS-LF: Brand: MEDLINE INDUSTRIES, INC.

## (undated) DEVICE — SUT VICRYL 3-0 SH J416H

## (undated) DEVICE — ABDOMINAL PAD: Brand: DERMACEA

## (undated) DEVICE — SOLUTION IRRIG 3000ML 0.9% NACL FLX CONT

## (undated) DEVICE — SOLUTION IRRIG 1000ML 0.9% NACL USP BTL

## (undated) DEVICE — SOL  .9 1000ML BTL

## (undated) DEVICE — STERILE SYNTHETIC POLYISOPRENE POWDER-FREE SURGICAL GLOVES WITH HYDROGEL COATING: Brand: PROTEXIS

## (undated) DEVICE — DRAPE,TAPE STRIPS,STERILE: Brand: MEDLINE

## (undated) DEVICE — MEDI-VAC NON-CONDUCTIVE SUCTION TUBING: Brand: CARDINAL HEALTH

## (undated) DEVICE — STERILE LATEX POWDER-FREE SURGICAL GLOVES WITH HYDROGEL COATING, SMOOTH FINISH, STRAIGHT FINGER: Brand: PROTEXIS

## (undated) DEVICE — PREMIUM WET SKIN PREP TRAY: Brand: MEDLINE INDUSTRIES, INC.

## (undated) DEVICE — ELECTRODE ESURG 2.75IN EZ CLN

## (undated) DEVICE — SUT SILK 2-0 SH K833H

## (undated) DEVICE — SOFT TISSUE SHAVER MINI: Brand: TRUCLEAR

## (undated) DEVICE — CAUTERY BLADE 2IN INS E1455

## (undated) NOTE — MR AVS SNAPSHOT
[address]  [phone]               Thank you for choosing us for your health care visit with Dorothy Dutton PT. We are glad to serve you and happy to provide you with this summary of your visit.   Please help Current Medications          This list is accurate as of: 4/18/17  5:25 PM.  Always use your most recent med list.                ACCU-CHEK FASTCLIX LANCETS Misc   TEST X1 DAILY           Azelastine HCl 0.05 % Soln   Place 1 drop into both eye

## (undated) NOTE — MR AVS SNAPSHOT
[place]  [address] [facility] [address]  [address] [facility] [address] [phone]               Thank you for choosing us for your health care visit with Beata Aguiar MD.  We are glad to serve you and happy to provide you with this s [place], [address]    [address] [facility] [address]                                      [address]: [phone]    [address]        [address]:  [phone]  [facility] in [facility] in [address] [facility], [address] Commonly known as:  OPTIVAR           EPINEPHrine 0.3 MG/0.3ML Soaj   Take as directed for anaphylaxis, then call 911.    Commonly known as:  EPIPEN 2-CHLOE           Fluticasone Propionate 50 MCG/ACT Susp   2 sprays in each nostril daily as needed   Commonly Traansmission questions? Call [phone] for help. Traansmission is NOT to be used for urgent needs. For medical emergencies, dial 911.            Visit [facility] [employer] online at  [email or web address]

## (undated) NOTE — MR AVS SNAPSHOT
[address]  [phone]               Thank you for choosing us for your health care visit with James Estevez PT. We are glad to serve you and happy to provide you with this summary of your visit.   Please help the building. For security purposes, please check in with the reception staff at every visit.                                           Apr 18, 2017  4:45 PM   PT VISIT BY THERAPIST with Loyce Goodpasture, PT   THE [facility] OF [facility] in [facility] ([place] [address] Take as directed for anaphylaxis, then call 911.    Commonly known as:  EPIPEN 2-CHLOE           Fluticasone Propionate 50 MCG/ACT Susp   2 sprays in each nostril daily as needed   Commonly known as:  FLONASE           hydrochlorothiazide 25 MG Tabs   Take 1

## (undated) NOTE — MR AVS SNAPSHOT
[address]  [phone]               Thank you for choosing us for your health care visit with Oxana Nayak PT. We are glad to serve you and happy to provide you with this summary of your visit.   Please he available in the parking lot in front of the [employer]. When you enter the [employer], please check in with the  reception staff. They will take your name and direct you to our P.T. clinic within the building.  For security purposes, ple Instructions and Information about Your Health     None      Allergies as of Jun 13, 2017     Mucinex Jittshaun    TBCR    Sudafed Jittery    Tabs;                   Current Medications          This list is accurate as of: 6/13/17  2:21 PM.  Always use your [address] [facility], [place] [address]     Phone:  [phone]    - Empagliflozin 10 MG Tabs  - ergocalciferol 60226 units Caps            MyChart     Visit MyChart  You can access your MyChart to more actively manage your health care

## (undated) NOTE — Clinical Note
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 .. I told her this was ok, but that I would send you a message.  VIKAS Mcclendon

## (undated) NOTE — LETTER
MEDICATION CONTRAINDICATION     Patient Name: Antionette River CSN: [id number]  -Age / Sex: 1970-A: 45 y  female Medical Records: [id number]    Surgeon(s):  Evy Armas MD  Procedure: Wire localized excisional biopsy right groin, Right    Procedure Comments: Wire localized excisional biopsy right groin   Anesthesia Type: General    The above patient has a contraindication to the medication ordered from your standing orders/[facility] Pre-Op STanding orders listed below. If you would like the medication given, please fax this form back indicating the override reason with physician signature/date/time. Patient has contraindication to Valium (part of IR standing orders for localization) due to Sudafed allergy (made her jittery).                     ___  Benefit outweighs risk   ___  Insignificant               ___ Low risk                 ___ Not a true allergy              ___ Dose appropriate                ___  Tolerated regimen in the past     Physician Signature: ________________________ Date/Time: ______________  Destini Downey FORM BACK TO:  [phone]  Rev 14

## (undated) NOTE — MR AVS SNAPSHOT
After Visit Summary   12/21/2020    George Daugherty    MRN: [id number]           Visit Information     Date & Time  12/21/2020  5:00 PM Provider  VIKAS Quiles Department  [facility] [address], [address] [place] [address]  Dept.  Phone  [phone] Diagnoses for This Visit    Well woman exam with routine gynecological exam   [454671]  -  Primary  Cervical cancer screening   [107893]    Perimenopausal   [570774]             Follow-up    Return in about 1 year (around 12/21/2021).      We Ordered the Fo Primary Care Providers  Treatment for mild illness or injury that does not require immediate attention VIDEO VISITS  Average cost  $35*    e-VISTS  Average cost  $35*     SAME DAY APPOINTMENTS   Available at primary care offices    [address]

## (undated) NOTE — Clinical Note
Bettie Story Morning wanted to see what you thought about stopping jardance on Lizette, I don't think she is getting a lot benefit with weight loss, and blood pressure has been looking good (she has been on it since 6/2017) and a1c looks good with the ozempic. Bijan Cabrera

## (undated) NOTE — Clinical Note
Orin Ocampo, valarie Boyd. Her EKG done earlier this month wasn't really that significantly different from the one in 2017 which at that time, ultimately led her to a stress echo test w/ normal result. She's going to get a stress test again in the AM on 1/31.

## (undated) NOTE — MR AVS SNAPSHOT
[place]  [address] [facility] [address]  [address] [facility] [address] [phone]               Thank you for choosing us for your health care visit with Severa Boop, MD.  We are glad to serve you and happy to provide you with this s BP Pulse Temp Height Weight BMI    132/84 mmHg 81 98.3 °F (36.8 °C) (Oral) 62\" 256 lb 46.81 kg/m2    Breastfeeding?                    No              Current Medications          This list is accurate as of: 4/26/17  4:18 PM.  Always use your most recent You can access your MyChart to more actively manage your health care and view more details from this visit by going to [email or web address].   If you've recently had a stay at the Hospital you can access your discharge instructions in [address] by tom

## (undated) NOTE — Clinical Note
Patient Name: Cindy Carcamo  YOB: 1970          MRN number:  [id number]  Date:  3/16/2017  Referring Physician:  Oj Comer EVALUATION:    Referring Physician: Dr. Hudson Sheth  Diagnosis: R knee pain, B Zaman Ness Date of Service: 3/16/2017  (as distribution has no pattern, this may be related to a diabetic neuropathy)  Gait: decreased sumi and step length as well as decreased knee flexion in sweing.   Palpation: TTP in medial joint line in R.  Stiffness in distal ITB and hamstring.

## (undated) NOTE — LETTER
Patient Name: Qi Marinelli  YOB: 1970          MRN number:  [id number]  Date:  6/28/2018  Referring Physician:  Allen Leonard     Discharge Summary    Pt has attended 8, cancelled 0, and no shown 0 visits in Occupational Therapy.      Subjec 5-  fabricate L LF Ring Orthosis for L LF to decrease \"triggering\" symptoms    3 visits.  MET  Pt will be independent and compliant with comprehensive HEP to maintain progress achieved in OT (12visits) MET     FOTO SCORE: 72/100  Plan:   D/C to HEP   Tere

## (undated) NOTE — MR AVS SNAPSHOT
[address]  [phone]               Thank you for choosing us for your health care visit with Villa Norris PT. We are glad to serve you and happy to provide you with this summary of your visit.   Please help available in the parking lot in front of the [employer]. When you enter the [employer], please check in with the  reception staff. They will take your name and direct you to our P.T. clinic within the building.  For security purposes, ple Mucinex Jittery    TBCR    Sudafed Jittery    Tabs;                   Current Medications          This list is accurate as of: 3/23/17  5:36 PM.  Always use your most recent med list.                ACCU-CHEK FASTCLIX LANCETS Misc   TEST X1 DAILY Visit [facility] online at  [email or web address]

## (undated) NOTE — Clinical Note
Dear Mary Hodge MD She is going to discuss with you about possibly adding saxenda or victoza injection to help with weight loss and decrease a1c Our mutual patient,GEM  is participating in our medical weight-loss program. We have been working togethe

## (undated) NOTE — MR AVS SNAPSHOT
[address], [facility] [address] [place] [address] [phone]               Thank you for choosing us for your health care visit with Familia Dodge MD.  We are glad to serve you and happy to provide you with this summar Assoc Dx:  Pelvic pain [R10.2], Irregular menses [N92.6]                 Follow-up Instructions     Return in about 1 month (around 6/5/2017) for follow up.       Scheduling Instructions     Friday May 05, 2017     Imaging:  US PELVIS (TRANSABDOMINAL PELVI Take 1 tablet by mouth daily. RaNITidine HCl 300 MG Tabs   Take 1 tablet (300 mg total) by mouth nightly. Commonly known as:  ZANTAC           ZYRTEC ALLERGY 10 MG Tabs   Generic drug:  cetirizine   Take 1 tablet by mouth daily.

## (undated) NOTE — MR AVS SNAPSHOT
After Visit Summary   12/16/2019    Irma Avila    MRN: [id number]           Visit Information     Date & Time  12/16/2019  4:30 PM Provider  VIKAS March Department  [facility] [address], [address]  Dept.  Phone  [phone] Cetirizine HCl (ZYRTEC ALLERGY) 10 MG Oral Tab Take 1 tablet by mouth daily.       Diagnoses for This Visit    Well woman exam with routine gynecological exam   [699974]  -  Primary  Cervical cancer screening   [418920]             Follow-up    Return in a Treatment for mild  illness or injury that does  not require immediate attention.           Average cost  $70*       VIDEO VISITS  Visit face-to-face with a [facility] physician or [facility]  using your mobile device or computer   using [employer]at

## (undated) NOTE — LETTER
OUTSIDE TESTING RESULT REQUEST     IMPORTANT: FOR YOUR IMMEDIATE ATTENTION  Please FAX all test results listed below to: [phone]     Testing already done on or about: 23     * * * * If testing is NOT complete, arrange with patient A.S.A.P. * * * *    Patient Name: Collin Carrillo  Surgery Date: 2023  Medical Record: [id number]  CSN: [id number]  : 1970 - A: 52 y     Sex: female  Surgeon(s):  Erwin Vargas MD  Procedure: Karlie Sissy HYSTEROSCOPY, dilation and curettage, possible polypectomy  Anesthesia Type: Choice     Surgeon: Erwin Vargas MD     The following Testing and Time Line are REQUIRED PER ANESTHESIA     EKG READ AND SIGNED WITHIN   90 days  CBC, Platelet; NO Differential within  30 days  BMP (requires 4 hour fast) within  90 days      Thank You,   Sent by: Scot Owens

## (undated) NOTE — MR AVS SNAPSHOT
[place]  17 [facility] 100  [place] [address]  [phone]               Thank you for choosing us for your health care visit with Zenon Mcknight MD.  We are glad to serve you and happy to provide you with this summa JOLIVETTE 0.35 MG Tabs   Generic drug:  Norethindrone           MetFORMIN HCl 500 MG Tabs   TAKE 1 TABLET(500 MG) BY MOUTH TWICE DAILY WITH MEALS   What changed:  See the new instructions.    Commonly known as:  GLUCOPHAGE           omeprazole 20 MG Cpdr Waylon Smith MD   [address] [place] [address]   14 [facility]   Phone:  [phone]   Fax:  [phone]    Diagnoses:  Abnormal menses   Order:  Obg - Internal    Roger [facility], [address] [address].   Phone:  [phone] If you are confident that your benefit plan will not require a referral or authorization, such as [place] Medicaid, please feel free to schedule your appointment immediately.  However, if you are unsure about the requirements for authorization, please wait office, you can view your past visit information in [employer] by going to Visits < Visit Summaries. [employer] questions? Call [phone] for help. [employer] is NOT to be used for urgent needs. For medical emergencies, dial 911.            Visit [facility]

## (undated) NOTE — MR AVS SNAPSHOT
[address] [phone]               Thank you for choosing us for your health care visit with Sd Bartholomew MD.  We are glad to serve you and happy to provide you with this summary of [G47.33, Z99.89], Controlled type 2 diabetes mellitus without complication, without long-term current use of insulin (HCC) [E11.9]           Leptin, Serum    Complete by:   May 16, 2017 (Approximate)    Assoc Dx:  Encounter for therapeutic drug monitoring [ Azelastine HCl 0.05 % Soln   Place 1 drop into both eyes daily. Commonly known as:  OPTIVAR           EPINEPHrine 0.3 MG/0.3ML Soaj   Take as directed for anaphylaxis, then call 911.    Commonly known as:  EPIPEN 2-CHLOE           Fluticasone Propio

## (undated) NOTE — ED AVS SNAPSHOT
[facility] in [address] [place] [address] Drive,[address]Th Floor    600 [facility]    Phone:  [phone]    Fax:  27 [address]   MRN: [id number]    Department:  [facility] Im[facility] in [facility] END   Date of Visit:  3/11/2017 Apr 06, 2017  4:45 PM   PT VISIT BY THERAPIST with James Estevez, PT   THE [facility] Physical Therapy in [address] [place] [address] ([place])    638 [facility]            Apr 11, 2017  4:45 PM   PT VISIT BY THERAPIST with James Estevez Phone:  [phone]    - Amoxicillin-Pot Clavulanate 875-125 MG Tabs  - ciprofloxacin HCl 0.3 % Soln              Discharge Instructions         · Rx Augmentin 875 mg twice daily for the next 10 days.  Take daily probiotics to avoid GI distress from the Insurance plans vary and the physician(s) referred by the Immediate Care may not be covered by your plan. Please contact your insurance company to determine coverage for follow-up care and referrals. [facility]  [address] If you have been prescribed any medication(s), please fill your prescription right away and begin taking the medication(s) as directed.     If the Immediate Care Provider has read X-rays, these will be re-interpreted by a radiologist.  If there is a signifi can help with your Affordable Care Act coverage, as well as all types of Medicaid plans. To get signed up and covered, please call [phone] and ask to get set up for an insurance coverage that is in-network with Christi Blackmon

## (undated) NOTE — LETTER
01/22/21        [place]el[place] Ortiz [id number]      Dear Jose Roberto Garg,    [facility] records indicate that you have outstanding lab work and or testing that was ordered for you and has not yet been completed:  Orders Placed This Encounter

## (undated) NOTE — LETTER
Date & Time: 2/9/2019, 2:11 PM  Patient: Sal Coronado  Encounter Provider(s):    Danial Gosselin, MD       To Whom It May Concern:    Joan Webber was seen and treated in our department on 2/9/2019.  She is advised only paper/pencil work with left wells

## (undated) NOTE — MR AVS SNAPSHOT
[address]  [phone]               Thank you for choosing us for your health care visit with Pam Lawrence PT. We are glad to serve you and happy to provide you with this summary of your visit.   Please help the building. For security purposes, please check in with the reception staff at every visit.                                           Apr 03, 2017  5:30 PM   Exam - New Patient with Colette Nolen MD   [facility] 26, [address] Right knee pain, unspecified chronicity    -  Primary    Primary osteoarthritis of both knees          Instructions and Information about Your Health     None      Allergies as of Mar 16, 2017     Mucinex Devttshaun    TBCR    Sudafed Jittery    Tabs; If you've recently had a stay at the Hospital you can access your discharge instructions in [employer] by going to Visits < Admission Summaries.  If you've been to the Emergency Department or your doctor's office, you can view your past visit information in My

## (undated) NOTE — MR AVS SNAPSHOT
[address]  [phone]               Thank you for choosing us for your health care visit with Vee Garcia PT. We are glad to serve you and happy to provide you with this summary of your visit.   Please help the building. For security purposes, please check in with the reception staff at every visit.                                           May 05, 2017  9:30 AM   Exam - New Patient with Colette Nolen MD   [facility] 50, [facility] Commonly known as:  ZANTAC           ZYRTEC ALLERGY 10 MG Tabs   Generic drug:  cetirizine   Take 1 tablet by mouth daily.                    MyChart     Visit MyChart  You can access your MyChart to more actively manage your health care and view more detai

## (undated) NOTE — Clinical Note
Dear Bertha Fonseca MD Our mutual Geraldo Davila  is participating in our medical weight-loss program. We have been working together on making lifestyle changes regarding nutrition, behaviors, and physical activity.   Please feel free to contact me with any

## (undated) NOTE — MR AVS SNAPSHOT
[address]  [phone]               Thank you for choosing us for your health care visit with Jessica Casas PT. We are glad to serve you and happy to provide you with this summary of your visit.   Please he Your appointment is scheduled at the [facility] Physical Therapy Department, inside the [employer], at [facility] (enter via [facility]).   Convenient parking is available in the parking lot in front of the Fi 2 sprays in each nostril daily as needed   Commonly known as:  FLONASE           hydrochlorothiazide 25 MG Tabs   Take 1 tablet (25 mg total) by mouth once daily.    Commonly known as:  HYDRODIURIL           JOLIVETTE 0.35 MG Tabs   Generic drug:  Norethind

## (undated) NOTE — MR AVS SNAPSHOT
[address]  [phone]               Thank you for choosing us for your health care visit with Dorothy Dutton PT. We are glad to serve you and happy to provide you with this summary of your visit.   Please help the building. For security purposes, please check in with the reception staff at every visit.                                           Apr 13, 2017  4:45 PM   PT VISIT BY THERAPIST with Dorothy Dutton PT   THE [facility] OF [facility] Physical Therapy in [facility] (EDW [address] Allergies as of Apr 04, 2017     Mucinex Jittery    TBCR    Sudafed Jittery    Tabs;                   Current Medications          This list is accurate as of: 4/4/17  5:19 PM.  Always use your most recent med list.                Beatris Fung For medical emergencies, dial 911.            Visit [facility] online at  [email or web address]

## (undated) NOTE — Clinical Note
Patient Name: Ciro Benitez  YOB: 1970          MRN number:  [id number]  Date:  4/25/2017  Referring Physician:  Jason Guerrero      Discharge Summary  Initial Functional Outcome Score 44/100  Final Functional Outcome Score 64/100  Number of Vis via fax as soon as possible to [phone]. If you have any questions, please contact me at Dept: [phone].     Sincerely,  Electronically signed by therapist: Loyce Goodpasture, PT

## (undated) NOTE — MR AVS SNAPSHOT
[address] [phone]               Thank you for choosing us for your health care visit with Светлана Luna MD.  We are glad to serve you and happy to provide you with this summary of Willie (enter via Dequan Barron). Convenient parking is available in the parking lot in front of the [employer]. When you enter the [employer], please check in with the  reception staff.  They will take your name and direct you Basic Metabolic Panel (8) [E]    Complete by:  Jun 13, 2017 (Approximate)    Assoc Dx:  Encounter for therapeutic drug monitoring [Z51.81], Obesity, Class III, BMI 40-49.9 (morbid obesity) ([facility] [E66.01], Low vitamin D level [E55.9], Controlled type 2 geri 0.35 mg daily. Meclizine HCl 25 MG Tabs   Take 1 tablet (25 mg total) by mouth 3 (three) times daily as needed for Dizziness.    Commonly known as:  ANTIVERT           MetFORMIN HCl 500 MG Tabs   TAKE 1 TABLET(500 MG) BY MOUTH TWICE DAILY WITH NICO

## (undated) NOTE — MR AVS SNAPSHOT
[address] [phone]               Thank you for choosing us for your health care visit with Leelee Calvo RD.   We are glad to serve you and happy to provide you with this summary of Your appointment is scheduled at the [facility] Physical Therapy Department, inside the [employer], at [facility] (enter via [facility]).   Convenient parking is available in the parking lot in front of the Fi Commonly known as:  OPTIVAR           EPINEPHrine 0.3 MG/0.3ML Soaj   Take as directed for anaphylaxis, then call 911.    Commonly known as:  EPIPEN 2-CHLOE           Fluticasone Propionate 50 MCG/ACT Susp   2 sprays in each nostril daily as needed   Commonly